# Patient Record
Sex: MALE | Race: WHITE | Employment: OTHER | URBAN - METROPOLITAN AREA
[De-identification: names, ages, dates, MRNs, and addresses within clinical notes are randomized per-mention and may not be internally consistent; named-entity substitution may affect disease eponyms.]

---

## 2017-03-02 PROBLEM — R09.89 DECREASED PULSES IN FEET: Status: ACTIVE | Noted: 2017-03-02

## 2017-03-02 PROBLEM — M79.89 LEG SWELLING: Status: ACTIVE | Noted: 2017-03-02

## 2017-03-02 PROBLEM — I87.2 CHRONIC VENOUS INSUFFICIENCY: Status: ACTIVE | Noted: 2017-03-02

## 2019-02-14 ENCOUNTER — OFFICE VISIT (OUTPATIENT)
Dept: CARDIOLOGY CLINIC | Age: 69
End: 2019-02-14
Payer: MEDICARE

## 2019-02-14 VITALS
HEIGHT: 67 IN | BODY MASS INDEX: 38.77 KG/M2 | SYSTOLIC BLOOD PRESSURE: 128 MMHG | HEART RATE: 64 BPM | RESPIRATION RATE: 16 BRPM | WEIGHT: 247 LBS | DIASTOLIC BLOOD PRESSURE: 74 MMHG

## 2019-02-14 DIAGNOSIS — I25.10 ATHEROSCLEROSIS OF NATIVE CORONARY ARTERY OF NATIVE HEART WITHOUT ANGINA PECTORIS: ICD-10-CM

## 2019-02-14 DIAGNOSIS — E78.00 PURE HYPERCHOLESTEROLEMIA: ICD-10-CM

## 2019-02-14 PROCEDURE — 99213 OFFICE O/P EST LOW 20 MIN: CPT | Performed by: INTERNAL MEDICINE

## 2019-02-14 PROCEDURE — 93000 ELECTROCARDIOGRAM COMPLETE: CPT | Performed by: INTERNAL MEDICINE

## 2019-02-14 RX ORDER — EMPAGLIFLOZIN 25 MG/1
25 TABLET, FILM COATED ORAL DAILY
COMMUNITY
Start: 2019-02-13 | End: 2020-02-11 | Stop reason: SDUPTHER

## 2019-04-16 ENCOUNTER — HOSPITAL ENCOUNTER (OUTPATIENT)
Age: 69
Discharge: HOME | End: 2019-04-18
Payer: MEDICARE

## 2019-04-16 PROBLEM — E66.01 CLASS 3 SEVERE OBESITY DUE TO EXCESS CALORIES WITH SERIOUS COMORBIDITY AND BODY MASS INDEX (BMI) OF 40.0 TO 44.9 IN ADULT (HCC): Status: ACTIVE | Noted: 2019-04-16

## 2019-04-16 LAB
ALBUMIN SERPL-MCNC: 4.6 G/DL (ref 3.5–5.2)
ALP BLD-CCNC: 55 U/L (ref 40–129)
ALT SERPL-CCNC: 19 U/L (ref 0–40)
ANION GAP SERPL CALCULATED.3IONS-SCNC: 13 MMOL/L (ref 7–16)
AST SERPL-CCNC: 22 U/L (ref 0–39)
BILIRUB SERPL-MCNC: 0.6 MG/DL (ref 0–1.2)
BUN BLDV-MCNC: 29 MG/DL (ref 8–23)
CALCIUM SERPL-MCNC: 9.6 MG/DL (ref 8.6–10.2)
CHLORIDE BLD-SCNC: 100 MMOL/L (ref 98–107)
CHOLESTEROL, TOTAL: 220 MG/DL (ref 0–199)
CO2: 22 MMOL/L (ref 22–29)
CREAT SERPL-MCNC: 1 MG/DL (ref 0.7–1.2)
GFR AFRICAN AMERICAN: >60
GFR NON-AFRICAN AMERICAN: >60 ML/MIN/1.73
GLUCOSE BLD-MCNC: 211 MG/DL (ref 74–99)
HDLC SERPL-MCNC: 47 MG/DL
LDL CHOLESTEROL CALCULATED: 136 MG/DL (ref 0–99)
POTASSIUM SERPL-SCNC: 5.4 MMOL/L (ref 3.5–5)
SODIUM BLD-SCNC: 135 MMOL/L (ref 132–146)
TOTAL PROTEIN: 7.3 G/DL (ref 6.4–8.3)
TRIGL SERPL-MCNC: 184 MG/DL (ref 0–149)
VLDLC SERPL CALC-MCNC: 37 MG/DL

## 2019-04-16 PROCEDURE — 83036 HEMOGLOBIN GLYCOSYLATED A1C: CPT

## 2019-04-16 PROCEDURE — 80061 LIPID PANEL: CPT

## 2019-04-16 PROCEDURE — 80053 COMPREHEN METABOLIC PANEL: CPT

## 2019-04-17 LAB — HBA1C MFR BLD: 8 % (ref 4–5.6)

## 2019-07-24 ENCOUNTER — HOSPITAL ENCOUNTER (OUTPATIENT)
Age: 69
Discharge: HOME | End: 2019-07-26
Payer: MEDICARE

## 2019-07-24 DIAGNOSIS — E11.9 TYPE 2 DIABETES MELLITUS WITHOUT COMPLICATION, WITHOUT LONG-TERM CURRENT USE OF INSULIN (HCC): Chronic | ICD-10-CM

## 2019-07-24 DIAGNOSIS — R53.83 FATIGUE, UNSPECIFIED TYPE: ICD-10-CM

## 2019-07-24 PROBLEM — E66.09 OBESITY DUE TO EXCESS CALORIES WITH SERIOUS COMORBIDITY: Status: ACTIVE | Noted: 2019-04-16

## 2019-07-24 LAB
ALBUMIN SERPL-MCNC: 4.6 G/DL (ref 3.5–5.2)
ALP BLD-CCNC: 57 U/L (ref 40–129)
ALT SERPL-CCNC: 16 U/L (ref 0–40)
ANION GAP SERPL CALCULATED.3IONS-SCNC: 14 MMOL/L (ref 7–16)
AST SERPL-CCNC: 17 U/L (ref 0–39)
BASOPHILS ABSOLUTE: 0.04 E9/L (ref 0–0.2)
BASOPHILS RELATIVE PERCENT: 0.8 % (ref 0–2)
BILIRUB SERPL-MCNC: 0.5 MG/DL (ref 0–1.2)
BUN BLDV-MCNC: 26 MG/DL (ref 8–23)
CALCIUM SERPL-MCNC: 9.1 MG/DL (ref 8.6–10.2)
CHLORIDE BLD-SCNC: 102 MMOL/L (ref 98–107)
CHOLESTEROL, TOTAL: 201 MG/DL (ref 0–199)
CO2: 20 MMOL/L (ref 22–29)
CREAT SERPL-MCNC: 1 MG/DL (ref 0.7–1.2)
EOSINOPHILS ABSOLUTE: 0.15 E9/L (ref 0.05–0.5)
EOSINOPHILS RELATIVE PERCENT: 2.9 % (ref 0–6)
GFR AFRICAN AMERICAN: >60
GFR NON-AFRICAN AMERICAN: >60 ML/MIN/1.73
GLUCOSE BLD-MCNC: 201 MG/DL (ref 74–99)
HBA1C MFR BLD: 7.8 % (ref 4–5.6)
HCT VFR BLD CALC: 46 % (ref 37–54)
HDLC SERPL-MCNC: 38 MG/DL
HEMOGLOBIN: 15.5 G/DL (ref 12.5–16.5)
IMMATURE GRANULOCYTES #: 0.01 E9/L
IMMATURE GRANULOCYTES %: 0.2 % (ref 0–5)
LDL CHOLESTEROL CALCULATED: 124 MG/DL (ref 0–99)
LYMPHOCYTES ABSOLUTE: 1.4 E9/L (ref 1.5–4)
LYMPHOCYTES RELATIVE PERCENT: 27.3 % (ref 20–42)
MCH RBC QN AUTO: 32.6 PG (ref 26–35)
MCHC RBC AUTO-ENTMCNC: 33.7 % (ref 32–34.5)
MCV RBC AUTO: 96.8 FL (ref 80–99.9)
MONOCYTES ABSOLUTE: 0.37 E9/L (ref 0.1–0.95)
MONOCYTES RELATIVE PERCENT: 7.2 % (ref 2–12)
NEUTROPHILS ABSOLUTE: 3.15 E9/L (ref 1.8–7.3)
NEUTROPHILS RELATIVE PERCENT: 61.6 % (ref 43–80)
PDW BLD-RTO: 12.4 FL (ref 11.5–15)
PLATELET # BLD: 151 E9/L (ref 130–450)
PMV BLD AUTO: 10.3 FL (ref 7–12)
POTASSIUM SERPL-SCNC: 5 MMOL/L (ref 3.5–5)
RBC # BLD: 4.75 E12/L (ref 3.8–5.8)
SODIUM BLD-SCNC: 136 MMOL/L (ref 132–146)
TOTAL PROTEIN: 7.2 G/DL (ref 6.4–8.3)
TRIGL SERPL-MCNC: 196 MG/DL (ref 0–149)
TSH SERPL DL<=0.05 MIU/L-ACNC: 1.38 UIU/ML (ref 0.27–4.2)
VITAMIN B-12: 422 PG/ML (ref 211–946)
VLDLC SERPL CALC-MCNC: 39 MG/DL
WBC # BLD: 5.1 E9/L (ref 4.5–11.5)

## 2019-07-24 PROCEDURE — 80061 LIPID PANEL: CPT

## 2019-07-24 PROCEDURE — 84443 ASSAY THYROID STIM HORMONE: CPT

## 2019-07-24 PROCEDURE — 85025 COMPLETE CBC W/AUTO DIFF WBC: CPT

## 2019-07-24 PROCEDURE — 82607 VITAMIN B-12: CPT

## 2019-07-24 PROCEDURE — 80053 COMPREHEN METABOLIC PANEL: CPT

## 2019-07-24 PROCEDURE — 83036 HEMOGLOBIN GLYCOSYLATED A1C: CPT

## 2019-09-19 ENCOUNTER — HOSPITAL ENCOUNTER (OUTPATIENT)
Dept: AUDIOLOGY | Age: 69
Discharge: HOME | End: 2019-09-19
Payer: MEDICARE

## 2019-09-19 PROCEDURE — 92557 COMPREHENSIVE HEARING TEST: CPT | Performed by: AUDIOLOGIST

## 2019-09-19 PROCEDURE — 92567 TYMPANOMETRY: CPT | Performed by: AUDIOLOGIST

## 2019-09-19 NOTE — PROGRESS NOTES
AUDIOMETRIC EVALUATION    REASON FOR REFERRAL:  This patient was referred for audiometric testing by Nicholas Graham MD due to difficulty hearing, especially his grandchildren. He can hear voices but has difficulty understanding conversation. He reports bilateral tinnitus and history of occupational noise exposure. RESULTS:  Pure tone audiometric testing using earphones was carried out. Results revealed air conduction thresholds averaging 15 dBHL through 1500 Hz, steeply sloping to about 60 dBHL for the higher frequencies. Speech reception thresholds were obtained at 25-15 dBHL . Speech discrimination testing was performed at 60 dBHL. Obtained were scores of 90% in the right ear and 85% in the left ear. Masked Bone Conduction Testing revealed no significant air bone gaps. Tympanometry was administered and revealed normal peak pressure and compliance bilaterally. IMPRESSION:  Todays results revealed normal hearing through 1500 Hz with a steeply sloping moderately high frequency sensorineural hearing loss bilaterally. Speech testing was in agreement with the pure tones, bilaterally. Speech discrimination was excellent bilaterally. Impedance testing revealed essentially normal middle ear function bilaterally. An ENT consult is suggested if you feel it is clinically warranted. A re-evaluation is recommended annually or if a change in hearing is noted. Amplification was discussed. His insurance is through [email or web address]. The above results were reviewed with the patient. If I can be of further assistance or provide additional information, please do not hesitate to contact this office.       Thank you for the referral.        Juliocesar Suarez M.A., [address] [facility] [id number]  Electronically signed by Shana Narayanan on 9/19/2019 at 2:38 PM

## 2019-11-04 ENCOUNTER — HOSPITAL ENCOUNTER (OUTPATIENT)
Age: 69
Discharge: HOME | End: 2019-11-06
Payer: MEDICARE

## 2019-11-04 DIAGNOSIS — E11.9 TYPE 2 DIABETES MELLITUS WITHOUT COMPLICATION, WITHOUT LONG-TERM CURRENT USE OF INSULIN (HCC): ICD-10-CM

## 2019-11-04 DIAGNOSIS — Z12.5 SCREENING FOR MALIGNANT NEOPLASM OF PROSTATE: ICD-10-CM

## 2019-11-04 LAB
ALBUMIN SERPL-MCNC: 4.5 G/DL (ref 3.5–5.2)
ALP BLD-CCNC: 63 U/L (ref 40–129)
ALT SERPL-CCNC: 15 U/L (ref 0–40)
ANION GAP SERPL CALCULATED.3IONS-SCNC: 16 MMOL/L (ref 7–16)
AST SERPL-CCNC: 15 U/L (ref 0–39)
BILIRUB SERPL-MCNC: 0.6 MG/DL (ref 0–1.2)
BUN BLDV-MCNC: 19 MG/DL (ref 8–23)
CALCIUM SERPL-MCNC: 9.6 MG/DL (ref 8.6–10.2)
CHLORIDE BLD-SCNC: 102 MMOL/L (ref 98–107)
CHOLESTEROL, TOTAL: 205 MG/DL (ref 0–199)
CO2: 23 MMOL/L (ref 22–29)
CREAT SERPL-MCNC: 1 MG/DL (ref 0.7–1.2)
GFR AFRICAN AMERICAN: >60
GFR NON-AFRICAN AMERICAN: >60 ML/MIN/1.73
GLUCOSE BLD-MCNC: 254 MG/DL (ref 74–99)
HBA1C MFR BLD: 7.9 % (ref 4–5.6)
HDLC SERPL-MCNC: 43 MG/DL
LDL CHOLESTEROL CALCULATED: 129 MG/DL (ref 0–99)
POTASSIUM SERPL-SCNC: 5.3 MMOL/L (ref 3.5–5)
PROSTATE SPECIFIC ANTIGEN: 0.43 NG/ML (ref 0–4)
SODIUM BLD-SCNC: 141 MMOL/L (ref 132–146)
TOTAL PROTEIN: 7.2 G/DL (ref 6.4–8.3)
TRIGL SERPL-MCNC: 163 MG/DL (ref 0–149)
VLDLC SERPL CALC-MCNC: 33 MG/DL

## 2019-11-04 PROCEDURE — 83036 HEMOGLOBIN GLYCOSYLATED A1C: CPT

## 2019-11-04 PROCEDURE — 80053 COMPREHEN METABOLIC PANEL: CPT

## 2019-11-04 PROCEDURE — 80061 LIPID PANEL: CPT

## 2019-11-04 PROCEDURE — G0103 PSA SCREENING: HCPCS

## 2020-02-11 ENCOUNTER — HOSPITAL ENCOUNTER (OUTPATIENT)
Age: 70
Discharge: HOME | End: 2020-02-13
Payer: MEDICARE

## 2020-02-11 LAB
ALBUMIN SERPL-MCNC: 4.5 G/DL (ref 3.5–5.2)
ALP BLD-CCNC: 52 U/L (ref 40–129)
ALT SERPL-CCNC: 18 U/L (ref 0–40)
ANION GAP SERPL CALCULATED.3IONS-SCNC: 13 MMOL/L (ref 7–16)
AST SERPL-CCNC: 19 U/L (ref 0–39)
BILIRUB SERPL-MCNC: 0.5 MG/DL (ref 0–1.2)
BUN BLDV-MCNC: 18 MG/DL (ref 8–23)
CALCIUM SERPL-MCNC: 9.5 MG/DL (ref 8.6–10.2)
CHLORIDE BLD-SCNC: 106 MMOL/L (ref 98–107)
CHOLESTEROL, TOTAL: 201 MG/DL (ref 0–199)
CO2: 21 MMOL/L (ref 22–29)
CREAT SERPL-MCNC: 1 MG/DL (ref 0.7–1.2)
GFR AFRICAN AMERICAN: >60
GFR NON-AFRICAN AMERICAN: >60 ML/MIN/1.73
GLUCOSE BLD-MCNC: 170 MG/DL (ref 74–99)
HBA1C MFR BLD: 7.6 % (ref 4–5.6)
HDLC SERPL-MCNC: 41 MG/DL
LDL CHOLESTEROL CALCULATED: 139 MG/DL (ref 0–99)
POTASSIUM SERPL-SCNC: 4.6 MMOL/L (ref 3.5–5)
SODIUM BLD-SCNC: 140 MMOL/L (ref 132–146)
TOTAL PROTEIN: 7.1 G/DL (ref 6.4–8.3)
TRIGL SERPL-MCNC: 104 MG/DL (ref 0–149)
VLDLC SERPL CALC-MCNC: 21 MG/DL

## 2020-02-11 PROCEDURE — 80053 COMPREHEN METABOLIC PANEL: CPT

## 2020-02-11 PROCEDURE — 83036 HEMOGLOBIN GLYCOSYLATED A1C: CPT

## 2020-02-11 PROCEDURE — 80061 LIPID PANEL: CPT

## 2020-03-03 ENCOUNTER — OFFICE VISIT (OUTPATIENT)
Dept: CARDIOLOGY CLINIC | Age: 70
End: 2020-03-03
Payer: MEDICARE

## 2020-03-03 VITALS
WEIGHT: 237 LBS | HEART RATE: 60 BPM | RESPIRATION RATE: 16 BRPM | BODY MASS INDEX: 37.2 KG/M2 | HEIGHT: 67 IN | SYSTOLIC BLOOD PRESSURE: 136 MMHG | DIASTOLIC BLOOD PRESSURE: 78 MMHG

## 2020-03-03 PROCEDURE — 99213 OFFICE O/P EST LOW 20 MIN: CPT | Performed by: INTERNAL MEDICINE

## 2020-03-03 PROCEDURE — 93000 ELECTROCARDIOGRAM COMPLETE: CPT | Performed by: INTERNAL MEDICINE

## 2020-03-03 NOTE — PROGRESS NOTES
Subjective:      Patient ID: Jo Bermudez is a 68 y.o. male. Chief Complaint   Patient presents with    Coronary Artery Disease       Patient Active Problem List    Diagnosis Date Noted    [facility] 10/27/2011     Priority: High     Class: Chronic    HTN (hypertension) 10/27/2011     Priority: High     Class: Chronic    Coronary atherosclerosis of native coronary artery 10/27/2011     Priority: High     Class: Chronic     Overview Note:       A. Lateral infarction 10/08 due to circumflex occlusion. PCI not performed due to completed infarction. B. Adenosine Myoview 12/14/09 ([facility] Neither fixed nor reversible perfusion defect. LVEF 50%.     C. ETT-N 2/13/2018: [facility] 6, normal nuke, EF 51%      Class 2 severe obesity due to excess calories with serious comorbidity and body mass index (BMI) of 38.0 to 38.9 in adult [facility]) 04/16/2019    Hyperlipidemia 06/07/2012     Overview Note:     Intolerant to multiple statin agents         Current Outpatient Medications   Medication Sig Dispense Refill    enalapril (VASOTEC) 2.5 MG tablet TAKE ONE TABLET BY MOUTH DAILY 90 tablet 2    JARDIANCE 25 MG tablet TAKE 25 MG BY MOUTH DAILY 90 tablet 3    glipiZIDE (GLUCOTROL) 5 MG tablet Take 1 tablet by mouth 4 times daily 360 tablet 3    atenolol (TENORMIN) 25 MG tablet TAKE 1 TABLET BY MOUTH EVERY DAY 90 tablet 3    metFORMIN (GLUCOPHAGE) 500 MG tablet TAKE 2 TABLETS BY MOUTH 2 TIMES DAILY (WITH MEALS) 360 tablet 2    sildenafil (REVATIO) 20 MG tablet Take 1 tablet by mouth daily as needed (ED) 30 tablet 3    ONE TOUCH ULTRA TEST strip TEST BLOOD SUGAR TWICE DAILY AS NEEDED 100 strip 5    tamsulosin (FLOMAX) 0.4 MG capsule TAKE ONE CAPSULE BY MOUTH EVERY DAY 90 capsule 3    TURMERIC CURCUMIN PO Take 1 capsule by mouth daily      b complex vitamins capsule Take 1 capsule by mouth daily      vitamin B-12 (CYANOCOBALAMIN) 1000 MCG tablet Take 1,000 mcg by mouth daily      vitamin D (CHOLECALCIFEROL) 1000 UNIT TABS tablet Take 400 Units by mouth daily       aspirin 325 MG tablet Take 325 mg by mouth daily.  Elastic Bandages & Supports (JOBST KNEE HIGH COMPRESSION SM) MISC Knee high with 20- 30 mmhg of compression (Patient not taking: Reported on 3/3/2020) 1 each 10     No current facility-administered medications for this visit.          Allergies   Allergen Reactions    Crestor [Rosuvastatin Calcium]      Muscles aches     Lipitor [Atorvastatin]      Muscle aches       Vitals:    03/03/20 0657   BP: 136/78   Pulse: 60   Resp: 16   Weight: 237 lb (107.5 kg)   Height: 5' 6.5\" (1.689 m)       Social History     Socioeconomic History    Marital status:      Spouse name: Not on file    Number of children: 2    Years of education: Not on file    Highest education level: Not on file   Occupational History    Occupation: self-employed     Comment: [employer] shop   Social Needs    Financial resource strain: Not on file    Food insecurity:     Worry: Not on file     Inability: Not on file    Transportation needs:     Medical: Not on file     Non-medical: Not on file   Tobacco Use    Smoking status: Never Smoker    Smokeless tobacco: Never Used   Substance and Sexual Activity    Alcohol use: Yes     Frequency: Monthly or less     Drinks per session: 1 or 2     Binge frequency: Never     Comment: couple times per month    Drug use: No    Sexual activity: Not on file   Lifestyle    Physical activity:     Days per week: Not on file     Minutes per session: Not on file    Stress: Not on file   Relationships    Social connections:     Talks on phone: Not on file     Gets together: Not on file     Attends [employer] service: Not on file     Active member of club or organization: Not on file     Attends meetings of clubs or organizations: Not on file     Relationship status: Not on file    Intimate partner violence:     Fear of current or ex partner: Not on file     Emotionally abused: Not on file     Physically abused: Not on file     Forced sexual activity: Not on file   Other Topics Concern    Not on file   Social History Narrative    Not on file       Family History   Problem Relation Age of Onset    COPD Mother     COPD Father     Heart Attack Father 79    Down Syndrome Sister     Crohn's Disease Brother     No Known Problems Sister     No Known Problems Sister     No Known Problems Brother     No Known Problems Brother        SUBJECTIVE: Zofia Schneider presents to the office today for re-evaluation of  3 chronic cardiac diagnoses. He complains of NO cardiac issues  and denies chest pain, dyspnea, lower extremity edema, orthopnea, palpitations, paroxysmal nocturnal dyspnea and syncope. He is now walking and using his phone to track steps - has lost 10 lbs. Review of Systems   Other than stated in HPI, negative 10-point system review. Objective:   Physical Exam   Constitutional: He is oriented to person, place, and time. He is cooperative. Obese     HENT:   Head: Normocephalic and atraumatic. Eyes: Conjunctivae are normal. Pupils are equal, round,   Neck: No JVD present. Carotid bruit is not present. Cardiovascular: Regular rhythm, S1 normal, S2 normal and intact distal pulses. PMI is not displaced. Exam reveals no gallop. No murmur heard. Pulmonary/Chest: Effort normal and breath sounds normal. No respiratory distress. Abdominal: Soft. Normal appearance and bowel sounds are normal. He exhibits no abdominal bruit and no pulsatile midline mass. There is no hepatomegaly. There is no tenderness. Musculoskeletal: Normal range of motion. He exhibits no edema. Neurological: He is alert and oriented to person, place, and time. Gait normal.   Skin: Skin is warm and dry. No bruising, no ecchymosis and no rash noted. He is not diaphoretic. No cyanosis. Psychiatric: He has a normal mood and affect.  His behavior is normal. Thought content normal. EKG: normal EKG, normal sinus rhythm. ASSESSMENT & PLAN:    Patient Active Problem List   Diagnoses    DM (diabetes mellitus), type 2    HTN (hypertension): at target    Coronary atherosclerosis of native coronary artery: s/p lateral MI 2008 due to circumflex occlusion treated conservatively due to completed, uncomplicated infarction.   Stress 2018            Hyperlipidemia: intolerant to multiple statin agents       OV one year

## 2020-06-10 ENCOUNTER — HOSPITAL ENCOUNTER (OUTPATIENT)
Age: 70
Discharge: HOME | End: 2020-06-12
Payer: MEDICARE

## 2020-06-10 LAB
ALBUMIN SERPL-MCNC: 4.6 G/DL (ref 3.5–5.2)
ALP BLD-CCNC: 62 U/L (ref 40–129)
ALT SERPL-CCNC: 15 U/L (ref 0–40)
ANION GAP SERPL CALCULATED.3IONS-SCNC: 19 MMOL/L (ref 7–16)
AST SERPL-CCNC: 19 U/L (ref 0–39)
BILIRUB SERPL-MCNC: 0.5 MG/DL (ref 0–1.2)
BUN BLDV-MCNC: 27 MG/DL (ref 8–23)
CALCIUM SERPL-MCNC: 10.1 MG/DL (ref 8.6–10.2)
CHLORIDE BLD-SCNC: 104 MMOL/L (ref 98–107)
CO2: 20 MMOL/L (ref 22–29)
CREAT SERPL-MCNC: 1.1 MG/DL (ref 0.7–1.2)
GFR AFRICAN AMERICAN: >60
GFR NON-AFRICAN AMERICAN: >60 ML/MIN/1.73
GLUCOSE BLD-MCNC: 190 MG/DL (ref 74–99)
HBA1C MFR BLD: 7.3 % (ref 4–5.6)
POTASSIUM SERPL-SCNC: 5.3 MMOL/L (ref 3.5–5)
SODIUM BLD-SCNC: 143 MMOL/L (ref 132–146)
TOTAL PROTEIN: 7.7 G/DL (ref 6.4–8.3)

## 2020-06-10 PROCEDURE — 83036 HEMOGLOBIN GLYCOSYLATED A1C: CPT

## 2020-06-10 PROCEDURE — 80061 LIPID PANEL: CPT

## 2020-06-10 PROCEDURE — 80053 COMPREHEN METABOLIC PANEL: CPT

## 2020-06-11 LAB
CHOLESTEROL, TOTAL: 201 MG/DL (ref 0–199)
HDLC SERPL-MCNC: 43 MG/DL
LDL CHOLESTEROL CALCULATED: 135 MG/DL (ref 0–99)
TRIGL SERPL-MCNC: 113 MG/DL (ref 0–149)
VLDLC SERPL CALC-MCNC: 23 MG/DL

## 2020-09-23 ENCOUNTER — HOSPITAL ENCOUNTER (OUTPATIENT)
Age: 70
Discharge: HOME | End: 2020-09-25
Payer: MEDICARE

## 2020-09-23 LAB
ALBUMIN SERPL-MCNC: 4.4 G/DL (ref 3.5–5.2)
ALP BLD-CCNC: 63 U/L (ref 40–129)
ALT SERPL-CCNC: 19 U/L (ref 0–40)
ANION GAP SERPL CALCULATED.3IONS-SCNC: 15 MMOL/L (ref 7–16)
AST SERPL-CCNC: 18 U/L (ref 0–39)
BILIRUB SERPL-MCNC: 0.6 MG/DL (ref 0–1.2)
BUN BLDV-MCNC: 20 MG/DL (ref 8–23)
CALCIUM SERPL-MCNC: 9.6 MG/DL (ref 8.6–10.2)
CHLORIDE BLD-SCNC: 103 MMOL/L (ref 98–107)
CHOLESTEROL, TOTAL: 203 MG/DL (ref 0–199)
CO2: 22 MMOL/L (ref 22–29)
CREAT SERPL-MCNC: 1.1 MG/DL (ref 0.7–1.2)
GFR AFRICAN AMERICAN: >60
GFR NON-AFRICAN AMERICAN: >60 ML/MIN/1.73
GLUCOSE BLD-MCNC: 192 MG/DL (ref 74–99)
HBA1C MFR BLD: 7.3 % (ref 4–5.6)
HDLC SERPL-MCNC: 45 MG/DL
LDL CHOLESTEROL CALCULATED: 135 MG/DL (ref 0–99)
POTASSIUM SERPL-SCNC: 4.9 MMOL/L (ref 3.5–5)
SODIUM BLD-SCNC: 140 MMOL/L (ref 132–146)
TOTAL PROTEIN: 7.1 G/DL (ref 6.4–8.3)
TRIGL SERPL-MCNC: 113 MG/DL (ref 0–149)
VITAMIN B-12: 653 PG/ML (ref 211–946)
VLDLC SERPL CALC-MCNC: 23 MG/DL

## 2020-09-23 PROCEDURE — 86592 SYPHILIS TEST NON-TREP QUAL: CPT

## 2020-09-23 PROCEDURE — 80053 COMPREHEN METABOLIC PANEL: CPT

## 2020-09-23 PROCEDURE — 82607 VITAMIN B-12: CPT

## 2020-09-23 PROCEDURE — 83036 HEMOGLOBIN GLYCOSYLATED A1C: CPT

## 2020-09-23 PROCEDURE — 80061 LIPID PANEL: CPT

## 2020-09-24 LAB — RPR: NORMAL

## 2020-10-06 ENCOUNTER — HOSPITAL ENCOUNTER (OUTPATIENT)
Dept: CT IMAGING | Age: 70
Discharge: HOME | End: 2020-10-08
Payer: MEDICARE

## 2020-10-06 PROCEDURE — 70450 CT HEAD/BRAIN W/O DYE: CPT

## 2021-01-05 ENCOUNTER — OFFICE VISIT (OUTPATIENT)
Dept: CARDIOLOGY CLINIC | Age: 71
End: 2021-01-05
Payer: MEDICARE

## 2021-01-05 VITALS
WEIGHT: 228.6 LBS | RESPIRATION RATE: 18 BRPM | BODY MASS INDEX: 36.74 KG/M2 | SYSTOLIC BLOOD PRESSURE: 120 MMHG | DIASTOLIC BLOOD PRESSURE: 72 MMHG | HEIGHT: 66 IN | HEART RATE: 70 BPM

## 2021-01-05 DIAGNOSIS — I48.91 ATRIAL FIBRILLATION, UNSPECIFIED TYPE (HCC): ICD-10-CM

## 2021-01-05 DIAGNOSIS — I25.10 ATHEROSCLEROSIS OF NATIVE CORONARY ARTERY OF NATIVE HEART WITHOUT ANGINA PECTORIS: Primary | ICD-10-CM

## 2021-01-05 PROCEDURE — 93000 ELECTROCARDIOGRAM COMPLETE: CPT | Performed by: INTERNAL MEDICINE

## 2021-01-05 PROCEDURE — 99215 OFFICE O/P EST HI 40 MIN: CPT | Performed by: INTERNAL MEDICINE

## 2021-01-05 NOTE — PROGRESS NOTES
Subjective:      Patient ID: Ty Mallory is a 65 y.o. male. Chief Complaint   Patient presents with    Coronary Artery Disease    Atrial Fibrillation       Patient Active Problem List    Diagnosis Date Noted    DM (diabetes mellitus), type 2 ([facility] 10/27/2011     Priority: High     Class: Chronic    HTN (hypertension) 10/27/2011     Priority: High     Class: Chronic    Coronary atherosclerosis of native coronary artery 10/27/2011     Priority: High     Class: Chronic     Overview Note:       A. Lateral infarction 10/08 due to circumflex occlusion. PCI not performed due to completed infarction. B. Adenosine Myoview 12/14/09 ([facility] Neither fixed nor reversible perfusion defect. LVEF 50%. C. ETT-N 2/13/2018: [facility] 6, normal nuke, EF 51%      Atrial fibrillation ([facility] 01/05/2021     Overview Note:     A. CHADS-VASC = 4      Class 2 severe obesity due to excess calories with serious comorbidity and body mass index (BMI) of 36.0 to 36.9 in adult [facility]) 04/16/2019    Hyperlipidemia 06/07/2012     Overview Note:     Intolerant to multiple statin agents         Current Outpatient Medications   Medication Sig Dispense Refill    nitroGLYCERIN (NITROSTAT) 0.4 MG SL tablet Place 1 tablet under the tongue every 5 minutes as needed for Chest pain up to max of 3 total doses.  If no relief after 1 dose, call 911. 25 tablet 3    zinc gluconate 50 MG tablet Take 50 mg by mouth daily      atenolol (TENORMIN) 25 MG tablet TAKE 1 TABLET BY MOUTH EVERY DAY 90 tablet 1    enalapril (VASOTEC) 2.5 MG tablet TAKE ONE TABLET BY MOUTH DAILY 90 tablet 0    metFORMIN (GLUCOPHAGE) 500 MG tablet TAKE 2 TABLETS BY MOUTH 2 TIMES DAILY (WITH MEALS) 360 tablet 2    tamsulosin (FLOMAX) 0.4 MG capsule TAKE ONE CAPSULE BY MOUTH EVERY DAY 90 capsule 3    glipiZIDE (GLUCOTROL) 5 MG tablet Take 1 tablet by mouth 4 times daily 360 tablet 3  sildenafil (REVATIO) 20 MG tablet Take 1 tablet by mouth daily as needed (ED) 30 tablet 3    TURMERIC CURCUMIN PO Take 1 capsule by mouth daily      b complex vitamins capsule Take 1 capsule by mouth daily      Elastic Bandages & Supports (JOBST KNEE HIGH COMPRESSION SM) MISC Knee high with 20- 30 mmhg of compression 1 each 10    vitamin D (CHOLECALCIFEROL) 1000 UNIT TABS tablet Take 400 Units by mouth daily       aspirin 325 MG tablet Take 325 mg by mouth daily.  rivaroxaban (XARELTO) 20 MG TABS tablet Take 1 tablet by mouth Daily with supper for 1 day Lot: [id number]  Exp: 9/22 42 tablet 0    vitamin B-12 (CYANOCOBALAMIN) 1000 MCG tablet Take 1,000 mcg by mouth daily       No current facility-administered medications for this visit.          Allergies   Allergen Reactions    Crestor [Rosuvastatin Calcium]      Muscles aches     Lipitor [Atorvastatin]      Muscle aches       Vitals:    01/05/21 1327   BP: 120/72   Pulse: 70   Resp: 18   Weight: 228 lb 9.6 oz (103.7 kg)   Height: 5' 6\" (1.676 m)       Social History     Socioeconomic History    Marital status:      Spouse name: Not on file    Number of children: 2    Years of education: Not on file    Highest education level: Not on file   Occupational History    Occupation: self-employed     Comment: [employer]   Social Needs    Financial resource strain: Not on file    Food insecurity     Worry: Not on file     Inability: Not on file   [employer] needs     Medical: Not on file     Non-medical: Not on file   Tobacco Use    Smoking status: Never Smoker    Smokeless tobacco: Never Used   Substance and Sexual Activity    Alcohol use: Yes     Frequency: Monthly or less     Drinks per session: 1 or 2     Binge frequency: Never     Comment: couple times per month    Drug use: No    Sexual activity: Not on file   Lifestyle    Physical activity     Days per week: Not on file     Minutes per session: Not on file  Stress: Not on file   Relationships    Social connections     Talks on phone: Not on file     Gets together: Not on file     Attends Holiness service: Not on file     Active member of club or organization: Not on file     Attends meetings of clubs or organizations: Not on file     Relationship status: Not on file    Intimate partner violence     Fear of current or ex partner: Not on file     Emotionally abused: Not on file     Physically abused: Not on file     Forced sexual activity: Not on file   Other Topics Concern    Not on file   Social History Narrative    Not on file       Family History   Problem Relation Age of Onset    COPD Mother     COPD Father     Heart Attack Father 79    Down Syndrome Sister     Crohn's Disease Brother     No Known Problems Sister     No Known Problems Sister     No Known Problems Brother     No Known Problems Brother        SUBJECTIVE: Dick Pickard presents to the office today for re-evaluation of  3 chronic cardiac diagnoses. He complains of new sob with exertion and vague chest discomfort. No palpitations, neuro symptoms,  and denies lower extremity edema, orthopnea, palpitations, paroxysmal nocturnal dyspnea and syncope. Review of Systems   Other than stated in HPI, negative 10-point system review. Objective:   Physical Exam   Constitutional: He is oriented to person, place, and time. He is cooperative. Obese     HENT:   Head: Normocephalic and atraumatic. Eyes: Conjunctivae are normal. Pupils are equal, round,   Neck: No JVD present. Carotid bruit is not present. Cardiovascular: irregular rhythm, S1 normal, S2 normal and intact distal pulses. PMI is not displaced. Exam reveals no gallop. No murmur heard. Pulmonary/Chest: Effort normal and breath sounds normal. No respiratory distress. Abdominal: Soft. Normal appearance and bowel sounds are normal. He exhibits no abdominal bruit and no pulsatile midline mass. There is no hepatomegaly. There is no tenderness. Musculoskeletal: Normal range of motion. He exhibits trace bilateral edema. Neurological: He is alert and oriented to person, place, and time. Gait normal.   Skin: Skin is warm and dry. No bruising, no ecchymosis and no rash noted. He is not diaphoretic. No cyanosis. Psychiatric: He has a normal mood and affect. His behavior is normal. Thought content normal.     EKG: normal EKG, normal sinus rhythm. ASSESSMENT & PLAN:    Patient Active Problem List   Diagnoses    DM (diabetes mellitus), type 2    HTN (hypertension): at target    Coronary atherosclerosis of native coronary artery: s/p lateral MI 2008 due to circumflex occlusion treated conservatively due to completed, uncomplicated infarction. Stress 2018             * Hyperlipidemia: intolerant to multiple statin agents- consider Repatha  Atrial fibrillation with elevated CHADS-VASC. Patient with symptomatic (CALI)  new onset afib with no signs of chf. . long discussion with patient about afib symptoms, chads-vasc system, risks and benefits of anticoagulations, cardioversion etc.  Gave him Xarelto 20 mg samples, one qd,and to drop ASA to 81 mg. Will see back in a month and if still in afib will arrange OP DCCV.   May need ischemic w/u after that       OV one month

## 2021-02-04 ENCOUNTER — OFFICE VISIT (OUTPATIENT)
Dept: CARDIOLOGY CLINIC | Age: 71
End: 2021-02-04
Payer: MEDICARE

## 2021-02-04 VITALS
HEIGHT: 66 IN | HEART RATE: 64 BPM | BODY MASS INDEX: 37.67 KG/M2 | SYSTOLIC BLOOD PRESSURE: 135 MMHG | RESPIRATION RATE: 16 BRPM | DIASTOLIC BLOOD PRESSURE: 93 MMHG | WEIGHT: 234.4 LBS

## 2021-02-04 DIAGNOSIS — I10 ESSENTIAL HYPERTENSION: ICD-10-CM

## 2021-02-04 DIAGNOSIS — I25.10 CORONARY ARTERY DISEASE INVOLVING NATIVE CORONARY ARTERY OF NATIVE HEART WITHOUT ANGINA PECTORIS: ICD-10-CM

## 2021-02-04 DIAGNOSIS — E78.5 HYPERLIPIDEMIA, UNSPECIFIED HYPERLIPIDEMIA TYPE: ICD-10-CM

## 2021-02-04 DIAGNOSIS — I25.118 ATHEROSCLEROSIS OF NATIVE CORONARY ARTERY OF NATIVE HEART WITH STABLE ANGINA PECTORIS (HCC): Primary | ICD-10-CM

## 2021-02-04 PROCEDURE — 93000 ELECTROCARDIOGRAM COMPLETE: CPT | Performed by: INTERNAL MEDICINE

## 2021-02-04 PROCEDURE — 99215 OFFICE O/P EST HI 40 MIN: CPT | Performed by: INTERNAL MEDICINE

## 2021-02-04 RX ORDER — ASPIRIN 81 MG/1
81 TABLET ORAL DAILY
COMMUNITY
End: 2021-03-03 | Stop reason: ALTCHOICE

## 2021-02-04 NOTE — PROGRESS NOTES
Subjective:      Patient ID: Radha Enrique is a 65 y.o. male. Chief Complaint   Patient presents with    Atrial Fibrillation    Coronary Artery Disease    Hypertension       Patient Active Problem List    Diagnosis Date Noted    DM (diabetes mellitus), type 2 ([facility] 10/27/2011     Priority: High     Class: Chronic    HTN (hypertension) 10/27/2011     Priority: High     Class: Chronic    Coronary atherosclerosis of native coronary artery 10/27/2011     Priority: High     Class: Chronic     Overview Note:       A. Lateral infarction 10/08 due to circumflex occlusion. PCI not performed due to completed infarction. B. Adenosine Myoview 12/14/09 ([facility] Neither fixed nor reversible perfusion defect. LVEF 50%. C. ETT-N 2/13/2018: [facility] 6, normal nuke, EF 51%      [facility]     Overview Note:     A. CHADS-VASC = 4      Class 2 severe obesity due to excess calories with serious comorbidity and body mass index (BMI) of 36.0 to 36.9 in adult [facility]) 04/16/2019    Hyperlipidemia 06/07/2012     Overview Note:     Intolerant to multiple statin agents         Current Outpatient Medications   Medication Sig Dispense Refill    aspirin 81 MG EC tablet Take 81 mg by mouth daily      enalapril (VASOTEC) 2.5 MG tablet TAKE ONE TABLET BY MOUTH DAILY 90 tablet 1    nitroGLYCERIN (NITROSTAT) 0.4 MG SL tablet Place 1 tablet under the tongue every 5 minutes as needed for Chest pain up to max of 3 total doses.  If no relief after 1 dose, call 911. 25 tablet 3    zinc gluconate 50 MG tablet Take 50 mg by mouth daily      atenolol (TENORMIN) 25 MG tablet TAKE 1 TABLET BY MOUTH EVERY DAY 90 tablet 1    metFORMIN (GLUCOPHAGE) 500 MG tablet TAKE 2 TABLETS BY MOUTH 2 TIMES DAILY (WITH MEALS) 360 tablet 2    tamsulosin (FLOMAX) 0.4 MG capsule TAKE ONE CAPSULE BY MOUTH EVERY DAY 90 capsule 3    glipiZIDE (GLUCOTROL) 5 MG tablet Take 1 tablet by mouth 4 times daily 360 tablet 3  sildenafil (REVATIO) 20 MG tablet Take 1 tablet by mouth daily as needed (ED) 30 tablet 3    TURMERIC CURCUMIN PO Take 1 capsule by mouth daily      b complex vitamins capsule Take 1 capsule by mouth daily      vitamin B-12 (CYANOCOBALAMIN) 1000 MCG tablet Take 1,000 mcg by mouth daily      vitamin D (CHOLECALCIFEROL) 1000 UNIT TABS tablet Take 400 Units by mouth daily       rivaroxaban (XARELTO) 20 MG TABS tablet Take 1 tablet by mouth Daily with supper for 1 day Lot: [id number]  Exp: 9/22 42 tablet 0    Elastic Bandages & Supports (JOBST KNEE HIGH COMPRESSION SM) MISC Knee high with 20- 30 mmhg of compression 1 each 10     No current facility-administered medications for this visit.          Allergies   Allergen Reactions    Crestor [Rosuvastatin Calcium]      Muscles aches     Lipitor [Atorvastatin]      Muscle aches       Vitals:    02/04/21 0715   BP: (!) 135/93   Pulse: 64   Resp: 16   Weight: 234 lb 6.4 oz (106.3 kg)   Height: 5' 6\" (1.676 m)       Social History     Socioeconomic History    Marital status:      Spouse name: Not on file    Number of children: 2    Years of education: Not on file    Highest education level: Not on file   Occupational History    Occupation: self-employed     Comment: [employer]   Social Needs    Financial resource strain: Not on file    Food insecurity     Worry: Not on file     Inability: Not on file   [employer] needs     Medical: Not on file     Non-medical: Not on file   Tobacco Use    Smoking status: Never Smoker    Smokeless tobacco: Never Used   Substance and Sexual Activity    Alcohol use: Yes     Frequency: Monthly or less     Drinks per session: 1 or 2     Binge frequency: Never     Comment: couple times per month    Drug use: No    Sexual activity: Not on file   Lifestyle    Physical activity     Days per week: Not on file     Minutes per session: Not on file    Stress: Not on file   Relationships    Social connections Talks on phone: Not on file     Gets together: Not on file     Attends Church service: Not on file     Active member of club or organization: Not on file     Attends meetings of clubs or organizations: Not on file     Relationship status: Not on file    Intimate partner violence     Fear of current or ex partner: Not on file     Emotionally abused: Not on file     Physically abused: Not on file     Forced sexual activity: Not on file   Other Topics Concern    Not on file   Social History Narrative    Not on file       Family History   Problem Relation Age of Onset    COPD Mother     COPD Father     Heart Attack Father 79    Down Syndrome Sister     Crohn's Disease Brother     No Known Problems Sister     No Known Problems Sister     No Known Problems Brother     No Known Problems Brother        SUBJECTIVE: Jose Maria Morton presents to the office today for re-evaluation of   chronic cardiac diagnoses. He complains of new sob (mild) with exertion and vague chest discomfort. No palpitations, neuro symptoms,  and denies lower extremity edema, orthopnea, palpitations, paroxysmal nocturnal dyspnea and syncope. Compliant with NOAC with no missed doses. No bleeds. No SHANNAN diagnosis. Review of Systems   Other than stated in HPI, negative 10-point system review. Objective:   Physical Exam   Constitutional: He is oriented to person, place, and time. He is cooperative. Obese     HENT:   Head: Normocephalic and atraumatic. Eyes: Conjunctivae are normal. Pupils are equal, round,   Neck: No JVD present. Carotid bruit is not present. Cardiovascular: irregular rhythm, S1 normal, S2 normal and intact distal pulses. PMI is not displaced. Exam reveals no gallop. No murmur heard. Pulmonary/Chest: Effort normal and breath sounds normal. No respiratory distress. Abdominal: Soft. Normal appearance and bowel sounds are normal. He exhibits no abdominal bruit and no pulsatile midline mass. There is no hepatomegaly. There is no tenderness. Musculoskeletal: Normal range of motion. He exhibits trace bilateral edema. Neurological: He is alert and oriented to person, place, and time. Gait normal.   Skin: Skin is warm and dry. No bruising, no ecchymosis and no rash noted. He is not diaphoretic. No cyanosis. Psychiatric: He has a normal mood and affect. His behavior is normal. Thought content normal.     EKG: atrial fibrillation, rate 64 bm , axis +46, otherwise normal      ASSESSMENT & PLAN:    Patient Active Problem List   Diagnoses    DM (diabetes mellitus), type 2    HTN (hypertension):     Coronary atherosclerosis of native coronary artery: s/p lateral MI 2008 due to circumflex occlusion treated conservatively due to completed, uncomplicated infarction.   * Hyperlipidemia: intolerant to multiple statin agents- consider Repatha  Atrial fibrillation with elevated CHADS-VASC. Patient with mildly symptomatic (CALI)  new onset afib with no signs of chf. . long discussion with patient about afib symptoms, chads-vasc system, risks and benefits of anticoagulations, cardioversion etc.  Gave him Xarelto 20 mg samples, one qd Will arrange OP DCCV.

## 2021-02-04 NOTE — PROGRESS NOTES
Pt given samples of Xarelto 20 mg 28 day supply per Dr. Yesenia Mckeon.     Electronically signed by Julieta Be MA on 2/4/2021 at 7:37 AM

## 2021-02-10 ENCOUNTER — HOSPITAL ENCOUNTER (OUTPATIENT)
Age: 71
Discharge: HOME | End: 2021-02-12
Payer: MEDICARE

## 2021-02-10 DIAGNOSIS — Z01.818 PREOP TESTING: ICD-10-CM

## 2021-02-10 PROCEDURE — U0003 INFECTIOUS AGENT DETECTION BY NUCLEIC ACID (DNA OR RNA); SEVERE ACUTE RESPIRATORY SYNDROME CORONAVIRUS 2 (SARS-COV-2) (CORONAVIRUS DISEASE [COVID-19]), AMPLIFIED PROBE TECHNIQUE, MAKING USE OF HIGH THROUGHPUT TECHNOLOGIES AS DESCRIBED BY CMS-2020-01-R: HCPCS

## 2021-02-11 LAB
SARS-COV-2: NOT DETECTED
SOURCE: NORMAL

## 2021-02-15 ENCOUNTER — HOSPITAL ENCOUNTER (OUTPATIENT)
Dept: INPATIENT UNIT | Age: 71
Setting detail: OUTPATIENT SURGERY
Discharge: HOME | End: 2021-02-15
Payer: MEDICARE

## 2021-02-15 ENCOUNTER — ANESTHESIA EVENT (OUTPATIENT)
Dept: INPATIENT UNIT | Age: 71
End: 2021-02-15

## 2021-02-15 ENCOUNTER — ANESTHESIA (OUTPATIENT)
Dept: INPATIENT UNIT | Age: 71
End: 2021-02-15

## 2021-02-15 VITALS
TEMPERATURE: 96 F | HEART RATE: 64 BPM | BODY MASS INDEX: 36.1 KG/M2 | HEIGHT: 67 IN | SYSTOLIC BLOOD PRESSURE: 144 MMHG | DIASTOLIC BLOOD PRESSURE: 75 MMHG | WEIGHT: 230 LBS | RESPIRATION RATE: 18 BRPM | OXYGEN SATURATION: 96 %

## 2021-02-15 VITALS
OXYGEN SATURATION: 99 % | SYSTOLIC BLOOD PRESSURE: 179 MMHG | RESPIRATION RATE: 20 BRPM | DIASTOLIC BLOOD PRESSURE: 82 MMHG

## 2021-02-15 LAB
EKG ATRIAL RATE: 394 BPM
EKG Q-T INTERVAL: 404 MS
EKG QRS DURATION: 78 MS
EKG QTC CALCULATION (BAZETT): 432 MS
EKG R AXIS: 15 DEGREES
EKG T AXIS: 12 DEGREES
EKG VENTRICULAR RATE: 69 BPM
METER GLUCOSE: 239 MG/DL (ref 74–99)

## 2021-02-15 PROCEDURE — 7100000011 HC PHASE II RECOVERY - ADDTL 15 MIN

## 2021-02-15 PROCEDURE — 92960 CARDIOVERSION ELECTRIC EXT: CPT

## 2021-02-15 PROCEDURE — 82962 GLUCOSE BLOOD TEST: CPT

## 2021-02-15 PROCEDURE — 92960 CARDIOVERSION ELECTRIC EXT: CPT | Performed by: INTERNAL MEDICINE

## 2021-02-15 PROCEDURE — 93005 ELECTROCARDIOGRAM TRACING: CPT

## 2021-02-15 PROCEDURE — 2580000003 HC RX 258: Performed by: NURSE ANESTHETIST, CERTIFIED REGISTERED

## 2021-02-15 PROCEDURE — 7100000010 HC PHASE II RECOVERY - FIRST 15 MIN

## 2021-02-15 PROCEDURE — 6360000002 HC RX W HCPCS: Performed by: NURSE ANESTHETIST, CERTIFIED REGISTERED

## 2021-02-15 PROCEDURE — 6370000000 HC RX 637 (ALT 250 FOR IP): Performed by: ANESTHESIOLOGY

## 2021-02-15 PROCEDURE — 3700000000 HC ANESTHESIA ATTENDED CARE

## 2021-02-15 RX ORDER — SODIUM CHLORIDE 9 MG/ML
INJECTION, SOLUTION INTRAVENOUS CONTINUOUS
Status: CANCELLED | OUTPATIENT
Start: 2021-02-15

## 2021-02-15 RX ORDER — PROPOFOL 10 MG/ML
INJECTION, EMULSION INTRAVENOUS PRN
Status: DISCONTINUED | OUTPATIENT
Start: 2021-02-15 | End: 2021-02-15 | Stop reason: SDUPTHER

## 2021-02-15 RX ORDER — SODIUM CHLORIDE 0.9 % (FLUSH) 0.9 %
10 SYRINGE (ML) INJECTION PRN
Status: CANCELLED | OUTPATIENT
Start: 2021-02-15

## 2021-02-15 RX ORDER — ATENOLOL 25 MG/1
25 TABLET ORAL ONCE
Status: COMPLETED | OUTPATIENT
Start: 2021-02-15 | End: 2021-02-15

## 2021-02-15 RX ORDER — SODIUM CHLORIDE 9 MG/ML
INJECTION, SOLUTION INTRAVENOUS CONTINUOUS PRN
Status: DISCONTINUED | OUTPATIENT
Start: 2021-02-15 | End: 2021-02-15 | Stop reason: SDUPTHER

## 2021-02-15 RX ORDER — SODIUM CHLORIDE 0.9 % (FLUSH) 0.9 %
10 SYRINGE (ML) INJECTION EVERY 12 HOURS SCHEDULED
Status: CANCELLED | OUTPATIENT
Start: 2021-02-15

## 2021-02-15 RX ADMIN — SODIUM CHLORIDE: 9 INJECTION, SOLUTION INTRAVENOUS at 09:58

## 2021-02-15 RX ADMIN — PROPOFOL 100 MG: 10 INJECTION, EMULSION INTRAVENOUS at 10:01

## 2021-02-15 RX ADMIN — ATENOLOL 25 MG: 25 TABLET ORAL at 08:58

## 2021-02-15 ASSESSMENT — LIFESTYLE VARIABLES: SMOKING_STATUS: 0

## 2021-02-15 ASSESSMENT — PAIN - FUNCTIONAL ASSESSMENT: PAIN_FUNCTIONAL_ASSESSMENT: 0-10

## 2021-02-15 NOTE — ANESTHESIA PRE PROCEDURE
Department of Anesthesiology  Preprocedure Note       Name:  Iesha Conway   Age:  65 y.o.  :  1950                                          MRN:  [id number]         Date:  2/15/2021      Surgeon: * No surgeons listed *    Procedure: Cardioversion    Medications prior to admission:   Prior to Admission medications    Medication Sig Start Date End Date Taking? Authorizing Provider   rivaroxaban (XARELTO) 20 MG TABS tablet Take 1 tablet by mouth daily (with breakfast) Lot: [id number], EXP: 2022 one bottle  Lot: [id number], EXP: 10/2022 three bottles 21  Yes Dipesh Hill MD   enalapril (VASOTEC) 2.5 MG tablet TAKE ONE TABLET BY MOUTH DAILY 1/15/21  Yes Marta Lin MD   atenolol (TENORMIN) 25 MG tablet TAKE 1 TABLET BY MOUTH EVERY DAY 10/6/20  Yes Marta Lin MD   metFORMIN (GLUCOPHAGE) 500 MG tablet TAKE 2 TABLETS BY MOUTH 2 TIMES DAILY (WITH MEALS) 20  Yes Marta Lin MD   glipiZIDE (GLUCOTROL) 5 MG tablet Take 1 tablet by mouth 4 times daily 19  Yes Marta Lin MD   aspirin 81 MG EC tablet Take 81 mg by mouth daily    Historical Provider, MD   nitroGLYCERIN (NITROSTAT) 0.4 MG SL tablet Place 1 tablet under the tongue every 5 minutes as needed for Chest pain up to max of 3 total doses.  If no relief after 1 dose, call 911. 21   Marta Lin MD   zinc gluconate 50 MG tablet Take 50 mg by mouth daily    Historical Provider, MD   tamsulosin (FLOMAX) 0.4 MG capsule TAKE ONE CAPSULE BY MOUTH EVERY DAY 20   Marta Lin MD   sildenafil (REVATIO) 20 MG tablet Take 1 tablet by mouth daily as needed (ED) 19   Marta Lin MD   TURMERIC CURCUMIN PO Take 1 capsule by mouth daily    Historical Provider, MD   b complex vitamins capsule Take 1 capsule by mouth daily    Historical Provider, MD   Elastic Bandages & Supports (JOBST KNEE HIGH COMPRESSION SM) MISC Knee high with 20- 30 mmhg of compression 3/2/17   Kandi Musa MD [Atorvastatin]      Muscle aches       Problem List:    Patient Active Problem List   Diagnosis Code    DM (diabetes mellitus), type 2 ([address] E11.9    HTN (hypertension) I10    Coronary atherosclerosis of native coronary artery I25.10    Hyperlipidemia E78.5    Class 2 severe obesity due to excess calories with serious comorbidity and body mass index (BMI) of 36.0 to 36.9 in adult ([address] E66.01, Z68.36    Atrial fibrillation ([address] I48.91       Past Medical History:        Diagnosis Date    A-fib ([address]     CAD (coronary artery disease) 10/27/2011    Chronic venous insufficiency 3/2/2017    COVID-19 01/2021    Decreased pulses in feet     Diabetes mellitus ([address]     DM (diabetes mellitus), type 2 ([address] 10/27/2011    ED (erectile dysfunction)     Gout 10/31/2011    Heart attack ([address]     HTN (hypertension) 10/27/2011    Hyperlipidemia 6/7/2012    Hypertension     Lateral myocardial infarction ([address] 10/08    due to circumflex occlusion    Leg swelling 3/2/2017       Past Surgical History:        Procedure Laterality Date    CARDIOVASCULAR STRESS TEST  12/14/09    neither fixed nor reversible perfusion defect; LVEF 50%    CATARACT REMOVAL Left     CHOLECYSTECTOMY  1999    HERNIA REPAIR  @ age 13   Fatuma Sit KNEE SURGERY      left-ligament torn    OTHER SURGICAL HISTORY  10/31/2011    cystoscopy retrograde;ESWL;stent on left    WISDOM TOOTH EXTRACTION         Social History:    Social History     Tobacco Use    Smoking status: Former Smoker    Smokeless tobacco: Never Used   Substance Use Topics    Alcohol use: Yes     Frequency: Monthly or less     Drinks per session: 1 or 2     Binge frequency: Never     Comment: couple times per month                                Counseling given: Not Answered      Vital Signs (Current):   Vitals:    02/15/21 0815   BP: (!) 148/76   Pulse: 72   Resp: 18   Temp: 95.9 °F (35.5 °C)   TempSrc: Temporal   SpO2: 98%   Weight: 230 lb (104.3 kg)   Height: 5' 6.5\" (1.689 m)                                              BP Readings from Last 3 Encounters:   02/15/21 (!) 148/76   02/04/21 (!) 135/93   01/05/21 120/72       NPO Status: Time of last liquid consumption: 2100                        Time of last solid consumption: 2100                        Date of last liquid consumption: 02/14/21                        Date of last solid food consumption: 02/14/21    BMI:   Wt Readings from Last 3 Encounters:   02/15/21 230 lb (104.3 kg)   02/10/21 230 lb (104.3 kg)   02/04/21 234 lb 6.4 oz (106.3 kg)     Body mass index is 36.57 kg/m². CBC:   Lab Results   Component Value Date    WBC 5.1 07/24/2019    RBC 4.75 07/24/2019    HGB 15.5 07/24/2019    HCT 46.0 07/24/2019    MCV 96.8 07/24/2019    RDW 12.4 07/24/2019     07/24/2019       CMP:   Lab Results   Component Value Date     01/04/2021    K 5.1 01/04/2021     01/04/2021    CO2 22 01/04/2021    BUN 20 01/04/2021    CREATININE 1.1 01/04/2021    GFRAA >60 01/04/2021    LABGLOM >60 01/04/2021    GLUCOSE 197 01/04/2021    GLUCOSE 149 11/01/2011    PROT 6.5 01/04/2021    CALCIUM 9.5 01/04/2021    BILITOT 0.7 01/04/2021    ALKPHOS 55 01/04/2021    AST 16 01/04/2021    ALT 13 01/04/2021       POC Tests: No results for input(s): POCGLU, POCNA, POCK, POCCL, POCBUN, POCHEMO, POCHCT in the last 72 hours.     Coags: No results found for: PROTIME, INR, APTT    HCG (If Applicable): No results found for: PREGTESTUR, PREGSERUM, HCG, HCGQUANT     ABGs: No results found for: PHART, PO2ART, VNO4GBJ, AUW5UMI, BEART, X4BTZBGG     Type & Screen (If Applicable):  No results found for: LABABO, LABRH    Drug/Infectious Status (If Applicable):  No results found for: HIV, HEPCAB    COVID-19 Screening (If Applicable):   Lab Results   Component Value Date    COVID19 Not Detected 02/10/2021         Anesthesia Evaluation  Patient summary reviewed no history of anesthetic complications:   Airway: Mallampati: III  TM distance: >3 FB   Neck ROM: full  Mouth opening: > = 3 FB Dental:      Comment: Poor dentition--chipped. Several molars are missing. Pulmonary: breath sounds clear to auscultation      (-) not a current smoker                           Cardiovascular:    (+) hypertension:, past MI:, CAD:, dysrhythmias: atrial fibrillation,       ECG reviewed  Rhythm: regular  Rate: normal    Stress test reviewed       Beta Blocker:  Dose within 24 Hrs         Neuro/Psych:   Negative Neuro/Psych ROS              GI/Hepatic/Renal: Neg GI/Hepatic/Renal ROS            Endo/Other:    (+) DiabetesType II DM, , blood dyscrasia: anticoagulation therapy:., .                 Abdominal:           Vascular: negative vascular ROS. Anesthesia Plan      MAC     ASA 3     (Pt agrees to [facility] ATHENS and IV sedation.)  Induction: intravenous. Anesthetic plan and risks discussed with patient. Plan discussed with CRNA.     Attending anesthesiologist reviewed and agrees with Pre Eval content            Tima Doherty MD   2/15/2021

## 2021-02-15 NOTE — ANESTHESIA POSTPROCEDURE EVALUATION
Department of Anesthesiology  Postprocedure Note    Patient: Mariana Hatfield  MRN: [id number]  YOB: 1950  Date of evaluation: 2/15/2021  Time:  6:58 PM     Procedure Summary     Date: 02/15/21 Room / Location: [facility] Stage I    Anesthesia Start: 0958 Anesthesia Stop: 1010    Procedure: CARDIOVERSION Diagnosis: Afib ([facility]    Scheduled Providers:  Responsible Provider: Diane Jones MD    Anesthesia Type: MAC ASA Status: 3          Anesthesia Type: MAC    Tejas Phase I: Tejas Score: 10    Tejas Phase II: Tejas Score: 10    Last vitals: Reviewed and per EMR flowsheets.        Anesthesia Post Evaluation    Patient location during evaluation: PACU  Level of consciousness: awake  Airway patency: patent  Nausea & Vomiting: no nausea and no vomiting  Complications: no  Cardiovascular status: hemodynamically stable  Respiratory status: acceptable

## 2021-02-16 ENCOUNTER — TELEPHONE (OUTPATIENT)
Dept: CARDIOLOGY CLINIC | Age: 71
End: 2021-02-16

## 2021-02-16 NOTE — TELEPHONE ENCOUNTER
Patient notified and instructed that the DCCV was not successful and he would like him to be started on Multaq 400 mg bid and to be seen in follow-up on Monday. Patient stated understanding and agrees to proceed.

## 2021-02-19 ENCOUNTER — TELEPHONE (OUTPATIENT)
Dept: CARDIOLOGY CLINIC | Age: 71
End: 2021-02-19

## 2021-02-19 NOTE — TELEPHONE ENCOUNTER
[phone]   From: shoaib   RE: Aide Castaneda   1950   ptn feet and hands are swollen up ptn is in bad condition, ptn is requesting a call back     Spoke with patient and he stated after reading the information about the Multaq he has many questions about this medication he would like answered before he started taking this. He would like to speak with you in person regarding this at his appt on Monday. He is concerned because his feet are swelling and his hands a little bit. He is having no shortness of breath or chest discomfort.   Please advise

## 2021-02-22 ENCOUNTER — OFFICE VISIT (OUTPATIENT)
Dept: CARDIOLOGY CLINIC | Age: 71
End: 2021-02-22
Payer: MEDICARE

## 2021-02-22 VITALS
HEIGHT: 66 IN | SYSTOLIC BLOOD PRESSURE: 128 MMHG | HEART RATE: 65 BPM | RESPIRATION RATE: 14 BRPM | WEIGHT: 235 LBS | BODY MASS INDEX: 37.77 KG/M2 | DIASTOLIC BLOOD PRESSURE: 67 MMHG

## 2021-02-22 DIAGNOSIS — I10 ESSENTIAL HYPERTENSION: Chronic | ICD-10-CM

## 2021-02-22 DIAGNOSIS — I48.91 ATRIAL FIBRILLATION, UNSPECIFIED TYPE (HCC): ICD-10-CM

## 2021-02-22 DIAGNOSIS — I25.118 ATHEROSCLEROSIS OF NATIVE CORONARY ARTERY OF NATIVE HEART WITH STABLE ANGINA PECTORIS (HCC): Primary | ICD-10-CM

## 2021-02-22 PROCEDURE — 99215 OFFICE O/P EST HI 40 MIN: CPT | Performed by: INTERNAL MEDICINE

## 2021-02-22 PROCEDURE — 93000 ELECTROCARDIOGRAM COMPLETE: CPT | Performed by: INTERNAL MEDICINE

## 2021-02-22 RX ORDER — FUROSEMIDE 20 MG/1
20 TABLET ORAL DAILY
Qty: 60 TABLET | Refills: 3 | Status: SHIPPED
Start: 2021-02-22 | End: 2021-04-02 | Stop reason: DRUGHIGH

## 2021-02-22 NOTE — PROGRESS NOTES
Subjective:      Patient ID: Eder Bermudez is a 65 y.o. male. Chief Complaint   Patient presents with    Atrial Fibrillation    Coronary Artery Disease    Hypertension       Patient Active Problem List    Diagnosis Date Noted    DM (diabetes mellitus), type 2 ([facility] 10/27/2011     Priority: High     Class: Chronic    HTN (hypertension) 10/27/2011     Priority: High     Class: Chronic    Coronary atherosclerosis of native coronary artery 10/27/2011     Priority: High     Class: Chronic     Overview Note:       A. Lateral infarction 10/08 due to circumflex occlusion. PCI not performed due to completed infarction. B. Adenosine Myoview 12/14/09 ([facility] Neither fixed nor reversible perfusion defect. LVEF 50%. C. ETT-N 2/13/2018: DUKE 6, normal nuke, EF 51%      Atrial fibrillation ([facility] [address] 01/05/2021     Overview Note:     A. CHADS-VASC = 4  B. Unsuccessful DCCV attempt 2/15/2021      Class 2 severe obesity due to excess calories with serious comorbidity and body mass index (BMI) of 36.0 to 36.9 in adult [facility]) 04/16/2019    Hyperlipidemia 06/07/2012     Overview Note:     Intolerant to multiple statin agents         Current Outpatient Medications   Medication Sig Dispense Refill    furosemide (LASIX) 20 MG tablet Take 1 tablet by mouth daily 60 tablet 3    glipiZIDE (GLUCOTROL) 5 MG tablet Take 1 tablet by mouth 4 times daily 360 tablet 3    aspirin 81 MG EC tablet Take 81 mg by mouth daily      rivaroxaban (XARELTO) 20 MG TABS tablet Take 1 tablet by mouth daily (with breakfast) Lot: [id number], EXP: 09/2022 one bottle  Lot: [id number], EXP: 10/2022 three bottles 28 tablet 0    enalapril (VASOTEC) 2.5 MG tablet TAKE ONE TABLET BY MOUTH DAILY 90 tablet 1    nitroGLYCERIN (NITROSTAT) 0.4 MG SL tablet Place 1 tablet under the tongue every 5 minutes as needed for Chest pain up to max of 3 total doses.  If no relief after 1 dose, call 911. 25 tablet 3  zinc gluconate 50 MG tablet Take 50 mg by mouth daily      atenolol (TENORMIN) 25 MG tablet TAKE 1 TABLET BY MOUTH EVERY DAY 90 tablet 1    metFORMIN (GLUCOPHAGE) 500 MG tablet TAKE 2 TABLETS BY MOUTH 2 TIMES DAILY (WITH MEALS) 360 tablet 2    tamsulosin (FLOMAX) 0.4 MG capsule TAKE ONE CAPSULE BY MOUTH EVERY DAY 90 capsule 3    sildenafil (REVATIO) 20 MG tablet Take 1 tablet by mouth daily as needed (ED) 30 tablet 3    TURMERIC CURCUMIN PO Take 1 capsule by mouth daily      b complex vitamins capsule Take 1 capsule by mouth daily      vitamin B-12 (CYANOCOBALAMIN) 1000 MCG tablet Take 1,000 mcg by mouth daily      vitamin D (CHOLECALCIFEROL) 1000 UNIT TABS tablet Take 400 Units by mouth daily       dronedarone hcl (MULTAQ) 400 MG TABS Take 1 tablet by mouth 2 times daily (with meals) (Patient not taking: Reported on 2/22/2021) 60 tablet 5    Elastic Bandages & Supports (JOBST KNEE HIGH COMPRESSION SM) MISC Knee high with 20- 30 mmhg of compression (Patient not taking: Reported on 2/22/2021) 1 each 10     No current facility-administered medications for this visit.          Allergies   Allergen Reactions    Crestor [Rosuvastatin Calcium]      Muscles aches     Lipitor [Atorvastatin]      Muscle aches       Vitals:    02/22/21 1335   BP: 128/67   Pulse: 65   Resp: 14   Weight: 235 lb (106.6 kg)   Height: 5' 6\" (1.676 m)       Social History     Socioeconomic History    Marital status:      Spouse name: Not on file    Number of children: 2    Years of education: Not on file    Highest education level: Not on file   Occupational History    Occupation: self-employed     Comment: print shop   Social Needs    Financial resource strain: Not on file    Food insecurity     Worry: Not on file     Inability: Not on file   [employer] needs     Medical: Not on file     Non-medical: Not on file   Tobacco Use    Smoking status: Former Smoker    Smokeless tobacco: Never Used Obese     HENT:   Head: Normocephalic and atraumatic. Eyes: Conjunctivae are normal. Pupils are equal, round,   Neck: No JVD present. Carotid bruit is not present. Cardiovascular: irregular rhythm, S1 normal, S2 normal and intact distal pulses. PMI is not displaced. Exam reveals no gallop. No murmur heard. Pulmonary/Chest: Effort normal and breath sounds normal. No respiratory distress. Abdominal: Soft. Normal appearance and bowel sounds are normal. He exhibits no abdominal bruit and no pulsatile midline mass. There is no hepatomegaly. There is no tenderness. Musculoskeletal: Normal range of motion. He exhibits 1+ bilateral edema. Neurological: He is alert and oriented to person, place, and time. Gait normal.   Skin: Skin is warm and dry. No bruising, no ecchymosis and no rash noted. He is not diaphoretic. No cyanosis. Psychiatric: He has a normal mood and affect. His behavior is normal. Thought content normal.     EKG: atrial fibrillation, rate 65 bpm, axis +52      ASSESSMENT & PLAN:    Patient Active Problem List   Diagnoses    DM (diabetes mellitus), type 2    HTN (hypertension):     Coronary atherosclerosis of native coronary artery: s/p lateral MI 2008 due to circumflex occlusion treated conservatively due to completed, uncomplicated infarction.   * Hyperlipidemia: intolerant to multiple statin agents- consider Repatha  Atrial fibrillation with elevated CHADS-VASC. Patient with mildly symptomatic (CALI)  new onset afib with no signs of chf. . long discussion with patient about afib symptoms, chads-vasc system, risks and benefits of anticoagulations, cardioversion etc.  We discussed rate vs rhythm control strategy and will try Multaq given his hx of structural heart disease, and safety profile.   LFTs are normal and he does NOT have Class III-IV chf Will start multaq 400 mg bid, stop ASA and atenolol, and start furosemide 20 mg to mobilize edema.  We will see him back in a week and if still in AF will arrange DCCV

## 2021-03-01 ENCOUNTER — OFFICE VISIT (OUTPATIENT)
Dept: CARDIOLOGY CLINIC | Age: 71
End: 2021-03-01
Payer: MEDICARE

## 2021-03-01 VITALS
HEIGHT: 66 IN | HEART RATE: 73 BPM | BODY MASS INDEX: 36.22 KG/M2 | SYSTOLIC BLOOD PRESSURE: 111 MMHG | OXYGEN SATURATION: 98 % | RESPIRATION RATE: 16 BRPM | DIASTOLIC BLOOD PRESSURE: 62 MMHG | WEIGHT: 225.4 LBS

## 2021-03-01 DIAGNOSIS — I10 ESSENTIAL HYPERTENSION: Chronic | ICD-10-CM

## 2021-03-01 DIAGNOSIS — I48.91 ATRIAL FIBRILLATION, UNSPECIFIED TYPE (HCC): Primary | ICD-10-CM

## 2021-03-01 DIAGNOSIS — I25.118 ATHEROSCLEROSIS OF NATIVE CORONARY ARTERY OF NATIVE HEART WITH STABLE ANGINA PECTORIS (HCC): ICD-10-CM

## 2021-03-01 PROCEDURE — 99215 OFFICE O/P EST HI 40 MIN: CPT | Performed by: INTERNAL MEDICINE

## 2021-03-01 PROCEDURE — 93000 ELECTROCARDIOGRAM COMPLETE: CPT | Performed by: INTERNAL MEDICINE

## 2021-03-01 NOTE — PROGRESS NOTES
Subjective:      Patient ID: Ty Mallory is a 65 y.o. male. Chief Complaint   Patient presents with    Atrial Fibrillation    Coronary Artery Disease    Hypertension       Patient Active Problem List    Diagnosis Date Noted    DM (diabetes mellitus), type 2 ([facility] [address] 10/27/2011     Priority: High     Class: Chronic    HTN (hypertension) 10/27/2011     Priority: High     Class: Chronic    Coronary atherosclerosis of native coronary artery 10/27/2011     Priority: High     Class: Chronic     Overview Note:       A. Lateral infarction 10/08 due to circumflex occlusion. PCI not performed due to completed infarction. B. Adenosine Myoview 12/14/09 ([facility] Neither fixed nor reversible perfusion defect. LVEF 50%. C. ETT-N 2/13/2018: [facility] 6, normal nuke, EF 51%      Atrial fibrillation ([facility] 01/05/2021     Overview Note:     A. CHADS-VASC = 4  B. Unsuccessful DCCV attempt 2/15/2021      Class 2 severe obesity due to excess calories with serious comorbidity and body mass index (BMI) of 36.0 to 36.9 in adult [facility]) 04/16/2019    Hyperlipidemia 06/07/2012     Overview Note:     Intolerant to multiple statin agents         Current Outpatient Medications   Medication Sig Dispense Refill    furosemide (LASIX) 20 MG tablet Take 1 tablet by mouth daily 60 tablet 3    glipiZIDE (GLUCOTROL) 5 MG tablet Take 1 tablet by mouth 4 times daily 360 tablet 3    dronedarone hcl (MULTAQ) 400 MG TABS Take 1 tablet by mouth 2 times daily (with meals) 60 tablet 5    rivaroxaban (XARELTO) 20 MG TABS tablet Take 1 tablet by mouth daily (with breakfast) Lot: [id number], EXP: 09/2022 one bottle  Lot: [id number], EXP: 10/2022 three bottles 28 tablet 0    enalapril (VASOTEC) 2.5 MG tablet TAKE ONE TABLET BY MOUTH DAILY 90 tablet 1    nitroGLYCERIN (NITROSTAT) 0.4 MG SL tablet Place 1 tablet under the tongue every 5 minutes as needed for Chest pain up to max of 3 total doses.  If no relief after 1 dose, call 911. 25 tablet 3    zinc gluconate 50 MG tablet Take 50 mg by mouth daily      metFORMIN (GLUCOPHAGE) 500 MG tablet TAKE 2 TABLETS BY MOUTH 2 TIMES DAILY (WITH MEALS) 360 tablet 2    tamsulosin (FLOMAX) 0.4 MG capsule TAKE ONE CAPSULE BY MOUTH EVERY DAY 90 capsule 3    sildenafil (REVATIO) 20 MG tablet Take 1 tablet by mouth daily as needed (ED) 30 tablet 3    TURMERIC CURCUMIN PO Take 1 capsule by mouth daily      b complex vitamins capsule Take 1 capsule by mouth daily      vitamin B-12 (CYANOCOBALAMIN) 1000 MCG tablet Take 1,000 mcg by mouth daily      vitamin D (CHOLECALCIFEROL) 1000 UNIT TABS tablet Take 400 Units by mouth daily       aspirin 81 MG EC tablet Take 81 mg by mouth daily      atenolol (TENORMIN) 25 MG tablet TAKE 1 TABLET BY MOUTH EVERY DAY (Patient not taking: Reported on 3/1/2021) 90 tablet 1    Elastic Bandages & Supports (JOBST KNEE HIGH COMPRESSION SM) MISC Knee high with 20- 30 mmhg of compression (Patient not taking: Reported on 2/22/2021) 1 each 10     No current facility-administered medications for this visit. Allergies   Allergen Reactions    Crestor [Rosuvastatin Calcium]      Muscles aches     Lipitor [Atorvastatin]      Muscle aches       Vitals:    03/01/21 1338   BP: 111/62   Pulse: 73   Resp: 16   SpO2: 98%   Weight: 225 lb 6.4 oz (102.2 kg)   Height: 5' 6\" (1.676 m)       Social History     Socioeconomic History    Marital status:      Spouse name: Not on file    Number of children: 2    Years of education: Not on file    Highest education level: Not on file   Occupational History    Occupation: self-employed     Comment: print shop   Social Needs    Financial resource strain: Not on file    Food insecurity     Worry: Not on file     Inability: Not on file   [employer] needs     Medical: Not on file     Non-medical: Not on file   Tobacco Use    Smoking status: Former Smoker    Smokeless tobacco: Never Used   Substance and Sexual Activity    Alcohol use:  Yes Frequency: Monthly or less     Drinks per session: 1 or 2     Binge frequency: Never     Comment: couple times per month    Drug use: No    Sexual activity: Not on file   Lifestyle    Physical activity     Days per week: Not on file     Minutes per session: Not on file    Stress: Not on file   Relationships    Social connections     Talks on phone: Not on file     Gets together: Not on file     Attends Buddhism service: Not on file     Active member of club or organization: Not on file     Attends meetings of clubs or organizations: Not on file     Relationship status: Not on file    Intimate partner violence     Fear of current or ex partner: Not on file     Emotionally abused: Not on file     Physically abused: Not on file     Forced sexual activity: Not on file   Other Topics Concern    Not on file   Social History Narrative    Not on file       Family History   Problem Relation Age of Onset    COPD Mother     COPD Father     Heart Attack Father 79    Down Syndrome Sister     Crohn's Disease Brother     No Known Problems Sister     No Known Problems Sister     No Known Problems Brother     No Known Problems Brother        SUBJECTIVE: Rj Crews presents to the office today for re-evaluation of chronic cardiac diagnoses. Since our last visit has started Multaq and furosemide. He complained of NO new compliants, and his pedal edema is less ( down 10 lbs) . No palpitations, neuro symptoms,  and denies orthopnea, palpitations, paroxysmal nocturnal dyspnea and syncope. Compliant with NOAC with no missed doses. No bleeds. No SHANNAN diagnosis. Review of Systems   Other than stated in HPI, negative 10-point system review. Objective:   Physical Exam   Constitutional: He is oriented to person, place, and time. He is cooperative. Obese     HENT:   Head: Normocephalic and atraumatic. Eyes: Conjunctivae are normal. Pupils are equal, round,   Neck: No JVD present.  Carotid bruit

## 2021-03-08 ENCOUNTER — HOSPITAL ENCOUNTER (OUTPATIENT)
Age: 71
Discharge: HOME | End: 2021-03-10
Payer: MEDICARE

## 2021-03-08 DIAGNOSIS — Z01.818 PREOP TESTING: ICD-10-CM

## 2021-03-08 LAB — SARS-COV-2, PCR: NOT DETECTED

## 2021-03-08 PROCEDURE — U0003 INFECTIOUS AGENT DETECTION BY NUCLEIC ACID (DNA OR RNA); SEVERE ACUTE RESPIRATORY SYNDROME CORONAVIRUS 2 (SARS-COV-2) (CORONAVIRUS DISEASE [COVID-19]), AMPLIFIED PROBE TECHNIQUE, MAKING USE OF HIGH THROUGHPUT TECHNOLOGIES AS DESCRIBED BY CMS-2020-01-R: HCPCS

## 2021-03-12 ENCOUNTER — ANESTHESIA EVENT (OUTPATIENT)
Dept: INPATIENT UNIT | Age: 71
End: 2021-03-12

## 2021-03-12 ENCOUNTER — ANESTHESIA (OUTPATIENT)
Dept: INPATIENT UNIT | Age: 71
End: 2021-03-12

## 2021-03-12 ENCOUNTER — TELEPHONE (OUTPATIENT)
Dept: CARDIOLOGY CLINIC | Age: 71
End: 2021-03-12

## 2021-03-12 ENCOUNTER — HOSPITAL ENCOUNTER (OUTPATIENT)
Dept: INPATIENT UNIT | Age: 71
Setting detail: OUTPATIENT SURGERY
Discharge: HOME | End: 2021-03-12
Payer: MEDICARE

## 2021-03-12 VITALS
SYSTOLIC BLOOD PRESSURE: 115 MMHG | RESPIRATION RATE: 18 BRPM | DIASTOLIC BLOOD PRESSURE: 65 MMHG | TEMPERATURE: 97.3 F | WEIGHT: 230 LBS | OXYGEN SATURATION: 98 % | HEIGHT: 67 IN | BODY MASS INDEX: 36.1 KG/M2 | HEART RATE: 66 BPM

## 2021-03-12 VITALS
SYSTOLIC BLOOD PRESSURE: 109 MMHG | DIASTOLIC BLOOD PRESSURE: 53 MMHG | RESPIRATION RATE: 69 BRPM | OXYGEN SATURATION: 95 %

## 2021-03-12 LAB — METER GLUCOSE: 143 MG/DL (ref 74–99)

## 2021-03-12 PROCEDURE — 7100000011 HC PHASE II RECOVERY - ADDTL 15 MIN

## 2021-03-12 PROCEDURE — 92960 CARDIOVERSION ELECTRIC EXT: CPT | Performed by: INTERNAL MEDICINE

## 2021-03-12 PROCEDURE — 93325 DOPPLER ECHO COLOR FLOW MAPG: CPT

## 2021-03-12 PROCEDURE — 93312 ECHO TRANSESOPHAGEAL: CPT

## 2021-03-12 PROCEDURE — 92960 CARDIOVERSION ELECTRIC EXT: CPT

## 2021-03-12 PROCEDURE — 3700000000 HC ANESTHESIA ATTENDED CARE

## 2021-03-12 PROCEDURE — 3700000001 HC ADD 15 MINUTES (ANESTHESIA)

## 2021-03-12 PROCEDURE — 93320 DOPPLER ECHO COMPLETE: CPT

## 2021-03-12 PROCEDURE — 2580000003 HC RX 258: Performed by: INTERNAL MEDICINE

## 2021-03-12 PROCEDURE — 6360000002 HC RX W HCPCS: Performed by: NURSE ANESTHETIST, CERTIFIED REGISTERED

## 2021-03-12 PROCEDURE — 7100000010 HC PHASE II RECOVERY - FIRST 15 MIN

## 2021-03-12 PROCEDURE — 93005 ELECTROCARDIOGRAM TRACING: CPT | Performed by: INTERNAL MEDICINE

## 2021-03-12 PROCEDURE — 82962 GLUCOSE BLOOD TEST: CPT

## 2021-03-12 RX ORDER — SODIUM CHLORIDE 9 MG/ML
INJECTION, SOLUTION INTRAVENOUS CONTINUOUS
Status: DISCONTINUED | OUTPATIENT
Start: 2021-03-12 | End: 2021-03-13 | Stop reason: HOSPADM

## 2021-03-12 RX ORDER — PROPOFOL 10 MG/ML
INJECTION, EMULSION INTRAVENOUS PRN
Status: DISCONTINUED | OUTPATIENT
Start: 2021-03-12 | End: 2021-03-12 | Stop reason: SDUPTHER

## 2021-03-12 RX ADMIN — SODIUM CHLORIDE: 9 INJECTION, SOLUTION INTRAVENOUS at 13:57

## 2021-03-12 RX ADMIN — PROPOFOL 230 MG: 10 INJECTION, EMULSION INTRAVENOUS at 14:53

## 2021-03-12 ASSESSMENT — LIFESTYLE VARIABLES: SMOKING_STATUS: 0

## 2021-03-12 ASSESSMENT — PAIN - FUNCTIONAL ASSESSMENT: PAIN_FUNCTIONAL_ASSESSMENT: 0-10

## 2021-03-12 NOTE — ANESTHESIA PRE PROCEDURE
Department of Anesthesiology  Preprocedure Note       Name:  Ace Cole   Age:  65 y.o.  :  1950                                          MRN:  [id number]         Date:  3/12/2021      Surgeon: * No surgeons listed *    Procedure: Cardioversion    Medications prior to admission:   Prior to Admission medications    Medication Sig Start Date End Date Taking? Authorizing Provider   furosemide (LASIX) 20 MG tablet Take 1 tablet by mouth daily  Patient taking differently: Take 20 mg by mouth every other day  21   Michelle Burleson MD   glipiZIDE (GLUCOTROL) 5 MG tablet Take 1 tablet by mouth 4 times daily 21   Yovani Mcpherson MD   dronedarone hcl (MULTAQ) 400 MG TABS Take 1 tablet by mouth 2 times daily (with meals) 21   Michelle Burleson MD   rivaroxaban (XARELTO) 20 MG TABS tablet Take 1 tablet by mouth daily (with breakfast) Lot: [id number], EXP: 2022 one bottle  Lot: [id number], EXP: 10/2022 three bottles 21   Michelle Burleson MD   enalapril (VASOTEC) 2.5 MG tablet TAKE ONE TABLET BY MOUTH DAILY  Patient taking differently: every evening TAKE ONE TABLET BY MOUTH DAILY 1/15/21   Yovani Mcpherson MD   nitroGLYCERIN (NITROSTAT) 0.4 MG SL tablet Place 1 tablet under the tongue every 5 minutes as needed for Chest pain up to max of 3 total doses.  If no relief after 1 dose, call 911. 21   Yovani Mcpherson MD   zinc gluconate 50 MG tablet Take 50 mg by mouth daily    Historical Provider, MD   metFORMIN (GLUCOPHAGE) 500 MG tablet TAKE 2 TABLETS BY MOUTH 2 TIMES DAILY (WITH MEALS) 20   Yovani Mcpherson MD   tamsulosin (FLOMAX) 0.4 MG capsule TAKE ONE CAPSULE BY MOUTH EVERY DAY 20   Yovani Mcpherson MD   sildenafil (REVATIO) 20 MG tablet Take 1 tablet by mouth daily as needed (ED) 19   Yovani Mcpherson MD   TURMERIC CURCUMIN PO Take 1 capsule by mouth daily    Historical Provider, MD   b complex vitamins capsule Take 1 capsule by mouth daily    Historical (CYANOCOBALAMIN) 1000 MCG tablet Take 1,000 mcg by mouth daily      vitamin D (CHOLECALCIFEROL) 1000 UNIT TABS tablet Take 400 Units by mouth daily        No current facility-administered medications for this visit. Facility-Administered Medications Ordered in Other Visits   Medication Dose Route Frequency Provider Last Rate Last Admin    0.9 % sodium chloride infusion   Intravenous Continuous Franci Noel MD           Allergies:     Allergies   Allergen Reactions    Adhesive Tape Dermatitis    Crestor [Rosuvastatin Calcium]      Muscles aches     Lipitor [Atorvastatin]      Muscle aches       Problem List:    Patient Active Problem List   Diagnosis Code    DM (diabetes mellitus), type 2 ([address] E11.9    HTN (hypertension) I10    Coronary atherosclerosis of native coronary artery I25.10    Hyperlipidemia E78.5    Class 2 severe obesity due to excess calories with serious comorbidity and body mass index (BMI) of 36.0 to 36.9 in adult ([facility]) E66.01, Z68.36    Atrial fibrillation ([facility]) I48.91       Past Medical History:        Diagnosis Date    A-fib [facility])     for cardioversion 3-12-21     CAD (coronary artery disease) 10/27/2011    Dr. Wero Silva Chronic venous insufficiency 3/2/2017    COVID-19 01/2021    resolved as of 3-3-21     Decreased pulses in feet     Diabetes mellitus ([address]     DM (diabetes mellitus), type 2 ([address] 10/27/2011    ED (erectile dysfunction)     Gout 10/31/2011    Heart attack ([address]     HTN (hypertension) 10/27/2011    Hyperlipidemia 6/7/2012    Hypertension     Lateral myocardial infarction ([address] 10/08    due to circumflex occlusion    Leg swelling 3/2/2017       Past Surgical History:        Procedure Laterality Date    CARDIAC CATHETERIZATION      CARDIOVASCULAR STRESS TEST  12/14/09    neither fixed nor reversible perfusion defect; LVEF 50%    CATARACT REMOVAL Left     CHOLECYSTECTOMY  1999    HERNIA REPAIR  @ age 14    KNEE SURGERY      left-ligament torn  OTHER SURGICAL HISTORY  10/31/2011    cystoscopy retrograde;ESWL;stent on left    TESTICLE SURGERY      as child     WISDOM TOOTH EXTRACTION         Social History:    Social History     Tobacco Use    Smoking status: Former Smoker    Smokeless tobacco: Never Used   Substance Use Topics    Alcohol use: Yes     Frequency: Monthly or less     Drinks per session: 1 or 2     Binge frequency: Never     Comment: couple times per month                                Counseling given: Not Answered      Vital Signs (Current): There were no vitals filed for this visit. BP Readings from Last 3 Encounters:   03/12/21 126/68   03/01/21 111/62   02/22/21 128/67       NPO Status:                                                                                 BMI:   Wt Readings from Last 3 Encounters:   03/12/21 230 lb (104.3 kg)   03/03/21 230 lb (104.3 kg)   03/01/21 225 lb 6.4 oz (102.2 kg)     There is no height or weight on file to calculate BMI.    CBC:   Lab Results   Component Value Date    WBC 5.1 07/24/2019    RBC 4.75 07/24/2019    HGB 15.5 07/24/2019    HCT 46.0 07/24/2019    MCV 96.8 07/24/2019    RDW 12.4 07/24/2019     07/24/2019       CMP:   Lab Results   Component Value Date     01/04/2021    K 5.1 01/04/2021     01/04/2021    CO2 22 01/04/2021    BUN 20 01/04/2021    CREATININE 1.1 01/04/2021    GFRAA >60 01/04/2021    LABGLOM >60 01/04/2021    GLUCOSE 197 01/04/2021    GLUCOSE 149 11/01/2011    PROT 6.5 01/04/2021    CALCIUM 9.5 01/04/2021    BILITOT 0.7 01/04/2021    ALKPHOS 55 01/04/2021    AST 16 01/04/2021    ALT 13 01/04/2021       POC Tests: No results for input(s): POCGLU, POCNA, POCK, POCCL, POCBUN, POCHEMO, POCHCT in the last 72 hours.     Coags: No results found for: PROTIME, INR, APTT    HCG (If Applicable): No results found for: PREGTESTUR, PREGSERUM, HCG, HCGQUANT     ABGs: No results found for: PHART, PO2ART, OOK1PUR, WCC4FQC, BEART, E6KUIGEO     Type & Screen (If Applicable):  No results found for: LABABO, LABRH    Drug/Infectious Status (If Applicable):  No results found for: HIV, HEPCAB    COVID-19 Screening (If Applicable):   Lab Results   Component Value Date    COVID19 Not Detected 03/08/2021         Anesthesia Evaluation  Patient summary reviewed and Nursing notes reviewed no history of anesthetic complications:   Airway: Mallampati: III  TM distance: >3 FB   Neck ROM: full  Mouth opening: > = 3 FB Dental:      Comment: Poor dentition--chipped. Several molars are missing. Pulmonary: breath sounds clear to auscultation      (-) not a current smoker                          ROS comment: COVID dEC 2020   Cardiovascular:  Exercise tolerance: good (>4 METS),   (+) hypertension:, past MI:, CAD:, dysrhythmias: atrial fibrillation,       ECG reviewed  Rhythm: regular  Rate: normal    Stress test reviewed       Beta Blocker:  Dose within 24 Hrs         Neuro/Psych:   Negative Neuro/Psych ROS              GI/Hepatic/Renal:            ROS comment: BPH. Endo/Other:    (+) DiabetesType II DM, , blood dyscrasia: anticoagulation therapy:., .                 Abdominal:           Vascular: negative vascular ROS. Anesthesia Plan      MAC     ASA 3     (Pt agrees to [facility] ATHENS and IV sedation.)  Induction: intravenous. Anesthetic plan and risks discussed with patient and spouse. Plan discussed with CRNA.     Attending anesthesiologist reviewed and agrees with Nena Cassidy MD   3/12/2021

## 2021-03-12 NOTE — TELEPHONE ENCOUNTER
----- Message from Arlin Christy MD sent at 3/12/2021  3:39 PM EST -----  OV one month but as it looks like he will be staying on multaq the dose of [place] needs to be reduced to 15 mg qd      ----- Message -----  From: Laura Amezquita  Sent: 3/12/2021   3:26 PM EST  To: Arlin Christy MD    Please advise on follow-up   ----- Message -----  From: Merlyn Darling MD  Sent: 3/12/2021   3:16 PM EST  To: Laura Amezquita    Successful cardioversion, please schedule follow up with Teodoro Mcgregor

## 2021-03-13 LAB
EKG ATRIAL RATE: 83 BPM
EKG Q-T INTERVAL: 406 MS
EKG QRS DURATION: 90 MS
EKG QTC CALCULATION (BAZETT): 425 MS
EKG R AXIS: 27 DEGREES
EKG T AXIS: 46 DEGREES
EKG VENTRICULAR RATE: 66 BPM

## 2021-03-13 PROCEDURE — 93010 ELECTROCARDIOGRAM REPORT: CPT | Performed by: INTERNAL MEDICINE

## 2021-03-15 LAB
EKG ATRIAL RATE: 71 BPM
EKG P AXIS: 41 DEGREES
EKG P-R INTERVAL: 240 MS
EKG Q-T INTERVAL: 412 MS
EKG QRS DURATION: 94 MS
EKG QTC CALCULATION (BAZETT): 447 MS
EKG R AXIS: 3 DEGREES
EKG T AXIS: 33 DEGREES
EKG VENTRICULAR RATE: 71 BPM

## 2021-03-17 NOTE — ANESTHESIA POSTPROCEDURE EVALUATION
Department of Anesthesiology  Postprocedure Note    Patient: Diana Blunt  MRN: [id number]  YOB: 1950  Date of evaluation: 3/17/2021  Time:  8:51 AM     Procedure Summary     Date: 03/12/21 Room / Location: [facility] Stage I    Anesthesia Start: 9460 Anesthesia Stop: 1222    Procedure: CARDIOVERSION Diagnosis: Paroxysmal atrial fibrillation    Scheduled Providers:  Responsible Provider: Alirio Patel MD    Anesthesia Type: MAC ASA Status: 3          Anesthesia Type: MAC    Tejas Phase I: Tejas Score: 10    Tejas Phase II: Tejas Score: 10    Last vitals: Reviewed and per EMR flowsheets.        Anesthesia Post Evaluation    Patient location during evaluation: PACU  Patient participation: complete - patient participated  Level of consciousness: awake and alert  Airway patency: patent  Nausea & Vomiting: no vomiting and no nausea  Complications: no  Cardiovascular status: blood pressure returned to baseline  Respiratory status: acceptable  Hydration status: euvolemic

## 2021-04-02 DIAGNOSIS — E11.9 TYPE 2 DIABETES MELLITUS WITHOUT COMPLICATION, WITHOUT LONG-TERM CURRENT USE OF INSULIN (HCC): Chronic | ICD-10-CM

## 2021-04-02 LAB
ALBUMIN SERPL-MCNC: 4 G/DL (ref 3.5–5.2)
ALP BLD-CCNC: 58 U/L (ref 40–129)
ALT SERPL-CCNC: 19 U/L (ref 0–40)
ANION GAP SERPL CALCULATED.3IONS-SCNC: 12 MMOL/L (ref 7–16)
AST SERPL-CCNC: 22 U/L (ref 0–39)
BILIRUB SERPL-MCNC: 0.4 MG/DL (ref 0–1.2)
BUN BLDV-MCNC: 23 MG/DL (ref 8–23)
CALCIUM SERPL-MCNC: 9.3 MG/DL (ref 8.6–10.2)
CHLORIDE BLD-SCNC: 104 MMOL/L (ref 98–107)
CHOLESTEROL, TOTAL: 144 MG/DL (ref 0–199)
CO2: 23 MMOL/L (ref 22–29)
CREAT SERPL-MCNC: 1.2 MG/DL (ref 0.7–1.2)
GFR AFRICAN AMERICAN: >60
GFR NON-AFRICAN AMERICAN: 59 ML/MIN/1.73
GLUCOSE BLD-MCNC: 186 MG/DL (ref 74–99)
HBA1C MFR BLD: 7.2 % (ref 4–5.6)
HDLC SERPL-MCNC: 44 MG/DL
LDL CHOLESTEROL CALCULATED: 89 MG/DL (ref 0–99)
POTASSIUM SERPL-SCNC: 4.9 MMOL/L (ref 3.5–5)
SODIUM BLD-SCNC: 139 MMOL/L (ref 132–146)
TOTAL PROTEIN: 6.9 G/DL (ref 6.4–8.3)
TRIGL SERPL-MCNC: 53 MG/DL (ref 0–149)
VLDLC SERPL CALC-MCNC: 11 MG/DL

## 2021-04-08 ENCOUNTER — OFFICE VISIT (OUTPATIENT)
Dept: CARDIOLOGY CLINIC | Age: 71
End: 2021-04-08
Payer: MEDICARE

## 2021-04-08 VITALS
BODY MASS INDEX: 37.28 KG/M2 | DIASTOLIC BLOOD PRESSURE: 70 MMHG | SYSTOLIC BLOOD PRESSURE: 141 MMHG | WEIGHT: 232 LBS | HEART RATE: 70 BPM | HEIGHT: 66 IN | RESPIRATION RATE: 14 BRPM

## 2021-04-08 DIAGNOSIS — I48.0 PAROXYSMAL ATRIAL FIBRILLATION (HCC): ICD-10-CM

## 2021-04-08 PROCEDURE — 93000 ELECTROCARDIOGRAM COMPLETE: CPT | Performed by: INTERNAL MEDICINE

## 2021-04-08 PROCEDURE — 99214 OFFICE O/P EST MOD 30 MIN: CPT | Performed by: INTERNAL MEDICINE

## 2021-04-08 NOTE — PROGRESS NOTES
Subjective:      Patient ID: Maddie Robertson is a 65 y.o. male. Chief Complaint   Patient presents with    Atrial Fibrillation    Coronary Artery Disease    Hypertension       Patient Active Problem List    Diagnosis Date Noted    DM (diabetes mellitus), type 2 ([facility] 10/27/2011     Priority: High     Class: Chronic    HTN (hypertension) 10/27/2011     Priority: High     Class: Chronic    Coronary atherosclerosis of native coronary artery 10/27/2011     Priority: High     Class: Chronic     Overview Note:       A. Lateral infarction 10/08 due to circumflex occlusion. PCI not performed due to completed infarction. B. Adenosine Myoview 12/14/09 ([facility] Neither fixed nor reversible perfusion defect. LVEF 50%. C. ETT-N 2/13/2018: DUKE 6, normal nuke, EF 51%      Atrial fibrillation ([facility] 01/05/2021     Overview Note:     A. CHADS-VASC = 4  B. Unsuccessful DCCV attempt 2/15/2021  C. CRYSTAL guided DCCV 3/12/2021 on Multaq      Class 2 severe obesity due to excess calories with serious comorbidity and body mass index (BMI) of 36.0 to 36.9 in adult [facility]) 04/16/2019    Hyperlipidemia 06/07/2012     Overview Note:     Intolerant to multiple statin agents         Current Outpatient Medications   Medication Sig Dispense Refill    furosemide (LASIX) 20 MG tablet Take 20 mg by mouth every other day      enalapril (VASOTEC) 2.5 MG tablet Take 2.5 mg by mouth nightly      rivaroxaban (XARELTO) 20 MG TABS tablet Take 1 tablet by mouth Daily with supper Lot: [id number], EXP: 09/2022 one bottle  Lot: [id number], EXP: 10/2022 three bottles 30 tablet 2    glipiZIDE (GLUCOTROL) 5 MG tablet Take 1 tablet by mouth 4 times daily 360 tablet 3    dronedarone hcl (MULTAQ) 400 MG TABS Take 1 tablet by mouth 2 times daily (with meals) 60 tablet 5    nitroGLYCERIN (NITROSTAT) 0.4 MG SL tablet Place 1 tablet under the tongue every 5 minutes as needed for Chest pain up to max of 3 total doses.  If no relief after 1 dose, call Physical activity     Days per week: Not on file     Minutes per session: Not on file    Stress: Not on file   Relationships    Social connections     Talks on phone: Not on file     Gets together: Not on file     Attends Denominational service: Not on file     Active member of club or organization: Not on file     Attends meetings of clubs or organizations: Not on file     Relationship status: Not on file    Intimate partner violence     Fear of current or ex partner: Not on file     Emotionally abused: Not on file     Physically abused: Not on file     Forced sexual activity: Not on file   Other Topics Concern    Not on file   Social History Narrative    Not on file       Family History   Problem Relation Age of Onset    COPD Mother     COPD Father     Heart Attack Father 79    Down Syndrome Sister     Crohn's Disease Brother     No Known Problems Sister     No Known Problems Sister     No Known Problems Brother     No Known Problems Brother        SUBJECTIVE: Javier Enciso presents to the office today for re-evaluation of chronic cardiac diagnoses. Since our last visit has had DCCV on  Multaq. It was successful and he felt an improved sense of energy for one week time, then back to his baseline. He complained of NO new compliants, and has been taking his lasix prn . No palpitations, neuro symptoms,  and denies orthopnea, palpitations, paroxysmal nocturnal dyspnea and syncope. Compliant with NOAC with no missed doses. No bleeds. No SHANNAN diagnosis. Review of Systems   Other than stated in HPI, negative 10-point system review. Objective:   Physical Exam   Constitutional: He is oriented to person, place, and time. He is cooperative. Obese     HENT:   Head: Normocephalic and atraumatic. Eyes: Conjunctivae are normal. Pupils are equal, round,   Neck: No JVD present. Carotid bruit is not present. Cardiovascular: irregular rhythm, S1 normal, S2 normal and intact distal pulses.   PMI

## 2021-04-09 ENCOUNTER — HOSPITAL ENCOUNTER (OUTPATIENT)
Dept: MRI IMAGING | Age: 71
Discharge: HOME | End: 2021-04-11
Payer: MEDICARE

## 2021-04-09 ENCOUNTER — HOSPITAL ENCOUNTER (OUTPATIENT)
Dept: ULTRASOUND IMAGING | Age: 71
Discharge: HOME | End: 2021-04-11
Payer: MEDICARE

## 2021-04-09 DIAGNOSIS — I48.0 PAROXYSMAL ATRIAL FIBRILLATION (HCC): Primary | ICD-10-CM

## 2021-04-09 DIAGNOSIS — R20.0 RIGHT SIDED NUMBNESS: ICD-10-CM

## 2021-04-09 PROCEDURE — 93880 EXTRACRANIAL BILAT STUDY: CPT

## 2021-04-09 PROCEDURE — 70551 MRI BRAIN STEM W/O DYE: CPT

## 2021-04-12 ENCOUNTER — TELEPHONE (OUTPATIENT)
Dept: VASCULAR SURGERY | Age: 71
End: 2021-04-12

## 2021-05-03 ENCOUNTER — OFFICE VISIT (OUTPATIENT)
Dept: NON INVASIVE DIAGNOSTICS | Age: 71
End: 2021-05-03
Payer: MEDICARE

## 2021-05-03 VITALS
BODY MASS INDEX: 37.39 KG/M2 | RESPIRATION RATE: 22 BRPM | HEART RATE: 77 BPM | DIASTOLIC BLOOD PRESSURE: 70 MMHG | WEIGHT: 238.2 LBS | SYSTOLIC BLOOD PRESSURE: 114 MMHG | OXYGEN SATURATION: 99 % | HEIGHT: 67 IN

## 2021-05-03 DIAGNOSIS — G47.9 SLEEP DISORDER: ICD-10-CM

## 2021-05-03 DIAGNOSIS — I48.0 PAROXYSMAL ATRIAL FIBRILLATION (HCC): Primary | ICD-10-CM

## 2021-05-03 DIAGNOSIS — R07.9 CHEST PAIN OF UNCERTAIN ETIOLOGY: ICD-10-CM

## 2021-05-03 PROCEDURE — 99204 OFFICE O/P NEW MOD 45 MIN: CPT | Performed by: STUDENT IN AN ORGANIZED HEALTH CARE EDUCATION/TRAINING PROGRAM

## 2021-05-03 PROCEDURE — 93000 ELECTROCARDIOGRAM COMPLETE: CPT | Performed by: STUDENT IN AN ORGANIZED HEALTH CARE EDUCATION/TRAINING PROGRAM

## 2021-05-03 NOTE — PATIENT INSTRUCTIONS
No medication changes at this time. Follow-up with Dr Sara vinson in 3 months. Obtain sleep study. Obtain stress test.  Avoid binge drinking. Consider antiarrhythmic medication and ablation options discussed at today's appointment. Contact Dr Sara Flynn office when you have made a decision.

## 2021-05-03 NOTE — PROGRESS NOTES
Lateral myocardial infarction ([facility] 10/08    due to circumflex occlusion    Leg swelling 3/2/2017     Past Surgical History:   Procedure Laterality Date    CARDIAC CATHETERIZATION      CARDIOVASCULAR STRESS TEST  12/14/09    neither fixed nor reversible perfusion defect; LVEF 50%    CATARACT REMOVAL Left     CHOLECYSTECTOMY  1999    HERNIA REPAIR  @ age 13   Tera Reaper KNEE SURGERY      left-ligament torn    OTHER SURGICAL HISTORY  10/31/2011    cystoscopy retrograde;ESWL;stent on left    TESTICLE SURGERY      as child     WISDOM TOOTH EXTRACTION       Family History   Problem Relation Age of Onset    COPD Mother     COPD Father     Heart Attack Father 79    Down Syndrome Sister     Crohn's Disease Brother     Atrial Fibrillation Sister     No Known Problems Sister     No Known Problems Brother     No Known Problems Brother      There is no family history of sudden cardiac arrest    Social History     Tobacco Use    Smoking status: Never Smoker    Smokeless tobacco: Never Used   Substance Use Topics    Alcohol use: Yes     Frequency: Monthly or less     Drinks per session: 1 or 2     Binge frequency: Never       Current Outpatient Medications   Medication Sig Dispense Refill    furosemide (LASIX) 20 MG tablet Take 20 mg by mouth every other day      enalapril (VASOTEC) 2.5 MG tablet Take 2.5 mg by mouth nightly      rivaroxaban (XARELTO) 20 MG TABS tablet Take 1 tablet by mouth Daily with supper Lot: [id number], EXP: 09/2022 one bottle  Lot: [id number], EXP: 10/2022 three bottles 30 tablet 2    glipiZIDE (GLUCOTROL) 5 MG tablet Take 1 tablet by mouth 4 times daily 360 tablet 3    nitroGLYCERIN (NITROSTAT) 0.4 MG SL tablet Place 1 tablet under the tongue every 5 minutes as needed for Chest pain up to max of 3 total doses.  If no relief after 1 dose, call 911. 25 tablet 3    zinc gluconate 50 MG tablet Take 50 mg by mouth daily      metFORMIN (GLUCOPHAGE) 500 MG tablet TAKE 2 TABLETS BY MOUTH 2 TIMES DAILY Weight: 238 lb 3.2 oz (108 kg)   Height: 5' 6.5\" (1.689 m)   Constitutional: Well-developed, no acute distress, well groomed, obese  Eyes: conjunctivae normal, no xanthelasma   Ears, Nose, Throat: mask  Neck: supple, no JVD, no bruits, no thyromegaly   CV: IRIR, regular rate,  no murmurs, rubs, or gallops. PMI is nondisplaced, Peripheral pulses normal  Lungs: clear to auscultation bilaterally, normal respiratory effort without used of accessory muscles, no wheezes  Abdomen: soft, non-tender, bowel sounds present, no masses or hepatomegaly   Extremities: no digital clubbing, 1+ b/l LE edema   Skin: warm, no rashes   Neuro/Psych: A&O x 3, normal mood and affect    Cardiac testing today:  · ECG (5/3/21): atrial fibrillation at 77 bpm, low precordial voltage. Assessment/Plan:  1. Nonvalvular persistent atrial fibrillation sp DCCV x 2 (2/15/21-failed; 3/12/21)  -CHADSVASC = 4 (age, CAD, HTN, DM). Continue Xarelto 20 mg daily. -Modifiable risk factors reviewed with patient. --Obesity: patient reports ~30 lb weight loss in the past year. Recommend diet and exercise to reduce weight further. --SHANNAN: patient reports chronically poor sleep duration and quality. Recommend sleep study. --EtOH: patient reports binge EtOH drinking (former alcoholic, now drinks ~5 drinks once a month). Recommend avoidance of EtOH. -Symptomatic despite rate control. Recommend rhythm control. I reviewed options of AA and ablation, including indications, material risks, and benefits of each. He desires to consider options and ask for input from Dr Vaibhav Degroot of which option to pursue. I will message Dr Vaibhav Degroot.     2. Exertional Chest Pain  -Recommend pharmacologic nuclear stress test.    I spent a total of 50 minutes reviewing previous notes, test results, and face to face with the patient discussing the diagnosis and importance of compliance with the treatment plan as well as documenting on the day of the visit.     Time of the day of service includes:  · Preparing to see the patient (eg. Review of the medical record, such as tests). · Obtaining and/or reviewing separately obtained history. · Ordering prescription medications, tests, and/or procedures. · Communicating results to the patient/family/caregiver. · Counseling/educating the patient/family/caregiver. · Documenting clinical information in the patients electronic record. · Coordination of care for the patient. · Performing a medical appropriate exam and/or evaluation. Thank you for allowing me to participate in your patient's care. Please call me if there are any questions. Abena Marcus D.O.   Cardiac Electrophysiology  [facility]    CC: Dr Austin Medel, Dr Juan Chandler

## 2021-05-05 ENCOUNTER — TELEPHONE (OUTPATIENT)
Dept: VASCULAR SURGERY | Age: 71
End: 2021-05-05

## 2021-05-06 ENCOUNTER — TELEPHONE (OUTPATIENT)
Dept: NON INVASIVE DIAGNOSTICS | Age: 71
End: 2021-05-06

## 2021-05-06 ENCOUNTER — OFFICE VISIT (OUTPATIENT)
Dept: VASCULAR SURGERY | Age: 71
End: 2021-05-06
Payer: MEDICARE

## 2021-05-06 VITALS — HEIGHT: 67 IN | BODY MASS INDEX: 36.1 KG/M2 | WEIGHT: 230 LBS

## 2021-05-06 DIAGNOSIS — I65.21 CAROTID STENOSIS, RIGHT: ICD-10-CM

## 2021-05-06 PROCEDURE — 99204 OFFICE O/P NEW MOD 45 MIN: CPT | Performed by: SURGERY

## 2021-05-06 NOTE — TELEPHONE ENCOUNTER
I attempted to contact patient to discuss rhythm control options. No answer at home number, so I left VM to contact office with a time I should I attempt to contact him. He was not available at his work number. Recommend stress test, then tikosyn admit. In the future, we can consider ablation after he addresses his modifiable risk factors (avoidance of binge drinking, sleep study, weight loss).

## 2021-05-06 NOTE — PROGRESS NOTES
Chief Complaint:   Chief Complaint   Patient presents with    Consultation     new ptAle SWANN         HPI: Patient came to the office for evaluation of abnormal carotid ultrasound that revealed 50 to 70% right carotid stenosis    Patient does not recall exactly why the testing was done, tells me partly because he is old, partly because he is getting some early dementia and wanted of them checked out, did also undergo MRI of the brain, no major abnormal findings      Patient denies any focal lateralizing neurological symptoms like loss of speech, vision or loss of function of extremity    Patient can walk a few blocks , and denies any symptoms of rest pain    Allergies   Allergen Reactions    Adhesive Tape Dermatitis    Crestor [Rosuvastatin Calcium]      Muscles aches     Lipitor [Atorvastatin]      Muscle aches       Current Outpatient Medications   Medication Sig Dispense Refill    furosemide (LASIX) 20 MG tablet Take 20 mg by mouth every other day      enalapril (VASOTEC) 2.5 MG tablet Take 2.5 mg by mouth nightly      rivaroxaban (XARELTO) 20 MG TABS tablet Take 1 tablet by mouth Daily with supper Lot: [id number], EXP: 09/2022 one bottle  Lot: [id number], EXP: 10/2022 three bottles 30 tablet 2    glipiZIDE (GLUCOTROL) 5 MG tablet Take 1 tablet by mouth 4 times daily 360 tablet 3    nitroGLYCERIN (NITROSTAT) 0.4 MG SL tablet Place 1 tablet under the tongue every 5 minutes as needed for Chest pain up to max of 3 total doses.  If no relief after 1 dose, call 911. 25 tablet 3    zinc gluconate 50 MG tablet Take 50 mg by mouth daily      metFORMIN (GLUCOPHAGE) 500 MG tablet TAKE 2 TABLETS BY MOUTH 2 TIMES DAILY (WITH MEALS) 360 tablet 2    tamsulosin (FLOMAX) 0.4 MG capsule TAKE ONE CAPSULE BY MOUTH EVERY DAY 90 capsule 3    sildenafil (REVATIO) 20 MG tablet Take 1 tablet by mouth daily as needed (ED) 30 tablet 3    TURMERIC CURCUMIN PO Take 1 capsule by mouth daily      b complex vitamins capsule Take 1 capsule by mouth daily      Elastic Bandages & Supports (JOBST KNEE HIGH COMPRESSION SM) MISC Knee high with 20- 30 mmhg of compression 1 each 10    vitamin B-12 (CYANOCOBALAMIN) 1000 MCG tablet Take 1,000 mcg by mouth daily      vitamin D (CHOLECALCIFEROL) 1000 UNIT TABS tablet Take 400 Units by mouth daily        No current facility-administered medications for this visit.         Past Medical History:   Diagnosis Date    A-fib [facility])     for cardioversion 3-12-21     CAD (coronary artery disease) 10/27/2011    Dr. Emelina Marina Carotid stenosis, right 5/6/2021    Chronic venous insufficiency 3/2/2017    COVID-19 01/2021    resolved as of 3-3-21     Decreased pulses in feet     Diabetes mellitus ([address]     DM (diabetes mellitus), type 2 ([address] 10/27/2011    ED (erectile dysfunction)     Gout 10/31/2011    Heart attack ([address]     HTN (hypertension) 10/27/2011    Hyperlipidemia 6/7/2012    Hypertension     Lateral myocardial infarction ([address] 10/08    due to circumflex occlusion    Leg swelling 3/2/2017       Past Surgical History:   Procedure Laterality Date    CARDIAC CATHETERIZATION      CARDIOVASCULAR STRESS TEST  12/14/09    neither fixed nor reversible perfusion defect; LVEF 50%    CATARACT REMOVAL Left     CHOLECYSTECTOMY  1999    HERNIA REPAIR  @ age 13   [facility] KNEE SURGERY      left-ligament torn    OTHER SURGICAL HISTORY  10/31/2011    cystoscopy retrograde;ESWL;stent on left    TESTICLE SURGERY      as child     WISDOM TOOTH EXTRACTION         Family History   Problem Relation Age of Onset    COPD Mother     COPD Father     Heart Attack Father 79    Down Syndrome Sister     Crohn's Disease Brother     Atrial Fibrillation Sister     No Known Problems Sister     No Known Problems Brother     No Known Problems Brother        Social History     Socioeconomic History    Marital status:      Spouse name: Not on file    Number of children: 2    Years of education: Not on file    Highest education level: Not on file   Occupational History    Occupation: self-employed     Comment: print shop   Social Needs    Financial resource strain: Not on file    Food insecurity     Worry: Not on file     Inability: Not on file   [employer] needs     Medical: Not on file     Non-medical: Not on file   Tobacco Use    Smoking status: Never Smoker    Smokeless tobacco: Never Used   Substance and Sexual Activity    Alcohol use: Yes     Frequency: Monthly or less     Drinks per session: 1 or 2     Binge frequency: Never    Drug use: Never    Sexual activity: Not on file   Lifestyle    Physical activity     Days per week: Not on file     Minutes per session: Not on file    Stress: Not on file   Relationships    Social connections     Talks on phone: Not on file     Gets together: Not on file     Attends Taoist service: Not on file     Active member of club or organization: Not on file     Attends meetings of clubs or organizations: Not on file     Relationship status: Not on file    Intimate partner violence     Fear of current or ex partner: Not on file     Emotionally abused: Not on file     Physically abused: Not on file     Forced sexual activity: Not on file   Other Topics Concern    Not on file   Social History Narrative    Not on file       Review of Systems:  Skin:  No abnormal pigmentation or rash  Eyes:  No blurring, diplopia or vision loss  Ears/Nose/Throat:  No hearing loss or vertigo  Respiratory:  No cough, pleuritic chest pain, dyspnea, or wheezing. Cardiovascular: No angina, palpitations . Coronary artery disease, cardiac including atrial fibrillation, at one time was recommended ablation, patient not sure, hyperlipidemia, hypertension  Gastrointestinal:  No nausea or vomiting; no abdominal pain or rectal bleeding  Musculoskeletal:  No arthritis or weakness.   Neurologic:  No paralysis, paresis, paresthesia, seizures or headaches  Hematologic/Lymphatic/Immunologic:  No anemia,

## 2021-05-25 ENCOUNTER — TELEPHONE (OUTPATIENT)
Dept: CARDIOLOGY | Age: 71
End: 2021-05-25

## 2021-05-27 ENCOUNTER — HOSPITAL ENCOUNTER (OUTPATIENT)
Dept: CARDIOLOGY | Age: 71
Discharge: HOME | End: 2021-05-27
Payer: MEDICARE

## 2021-05-27 VITALS
DIASTOLIC BLOOD PRESSURE: 80 MMHG | RESPIRATION RATE: 16 BRPM | BODY MASS INDEX: 36.1 KG/M2 | SYSTOLIC BLOOD PRESSURE: 126 MMHG | HEART RATE: 68 BPM | HEIGHT: 67 IN | WEIGHT: 230 LBS

## 2021-05-27 DIAGNOSIS — R07.9 CHEST PAIN OF UNCERTAIN ETIOLOGY: Primary | ICD-10-CM

## 2021-05-27 DIAGNOSIS — R07.9 CHEST PAIN OF UNCERTAIN ETIOLOGY: ICD-10-CM

## 2021-05-27 PROCEDURE — 3430000000 HC RX DIAGNOSTIC RADIOPHARMACEUTICAL: Performed by: INTERNAL MEDICINE

## 2021-05-27 PROCEDURE — A9500 TC99M SESTAMIBI: HCPCS | Performed by: INTERNAL MEDICINE

## 2021-05-27 PROCEDURE — 2580000003 HC RX 258: Performed by: INTERNAL MEDICINE

## 2021-05-27 PROCEDURE — 93017 CV STRESS TEST TRACING ONLY: CPT

## 2021-05-27 PROCEDURE — 78452 HT MUSCLE IMAGE SPECT MULT: CPT

## 2021-05-27 PROCEDURE — 6360000002 HC RX W HCPCS: Performed by: STUDENT IN AN ORGANIZED HEALTH CARE EDUCATION/TRAINING PROGRAM

## 2021-05-27 RX ORDER — SODIUM CHLORIDE 0.9 % (FLUSH) 0.9 %
10 SYRINGE (ML) INJECTION PRN
Status: DISCONTINUED | OUTPATIENT
Start: 2021-05-27 | End: 2021-05-28 | Stop reason: HOSPADM

## 2021-05-27 RX ADMIN — Medication 10.2 MILLICURIE: at 07:19

## 2021-05-27 RX ADMIN — SODIUM CHLORIDE, PRESERVATIVE FREE 10 ML: 5 INJECTION INTRAVENOUS at 08:41

## 2021-05-27 RX ADMIN — SODIUM CHLORIDE, PRESERVATIVE FREE 10 ML: 5 INJECTION INTRAVENOUS at 07:20

## 2021-05-27 RX ADMIN — Medication 32.8 MILLICURIE: at 08:40

## 2021-05-27 RX ADMIN — SODIUM CHLORIDE, PRESERVATIVE FREE 10 ML: 5 INJECTION INTRAVENOUS at 08:40

## 2021-05-27 RX ADMIN — REGADENOSON 0.4 MG: 0.08 INJECTION, SOLUTION INTRAVENOUS at 08:40

## 2021-05-27 NOTE — PROCEDURES
[address]  [phone]                Pharmacologic Stress Nuclear Gated SPECT Study    Name: [facility] Account Number: [de-identified]    :  1950          Sex: male         Date of Study:  2021    Height: 5' 6.5\" (168.9 cm)         Weight: 230 lb (104.3 kg)     Ordering Provider: Librado Ariza DO          PCP: Richard Bronson MD      Cardiologist: Pascale Campos MD             Interpreting Physician: Samra Chow MD  _________________________________________________________________________________    Indication:   Evaluation of extent and severity of coronary artery disease    Clinical History:   Patient has prior history of coronary artery disease. Resting ECG:    ID int  sec, QRS int 0.08 sec, QT int  sec; HR 70 bpm  Atrial fibrillation with controlled ventricular response. Old anteroseptal MI. Procedure:   Pharmacologic stress testing was performed with regadenoson 0.4 mg for 15 seconds. Additionally, low-level exercise was performed along with the infusion. The heart rate was 70 at baseline and danyelle to 108 beats during the infusion. This corresponds to 73% of maximum predicted heart rate. The blood pressure at baseline was 126/80 and blood pressure at the end of infusion was 128/70. Blood pressure response was normal during the stress procedure. The patient experienced mild shortness of breath during the infusion. ECG during the infusion did not change. IMAGING: Myocardial perfusion imaging was performed at rest 30-35 minutes following the intravenous injection of 10.2 mCi of (Tc-Sestamibi) followed by 10 ml of Normal Saline. As per infusion protocol, the patient was injected intravenously with 32.8 mCi of (Tc-Sestamibi) followed by 10 ml of Normal Saline. Gated post-stress tomographic imaging was performed 20-25 minutes after stress. FINDINGS: The overall quality of the study was good. Left ventricular cavity size was noted to be normal.    Rotational analog analysis demonstrated no patient motion or abnormal extracardiac radioactivity. The gated SPECT stress imaging in the short, vertical long, and horizontal long axis demonstrated a moderate size mild intensity apical defect. This also involves the mid and distal adjacent anterior wall and distal anterior septum. There is a moderately large mild intensity defect of the basilar and mid inferior wall and the basilar and mid inferior septum. The resting images show significant reversibility of the inferior and inferoseptal segments. The apical defect is unchanged. Resolution of the distal anterior defect and distal anterior septal defect. Gated SPECT left ventricular ejection fraction was calculated to be 64%, with normal myocardial thickening and wall motion. Impression:    ECG during the infusion did not change. The myocardial perfusion imaging was abnormal.  The fixed apical defect had no associated wall motion reality it was likely artifact. The inferior and inferoseptal defect show significant reversibility consistent with ischemia and likely some element of attenuation artifact. There is significant reversibility of the distal anterior and distal anteroseptal defect suggesting ischemic potential..  This suggests multivessel ischemic potential  Overall left ventricular systolic function was normal without regional wall motion abnormalities. High risk nuclear perfusion pharmacologic stress test.    Thank you for sending your patient to this [employer].      Electronically signed by Cristhian Bhardwaj MD on 5/27/21 at 5:07 PM EDT

## 2021-05-28 ENCOUNTER — TELEPHONE (OUTPATIENT)
Dept: CARDIOLOGY CLINIC | Age: 71
End: 2021-05-28

## 2021-05-28 NOTE — TELEPHONE ENCOUNTER
----- Message from Ron Brown MD sent at 5/28/2021  3:18 PM EDT -----  Please let patient know that his stress test was reviewed. There does appear to be some change from his prior study. Advise him if he does has any significant chest pain to seek attention in the hospital emergency room. Dr. Kayleen Brown will further evaluate the study when he returns and get back to him.  ----- Message -----  From: Radha Keyes  Sent: 5/28/2021   3:06 PM EDT  To: Ron Brown MD      ----- Message -----  From: Radha Keyes  Sent: 5/28/2021   9:59 AM EDT  To: Chelsea Hull MD    Please review and advise   ----- Message -----  From: Julian Pierre  Sent: 5/28/2021   8:46 AM EDT  To:  Vickie Amezquita    Abnormal stress test Guy pt - ordered by Yissel Cornelius  ----- Message -----  From: Daniel Cadena  Sent: 5/28/2021   8:08 AM EDT  To: Julian Pierre

## 2021-05-28 NOTE — TELEPHONE ENCOUNTER
Patients wife notified and instructed on stress test results and recommendations per Dr. Ni Silvestre. We will get back to patient on Tuesday after review by Dr. Alvarez Young. She stated understanding.

## 2021-06-01 NOTE — TELEPHONE ENCOUNTER
Patent notified and instructed on stress test results and need for heart cath. He stated understanding.

## 2021-06-02 DIAGNOSIS — Z01.810 PREOPERATIVE CARDIOVASCULAR EXAMINATION: ICD-10-CM

## 2021-06-02 DIAGNOSIS — R94.39 ABNORMAL STRESS TEST: Primary | ICD-10-CM

## 2021-06-02 DIAGNOSIS — I25.118 ATHEROSCLEROSIS OF NATIVE CORONARY ARTERY OF NATIVE HEART WITH STABLE ANGINA PECTORIS (HCC): ICD-10-CM

## 2021-06-14 ENCOUNTER — HOSPITAL ENCOUNTER (OUTPATIENT)
Age: 71
Discharge: HOME | End: 2021-06-14
Payer: MEDICARE

## 2021-06-14 DIAGNOSIS — I25.118 ATHEROSCLEROSIS OF NATIVE CORONARY ARTERY OF NATIVE HEART WITH STABLE ANGINA PECTORIS (HCC): ICD-10-CM

## 2021-06-14 DIAGNOSIS — Z01.810 PREOPERATIVE CARDIOVASCULAR EXAMINATION: ICD-10-CM

## 2021-06-14 DIAGNOSIS — R94.39 ABNORMAL STRESS TEST: ICD-10-CM

## 2021-06-14 LAB
ALBUMIN SERPL-MCNC: 4.2 G/DL (ref 3.5–5.2)
ALP BLD-CCNC: 59 U/L (ref 40–129)
ALT SERPL-CCNC: 15 U/L (ref 0–40)
ANION GAP SERPL CALCULATED.3IONS-SCNC: 8 MMOL/L (ref 7–16)
AST SERPL-CCNC: 15 U/L (ref 0–39)
BILIRUB SERPL-MCNC: 0.5 MG/DL (ref 0–1.2)
BUN BLDV-MCNC: 18 MG/DL (ref 6–23)
CALCIUM SERPL-MCNC: 9 MG/DL (ref 8.6–10.2)
CHLORIDE BLD-SCNC: 107 MMOL/L (ref 98–107)
CO2: 24 MMOL/L (ref 22–29)
CREAT SERPL-MCNC: 1 MG/DL (ref 0.7–1.2)
GFR AFRICAN AMERICAN: >60
GFR NON-AFRICAN AMERICAN: >60 ML/MIN/1.73
GLUCOSE BLD-MCNC: 195 MG/DL (ref 74–99)
HCT VFR BLD CALC: 42 % (ref 37–54)
HEMOGLOBIN: 14.5 G/DL (ref 12.5–16.5)
INR BLD: 1.3
MCH RBC QN AUTO: 33.5 PG (ref 26–35)
MCHC RBC AUTO-ENTMCNC: 34.5 % (ref 32–34.5)
MCV RBC AUTO: 97 FL (ref 80–99.9)
PDW BLD-RTO: 12.8 FL (ref 11.5–15)
PLATELET # BLD: 133 E9/L (ref 130–450)
PMV BLD AUTO: 9.5 FL (ref 7–12)
POTASSIUM SERPL-SCNC: 4.7 MMOL/L (ref 3.5–5)
PROTHROMBIN TIME: 13.6 SEC (ref 9.3–12.4)
RBC # BLD: 4.33 E12/L (ref 3.8–5.8)
SODIUM BLD-SCNC: 139 MMOL/L (ref 132–146)
TOTAL PROTEIN: 6.6 G/DL (ref 6.4–8.3)
WBC # BLD: 4.3 E9/L (ref 4.5–11.5)

## 2021-06-14 PROCEDURE — 80053 COMPREHEN METABOLIC PANEL: CPT

## 2021-06-14 PROCEDURE — 36415 COLL VENOUS BLD VENIPUNCTURE: CPT

## 2021-06-14 PROCEDURE — 85027 COMPLETE CBC AUTOMATED: CPT

## 2021-06-14 PROCEDURE — 85610 PROTHROMBIN TIME: CPT

## 2021-06-15 ENCOUNTER — TELEPHONE (OUTPATIENT)
Dept: CARDIAC CATH/INVASIVE PROCEDURES | Age: 71
End: 2021-06-15

## 2021-06-15 NOTE — TELEPHONE ENCOUNTER
Reminded wife of patient's scheduled procedure on 6/16/21. Instructions given and COVID questionnaire completed.

## 2021-06-16 ENCOUNTER — HOSPITAL ENCOUNTER (OUTPATIENT)
Dept: CARDIAC CATH/INVASIVE PROCEDURES | Age: 71
Discharge: HOME | End: 2021-06-16
Payer: MEDICARE

## 2021-06-16 VITALS
RESPIRATION RATE: 14 BRPM | HEART RATE: 69 BPM | OXYGEN SATURATION: 96 % | BODY MASS INDEX: 36.96 KG/M2 | TEMPERATURE: 98.5 F | WEIGHT: 230 LBS | DIASTOLIC BLOOD PRESSURE: 96 MMHG | HEIGHT: 66 IN | SYSTOLIC BLOOD PRESSURE: 138 MMHG

## 2021-06-16 DIAGNOSIS — I25.118 ATHEROSCLEROSIS OF NATIVE CORONARY ARTERY OF NATIVE HEART WITH STABLE ANGINA PECTORIS (HCC): Primary | ICD-10-CM

## 2021-06-16 LAB
ABO/RH: NORMAL
ANTIBODY SCREEN: NORMAL

## 2021-06-16 PROCEDURE — 86901 BLOOD TYPING SEROLOGIC RH(D): CPT

## 2021-06-16 PROCEDURE — C1769 GUIDE WIRE: HCPCS

## 2021-06-16 PROCEDURE — 2500000003 HC RX 250 WO HCPCS

## 2021-06-16 PROCEDURE — 6360000002 HC RX W HCPCS

## 2021-06-16 PROCEDURE — 93458 L HRT ARTERY/VENTRICLE ANGIO: CPT | Performed by: INTERNAL MEDICINE

## 2021-06-16 PROCEDURE — C1894 INTRO/SHEATH, NON-LASER: HCPCS

## 2021-06-16 PROCEDURE — 86900 BLOOD TYPING SEROLOGIC ABO: CPT

## 2021-06-16 PROCEDURE — 93458 L HRT ARTERY/VENTRICLE ANGIO: CPT

## 2021-06-16 PROCEDURE — 86850 RBC ANTIBODY SCREEN: CPT

## 2021-06-16 PROCEDURE — 36415 COLL VENOUS BLD VENIPUNCTURE: CPT

## 2021-06-16 PROCEDURE — C1887 CATHETER, GUIDING: HCPCS

## 2021-06-16 PROCEDURE — 2709999900 HC NON-CHARGEABLE SUPPLY

## 2021-06-16 RX ORDER — SODIUM CHLORIDE 0.9 % (FLUSH) 0.9 %
5-40 SYRINGE (ML) INJECTION PRN
Status: DISCONTINUED | OUTPATIENT
Start: 2021-06-16 | End: 2021-06-17 | Stop reason: HOSPADM

## 2021-06-16 RX ORDER — SODIUM CHLORIDE 0.9 % (FLUSH) 0.9 %
5-40 SYRINGE (ML) INJECTION EVERY 12 HOURS SCHEDULED
Status: DISCONTINUED | OUTPATIENT
Start: 2021-06-16 | End: 2021-06-17 | Stop reason: HOSPADM

## 2021-06-16 RX ORDER — SODIUM CHLORIDE 9 MG/ML
25 INJECTION, SOLUTION INTRAVENOUS PRN
Status: DISCONTINUED | OUTPATIENT
Start: 2021-06-16 | End: 2021-06-17 | Stop reason: HOSPADM

## 2021-06-16 RX ORDER — ACETAMINOPHEN 325 MG/1
650 TABLET ORAL EVERY 4 HOURS PRN
Status: DISCONTINUED | OUTPATIENT
Start: 2021-06-16 | End: 2021-06-17 | Stop reason: HOSPADM

## 2021-06-16 RX ORDER — SODIUM CHLORIDE 9 MG/ML
INJECTION, SOLUTION INTRAVENOUS CONTINUOUS
Status: ACTIVE | OUTPATIENT
Start: 2021-06-16 | End: 2021-06-16

## 2021-06-16 NOTE — PROCEDURES
[address]                  [address] [facility]  [facility]                            CARDIAC CATHETERIZATION    PATIENT NAME: Myriam Chery                    :        1950  MED REC NO:   [id number]                            ROOM:  ACCOUNT NO:   [de-identified]                           ADMIT DATE: 2021  PROVIDER:     Florecita Young MD    DATE OF PROCEDURE:  2021    REFERRING PROVIDER:  Dr. Sean Vera. INDICATION:  High-risk Lexiscan with evidence of inferior and anterior  reversible ischemia. DESCRIPTION OF PROCEDURE:  After obtaining a signed consent from the  patient, he was brought to the cardiac cath lab in his usual fasting  state. Under sterile condition and under local anesthetic and using  conscious sedation, a 6-Welsh Terumo Slender sheath was inserted into  the right radial artery. He received 5000 units of intravenous heparin. He also received 200 mcg of intraarterial nitroglycerin via the right  radial sheath. Then, a 5-Welsh TIG diagnostic catheter was advanced to  the ascending aorta without difficulty. The right coronary artery was  engaged and a coronary angiogram was done. Then, the left main coronary  artery was engaged and a coronary angiogram was done. This catheter was  exchanged over a guidewire to a 5-Welsh pigtail, which was advanced  into the left ventricle without difficulty. The left ventricular  end-diastolic pressure was measured. Left ventriculogram was done. There was no significant gradient across the aortic valve. At the end  of the procedure, the pigtail was pulled out. The Terumo Slender sheath  was pulled out and a Vasc Band was applied over the right radial artery  with good hemostasis. The patient tolerated the procedure very well and  no complications were noted. No significant blood loss occurred during  the procedure. FINDINGS:  HEMODYNAMICS:  Aortic pressure 140/87 mmHg.   Left ventricular end-diastolic pressure 20 mmHg. CORONARY ANATOMY:  LEFT MAIN:  It is a large artery with no significant angiographic  stenosis. LAD:  It is a large artery wrapping around the apex and giving rise to 2  small diagonal branches and several fair-size septal branches. The LAD  was heavily calcified in its proximal segment. There was 80% discrete  proximal LAD stenosis and 60% discrete mid stenosis and 70% apical LAD  stenosis. LEFT CIRCUMFLEX:  It is a large artery giving rise to a large  bifurcating obtuse marginal branch and there is chronic total occlusion  of the mid circumflex. The distal circumflex artery is receiving  collaterals from the RV marginal branch of the small nondominant RCA. There was 70%-80% tubular proximal to mid circumflex stenosis and  80%-90% discrete proximal stenosis prior to the bifurcation of the  marginal branch. RIGHT CORONARY ARTERY:  It is A small nondominant artery giving rise to  a small conus branch, 2 fair-size RV marginal branches. The first  obtuse marginal branch gives collaterals to the distal left circumflex  artery. The second RV marginal branch has 80%-90% proximal stenosis. LEFT VENTRICULOGRAM:  Revealed mid to basal inferior wall akinesis with  an ejection fraction of 45%-50%. No mitral regurgitation was noted. IMPRESSION:  1. Severe triple-vessel CAD. 2.  Mid to basal inferior wall akinesis with an ejection fraction of  45%-50%. 3.  Elevated LVEDP at 20 mmHg. RECOMMENDATIONS:  1.  Start coated baby aspirin daily. 2.  May resume Xarelto tonight. 3.  Hold metformin for the next 48 hours and reassess kidney function  ____prior to resuming metformin. 4.  We will refer for CT Surgery consultation for possible CABG. PROCEDURE START TIME:  09:38 a.m. PROCEDURE END TIME:  09:53 a.m. FLUOROSCOPY TIME:  1.8 minutes. CONTRAST VOLUME:  54 mL of Isovue.     CONSCIOUS SEDATION:  1 mg of intravenous Versed and 50 mcg of  intravenous fentanyl.         Donna Dexter MD    D: 06/16/2021 10:15:11       T: 06/16/2021 10:21:23     [id number]  Job#: [id number]     Doc#: [id number]    CC:

## 2021-06-19 ENCOUNTER — HOSPITAL ENCOUNTER (OUTPATIENT)
Age: 71
Discharge: HOME | End: 2021-06-19
Payer: MEDICARE

## 2021-06-19 LAB
ANION GAP SERPL CALCULATED.3IONS-SCNC: 9 MMOL/L (ref 7–16)
BUN BLDV-MCNC: 19 MG/DL (ref 6–23)
CALCIUM SERPL-MCNC: 9.2 MG/DL (ref 8.6–10.2)
CHLORIDE BLD-SCNC: 106 MMOL/L (ref 98–107)
CO2: 24 MMOL/L (ref 22–29)
CREAT SERPL-MCNC: 1.2 MG/DL (ref 0.7–1.2)
GFR AFRICAN AMERICAN: >60
GFR NON-AFRICAN AMERICAN: 59 ML/MIN/1.73
GLUCOSE BLD-MCNC: 209 MG/DL (ref 74–99)
POTASSIUM SERPL-SCNC: 4.6 MMOL/L (ref 3.5–5)
SODIUM BLD-SCNC: 139 MMOL/L (ref 132–146)

## 2021-06-19 PROCEDURE — 80048 BASIC METABOLIC PNL TOTAL CA: CPT

## 2021-06-19 PROCEDURE — 36415 COLL VENOUS BLD VENIPUNCTURE: CPT

## 2021-06-21 ENCOUNTER — TELEPHONE (OUTPATIENT)
Dept: CARDIOTHORACIC SURGERY | Age: 71
End: 2021-06-21

## 2021-06-22 ENCOUNTER — INITIAL CONSULT (OUTPATIENT)
Dept: CARDIOTHORACIC SURGERY | Age: 71
End: 2021-06-22
Payer: MEDICARE

## 2021-06-22 ENCOUNTER — PREP FOR PROCEDURE (OUTPATIENT)
Dept: CARDIOTHORACIC SURGERY | Age: 71
End: 2021-06-22

## 2021-06-22 VITALS
SYSTOLIC BLOOD PRESSURE: 148 MMHG | DIASTOLIC BLOOD PRESSURE: 73 MMHG | BODY MASS INDEX: 36.96 KG/M2 | HEIGHT: 66 IN | WEIGHT: 230 LBS | HEART RATE: 62 BPM

## 2021-06-22 DIAGNOSIS — Z01.818 PRE-OP EVALUATION: Primary | ICD-10-CM

## 2021-06-22 DIAGNOSIS — I73.9 PVD (PERIPHERAL VASCULAR DISEASE) (HCC): ICD-10-CM

## 2021-06-22 DIAGNOSIS — I25.10 CAD IN NATIVE ARTERY: Primary | ICD-10-CM

## 2021-06-22 DIAGNOSIS — E11.69 TYPE 2 DIABETES MELLITUS WITH OTHER SPECIFIED COMPLICATION, UNSPECIFIED WHETHER LONG TERM INSULIN USE (HCC): ICD-10-CM

## 2021-06-22 PROCEDURE — 99204 OFFICE O/P NEW MOD 45 MIN: CPT | Performed by: THORACIC SURGERY (CARDIOTHORACIC VASCULAR SURGERY)

## 2021-06-22 RX ORDER — SODIUM CHLORIDE 0.9 % (FLUSH) 0.9 %
10 SYRINGE (ML) INJECTION EVERY 12 HOURS SCHEDULED
Status: CANCELLED | OUTPATIENT
Start: 2021-06-22

## 2021-06-22 RX ORDER — SODIUM CHLORIDE 9 MG/ML
INJECTION, SOLUTION INTRAVENOUS CONTINUOUS
Status: CANCELLED | OUTPATIENT
Start: 2021-06-22

## 2021-06-22 RX ORDER — SODIUM CHLORIDE 9 MG/ML
25 INJECTION, SOLUTION INTRAVENOUS PRN
Status: CANCELLED | OUTPATIENT
Start: 2021-06-22

## 2021-06-22 RX ORDER — CHLORHEXIDINE GLUCONATE ORAL RINSE 1.2 MG/ML
15 SOLUTION DENTAL ONCE
Status: CANCELLED | OUTPATIENT
Start: 2021-06-22 | End: 2021-06-22

## 2021-06-22 RX ORDER — SODIUM CHLORIDE 0.9 % (FLUSH) 0.9 %
10 SYRINGE (ML) INJECTION PRN
Status: CANCELLED | OUTPATIENT
Start: 2021-06-22

## 2021-06-22 RX ORDER — CHLORHEXIDINE GLUCONATE 4 G/100ML
SOLUTION TOPICAL ONCE
Status: CANCELLED | OUTPATIENT
Start: 2021-06-22 | End: 2021-06-22

## 2021-06-22 ASSESSMENT — ENCOUNTER SYMPTOMS
SHORTNESS OF BREATH: 0
COUGH: 0

## 2021-06-22 NOTE — H&P
Lashawn Benito is a 66 y.o. male who recently  presented to office upon referral from cardiology for continued evaluation and recommendations regarding his CAD. Patient hx is significant for CAD ( MI in 2008) SHANNAN, and afib s/p DCCV x 2 (2/15/21-failed; 3/12/21)  Patient reported over last few months exertional CP. Stress test was ordered which was positive and patient was referred for cardiac cath done on 6/16 revealing severe triple vessel CAD  Patient denies any CP at present time or CP at rest.  No SOB.     Past Medical History:   Diagnosis Date    A-fib [facility])     for cardioversion 3-12-21     CAD (coronary artery disease) 10/27/2011    Dr. Lashon Ceron Carotid stenosis, right 5/6/2021    Chronic venous insufficiency 3/2/2017    COVID-19 01/2021    resolved as of 3-3-21     Decreased pulses in feet     Diabetes mellitus ([address]     DM (diabetes mellitus), type 2 ([address] 10/27/2011    ED (erectile dysfunction)     Gout 10/31/2011    Heart attack ([address]     HTN (hypertension) 10/27/2011    Hyperlipidemia 6/7/2012    Hypertension     Lateral myocardial infarction ([address] 10/08    due to circumflex occlusion    Leg swelling 3/2/2017       Past Surgical History:   Procedure Laterality Date    CARDIAC CATHETERIZATION      CARDIAC CATHETERIZATION  06/16/2021    DR. [facility]DELARIA [facility] EF 45% CTS CONSULT    CARDIOVASCULAR STRESS TEST  12/14/2009    neither fixed nor reversible perfusion defect; LVEF 50%    CATARACT REMOVAL Left     CHOLECYSTECTOMY  1999    HERNIA REPAIR  @ age 13   [employer] KNEE SURGERY      left-ligament torn    OTHER SURGICAL HISTORY  10/31/2011    cystoscopy retrograde;ESWL;stent on left    TESTICLE SURGERY      as child     WISDOM TOOTH EXTRACTION         Social History     Tobacco Use    Smoking status: Never Smoker    Smokeless tobacco: Never Used   Vaping Use    Vaping Use: Never used   Substance Use Topics    Alcohol use: Yes     Comment: occasional    Drug use: Never       No current facility-administered medications for this encounter. Current Outpatient Medications:     aspirin 81 MG EC tablet, Take 81 mg by mouth daily, Disp: , Rfl:     tamsulosin (FLOMAX) 0.4 MG capsule, TAKE ONE CAPSULE BY MOUTH EVERY DAY, Disp: 90 capsule, Rfl: 3    furosemide (LASIX) 20 MG tablet, Take 20 mg by mouth every other day Patient takes when needed, Disp: , Rfl:     enalapril (VASOTEC) 2.5 MG tablet, Take 2.5 mg by mouth nightly, Disp: , Rfl:     rivaroxaban (XARELTO) 20 MG TABS tablet, Take 1 tablet by mouth Daily with supper Lot: [id number], EXP: 09/2022 one bottle Lot: [id number], EXP: 10/2022 three bottles, Disp: 30 tablet, Rfl: 2    glipiZIDE (GLUCOTROL) 5 MG tablet, Take 1 tablet by mouth 4 times daily, Disp: 360 tablet, Rfl: 3    nitroGLYCERIN (NITROSTAT) 0.4 MG SL tablet, Place 1 tablet under the tongue every 5 minutes as needed for Chest pain up to max of 3 total doses.  If no relief after 1 dose, call 911., Disp: 25 tablet, Rfl: 3    zinc gluconate 50 MG tablet, Take 50 mg by mouth daily Not taking, Disp: , Rfl:     metFORMIN (GLUCOPHAGE) 500 MG tablet, TAKE 2 TABLETS BY MOUTH 2 TIMES DAILY (WITH MEALS), Disp: 360 tablet, Rfl: 2    sildenafil (REVATIO) 20 MG tablet, Take 1 tablet by mouth daily as needed (ED), Disp: 30 tablet, Rfl: 3    TURMERIC CURCUMIN PO, Take 1 capsule by mouth daily Has not taken recently, Disp: , Rfl:     b complex vitamins capsule, Take 1 capsule by mouth daily, Disp: , Rfl:     Elastic Bandages & Supports (JOBST KNEE HIGH COMPRESSION SM) MISC, Knee high with 20- 30 mmhg of compression, Disp: 1 each, Rfl: 10    vitamin B-12 (CYANOCOBALAMIN) 1000 MCG tablet, Take 1,000 mcg by mouth daily Has not taken recently, Disp: , Rfl:     vitamin D (CHOLECALCIFEROL) 1000 UNIT TABS tablet, Take 400 Units by mouth daily , Disp: , Rfl:     Allergies   Allergen Reactions    Adhesive Tape Dermatitis    Crestor [Rosuvastatin Calcium]      Muscles aches     Lipitor [Atorvastatin] regurgitation.      Aortic Valve   Structurally normal aortic valve.      Pulmonic Valve   The pulmonic valve was not well visualized.      Pericardial Effusion   No pericardial effusion.      Aorta   Aortic root dimension within normal limits.   Mild plaque noted in the descending aorta.      Conclusions      Summary   Normal left ventricle size and systolic function.   Mildly dilated left atrium.   Physiologic and/or trace mitral regurgitation is present.   Mild tricuspid regurgitation.   Mild plaque noted in the descending aorta.           CORONARY ANATOMY:  LEFT MAIN:  It is a large artery with no significant angiographic  stenosis.     LAD:  It is a large artery wrapping around the apex and giving rise to 2  small diagonal branches and several fair-size septal branches.  The LAD  was heavily calcified in its proximal segment.  There was 80% discrete  proximal LAD stenosis and 60% discrete mid stenosis and 70% apical LAD  stenosis.     LEFT CIRCUMFLEX:  It is a large artery giving rise to a large  bifurcating obtuse marginal branch and there is chronic total occlusion  of the mid circumflex.  The distal circumflex artery is receiving  collaterals from the RV marginal branch of the small nondominant RCA. There was 70%-80% tubular proximal to mid circumflex stenosis and  80%-90% discrete proximal stenosis prior to the bifurcation of the  marginal branch.     RIGHT CORONARY ARTERY:  It is A small nondominant artery giving rise to  a small conus branch, 2 fair-size RV marginal branches.  The first  obtuse marginal branch gives collaterals to the distal left circumflex  artery.  The second RV marginal branch has 80%-90% proximal stenosis.     LEFT VENTRICULOGRAM:  Revealed mid to basal inferior wall akinesis with  an ejection fraction of 45%-50%.  No mitral regurgitation was noted.     IMPRESSION:  1.  Severe triple-vessel CAD. 2.  Mid to basal inferior wall akinesis with an ejection fraction of  45%-50%.   3.

## 2021-06-22 NOTE — PROGRESS NOTES
and systolic function. Right Ventricle   Normal right ventricular size and function. Left Atrium   Mildly dilated left atrium. There is no left atrial appendage thrombus. Agitated saline injected for shunt evaluation. No evidence of patent foramen ovale. Right Atrium   Normal right atrium size. Mitral Valve   Structurally normal mitral valve. Physiologic and/or trace mitral regurgitation is present. Tricuspid Valve   The tricuspid valve appears structurally normal.   Mild tricuspid regurgitation. Aortic Valve   Structurally normal aortic valve. Pulmonic Valve   The pulmonic valve was not well visualized. Pericardial Effusion   No pericardial effusion. Aorta   Aortic root dimension within normal limits. Mild plaque noted in the descending aorta. Conclusions      Summary   Normal left ventricle size and systolic function. Mildly dilated left atrium. Physiologic and/or trace mitral regurgitation is present. Mild tricuspid regurgitation. Mild plaque noted in the descending aorta. CORONARY ANATOMY:  LEFT MAIN:  It is a large artery with no significant angiographic  stenosis.     LAD:  It is a large artery wrapping around the apex and giving rise to 2  small diagonal branches and several fair-size septal branches. The LAD  was heavily calcified in its proximal segment. There was 80% discrete  proximal LAD stenosis and 60% discrete mid stenosis and 70% apical LAD  stenosis.     LEFT CIRCUMFLEX:  It is a large artery giving rise to a large  bifurcating obtuse marginal branch and there is chronic total occlusion  of the mid circumflex. The distal circumflex artery is receiving  collaterals from the RV marginal branch of the small nondominant RCA.    There was 70%-80% tubular proximal to mid circumflex stenosis and  80%-90% discrete proximal stenosis prior to the bifurcation of the  marginal branch.     RIGHT CORONARY ARTERY:  It is A small nondominant artery giving rise to  a small conus branch, 2 fair-size RV marginal branches. The first  obtuse marginal branch gives collaterals to the distal left circumflex  artery. The second RV marginal branch has 80%-90% proximal stenosis.     LEFT VENTRICULOGRAM:  Revealed mid to basal inferior wall akinesis with  an ejection fraction of 45%-50%. No mitral regurgitation was noted.     IMPRESSION:  1. Severe triple-vessel CAD. 2.  Mid to basal inferior wall akinesis with an ejection fraction of  45%-50%. 3.  Elevated LVEDP at 20 mmHg.     RECOMMENDATIONS:  1.  Start coated baby aspirin daily. 2.  May resume Xarelto tonight. 3.  Hold metformin for the next 48 hours and reassess kidney function  ____prior to resuming metformin. 4.  We will refer for CT Surgery consultation for possible CABG.     Assessment:      67 yo male with severe MVCAD, afib s/p unsuccessful DCCV x2       Plan:      Surgery was recommended  R/B/A were discussed at length and he is agreeable to proceed  CABG (LAD, OM +/- distal cx/LPDA)/MAZE/LAAL on 7/9   Hold Effie Du prior to surgery

## 2021-06-24 ENCOUNTER — TELEPHONE (OUTPATIENT)
Dept: CARDIOTHORACIC SURGERY | Age: 71
End: 2021-06-24

## 2021-06-24 NOTE — TELEPHONE ENCOUNTER
Pt has had COVID vaccine.  Informed to bring card with him to PAT  [email or web address] 7/7 7:30AM  arrive 7:15  Last dose Xarelto 7/3

## 2021-07-02 ENCOUNTER — OFFICE VISIT (OUTPATIENT)
Dept: CARDIOLOGY CLINIC | Age: 71
End: 2021-07-02
Payer: MEDICARE

## 2021-07-02 VITALS
WEIGHT: 235.6 LBS | HEART RATE: 74 BPM | DIASTOLIC BLOOD PRESSURE: 89 MMHG | RESPIRATION RATE: 18 BRPM | SYSTOLIC BLOOD PRESSURE: 118 MMHG | HEIGHT: 67 IN | BODY MASS INDEX: 36.98 KG/M2

## 2021-07-02 DIAGNOSIS — I25.10 CORONARY ARTERY DISEASE INVOLVING NATIVE CORONARY ARTERY OF NATIVE HEART WITHOUT ANGINA PECTORIS: Primary | ICD-10-CM

## 2021-07-02 DIAGNOSIS — E78.5 HYPERLIPIDEMIA, UNSPECIFIED HYPERLIPIDEMIA TYPE: ICD-10-CM

## 2021-07-02 DIAGNOSIS — I48.91 ATRIAL FIBRILLATION, UNSPECIFIED TYPE (HCC): ICD-10-CM

## 2021-07-02 PROCEDURE — 99214 OFFICE O/P EST MOD 30 MIN: CPT | Performed by: INTERNAL MEDICINE

## 2021-07-02 PROCEDURE — 93000 ELECTROCARDIOGRAM COMPLETE: CPT | Performed by: INTERNAL MEDICINE

## 2021-07-02 RX ORDER — PRAVASTATIN SODIUM 20 MG
20 TABLET ORAL DAILY
Qty: 90 TABLET | Refills: 5 | Status: SHIPPED
Start: 2021-07-02 | End: 2022-06-15 | Stop reason: SDUPTHER

## 2021-07-02 NOTE — PROGRESS NOTES
360 tablet 3    nitroGLYCERIN (NITROSTAT) 0.4 MG SL tablet Place 1 tablet under the tongue every 5 minutes as needed for Chest pain up to max of 3 total doses. If no relief after 1 dose, call 911. 25 tablet 3    zinc gluconate 50 MG tablet Take 50 mg by mouth daily Not taking      metFORMIN (GLUCOPHAGE) 500 MG tablet TAKE 2 TABLETS BY MOUTH 2 TIMES DAILY (WITH MEALS) 360 tablet 2    sildenafil (REVATIO) 20 MG tablet Take 1 tablet by mouth daily as needed (ED) 30 tablet 3    TURMERIC CURCUMIN PO Take 1 capsule by mouth daily Has not taken recently      b complex vitamins capsule Take 1 capsule by mouth daily      vitamin B-12 (CYANOCOBALAMIN) 1000 MCG tablet Take 1,000 mcg by mouth daily Has not taken recently      vitamin D (CHOLECALCIFEROL) 1000 UNIT TABS tablet Take 400 Units by mouth daily       Elastic Bandages & Supports (JOBST KNEE HIGH COMPRESSION SM) MISC Knee high with 20- 30 mmhg of compression 1 each 10     No current facility-administered medications for this visit.         Allergies   Allergen Reactions    Adhesive Tape Dermatitis    Crestor [Rosuvastatin Calcium]      Muscles aches     Lipitor [Atorvastatin]      Muscle aches       Vitals:    07/02/21 0726   BP: 118/89   Pulse: 74   Resp: 18   Weight: 235 lb 9.6 oz (106.9 kg)   Height: 5' 6.5\" (1.689 m)       Social History     Socioeconomic History    Marital status:      Spouse name: Not on file    Number of children: 2    Years of education: Not on file    Highest education level: Not on file   Occupational History    Occupation: self-employed     Comment: print shop   Tobacco Use    Smoking status: Never Smoker    Smokeless tobacco: Never Used   Vaping Use    Vaping Use: Never used   Substance and Sexual Activity    Alcohol use: Yes     Comment: occasional    Drug use: Never    Sexual activity: Not on file   Other Topics Concern    Not on file   Social History Narrative    Not on file     Social Determinants of Health Financial Resource Strain:     Difficulty of Paying Living Expenses:    Food Insecurity:     Worried About Running Out of Food in the Last Year:     [address] in the Last Year:    Transportation Needs:     Lack of Transportation (Medical):  Lack of Transportation (Non-Medical):    Physical Activity:     Days of Exercise per Week:     Minutes of Exercise per Session:    Stress:     Feeling of Stress :    Social Connections:     Frequency of Communication with Friends and Family:     Frequency of Social Gatherings with Friends and Family:     Attends [employer] Services:     Active Member of Clubs or Organizations:     Attends Club or Organization Meetings:     Marital Status:    Intimate Partner Violence:     Fear of Current or Ex-Partner:     Emotionally Abused:     Physically Abused:     Sexually Abused:        Family History   Problem Relation Age of Onset    COPD Mother     COPD Father     Heart Attack Father 79    Down Syndrome Sister     Crohn's Disease Brother     Atrial Fibrillation Sister     No Known Problems Sister     No Known Problems Brother     No Known Problems Brother        SUBJECTIVE: Lola Shah presents to the office today for re-evaluation of chronic cardiac diagnoses. Since our last visit has had high risk stress, cardiac cath (I reviewed actual films), met with EP - dr Ayse Atkinson, and has had OHS set up with dr Kacy Veliz for next wee,. He complains of NO new compliants, and has been taking his lasix prn No palpitations, neuro symptoms,  and denies orthopnea, palpitations, paroxysmal nocturnal dyspnea and syncope. Compliant with NOAC with no missed doses. No bleeds. No SHANNAN diagnosis. Review of Systems   Other than stated in HPI, negative 10-point system review. Objective:   Physical Exam   Constitutional: He is oriented to person, place, and time. He is cooperative. Obese     HENT:   Head: Normocephalic and atraumatic.    Eyes: Conjunctivae are normal. Pupils are equal, round,   Neck: No JVD present. Carotid bruit is not present. Cardiovascular: irregular rhythm, S1 normal, S2 normal and intact distal pulses. PMI is not displaced. Exam reveals no gallop. No murmur heard. Pulmonary/Chest: Effort normal and breath sounds normal. No respiratory distress. Abdominal: Soft. Normal appearance and bowel sounds are normal. He exhibits no abdominal bruit and no pulsatile midline mass. There is no hepatomegaly. There is no tenderness. Musculoskeletal: Normal range of motion. He exhibits 1+ bilateral edema. Neurological: He is alert and oriented to person, place, and time. Gait normal.   Skin: Skin is warm and dry. No bruising, no ecchymosis and no rash noted. He is not diaphoretic. No cyanosis. Psychiatric: He has a normal mood and affect. His behavior is normal. Thought content normal.     EKG: atrial fibrillation, rate 74 bpm, axis +35, otherwise normal.       ASSESSMENT & PLAN:    Patient Active Problem List   Diagnoses    DM (diabetes mellitus), type 2    HTN (hypertension):     Coronary atherosclerosis of native coronary artery: s/p lateral MI 2008 due to circumflex occlusion treated conservatively due to completed, uncomplicated infarction. Cath now for high risk stress - multivessel disease, surgery recommended                 * Hyperlipidemia: intolerant to multiple statin agents- (crestor and atorvastatin) will try pravastatin 20 mg. If cannot tolerate will need Repatha    Atrial fibrillation with elevated CHADS-VASC. We opted initially for rhythm control given his claim of malaise in AF. Multaq given his hx of ischemic heart disease, and safety profile. DCCV initially successful, but now back in AF.   EP involved now for possible AAD, but we will have [facility] and [facility] at time of OHS and will re-evaluate

## 2021-07-06 ENCOUNTER — HOSPITAL ENCOUNTER (OUTPATIENT)
Age: 71
Discharge: HOME | End: 2021-07-06
Payer: MEDICARE

## 2021-07-06 DIAGNOSIS — E11.9 TYPE 2 DIABETES MELLITUS WITHOUT COMPLICATION, WITHOUT LONG-TERM CURRENT USE OF INSULIN (HCC): Chronic | ICD-10-CM

## 2021-07-06 LAB
ALBUMIN SERPL-MCNC: 4.2 G/DL (ref 3.5–5.2)
ALP BLD-CCNC: 60 U/L (ref 40–129)
ALT SERPL-CCNC: 16 U/L (ref 0–40)
ANION GAP SERPL CALCULATED.3IONS-SCNC: 9 MMOL/L (ref 7–16)
AST SERPL-CCNC: 19 U/L (ref 0–39)
BILIRUB SERPL-MCNC: 0.9 MG/DL (ref 0–1.2)
BUN BLDV-MCNC: 22 MG/DL (ref 6–23)
CALCIUM SERPL-MCNC: 9.1 MG/DL (ref 8.6–10.2)
CHLORIDE BLD-SCNC: 106 MMOL/L (ref 98–107)
CHOLESTEROL, TOTAL: 152 MG/DL (ref 0–199)
CO2: 24 MMOL/L (ref 22–29)
CREAT SERPL-MCNC: 1.1 MG/DL (ref 0.7–1.2)
GFR AFRICAN AMERICAN: >60
GFR NON-AFRICAN AMERICAN: >60 ML/MIN/1.73
GLUCOSE BLD-MCNC: 195 MG/DL (ref 74–99)
HBA1C MFR BLD: 7.2 % (ref 4–5.6)
HDLC SERPL-MCNC: 46 MG/DL
LDL CHOLESTEROL CALCULATED: 91 MG/DL (ref 0–99)
POTASSIUM SERPL-SCNC: 4.9 MMOL/L (ref 3.5–5)
SODIUM BLD-SCNC: 139 MMOL/L (ref 132–146)
TOTAL PROTEIN: 6.9 G/DL (ref 6.4–8.3)
TRIGL SERPL-MCNC: 75 MG/DL (ref 0–149)
VLDLC SERPL CALC-MCNC: 15 MG/DL

## 2021-07-06 PROCEDURE — 80053 COMPREHEN METABOLIC PANEL: CPT

## 2021-07-06 PROCEDURE — 36415 COLL VENOUS BLD VENIPUNCTURE: CPT

## 2021-07-06 PROCEDURE — 80061 LIPID PANEL: CPT

## 2021-07-06 PROCEDURE — 83036 HEMOGLOBIN GLYCOSYLATED A1C: CPT

## 2021-07-06 NOTE — PROGRESS NOTES
parking garage or the handicap lot. [] To reach the [place] lobby from [address], upon entering the hospital, take elevator B to the 3rd floor. EDUCATION INSTRUCTIONS:        [x] Pre-admission Testing educational folder given  [x] Incentive Spirometry,coughing & deep breathing exercises reviewed. [x]Medication information sheet(s)   [x]Fluoroscopy-Xray used in surgery reviewed with patient. Educational pamphlet placed in chart. [x]Pain: Post-op pain is normal and to be expected. You will be asked to rate your pain from 0-10(a zero is not acceptable-education is needed). Your post-op pain goal is:  [x] Ask your nurse for your pain medication. MEDICATION INSTRUCTIONS:   [x]Bring a complete list of your medications, please write the last time you took the medicine, give this list to the nurse. [x] Take the following medications the morning of surgery with 1-2 ounces of water:   [x] Stop herbal supplements and vitamins 5 days before your surgery. [x] DO NOT take any diabetic medicine the morning of surgery. Follow instructions for insulin the day before surgery. [x] If you are diabetic and your blood sugar is low or you feel symptomatic, you may drink 1-2 ounces of apple juice or take a glucose tablet. The morning of your procedure, you may call the pre-op area if you have concerns about your blood sugar [phone]. [x] Follow physician instructions regarding any blood thinners you may be taking. WHAT TO EXPECT:  [x] The day of surgery you will be greeted and checked in by the [facility] [employer] [facility].  In addition, you will be registered in the [facility] by a Patient Access Representative. Please bring your photo ID and insurance card. A nurse will greet you in accordance to the time you are needed in the pre-op area to prepare you for surgery.  Please do not be discouraged if you are not greeted in the order you arrive as there are many variables that are involved in patient preparation. Your patience is greatly appreciated as you wait for your nurse. Please bring in items such as: books, magazines, newspapers, electronics, or any other items  to occupy your time in the waiting area. [x]  Delays may occur with surgery and staff will make a sincere effort to keep you informed of delays. If any delays occur with your procedure, we apologize ahead of time for your inconvenience as we recognize the value of your time.

## 2021-07-07 ENCOUNTER — HOSPITAL ENCOUNTER (OUTPATIENT)
Dept: PULMONOLOGY | Age: 71
Discharge: HOME | End: 2021-07-07
Payer: MEDICARE

## 2021-07-07 ENCOUNTER — HOSPITAL ENCOUNTER (OUTPATIENT)
Dept: CT IMAGING | Age: 71
Discharge: HOME | End: 2021-07-09
Payer: MEDICARE

## 2021-07-07 ENCOUNTER — HOSPITAL ENCOUNTER (OUTPATIENT)
Dept: GENERAL RADIOLOGY | Age: 71
Discharge: HOME | End: 2021-07-09
Payer: MEDICARE

## 2021-07-07 ENCOUNTER — HOSPITAL ENCOUNTER (OUTPATIENT)
Dept: INTERVENTIONAL RADIOLOGY/VASCULAR | Age: 71
Discharge: HOME | End: 2021-07-09
Payer: MEDICARE

## 2021-07-07 ENCOUNTER — HOSPITAL ENCOUNTER (OUTPATIENT)
Dept: PREADMISSION TESTING | Age: 71
Setting detail: SURGERY ADMIT
Discharge: HOME | DRG: 228 | End: 2021-07-07
Payer: MEDICARE

## 2021-07-07 VITALS
TEMPERATURE: 96.5 F | DIASTOLIC BLOOD PRESSURE: 57 MMHG | OXYGEN SATURATION: 97 % | RESPIRATION RATE: 20 BRPM | HEART RATE: 77 BPM | WEIGHT: 236 LBS | SYSTOLIC BLOOD PRESSURE: 127 MMHG | BODY MASS INDEX: 37.93 KG/M2 | HEIGHT: 66 IN

## 2021-07-07 DIAGNOSIS — Z01.818 PRE-OP EVALUATION: ICD-10-CM

## 2021-07-07 DIAGNOSIS — E11.69 TYPE 2 DIABETES MELLITUS WITH OTHER SPECIFIED COMPLICATION, UNSPECIFIED WHETHER LONG TERM INSULIN USE (HCC): ICD-10-CM

## 2021-07-07 DIAGNOSIS — I73.9 PVD (PERIPHERAL VASCULAR DISEASE) (HCC): ICD-10-CM

## 2021-07-07 LAB
ABO/RH: NORMAL
ALBUMIN SERPL-MCNC: 4.3 G/DL (ref 3.5–5.2)
ALP BLD-CCNC: 55 U/L (ref 40–129)
ALT SERPL-CCNC: 15 U/L (ref 0–40)
ANION GAP SERPL CALCULATED.3IONS-SCNC: 8 MMOL/L (ref 7–16)
ANTIBODY SCREEN: NORMAL
APTT: 27.6 SEC (ref 24.5–35.1)
AST SERPL-CCNC: 17 U/L (ref 0–39)
BILIRUB SERPL-MCNC: 0.8 MG/DL (ref 0–1.2)
BILIRUBIN URINE: NEGATIVE
BLOOD, URINE: NEGATIVE
BUN BLDV-MCNC: 19 MG/DL (ref 6–23)
CALCIUM SERPL-MCNC: 9.1 MG/DL (ref 8.6–10.2)
CHLORIDE BLD-SCNC: 107 MMOL/L (ref 98–107)
CLARITY: CLEAR
CO2: 23 MMOL/L (ref 22–29)
COLOR: YELLOW
CREAT SERPL-MCNC: 1 MG/DL (ref 0.7–1.2)
GFR AFRICAN AMERICAN: >60
GFR NON-AFRICAN AMERICAN: >60 ML/MIN/1.73
GLUCOSE BLD-MCNC: 210 MG/DL (ref 74–99)
GLUCOSE URINE: NEGATIVE MG/DL
HBA1C MFR BLD: 7.2 % (ref 4–5.6)
HCT VFR BLD CALC: 43.1 % (ref 37–54)
HEMOGLOBIN: 14.6 G/DL (ref 12.5–16.5)
INR BLD: 1.1
KETONES, URINE: NEGATIVE MG/DL
LEUKOCYTE ESTERASE, URINE: NEGATIVE
MCH RBC QN AUTO: 32.4 PG (ref 26–35)
MCHC RBC AUTO-ENTMCNC: 33.9 % (ref 32–34.5)
MCV RBC AUTO: 95.6 FL (ref 80–99.9)
NITRITE, URINE: NEGATIVE
PDW BLD-RTO: 12.4 FL (ref 11.5–15)
PH UA: 5.5 (ref 5–9)
PLATELET # BLD: 136 E9/L (ref 130–450)
PMV BLD AUTO: 9.8 FL (ref 7–12)
POTASSIUM SERPL-SCNC: 4.5 MMOL/L (ref 3.5–5)
PROTEIN UA: NEGATIVE MG/DL
PROTHROMBIN TIME: 12.1 SEC (ref 9.3–12.4)
RBC # BLD: 4.51 E12/L (ref 3.8–5.8)
SODIUM BLD-SCNC: 138 MMOL/L (ref 132–146)
SPECIFIC GRAVITY UA: 1.02 (ref 1–1.03)
TOTAL PROTEIN: 6.5 G/DL (ref 6.4–8.3)
UROBILINOGEN, URINE: 0.2 E.U./DL
WBC # BLD: 4.8 E9/L (ref 4.5–11.5)

## 2021-07-07 PROCEDURE — 71046 X-RAY EXAM CHEST 2 VIEWS: CPT

## 2021-07-07 PROCEDURE — 93922 UPR/L XTREMITY ART 2 LEVELS: CPT

## 2021-07-07 PROCEDURE — 85730 THROMBOPLASTIN TIME PARTIAL: CPT

## 2021-07-07 PROCEDURE — 83036 HEMOGLOBIN GLYCOSYLATED A1C: CPT

## 2021-07-07 PROCEDURE — 94375 RESPIRATORY FLOW VOLUME LOOP: CPT

## 2021-07-07 PROCEDURE — 80053 COMPREHEN METABOLIC PANEL: CPT

## 2021-07-07 PROCEDURE — 86900 BLOOD TYPING SEROLOGIC ABO: CPT

## 2021-07-07 PROCEDURE — 87088 URINE BACTERIA CULTURE: CPT

## 2021-07-07 PROCEDURE — 86923 COMPATIBILITY TEST ELECTRIC: CPT

## 2021-07-07 PROCEDURE — 94729 DIFFUSING CAPACITY: CPT | Performed by: INTERNAL MEDICINE

## 2021-07-07 PROCEDURE — 94726 PLETHYSMOGRAPHY LUNG VOLUMES: CPT | Performed by: INTERNAL MEDICINE

## 2021-07-07 PROCEDURE — 94729 DIFFUSING CAPACITY: CPT

## 2021-07-07 PROCEDURE — 94010 BREATHING CAPACITY TEST: CPT | Performed by: INTERNAL MEDICINE

## 2021-07-07 PROCEDURE — 36415 COLL VENOUS BLD VENIPUNCTURE: CPT

## 2021-07-07 PROCEDURE — 71250 CT THORAX DX C-: CPT

## 2021-07-07 PROCEDURE — 85027 COMPLETE CBC AUTOMATED: CPT

## 2021-07-07 PROCEDURE — 85610 PROTHROMBIN TIME: CPT

## 2021-07-07 PROCEDURE — 86850 RBC ANTIBODY SCREEN: CPT

## 2021-07-07 PROCEDURE — 87081 CULTURE SCREEN ONLY: CPT

## 2021-07-07 PROCEDURE — 86901 BLOOD TYPING SEROLOGIC RH(D): CPT

## 2021-07-07 PROCEDURE — 81003 URINALYSIS AUTO W/O SCOPE: CPT

## 2021-07-08 ENCOUNTER — ANESTHESIA EVENT (OUTPATIENT)
Dept: OPERATING ROOM | Age: 71
DRG: 228 | End: 2021-07-08
Payer: MEDICARE

## 2021-07-08 LAB — MRSA CULTURE ONLY: NORMAL

## 2021-07-08 NOTE — PROGRESS NOTES
Pre-operative testing review:   --carotids with right 50-69% and left 0-49%  --gracia with good flow bilaterally  --ct chest reviewed  --CRYSTAL on 3/12/21 with no significant LV dysfunction or valvular abnormalities  --pft with FEV1 91 percent of predicted and DLCO 90 percent of predicted  --hgA1c 7.2

## 2021-07-09 ENCOUNTER — HOSPITAL ENCOUNTER (INPATIENT)
Age: 71
LOS: 6 days | Discharge: NURSING, REHAB OR LONG-TERM CARE | DRG: 228 | End: 2021-07-15
Attending: THORACIC SURGERY (CARDIOTHORACIC VASCULAR SURGERY) | Admitting: THORACIC SURGERY (CARDIOTHORACIC VASCULAR SURGERY)
Payer: MEDICARE

## 2021-07-09 ENCOUNTER — APPOINTMENT (OUTPATIENT)
Dept: GENERAL RADIOLOGY | Age: 71
DRG: 228 | End: 2021-07-09
Attending: THORACIC SURGERY (CARDIOTHORACIC VASCULAR SURGERY)
Payer: MEDICARE

## 2021-07-09 ENCOUNTER — ANESTHESIA (OUTPATIENT)
Dept: OPERATING ROOM | Age: 71
DRG: 228 | End: 2021-07-09
Payer: MEDICARE

## 2021-07-09 VITALS — OXYGEN SATURATION: 88 % | TEMPERATURE: 97 F

## 2021-07-09 DIAGNOSIS — G89.18 ACUTE POST-OPERATIVE PAIN: Primary | ICD-10-CM

## 2021-07-09 DIAGNOSIS — Z95.1 S/P CABG (CORONARY ARTERY BYPASS GRAFT): ICD-10-CM

## 2021-07-09 PROBLEM — I25.10 CAD IN NATIVE ARTERY: Status: ACTIVE | Noted: 2021-07-09

## 2021-07-09 LAB
AADO2: 116 MMHG
AADO2: 173.2 MMHG
ACTIVATED CLOTTING TIME: 106 SECONDS (ref 99–130)
ACTIVATED CLOTTING TIME: 125 SECONDS (ref 99–130)
ACTIVATED CLOTTING TIME: 427 SECONDS (ref 99–130)
ACTIVATED CLOTTING TIME: 466 SECONDS (ref 99–130)
ACTIVATED CLOTTING TIME: 617 SECONDS (ref 99–130)
ACTIVATED CLOTTING TIME: 844 SECONDS (ref 99–130)
ANION GAP SERPL CALCULATED.3IONS-SCNC: 11 MMOL/L (ref 7–16)
APTT: 25.2 SEC (ref 24.5–35.1)
B.E.: -1.7 MMOL/L (ref -3–0)
B.E.: -2.3 MMOL/L (ref -3–0)
B.E.: -3.9 MMOL/L (ref -3–3)
B.E.: -4.5 MMOL/L (ref -3–3)
B.E.: -5.7 MMOL/L (ref -3–3)
BLOOD BANK DISPENSE STATUS: NORMAL
BLOOD BANK PRODUCT CODE: NORMAL
BPU ID: NORMAL
BUN BLDV-MCNC: 14 MG/DL (ref 6–23)
CALCIUM SERPL-MCNC: 8.4 MG/DL (ref 8.6–10.2)
CARDIOPULMONARY BYPASS: YES
CARDIOPULMONARY BYPASS: YES
CHLORIDE BLD-SCNC: 107 MMOL/L (ref 98–107)
CO2: 20 MMOL/L (ref 22–29)
COHB: 0.3 % (ref 0–1.5)
COHB: 0.4 % (ref 0–1.5)
COHB: 0.4 % (ref 0–1.5)
CREAT SERPL-MCNC: 1 MG/DL (ref 0.7–1.2)
CRITICAL: ABNORMAL
DATE ANALYZED: ABNORMAL
DATE OF COLLECTION: ABNORMAL
DESCRIPTION BLOOD BANK: NORMAL
DEVICE: ABNORMAL
DEVICE: ABNORMAL
FIO2 ARTERIAL: 70
FIO2 ARTERIAL: 70
FIO2: 50 %
FIO2: 50 %
GFR AFRICAN AMERICAN: >60
GFR NON-AFRICAN AMERICAN: >60 ML/MIN/1.73
GLUCOSE BLD-MCNC: 134 MG/DL (ref 74–99)
HCO3 ARTERIAL: 23.4 MMOL/L (ref 22–26)
HCO3 ARTERIAL: 23.5 MMOL/L (ref 22–26)
HCO3: 17.8 MMOL/L (ref 22–26)
HCO3: 20.2 MMOL/L (ref 22–26)
HCO3: 20.6 MMOL/L (ref 22–26)
HCT (EST): 28 % (ref 37–54)
HCT (EST): 29 % (ref 37–54)
HCT VFR BLD CALC: 33.7 % (ref 37–54)
HEMOGLOBIN: 11.3 G/DL (ref 12.5–16.5)
HGB, (EST): 9.4 G/DL (ref 12.5–15.5)
HGB, (EST): 9.8 G/DL (ref 12.5–15.5)
HHB: 1.5 % (ref 0–5)
HHB: 2.3 % (ref 0–5)
HHB: 3 % (ref 0–5)
INR BLD: 1.3
LAB: ABNORMAL
MAGNESIUM: 2.5 MG/DL (ref 1.6–2.6)
MCH RBC QN AUTO: 32.6 PG (ref 26–35)
MCHC RBC AUTO-ENTMCNC: 33.5 % (ref 32–34.5)
MCV RBC AUTO: 97.1 FL (ref 80–99.9)
METER GLUCOSE: 115 MG/DL (ref 74–99)
METER GLUCOSE: 140 MG/DL (ref 74–99)
METER GLUCOSE: 173 MG/DL (ref 74–99)
METER GLUCOSE: 180 MG/DL (ref 74–99)
METER GLUCOSE: 184 MG/DL (ref 74–99)
METER GLUCOSE: 219 MG/DL (ref 74–99)
METER GLUCOSE: 272 MG/DL (ref 74–99)
METER GLUCOSE: 283 MG/DL (ref 74–99)
METHB: 0 % (ref 0–1.5)
METHB: 0.4 % (ref 0–1.5)
METHB: 0.5 % (ref 0–1.5)
MODE: ABNORMAL
MODE: ABNORMAL
MODE: AC
O2 CONTENT: 16.4 ML/DL
O2 SATURATION: 100 % (ref 92–98.5)
O2 SATURATION: 97 % (ref 92–98.5)
O2 SATURATION: 97.7 % (ref 92–98.5)
O2 SATURATION: 98.5 % (ref 92–98.5)
O2 SATURATION: 99.8 % (ref 92–98.5)
O2HB: 96.6 % (ref 94–97)
O2HB: 96.9 % (ref 94–97)
O2HB: 97.7 % (ref 94–97)
OPERATOR ID: 5100
OPERATOR ID: ABNORMAL
OPERATOR ID: ABNORMAL
PATIENT TEMP: 37 C
PCO2 ARTERIAL: 40.8 MMHG (ref 35–45)
PCO2 ARTERIAL: 43.2 MMHG (ref 35–45)
PCO2: 29.1 MMHG (ref 35–45)
PCO2: 33.9 MMHG (ref 35–45)
PCO2: 38.3 MMHG (ref 35–45)
PDW BLD-RTO: 12.1 FL (ref 11.5–15)
PEEP/CPAP: 5 CMH2O
PEEP/CPAP: 6 CMH2O
PFO2: 2.55 MMHG/%
PFO2: 3.8 MMHG/%
PH BLOOD GAS: 7.34 (ref 7.35–7.45)
PH BLOOD GAS: 7.35 (ref 7.35–7.45)
PH BLOOD GAS: 7.37 (ref 7.35–7.45)
PH BLOOD GAS: 7.39 (ref 7.35–7.45)
PH BLOOD GAS: 7.4 (ref 7.35–7.45)
PLATELET # BLD: 86 E9/L (ref 130–450)
PLATELET CONFIRMATION: NORMAL
PMV BLD AUTO: 9.4 FL (ref 7–12)
PO2 ARTERIAL: 262.5 MMHG (ref 60–80)
PO2 ARTERIAL: 411 MMHG (ref 60–80)
PO2: 127.7 MMHG (ref 75–100)
PO2: 189.9 MMHG (ref 75–100)
PO2: 97.9 MMHG (ref 75–100)
POTASSIUM SERPL-SCNC: 4.05 MMOL/L (ref 3.5–5)
POTASSIUM SERPL-SCNC: 4.2 MMOL/L (ref 3.5–5)
POTASSIUM SERPL-SCNC: 4.48 MMOL/L (ref 3.5–5)
POTASSIUM SERPL-SCNC: 5.5 MMOL/L (ref 3.5–5.5)
POTASSIUM SERPL-SCNC: 5.9 MMOL/L (ref 3.5–5.5)
POTASSIUM SERPL-SCNC: 6.1 MMOL/L (ref 3.5–5)
PROTHROMBIN TIME: 14.4 SEC (ref 9.3–12.4)
PS: 10 CMH20
RBC # BLD: 3.47 E12/L (ref 3.8–5.8)
RI(T): 0.61
RI(T): 136 %
RR MECHANICAL: 10 B/MIN
SODIUM BLD-SCNC: 138 MMOL/L (ref 132–146)
SOURCE, BLOOD GAS: ABNORMAL
THB: 11.9 G/DL (ref 11.5–16.5)
THB: 12.5 G/DL (ref 11.5–16.5)
THB: 12.6 G/DL (ref 11.5–16.5)
TIME ANALYZED: 1222
TIME ANALYZED: 1353
TIME ANALYZED: 1518
URINE CULTURE, ROUTINE: NORMAL
VT MECHANICAL: 550 ML
WBC # BLD: 7.7 E9/L (ref 4.5–11.5)

## 2021-07-09 PROCEDURE — 2700000000 HC OXYGEN THERAPY PER DAY

## 2021-07-09 PROCEDURE — 33572 OPEN CORONARY ENDARTERECTOMY: CPT | Performed by: THORACIC SURGERY (CARDIOTHORACIC VASCULAR SURGERY)

## 2021-07-09 PROCEDURE — 85610 PROTHROMBIN TIME: CPT

## 2021-07-09 PROCEDURE — 02C00ZZ EXTIRPATION OF MATTER FROM CORONARY ARTERY, ONE ARTERY, OPEN APPROACH: ICD-10-PCS | Performed by: THORACIC SURGERY (CARDIOTHORACIC VASCULAR SURGERY)

## 2021-07-09 PROCEDURE — 71045 X-RAY EXAM CHEST 1 VIEW: CPT

## 2021-07-09 PROCEDURE — 3600000018 HC SURGERY OHS ADDTL 15MIN: Performed by: THORACIC SURGERY (CARDIOTHORACIC VASCULAR SURGERY)

## 2021-07-09 PROCEDURE — 05HM33Z INSERTION OF INFUSION DEVICE INTO RIGHT INTERNAL JUGULAR VEIN, PERCUTANEOUS APPROACH: ICD-10-PCS | Performed by: ANESTHESIOLOGY

## 2021-07-09 PROCEDURE — 6360000002 HC RX W HCPCS

## 2021-07-09 PROCEDURE — 82962 GLUCOSE BLOOD TEST: CPT

## 2021-07-09 PROCEDURE — 7100000000 HC PACU RECOVERY - FIRST 15 MIN

## 2021-07-09 PROCEDURE — 33257 ABLATE ATRIA LMTD ADD-ON: CPT | Performed by: THORACIC SURGERY (CARDIOTHORACIC VASCULAR SURGERY)

## 2021-07-09 PROCEDURE — 3600000008 HC SURGERY OHS BASE: Performed by: THORACIC SURGERY (CARDIOTHORACIC VASCULAR SURGERY)

## 2021-07-09 PROCEDURE — 02L70CK OCCLUSION OF LEFT ATRIAL APPENDAGE WITH EXTRALUMINAL DEVICE, OPEN APPROACH: ICD-10-PCS | Performed by: THORACIC SURGERY (CARDIOTHORACIC VASCULAR SURGERY)

## 2021-07-09 PROCEDURE — 84132 ASSAY OF SERUM POTASSIUM: CPT

## 2021-07-09 PROCEDURE — 37799 UNLISTED PX VASCULAR SURGERY: CPT

## 2021-07-09 PROCEDURE — 88305 TISSUE EXAM BY PATHOLOGIST: CPT

## 2021-07-09 PROCEDURE — 99223 1ST HOSP IP/OBS HIGH 75: CPT | Performed by: INTERNAL MEDICINE

## 2021-07-09 PROCEDURE — 33533 CABG ARTERIAL SINGLE: CPT | Performed by: THORACIC SURGERY (CARDIOTHORACIC VASCULAR SURGERY)

## 2021-07-09 PROCEDURE — 02580ZZ DESTRUCTION OF CONDUCTION MECHANISM, OPEN APPROACH: ICD-10-PCS | Performed by: THORACIC SURGERY (CARDIOTHORACIC VASCULAR SURGERY)

## 2021-07-09 PROCEDURE — 99232 SBSQ HOSP IP/OBS MODERATE 35: CPT | Performed by: NURSE PRACTITIONER

## 2021-07-09 PROCEDURE — 82805 BLOOD GASES W/O2 SATURATION: CPT

## 2021-07-09 PROCEDURE — 6360000002 HC RX W HCPCS: Performed by: PHYSICIAN ASSISTANT

## 2021-07-09 PROCEDURE — 6370000000 HC RX 637 (ALT 250 FOR IP): Performed by: PHYSICIAN ASSISTANT

## 2021-07-09 PROCEDURE — C1729 CATH, DRAINAGE: HCPCS | Performed by: THORACIC SURGERY (CARDIOTHORACIC VASCULAR SURGERY)

## 2021-07-09 PROCEDURE — 85027 COMPLETE CBC AUTOMATED: CPT

## 2021-07-09 PROCEDURE — 2580000003 HC RX 258

## 2021-07-09 PROCEDURE — 6360000002 HC RX W HCPCS: Performed by: THORACIC SURGERY (CARDIOTHORACIC VASCULAR SURGERY)

## 2021-07-09 PROCEDURE — 06BQ4ZZ EXCISION OF LEFT SAPHENOUS VEIN, PERCUTANEOUS ENDOSCOPIC APPROACH: ICD-10-PCS | Performed by: THORACIC SURGERY (CARDIOTHORACIC VASCULAR SURGERY)

## 2021-07-09 PROCEDURE — 2580000003 HC RX 258: Performed by: PHYSICIAN ASSISTANT

## 2021-07-09 PROCEDURE — C9113 INJ PANTOPRAZOLE SODIUM, VIA: HCPCS | Performed by: PHYSICIAN ASSISTANT

## 2021-07-09 PROCEDURE — 2709999900 HC NON-CHARGEABLE SUPPLY: Performed by: THORACIC SURGERY (CARDIOTHORACIC VASCULAR SURGERY)

## 2021-07-09 PROCEDURE — 33508 ENDOSCOPIC VEIN HARVEST: CPT | Performed by: THORACIC SURGERY (CARDIOTHORACIC VASCULAR SURGERY)

## 2021-07-09 PROCEDURE — APPSS60 APP SPLIT SHARED TIME 46-60 MINUTES: Performed by: PHYSICIAN ASSISTANT

## 2021-07-09 PROCEDURE — 2500000003 HC RX 250 WO HCPCS: Performed by: THORACIC SURGERY (CARDIOTHORACIC VASCULAR SURGERY)

## 2021-07-09 PROCEDURE — 2580000003 HC RX 258: Performed by: THORACIC SURGERY (CARDIOTHORACIC VASCULAR SURGERY)

## 2021-07-09 PROCEDURE — 83735 ASSAY OF MAGNESIUM: CPT

## 2021-07-09 PROCEDURE — 94640 AIRWAY INHALATION TREATMENT: CPT

## 2021-07-09 PROCEDURE — 85347 COAGULATION TIME ACTIVATED: CPT

## 2021-07-09 PROCEDURE — 33517 CABG ARTERY-VEIN SINGLE: CPT | Performed by: THORACIC SURGERY (CARDIOTHORACIC VASCULAR SURGERY)

## 2021-07-09 PROCEDURE — 94002 VENT MGMT INPAT INIT DAY: CPT

## 2021-07-09 PROCEDURE — C1889 IMPLANT/INSERT DEVICE, NOC: HCPCS | Performed by: THORACIC SURGERY (CARDIOTHORACIC VASCULAR SURGERY)

## 2021-07-09 PROCEDURE — 6370000000 HC RX 637 (ALT 250 FOR IP)

## 2021-07-09 PROCEDURE — P9045 ALBUMIN (HUMAN), 5%, 250 ML: HCPCS

## 2021-07-09 PROCEDURE — 85730 THROMBOPLASTIN TIME PARTIAL: CPT

## 2021-07-09 PROCEDURE — P9041 ALBUMIN (HUMAN),5%, 50ML: HCPCS

## 2021-07-09 PROCEDURE — 88304 TISSUE EXAM BY PATHOLOGIST: CPT

## 2021-07-09 PROCEDURE — 82803 BLOOD GASES ANY COMBINATION: CPT

## 2021-07-09 PROCEDURE — 7100000001 HC PACU RECOVERY - ADDTL 15 MIN

## 2021-07-09 PROCEDURE — 2000000000 HC ICU R&B

## 2021-07-09 PROCEDURE — 2500000003 HC RX 250 WO HCPCS: Performed by: PHYSICIAN ASSISTANT

## 2021-07-09 PROCEDURE — 80048 BASIC METABOLIC PNL TOTAL CA: CPT

## 2021-07-09 PROCEDURE — 2500000003 HC RX 250 WO HCPCS

## 2021-07-09 PROCEDURE — 021009W BYPASS CORONARY ARTERY, ONE ARTERY FROM AORTA WITH AUTOLOGOUS VENOUS TISSUE, OPEN APPROACH: ICD-10-PCS | Performed by: THORACIC SURGERY (CARDIOTHORACIC VASCULAR SURGERY)

## 2021-07-09 PROCEDURE — 3700000000 HC ANESTHESIA ATTENDED CARE: Performed by: THORACIC SURGERY (CARDIOTHORACIC VASCULAR SURGERY)

## 2021-07-09 PROCEDURE — C1713 ANCHOR/SCREW BN/BN,TIS/BN: HCPCS | Performed by: THORACIC SURGERY (CARDIOTHORACIC VASCULAR SURGERY)

## 2021-07-09 PROCEDURE — 6360000002 HC RX W HCPCS: Performed by: NURSE PRACTITIONER

## 2021-07-09 PROCEDURE — P9045 ALBUMIN (HUMAN), 5%, 250 ML: HCPCS | Performed by: NURSE PRACTITIONER

## 2021-07-09 PROCEDURE — 6370000000 HC RX 637 (ALT 250 FOR IP): Performed by: THORACIC SURGERY (CARDIOTHORACIC VASCULAR SURGERY)

## 2021-07-09 PROCEDURE — 2720000010 HC SURG SUPPLY STERILE: Performed by: THORACIC SURGERY (CARDIOTHORACIC VASCULAR SURGERY)

## 2021-07-09 PROCEDURE — 5A1221Z PERFORMANCE OF CARDIAC OUTPUT, CONTINUOUS: ICD-10-PCS | Performed by: THORACIC SURGERY (CARDIOTHORACIC VASCULAR SURGERY)

## 2021-07-09 PROCEDURE — 36415 COLL VENOUS BLD VENIPUNCTURE: CPT

## 2021-07-09 PROCEDURE — B24BZZ4 ULTRASONOGRAPHY OF HEART WITH AORTA, TRANSESOPHAGEAL: ICD-10-PCS | Performed by: THORACIC SURGERY (CARDIOTHORACIC VASCULAR SURGERY)

## 2021-07-09 PROCEDURE — 3700000001 HC ADD 15 MINUTES (ANESTHESIA): Performed by: THORACIC SURGERY (CARDIOTHORACIC VASCULAR SURGERY)

## 2021-07-09 PROCEDURE — 02100Z9 BYPASS CORONARY ARTERY, ONE ARTERY FROM LEFT INTERNAL MAMMARY, OPEN APPROACH: ICD-10-PCS | Performed by: THORACIC SURGERY (CARDIOTHORACIC VASCULAR SURGERY)

## 2021-07-09 DEVICE — DEVICE OCCL CLP L40MM PLUNG GRP FLX SHFT FOR GILLINOV: Type: IMPLANTABLE DEVICE | Site: HEART | Status: FUNCTIONAL

## 2021-07-09 DEVICE — SCREW BNE L11MM DIA2.3MM THOR STRNL TI LOK DRL FREE LEV 1: Type: IMPLANTABLE DEVICE | Site: STERNUM | Status: FUNCTIONAL

## 2021-07-09 DEVICE — PLATE BONE 1.8MM THICKNESS STRNL LCK 6 H CP TI: Type: IMPLANTABLE DEVICE | Site: STERNUM | Status: FUNCTIONAL

## 2021-07-09 DEVICE — PLATE LCK STRNL 8-H LAD T 1.8MM: Type: IMPLANTABLE DEVICE | Site: STERNUM | Status: FUNCTIONAL

## 2021-07-09 RX ORDER — ASPIRIN 300 MG/1
300 SUPPOSITORY RECTAL ONCE
Status: COMPLETED | OUTPATIENT
Start: 2021-07-09 | End: 2021-07-09

## 2021-07-09 RX ORDER — PROPOFOL 10 MG/ML
INJECTION, EMULSION INTRAVENOUS PRN
Status: DISCONTINUED | OUTPATIENT
Start: 2021-07-09 | End: 2021-07-09 | Stop reason: SDUPTHER

## 2021-07-09 RX ORDER — OXYCODONE HYDROCHLORIDE 5 MG/1
5 TABLET ORAL EVERY 4 HOURS PRN
Status: DISCONTINUED | OUTPATIENT
Start: 2021-07-09 | End: 2021-07-10

## 2021-07-09 RX ORDER — GLYCOPYRROLATE 1 MG/5 ML
SYRINGE (ML) INTRAVENOUS PRN
Status: DISCONTINUED | OUTPATIENT
Start: 2021-07-09 | End: 2021-07-09 | Stop reason: SDUPTHER

## 2021-07-09 RX ORDER — FENTANYL CITRATE 50 UG/ML
50 INJECTION, SOLUTION INTRAMUSCULAR; INTRAVENOUS
Status: DISCONTINUED | OUTPATIENT
Start: 2021-07-09 | End: 2021-07-10

## 2021-07-09 RX ORDER — SODIUM CHLORIDE 9 MG/ML
25 INJECTION, SOLUTION INTRAVENOUS PRN
Status: DISCONTINUED | OUTPATIENT
Start: 2021-07-09 | End: 2021-07-09 | Stop reason: HOSPADM

## 2021-07-09 RX ORDER — ONDANSETRON 2 MG/ML
INJECTION INTRAMUSCULAR; INTRAVENOUS PRN
Status: DISCONTINUED | OUTPATIENT
Start: 2021-07-09 | End: 2021-07-09 | Stop reason: SDUPTHER

## 2021-07-09 RX ORDER — SODIUM CHLORIDE 9 MG/ML
INJECTION, SOLUTION INTRAVENOUS CONTINUOUS
Status: DISCONTINUED | OUTPATIENT
Start: 2021-07-09 | End: 2021-07-09

## 2021-07-09 RX ORDER — SODIUM CHLORIDE 0.9 % (FLUSH) 0.9 %
10 SYRINGE (ML) INJECTION PRN
Status: DISCONTINUED | OUTPATIENT
Start: 2021-07-09 | End: 2021-07-09 | Stop reason: HOSPADM

## 2021-07-09 RX ORDER — SODIUM CHLORIDE 0.9 % (FLUSH) 0.9 %
10 SYRINGE (ML) INJECTION PRN
Status: DISCONTINUED | OUTPATIENT
Start: 2021-07-09 | End: 2021-07-15 | Stop reason: HOSPADM

## 2021-07-09 RX ORDER — IPRATROPIUM BROMIDE AND ALBUTEROL SULFATE 2.5; .5 MG/3ML; MG/3ML
1 SOLUTION RESPIRATORY (INHALATION)
Status: DISCONTINUED | OUTPATIENT
Start: 2021-07-09 | End: 2021-07-15 | Stop reason: HOSPADM

## 2021-07-09 RX ORDER — VECURONIUM BROMIDE 1 MG/ML
INJECTION, POWDER, LYOPHILIZED, FOR SOLUTION INTRAVENOUS PRN
Status: DISCONTINUED | OUTPATIENT
Start: 2021-07-09 | End: 2021-07-09 | Stop reason: SDUPTHER

## 2021-07-09 RX ORDER — CEFAZOLIN SODIUM 1 G/3ML
INJECTION, POWDER, FOR SOLUTION INTRAMUSCULAR; INTRAVENOUS PRN
Status: DISCONTINUED | OUTPATIENT
Start: 2021-07-09 | End: 2021-07-09 | Stop reason: SDUPTHER

## 2021-07-09 RX ORDER — PROPOFOL 10 MG/ML
10 INJECTION, EMULSION INTRAVENOUS CONTINUOUS PRN
Status: DISCONTINUED | OUTPATIENT
Start: 2021-07-09 | End: 2021-07-10

## 2021-07-09 RX ORDER — SODIUM CHLORIDE 9 MG/ML
30 INJECTION, SOLUTION INTRAVENOUS CONTINUOUS
Status: DISCONTINUED | OUTPATIENT
Start: 2021-07-09 | End: 2021-07-10

## 2021-07-09 RX ORDER — LIDOCAINE HYDROCHLORIDE 20 MG/ML
INJECTION, SOLUTION INTRAVENOUS PRN
Status: DISCONTINUED | OUTPATIENT
Start: 2021-07-09 | End: 2021-07-09 | Stop reason: SDUPTHER

## 2021-07-09 RX ORDER — SODIUM CHLORIDE 9 MG/ML
INJECTION, SOLUTION INTRAVENOUS CONTINUOUS PRN
Status: DISCONTINUED | OUTPATIENT
Start: 2021-07-09 | End: 2021-07-09 | Stop reason: SDUPTHER

## 2021-07-09 RX ORDER — ACETAMINOPHEN 650 MG/1
650 SUPPOSITORY RECTAL EVERY 4 HOURS PRN
Status: DISCONTINUED | OUTPATIENT
Start: 2021-07-09 | End: 2021-07-10

## 2021-07-09 RX ORDER — 0.9 % SODIUM CHLORIDE 0.9 %
250 INTRAVENOUS SOLUTION INTRAVENOUS CONTINUOUS PRN
Status: DISCONTINUED | OUTPATIENT
Start: 2021-07-09 | End: 2021-07-10

## 2021-07-09 RX ORDER — MAGNESIUM SULFATE IN WATER 40 MG/ML
2000 INJECTION, SOLUTION INTRAVENOUS PRN
Status: DISCONTINUED | OUTPATIENT
Start: 2021-07-09 | End: 2021-07-10

## 2021-07-09 RX ORDER — AMINOCAPROIC ACID 250 MG/ML
INJECTION, SOLUTION INTRAVENOUS PRN
Status: DISCONTINUED | OUTPATIENT
Start: 2021-07-09 | End: 2021-07-09 | Stop reason: SDUPTHER

## 2021-07-09 RX ORDER — PANTOPRAZOLE SODIUM 40 MG/1
40 TABLET, DELAYED RELEASE ORAL DAILY
Status: DISCONTINUED | OUTPATIENT
Start: 2021-07-10 | End: 2021-07-15 | Stop reason: HOSPADM

## 2021-07-09 RX ORDER — CHLORHEXIDINE GLUCONATE 0.12 MG/ML
15 RINSE ORAL 2 TIMES DAILY
Status: DISCONTINUED | OUTPATIENT
Start: 2021-07-09 | End: 2021-07-09

## 2021-07-09 RX ORDER — SENNA AND DOCUSATE SODIUM 50; 8.6 MG/1; MG/1
1 TABLET, FILM COATED ORAL 2 TIMES DAILY
Status: DISCONTINUED | OUTPATIENT
Start: 2021-07-09 | End: 2021-07-10

## 2021-07-09 RX ORDER — FENTANYL CITRATE 50 UG/ML
25 INJECTION, SOLUTION INTRAMUSCULAR; INTRAVENOUS
Status: DISCONTINUED | OUTPATIENT
Start: 2021-07-09 | End: 2021-07-10

## 2021-07-09 RX ORDER — ALBUMIN, HUMAN INJ 5% 5 %
SOLUTION INTRAVENOUS PRN
Status: DISCONTINUED | OUTPATIENT
Start: 2021-07-09 | End: 2021-07-09 | Stop reason: SDUPTHER

## 2021-07-09 RX ORDER — POTASSIUM CHLORIDE 29.8 MG/ML
20 INJECTION INTRAVENOUS PRN
Status: DISCONTINUED | OUTPATIENT
Start: 2021-07-09 | End: 2021-07-10

## 2021-07-09 RX ORDER — MEPERIDINE HYDROCHLORIDE 50 MG/ML
25 INJECTION INTRAMUSCULAR; INTRAVENOUS; SUBCUTANEOUS
Status: ACTIVE | OUTPATIENT
Start: 2021-07-09 | End: 2021-07-09

## 2021-07-09 RX ORDER — ALBUMIN, HUMAN INJ 5% 5 %
25 SOLUTION INTRAVENOUS ONCE
Status: COMPLETED | OUTPATIENT
Start: 2021-07-09 | End: 2021-07-09

## 2021-07-09 RX ORDER — TAMSULOSIN HYDROCHLORIDE 0.4 MG/1
0.4 CAPSULE ORAL DAILY
Status: DISCONTINUED | OUTPATIENT
Start: 2021-07-10 | End: 2021-07-15 | Stop reason: HOSPADM

## 2021-07-09 RX ORDER — ALBUMIN, HUMAN INJ 5% 5 %
SOLUTION INTRAVENOUS
Status: COMPLETED
Start: 2021-07-09 | End: 2021-07-09

## 2021-07-09 RX ORDER — DEXTROSE MONOHYDRATE 50 MG/ML
100 INJECTION, SOLUTION INTRAVENOUS PRN
Status: DISCONTINUED | OUTPATIENT
Start: 2021-07-09 | End: 2021-07-10

## 2021-07-09 RX ORDER — PHENYLEPHRINE HYDROCHLORIDE 10 MG/ML
INJECTION INTRAVENOUS PRN
Status: DISCONTINUED | OUTPATIENT
Start: 2021-07-09 | End: 2021-07-09 | Stop reason: SDUPTHER

## 2021-07-09 RX ORDER — PROTAMINE SULFATE 10 MG/ML
INJECTION, SOLUTION INTRAVENOUS PRN
Status: DISCONTINUED | OUTPATIENT
Start: 2021-07-09 | End: 2021-07-09 | Stop reason: SDUPTHER

## 2021-07-09 RX ORDER — SODIUM CHLORIDE 9 MG/ML
25 INJECTION, SOLUTION INTRAVENOUS PRN
Status: DISCONTINUED | OUTPATIENT
Start: 2021-07-09 | End: 2021-07-10

## 2021-07-09 RX ORDER — SENNA PLUS 8.6 MG/1
1 TABLET ORAL NIGHTLY
Status: DISCONTINUED | OUTPATIENT
Start: 2021-07-10 | End: 2021-07-10

## 2021-07-09 RX ORDER — FIBRINOGEN HUMAN AND THROMBIN HUMAN 1 ML
KIT TOPICAL PRN
Status: DISCONTINUED | OUTPATIENT
Start: 2021-07-09 | End: 2021-07-09 | Stop reason: HOSPADM

## 2021-07-09 RX ORDER — ALBUMIN, HUMAN INJ 5% 5 %
25 SOLUTION INTRAVENOUS PRN
Status: DISCONTINUED | OUTPATIENT
Start: 2021-07-09 | End: 2021-07-10

## 2021-07-09 RX ORDER — OXYCODONE HYDROCHLORIDE 10 MG/1
10 TABLET ORAL EVERY 4 HOURS PRN
Status: DISCONTINUED | OUTPATIENT
Start: 2021-07-09 | End: 2021-07-10

## 2021-07-09 RX ORDER — MIDAZOLAM HYDROCHLORIDE 1 MG/ML
INJECTION INTRAMUSCULAR; INTRAVENOUS PRN
Status: DISCONTINUED | OUTPATIENT
Start: 2021-07-09 | End: 2021-07-09 | Stop reason: SDUPTHER

## 2021-07-09 RX ORDER — SODIUM CHLORIDE 9 MG/ML
10 INJECTION INTRAVENOUS DAILY
Status: COMPLETED | OUTPATIENT
Start: 2021-07-09 | End: 2021-07-09

## 2021-07-09 RX ORDER — ONDANSETRON 2 MG/ML
4 INJECTION INTRAMUSCULAR; INTRAVENOUS EVERY 8 HOURS PRN
Status: DISCONTINUED | OUTPATIENT
Start: 2021-07-09 | End: 2021-07-10

## 2021-07-09 RX ORDER — PANTOPRAZOLE SODIUM 40 MG/10ML
40 INJECTION, POWDER, LYOPHILIZED, FOR SOLUTION INTRAVENOUS DAILY
Status: COMPLETED | OUTPATIENT
Start: 2021-07-09 | End: 2021-07-09

## 2021-07-09 RX ORDER — HEPARIN SODIUM 10000 [USP'U]/ML
INJECTION, SOLUTION INTRAVENOUS; SUBCUTANEOUS PRN
Status: DISCONTINUED | OUTPATIENT
Start: 2021-07-09 | End: 2021-07-09 | Stop reason: SDUPTHER

## 2021-07-09 RX ORDER — EPINEPHRINE 1 MG/ML
INJECTION, SOLUTION, CONCENTRATE INTRAVENOUS PRN
Status: DISCONTINUED | OUTPATIENT
Start: 2021-07-09 | End: 2021-07-09 | Stop reason: SDUPTHER

## 2021-07-09 RX ORDER — DEXTROSE MONOHYDRATE 25 G/50ML
12.5 INJECTION, SOLUTION INTRAVENOUS PRN
Status: DISCONTINUED | OUTPATIENT
Start: 2021-07-09 | End: 2021-07-10

## 2021-07-09 RX ORDER — CHLORHEXIDINE GLUCONATE 4 G/100ML
SOLUTION TOPICAL ONCE
Status: COMPLETED | OUTPATIENT
Start: 2021-07-09 | End: 2021-07-09

## 2021-07-09 RX ORDER — INSULIN GLARGINE 100 [IU]/ML
0.15 INJECTION, SOLUTION SUBCUTANEOUS NIGHTLY
Status: DISCONTINUED | OUTPATIENT
Start: 2021-07-10 | End: 2021-07-15 | Stop reason: HOSPADM

## 2021-07-09 RX ORDER — SODIUM CHLORIDE, SODIUM LACTATE, POTASSIUM CHLORIDE, CALCIUM CHLORIDE 600; 310; 30; 20 MG/100ML; MG/100ML; MG/100ML; MG/100ML
INJECTION, SOLUTION INTRAVENOUS CONTINUOUS PRN
Status: DISCONTINUED | OUTPATIENT
Start: 2021-07-09 | End: 2021-07-09 | Stop reason: SDUPTHER

## 2021-07-09 RX ORDER — PROPOFOL 10 MG/ML
INJECTION, EMULSION INTRAVENOUS
Status: COMPLETED
Start: 2021-07-09 | End: 2021-07-09

## 2021-07-09 RX ORDER — ASPIRIN 81 MG/1
81 TABLET ORAL DAILY
Status: DISCONTINUED | OUTPATIENT
Start: 2021-07-10 | End: 2021-07-10

## 2021-07-09 RX ORDER — CHLORHEXIDINE GLUCONATE 0.12 MG/ML
15 RINSE ORAL ONCE
Status: COMPLETED | OUTPATIENT
Start: 2021-07-09 | End: 2021-07-09

## 2021-07-09 RX ORDER — FENTANYL CITRATE 0.05 MG/ML
INJECTION, SOLUTION INTRAMUSCULAR; INTRAVENOUS PRN
Status: DISCONTINUED | OUTPATIENT
Start: 2021-07-09 | End: 2021-07-09 | Stop reason: SDUPTHER

## 2021-07-09 RX ORDER — ACETAMINOPHEN 325 MG/1
650 TABLET ORAL EVERY 4 HOURS PRN
Status: DISCONTINUED | OUTPATIENT
Start: 2021-07-09 | End: 2021-07-10

## 2021-07-09 RX ORDER — CALCIUM CHLORIDE 100 MG/ML
INJECTION INTRAVENOUS; INTRAVENTRICULAR PRN
Status: DISCONTINUED | OUTPATIENT
Start: 2021-07-09 | End: 2021-07-09 | Stop reason: SDUPTHER

## 2021-07-09 RX ORDER — PRAVASTATIN SODIUM 20 MG
20 TABLET ORAL NIGHTLY
Status: DISCONTINUED | OUTPATIENT
Start: 2021-07-09 | End: 2021-07-15 | Stop reason: HOSPADM

## 2021-07-09 RX ORDER — SODIUM CHLORIDE 0.9 % (FLUSH) 0.9 %
10 SYRINGE (ML) INJECTION EVERY 12 HOURS SCHEDULED
Status: DISCONTINUED | OUTPATIENT
Start: 2021-07-09 | End: 2021-07-09 | Stop reason: HOSPADM

## 2021-07-09 RX ORDER — NICOTINE POLACRILEX 4 MG
15 LOZENGE BUCCAL PRN
Status: DISCONTINUED | OUTPATIENT
Start: 2021-07-09 | End: 2021-07-10

## 2021-07-09 RX ORDER — SODIUM CHLORIDE 0.9 % (FLUSH) 0.9 %
10 SYRINGE (ML) INJECTION EVERY 12 HOURS SCHEDULED
Status: DISCONTINUED | OUTPATIENT
Start: 2021-07-09 | End: 2021-07-15 | Stop reason: HOSPADM

## 2021-07-09 RX ADMIN — SODIUM CHLORIDE, PRESERVATIVE FREE 10 ML: 5 INJECTION INTRAVENOUS at 12:03

## 2021-07-09 RX ADMIN — ALBUMIN, HUMAN INJ 5% 25 G: 5 SOLUTION at 11:45

## 2021-07-09 RX ADMIN — CALCIUM CHLORIDE 1 G: 100 INJECTION, SOLUTION INTRAVENOUS at 10:17

## 2021-07-09 RX ADMIN — FENTANYL CITRATE 50 MCG: 50 INJECTION, SOLUTION INTRAMUSCULAR; INTRAVENOUS at 11:46

## 2021-07-09 RX ADMIN — AMINOCAPROIC ACID 1 G/HR: 250 INJECTION, SOLUTION INTRAVENOUS at 07:16

## 2021-07-09 RX ADMIN — Medication 2 MCG/MIN: at 16:15

## 2021-07-09 RX ADMIN — LIDOCAINE HYDROCHLORIDE 100 MG: 20 INJECTION, SOLUTION INTRAVENOUS at 07:00

## 2021-07-09 RX ADMIN — CHLORHEXIDINE GLUCONATE: 213 SOLUTION TOPICAL at 05:44

## 2021-07-09 RX ADMIN — FENTANYL CITRATE 250 MCG: 50 INJECTION, SOLUTION INTRAMUSCULAR; INTRAVENOUS at 07:51

## 2021-07-09 RX ADMIN — INSULIN HUMAN 10 UNITS: 100 INJECTION, SOLUTION PARENTERAL at 09:52

## 2021-07-09 RX ADMIN — IPRATROPIUM BROMIDE AND ALBUTEROL SULFATE 1 AMPULE: 2.5; .5 SOLUTION RESPIRATORY (INHALATION) at 16:35

## 2021-07-09 RX ADMIN — SODIUM CHLORIDE, PRESERVATIVE FREE 10 ML: 5 INJECTION INTRAVENOUS at 15:56

## 2021-07-09 RX ADMIN — ALBUMIN (HUMAN) 500 ML: 12.5 INJECTION, SOLUTION INTRAVENOUS at 10:20

## 2021-07-09 RX ADMIN — FENTANYL CITRATE 100 MCG: 50 INJECTION, SOLUTION INTRAMUSCULAR; INTRAVENOUS at 07:00

## 2021-07-09 RX ADMIN — 0.12% CHLORHEXIDINE GLUCONATE 15 ML: 1.2 RINSE ORAL at 05:50

## 2021-07-09 RX ADMIN — PROPOFOL 10 MCG/KG/MIN: 10 INJECTION, EMULSION INTRAVENOUS at 11:50

## 2021-07-09 RX ADMIN — CEFAZOLIN 2000 MG: 1 INJECTION, POWDER, FOR SOLUTION INTRAMUSCULAR; INTRAVENOUS at 10:15

## 2021-07-09 RX ADMIN — PROPOFOL 120 MG: 10 INJECTION, EMULSION INTRAVENOUS at 07:00

## 2021-07-09 RX ADMIN — PROPOFOL 50 MG: 10 INJECTION, EMULSION INTRAVENOUS at 07:30

## 2021-07-09 RX ADMIN — MUPIROCIN: 20 OINTMENT TOPICAL at 20:21

## 2021-07-09 RX ADMIN — VECURONIUM BROMIDE 4 MG: 10 INJECTION, POWDER, LYOPHILIZED, FOR SOLUTION INTRAVENOUS at 09:06

## 2021-07-09 RX ADMIN — ALBUMIN (HUMAN) 25 G: 12.5 INJECTION, SOLUTION INTRAVENOUS at 15:07

## 2021-07-09 RX ADMIN — FENTANYL CITRATE 50 MCG: 50 INJECTION, SOLUTION INTRAMUSCULAR; INTRAVENOUS at 16:30

## 2021-07-09 RX ADMIN — Medication 0.2 MG: at 10:49

## 2021-07-09 RX ADMIN — SODIUM CHLORIDE, POTASSIUM CHLORIDE, SODIUM LACTATE AND CALCIUM CHLORIDE: 600; 310; 30; 20 INJECTION, SOLUTION INTRAVENOUS at 06:48

## 2021-07-09 RX ADMIN — ONDANSETRON HYDROCHLORIDE 4 MG: 2 INJECTION, SOLUTION INTRAMUSCULAR; INTRAVENOUS at 10:36

## 2021-07-09 RX ADMIN — PANTOPRAZOLE SODIUM 40 MG: 40 INJECTION, POWDER, FOR SOLUTION INTRAVENOUS at 12:34

## 2021-07-09 RX ADMIN — INSULIN LISPRO 3 UNITS: 100 INJECTION, SOLUTION INTRAVENOUS; SUBCUTANEOUS at 15:43

## 2021-07-09 RX ADMIN — ASPIRIN 300 MG: 300 SUPPOSITORY RECTAL at 12:35

## 2021-07-09 RX ADMIN — MIDAZOLAM 4 MG: 1 INJECTION INTRAMUSCULAR; INTRAVENOUS at 06:55

## 2021-07-09 RX ADMIN — MUPIROCIN: 20 OINTMENT TOPICAL at 12:30

## 2021-07-09 RX ADMIN — EPINEPHRINE 10 MCG: 1 INJECTION, SOLUTION INTRAMUSCULAR; SUBCUTANEOUS at 10:09

## 2021-07-09 RX ADMIN — AMINOCAPROIC ACID 500 MG: 250 INJECTION, SOLUTION INTRAVENOUS at 07:00

## 2021-07-09 RX ADMIN — IPRATROPIUM BROMIDE AND ALBUTEROL SULFATE 1 AMPULE: 2.5; .5 SOLUTION RESPIRATORY (INHALATION) at 20:24

## 2021-07-09 RX ADMIN — IPRATROPIUM BROMIDE AND ALBUTEROL SULFATE 1 AMPULE: 2.5; .5 SOLUTION RESPIRATORY (INHALATION) at 12:20

## 2021-07-09 RX ADMIN — 0.12% CHLORHEXIDINE GLUCONATE 15 ML: 1.2 RINSE ORAL at 12:34

## 2021-07-09 RX ADMIN — SODIUM CHLORIDE: 9 INJECTION, SOLUTION INTRAVENOUS at 06:33

## 2021-07-09 RX ADMIN — HEPARIN SODIUM 40000 UNITS: 10000 INJECTION INTRAVENOUS; SUBCUTANEOUS at 08:01

## 2021-07-09 RX ADMIN — FENTANYL CITRATE 150 MCG: 50 INJECTION, SOLUTION INTRAMUSCULAR; INTRAVENOUS at 07:33

## 2021-07-09 RX ADMIN — INSULIN HUMAN 7 UNITS: 100 INJECTION, SOLUTION PARENTERAL at 09:04

## 2021-07-09 RX ADMIN — SODIUM CHLORIDE 5 UNITS/HR: 9 INJECTION, SOLUTION INTRAVENOUS at 08:34

## 2021-07-09 RX ADMIN — SODIUM CHLORIDE 30 ML/HR: 9 INJECTION, SOLUTION INTRAVENOUS at 11:58

## 2021-07-09 RX ADMIN — PHENYLEPHRINE HYDROCHLORIDE 900 MCG: 10 INJECTION INTRAVENOUS at 10:52

## 2021-07-09 RX ADMIN — FENTANYL CITRATE 250 MCG: 50 INJECTION, SOLUTION INTRAMUSCULAR; INTRAVENOUS at 07:36

## 2021-07-09 RX ADMIN — FENTANYL CITRATE 25 MCG: 50 INJECTION, SOLUTION INTRAMUSCULAR; INTRAVENOUS at 20:21

## 2021-07-09 RX ADMIN — SODIUM CHLORIDE: 9 INJECTION, SOLUTION INTRAVENOUS at 05:57

## 2021-07-09 RX ADMIN — CEFAZOLIN 2000 MG: 1 INJECTION, POWDER, FOR SOLUTION INTRAMUSCULAR; INTRAVENOUS at 07:00

## 2021-07-09 RX ADMIN — MIDAZOLAM 4 MG: 1 INJECTION INTRAMUSCULAR; INTRAVENOUS at 06:37

## 2021-07-09 RX ADMIN — PROTAMINE SULFATE 350 MG: 10 INJECTION, SOLUTION INTRAVENOUS at 10:11

## 2021-07-09 RX ADMIN — CALCIUM GLUCONATE 1000 MG: 98 INJECTION, SOLUTION INTRAVENOUS at 13:51

## 2021-07-09 RX ADMIN — SODIUM CHLORIDE, PRESERVATIVE FREE 10 ML: 5 INJECTION INTRAVENOUS at 12:35

## 2021-07-09 RX ADMIN — INSULIN LISPRO 3 UNITS: 100 INJECTION, SOLUTION INTRAVENOUS; SUBCUTANEOUS at 11:54

## 2021-07-09 RX ADMIN — SODIUM CHLORIDE, POTASSIUM CHLORIDE, SODIUM LACTATE AND CALCIUM CHLORIDE: 600; 310; 30; 20 INJECTION, SOLUTION INTRAVENOUS at 10:40

## 2021-07-09 RX ADMIN — FENTANYL CITRATE 250 MCG: 50 INJECTION, SOLUTION INTRAMUSCULAR; INTRAVENOUS at 07:38

## 2021-07-09 RX ADMIN — VECURONIUM BROMIDE 20 MG: 10 INJECTION, POWDER, LYOPHILIZED, FOR SOLUTION INTRAVENOUS at 07:00

## 2021-07-09 RX ADMIN — Medication 2000 MG: at 17:37

## 2021-07-09 RX ADMIN — SODIUM CHLORIDE, PRESERVATIVE FREE 10 ML: 5 INJECTION INTRAVENOUS at 20:21

## 2021-07-09 RX ADMIN — ALBUMIN (HUMAN) 25 G: 12.5 INJECTION, SOLUTION INTRAVENOUS at 11:45

## 2021-07-09 ASSESSMENT — PULMONARY FUNCTION TESTS
PIF_VALUE: 1
PIF_VALUE: 1
PIF_VALUE: 18
PIF_VALUE: 22
PIF_VALUE: 1
PIF_VALUE: 22
PIF_VALUE: 18
PIF_VALUE: 23
PIF_VALUE: 1
PIF_VALUE: 20
PIF_VALUE: 1
PIF_VALUE: 22
PIF_VALUE: 21
PIF_VALUE: 23
PIF_VALUE: 23
PIF_VALUE: 21
PIF_VALUE: 1
PIF_VALUE: 21
PIF_VALUE: 1
PIF_VALUE: 18
PIF_VALUE: 1
PIF_VALUE: 1
PIF_VALUE: 17
PIF_VALUE: 18
PIF_VALUE: 22
PIF_VALUE: 21
PIF_VALUE: 22
PIF_VALUE: 18
PIF_VALUE: 1
PIF_VALUE: 22
PIF_VALUE: 21
PIF_VALUE: 0
PIF_VALUE: 22
PIF_VALUE: 21
PIF_VALUE: 0
PIF_VALUE: 1
PIF_VALUE: 18
PIF_VALUE: 18
PIF_VALUE: 21
PIF_VALUE: 1
PIF_VALUE: 17
PIF_VALUE: 21
PIF_VALUE: 23
PIF_VALUE: 23
PIF_VALUE: 18
PIF_VALUE: 21
PIF_VALUE: 18
PIF_VALUE: 22
PIF_VALUE: 21
PIF_VALUE: 16
PIF_VALUE: 1
PIF_VALUE: 23
PIF_VALUE: 22
PIF_VALUE: 18
PIF_VALUE: 1
PIF_VALUE: 0
PIF_VALUE: 18
PIF_VALUE: 1
PIF_VALUE: 1
PIF_VALUE: 2
PIF_VALUE: 21
PIF_VALUE: 22
PIF_VALUE: 21
PIF_VALUE: 2
PIF_VALUE: 1
PIF_VALUE: 7
PIF_VALUE: 1
PIF_VALUE: 17
PIF_VALUE: 0
PIF_VALUE: 21
PIF_VALUE: 1
PIF_VALUE: 18
PIF_VALUE: 21
PIF_VALUE: 18
PIF_VALUE: 21
PIF_VALUE: 22
PIF_VALUE: 1
PIF_VALUE: 23
PIF_VALUE: 1
PIF_VALUE: 21
PIF_VALUE: 20
PIF_VALUE: 18
PIF_VALUE: 21
PIF_VALUE: 1
PIF_VALUE: 1
PIF_VALUE: 22
PIF_VALUE: 21
PIF_VALUE: 0
PIF_VALUE: 21
PIF_VALUE: 22
PIF_VALUE: 1
PIF_VALUE: 19
PIF_VALUE: 19
PIF_VALUE: 22
PIF_VALUE: 1
PIF_VALUE: 21
PIF_VALUE: 1
PIF_VALUE: 18
PIF_VALUE: 23
PIF_VALUE: 20
PIF_VALUE: 22
PIF_VALUE: 1
PIF_VALUE: 18
PIF_VALUE: 21
PIF_VALUE: 0
PIF_VALUE: 15
PIF_VALUE: 17
PIF_VALUE: 23
PIF_VALUE: 20
PIF_VALUE: 18
PIF_VALUE: 22
PIF_VALUE: 0
PIF_VALUE: 1
PIF_VALUE: 20
PIF_VALUE: 1
PIF_VALUE: 1
PIF_VALUE: 22
PIF_VALUE: 20
PIF_VALUE: 18
PIF_VALUE: 1
PIF_VALUE: 1
PIF_VALUE: 22
PIF_VALUE: 22
PIF_VALUE: 19
PIF_VALUE: 23
PIF_VALUE: 22
PIF_VALUE: 20
PIF_VALUE: 21
PIF_VALUE: 22
PIF_VALUE: 15
PIF_VALUE: 2
PIF_VALUE: 21
PIF_VALUE: 20
PIF_VALUE: 20
PIF_VALUE: 22
PIF_VALUE: 1
PIF_VALUE: 22
PIF_VALUE: 0
PIF_VALUE: 17
PIF_VALUE: 6
PIF_VALUE: 18
PIF_VALUE: 20
PIF_VALUE: 22
PIF_VALUE: 22
PIF_VALUE: 1
PIF_VALUE: 17
PIF_VALUE: 3
PIF_VALUE: 1
PIF_VALUE: 1
PIF_VALUE: 22
PIF_VALUE: 0
PIF_VALUE: 21
PIF_VALUE: 18
PIF_VALUE: 21
PIF_VALUE: 20
PIF_VALUE: 18
PIF_VALUE: 22
PIF_VALUE: 21
PIF_VALUE: 0
PIF_VALUE: 1
PIF_VALUE: 21
PIF_VALUE: 1
PIF_VALUE: 20
PIF_VALUE: 22
PIF_VALUE: 8
PIF_VALUE: 22
PIF_VALUE: 1
PIF_VALUE: 21
PIF_VALUE: 22
PIF_VALUE: 20
PIF_VALUE: 22
PIF_VALUE: 21
PIF_VALUE: 22
PIF_VALUE: 21
PIF_VALUE: 1
PIF_VALUE: 20
PIF_VALUE: 19
PIF_VALUE: 17
PIF_VALUE: 1
PIF_VALUE: 0
PIF_VALUE: 1
PIF_VALUE: 2
PIF_VALUE: 21
PIF_VALUE: 21
PIF_VALUE: 1
PIF_VALUE: 21
PIF_VALUE: 0
PIF_VALUE: 23
PIF_VALUE: 23
PIF_VALUE: 1
PIF_VALUE: 1
PIF_VALUE: 19
PIF_VALUE: 20
PIF_VALUE: 20
PIF_VALUE: 0
PIF_VALUE: 17
PIF_VALUE: 18
PIF_VALUE: 20
PIF_VALUE: 21
PIF_VALUE: 1
PIF_VALUE: 17
PIF_VALUE: 21
PIF_VALUE: 26
PIF_VALUE: 1
PIF_VALUE: 21
PIF_VALUE: 21
PIF_VALUE: 1
PIF_VALUE: 18
PIF_VALUE: 18
PIF_VALUE: 2
PIF_VALUE: 23
PIF_VALUE: 17
PIF_VALUE: 22
PIF_VALUE: 17
PIF_VALUE: 1
PIF_VALUE: 0
PIF_VALUE: 23
PIF_VALUE: 19
PIF_VALUE: 18
PIF_VALUE: 23
PIF_VALUE: 21
PIF_VALUE: 1
PIF_VALUE: 22
PIF_VALUE: 22
PIF_VALUE: 23
PIF_VALUE: 22
PIF_VALUE: 22
PIF_VALUE: 17
PIF_VALUE: 1
PIF_VALUE: 37
PIF_VALUE: 0
PIF_VALUE: 15
PIF_VALUE: 20
PIF_VALUE: 20
PIF_VALUE: 1
PIF_VALUE: 21
PIF_VALUE: 1
PIF_VALUE: 0
PIF_VALUE: 21
PIF_VALUE: 21
PIF_VALUE: 20
PIF_VALUE: 22
PIF_VALUE: 21
PIF_VALUE: 18
PIF_VALUE: 15
PIF_VALUE: 18
PIF_VALUE: 23
PIF_VALUE: 18
PIF_VALUE: 3
PIF_VALUE: 1
PIF_VALUE: 24
PIF_VALUE: 15
PIF_VALUE: 0
PIF_VALUE: 22
PIF_VALUE: 21
PIF_VALUE: 1
PIF_VALUE: 17
PIF_VALUE: 22
PIF_VALUE: 18
PIF_VALUE: 18
PIF_VALUE: 1
PIF_VALUE: 1
PIF_VALUE: 21
PIF_VALUE: 20
PIF_VALUE: 19
PIF_VALUE: 1
PIF_VALUE: 0
PIF_VALUE: 1
PIF_VALUE: 21
PIF_VALUE: 22
PIF_VALUE: 18
PIF_VALUE: 1
PIF_VALUE: 1
PIF_VALUE: 0
PIF_VALUE: 23
PIF_VALUE: 21
PIF_VALUE: 22
PIF_VALUE: 20
PIF_VALUE: 20
PIF_VALUE: 37
PIF_VALUE: 20
PIF_VALUE: 18
PIF_VALUE: 20
PIF_VALUE: 21
PIF_VALUE: 20
PIF_VALUE: 22
PIF_VALUE: 1
PIF_VALUE: 21
PIF_VALUE: 22
PIF_VALUE: 20
PIF_VALUE: 22

## 2021-07-09 ASSESSMENT — PAIN - FUNCTIONAL ASSESSMENT
PAIN_FUNCTIONAL_ASSESSMENT: 0-10
PAIN_FUNCTIONAL_ASSESSMENT: PREVENTS OR INTERFERES SOME ACTIVE ACTIVITIES AND ADLS
PAIN_FUNCTIONAL_ASSESSMENT: PREVENTS OR INTERFERES SOME ACTIVE ACTIVITIES AND ADLS

## 2021-07-09 ASSESSMENT — PAIN DESCRIPTION - ORIENTATION
ORIENTATION: MID
ORIENTATION: MID

## 2021-07-09 ASSESSMENT — PAIN DESCRIPTION - ONSET
ONSET: ON-GOING
ONSET: GRADUAL

## 2021-07-09 ASSESSMENT — PAIN DESCRIPTION - PAIN TYPE
TYPE: SURGICAL PAIN
TYPE: ACUTE PAIN;SURGICAL PAIN
TYPE: SURGICAL PAIN

## 2021-07-09 ASSESSMENT — PAIN DESCRIPTION - DESCRIPTORS
DESCRIPTORS: ACHING;DISCOMFORT
DESCRIPTORS: ACHING;BURNING;DISCOMFORT
DESCRIPTORS: ACHING;BURNING;DISCOMFORT

## 2021-07-09 ASSESSMENT — PAIN DESCRIPTION - PROGRESSION
CLINICAL_PROGRESSION: GRADUALLY WORSENING
CLINICAL_PROGRESSION: GRADUALLY WORSENING

## 2021-07-09 ASSESSMENT — PAIN SCALES - GENERAL
PAINLEVEL_OUTOF10: 7
PAINLEVEL_OUTOF10: 7
PAINLEVEL_OUTOF10: 0
PAINLEVEL_OUTOF10: 4
PAINLEVEL_OUTOF10: 0
PAINLEVEL_OUTOF10: 4
PAINLEVEL_OUTOF10: 3

## 2021-07-09 ASSESSMENT — PAIN DESCRIPTION - FREQUENCY
FREQUENCY: INTERMITTENT
FREQUENCY: CONTINUOUS

## 2021-07-09 ASSESSMENT — LIFESTYLE VARIABLES: SMOKING_STATUS: 0

## 2021-07-09 ASSESSMENT — PAIN DESCRIPTION - LOCATION
LOCATION: CHEST
LOCATION: CHEST;INCISION;STERNUM
LOCATION: CHEST

## 2021-07-09 NOTE — PROCEDURES
[address]                  [address] [facility],  [facility]                               PULMONARY FUNCTION    PATIENT NAME: Jeff Moralse                    :        1950  MED REC NO:   [id number]                            ROOM:  ACCOUNT NO:   [de-identified]                           ADMIT DATE: 2021  PROVIDER:     Joseluis Walsh DO    DATE OF PROCEDURE:  2021    INDICATION:  A 75-year-old male, 66 inches, 236 pounds, preoperative  assessment. FINDINGS:  Spirometry and diffusion capacity are normal with a forced  vital capacity of 3.26 liters, 91% of predicted, FEV1 of 2.48 liters,  91% of predicted with an FEV1/FVC ratio at 76%. Midflow rates are  normal at 95% of predicted with a maximum voluntary ventilation at 91  liters per minute, 83% of predicted. Static lung volumes with slow vital capacity of 3.40 liters, 95% of  predicted. Inspiratory capacity 2.99 liters, 101% of predicted. Expiratory reserve volume 0.41 liters, 65% of predicted with a diffusion  capacity of 24.50 mL/minute per mmHg which is 90% of predicted. IMPRESSION:  Normal spirometry and diffusion capacity.           Ric Soriano DO    D: 2021 15:20:21       T: 2021 15:28:27     [email or web address]  Job#: [id number]     Doc#: [id number]

## 2021-07-09 NOTE — PROGRESS NOTES
Patient was extubated to 3 liters/min via nasal cannula. Breath Sounds post extubation were clear throughout all lung fields anterior and posteriorly. Stridor was not present post extubation. SPO2 was 98%. Patient suctioned orally and endotracheally prior to extubation. Post extubation patient is able voice his name with good phonation.       Performed by  Carly Del Rio RCP

## 2021-07-09 NOTE — CONSULTS
Inpatient Cardiology Consultation      Reason for Consult:  Post op, know to [facility] cardiology    Consulting Physician: Oliver Mccoy DO    Requesting Physician:  Noemi Jeronimo DO    Date of Consultation: 7/9/2021    HISTORY OF PRESENT ILLNESS OF Jayme Soriano located in  room 3813/3813-A:     Jayme Soriano is a 66 y.o. male  known to Dr. Yan Story. Shahram Robles MD    Patient has h/o CAD, MI 2008, A. fib, HTN, HLD, DM, PAD, carotid disease, gout, chronic venous insufficiency, Covid-19 1/21. He underwent a stress test on 5/27/2021 showing a normal left ventricular systolic function and reversible perfusion defect in inferior and inferior lateral segments. He has had a heart cath on 6/16/2021 followed by CABG x2 (LIMA-LAD, SVG-OM), right carotid endarterectomy, MAZE, LYNSEY today (7/9/2021) by Dr. Gisella Shin MD and Noemi Jeronimo DO. During the exam: Sedated, recently extubated. Please note: past medical records were reviewed per electronic medical record (EMR) - see detailed reports under Past Medical/ Surgical History. Past Medical History:    Past Medical History:   Diagnosis Date    A-fib [facility])     for cardioversion 3-12-21     CAD (coronary artery disease) 10/27/2011    Dr. Robin Russo Carotid stenosis, right 5/6/2021    Chronic venous insufficiency 3/2/2017    COVID-19 01/2021    resolved as of 3-3-21     Decreased pulses in feet     Diabetes mellitus ([address]     DM (diabetes mellitus), type 2 ([address] 10/27/2011    ED (erectile dysfunction)     Gout 10/31/2011    Heart attack ([address]     HTN (hypertension) 10/27/2011    Hyperlipidemia 6/7/2012    Hypertension     Lateral myocardial infarction ([address] 10/08    due to circumflex occlusion    Leg swelling 3/2/2017       Past Surgical History:    Past Surgical History:   Procedure Laterality Date   R Stephanie 11  06/16/2021    DR. CANDELARIA [facility] EF 45% CTS CONSULT    CARDIOVASCULAR STRESS TEST 12/14/2009    neither fixed nor reversible perfusion defect; LVEF 50%    CATARACT REMOVAL Left     CHOLECYSTECTOMY  1999    HERNIA REPAIR  @ age 13    KNEE SURGERY      left-ligament torn    OTHER SURGICAL HISTORY  10/31/2011    cystoscopy retrograde;ESWL;stent on left    TESTICLE SURGERY      as child     WISDOM TOOTH EXTRACTION         Medications Prior to admit:  Prior to Admission medications    Medication Sig Start Date End Date Taking? Authorizing Provider   pravastatin (PRAVACHOL) 20 MG tablet Take 1 tablet by mouth daily 7/2/21  Yes González Colon MD   aspirin 81 MG EC tablet Take 81 mg by mouth daily   Yes Historical Provider, MD   glipiZIDE (GLUCOTROL) 5 MG tablet Take 1 tablet by mouth 4 times daily 2/17/21  Yes Angelina Britt MD   metFORMIN (GLUCOPHAGE) 500 MG tablet TAKE 2 TABLETS BY MOUTH 2 TIMES DAILY (WITH MEALS) 7/27/20  Yes Angelina Britt MD   blood glucose test strips (ONETOUCH ULTRA) strip TEST BLOOD SUGAR TWICE DAILY AS NEEDED 7/6/21   LINNETTE Brooks - CNP   tamsulosin (FLOMAX) 0.4 MG capsule TAKE ONE CAPSULE BY MOUTH EVERY DAY 6/10/21   Angelina Britt MD   furosemide (LASIX) 20 MG tablet Take 20 mg by mouth every other day Patient takes when needed    Historical Provider, MD   enalapril (VASOTEC) 2.5 MG tablet Take 2.5 mg by mouth nightly    Historical Provider, MD   rivaroxaban (XARELTO) 20 MG TABS tablet Take 1 tablet by mouth Daily with supper Lot: [id number], EXP: 09/2022 one bottle  Lot: [id number], EXP: 10/2022 three bottles 3/12/21   Leandro Saini MD   nitroGLYCERIN (NITROSTAT) 0.4 MG SL tablet Place 1 tablet under the tongue every 5 minutes as needed for Chest pain up to max of 3 total doses. If no relief after 1 dose, call 911.   Patient not taking: Reported on 7/6/2021 1/4/21   Angelina Britt MD   zinc gluconate 50 MG tablet Take 50 mg by mouth daily Not taking    Historical Provider, MD   TURMERIC CURCUMIN PO Take 1 capsule by mouth daily Has not taken recently    Historical Provider, MD   b complex vitamins capsule Take 1 capsule by mouth daily    Historical Provider, MD   Elastic Bandages & Supports (JOBST KNEE HIGH COMPRESSION SM) MISC Knee high with 20- 30 mmhg of compression 3/2/17   Adilson Salinas MD   vitamin B-12 (CYANOCOBALAMIN) 1000 MCG tablet Take 1,000 mcg by mouth daily Has not taken recently    Historical Provider, MD   vitamin D (CHOLECALCIFEROL) 1000 UNIT TABS tablet Take 400 Units by mouth daily     Historical Provider, MD       Current Medications:    Current Facility-Administered Medications: 0.9 % sodium chloride infusion, 30 mL/hr, Intravenous, Continuous  sodium chloride flush 0.9 % injection 10 mL, 10 mL, Intravenous, 2 times per day  sodium chloride flush 0.9 % injection 10 mL, 10 mL, Intravenous, PRN  0.9 % sodium chloride infusion, 25 mL, Intravenous, PRN  ondansetron (ZOFRAN) injection 4 mg, 4 mg, Intravenous, Q8H PRN  [START ON 7/10/2021] aspirin EC tablet 81 mg, 81 mg, Oral, Daily  acetaminophen (TYLENOL) tablet 650 mg, 650 mg, Oral, Q4H PRN  acetaminophen (TYLENOL) suppository 650 mg, 650 mg, Rectal, Q4H PRN  oxyCODONE (ROXICODONE) immediate release tablet 5 mg, 5 mg, Oral, Q4H PRN **OR** oxyCODONE HCl (OXY-IR) immediate release tablet 10 mg, 10 mg, Oral, Q4H PRN  fentaNYL (SUBLIMAZE) injection 25 mcg, 25 mcg, Intravenous, Q1H PRN **OR** fentaNYL (SUBLIMAZE) injection 50 mcg, 50 mcg, Intravenous, Q1H PRN  meperidine (DEMEROL) injection 25 mg, 25 mg, Intravenous, Once PRN  chlorhexidine (PERIDEX) 0.12 % solution 15 mL, 15 mL, Mouth/Throat, BID  [START ON 7/10/2021] magnesium oxide (MAG-OX) tablet 400 mg, 400 mg, Oral, Daily  mupirocin (BACTROBAN) 2 % ointment, , Nasal, BID  propofol injection, 10 mcg/kg/min, Intravenous, Continuous PRN  sennosides-docusate sodium (SENOKOT-S) 8.6-50 MG tablet 1 tablet, 1 tablet, Oral, BID  magnesium hydroxide (MILK OF MAGNESIA) 400 MG/5ML suspension 30 mL, 30 mL, Oral, Daily PRN  [START ON 7/10/2021] pantoprazole (PROTONIX) tablet 40 mg, 40 mg, Oral, Daily  ceFAZolin (ANCEF) 2000 mg in sterile water 20 mL IV syringe, 2,000 mg, Intravenous, Q8H  potassium chloride 20 mEq/50 mL IVPB (Central Line), 20 mEq, Intravenous, PRN  magnesium sulfate 2000 mg in 50 mL IVPB premix, 2,000 mg, Intravenous, PRN  ipratropium-albuterol (DUONEB) nebulizer solution 1 ampule, 1 ampule, Inhalation, Q4H WA  albumin human 5 % IV solution 25 g, 25 g, Intravenous, PRN  0.9 % sodium chloride bolus, 250 mL, Intravenous, Continuous PRN  norepinephrine (LEVOPHED) 16 mg in dextrose 5% 250 mL infusion, 2 mcg/min, Intravenous, Continuous PRN  clevidipine (CLEVIPREX) infusion, 2 mg/hr, Intravenous, Continuous PRN  insulin regular (HUMULIN R;NOVOLIN R) 100 Units in sodium chloride 0.9 % 100 mL infusion, 1 Units/hr, Intravenous, Continuous  [START ON 7/10/2021] insulin glargine (LANTUS) injection vial 16 Units, 0.15 Units/kg, Subcutaneous, Nightly  glucose (GLUTOSE) 40 % oral gel 15 g, 15 g, Oral, PRN  dextrose 50 % IV solution, 12.5 g, Intravenous, PRN  glucagon (rDNA) injection 1 mg, 1 mg, Intramuscular, PRN  dextrose 5 % solution, 100 mL/hr, Intravenous, PRN  calcium gluconate 1,000 mg in dextrose 5 % 100 mL IVPB, 1,000 mg, Intravenous, PRN  insulin lispro (HUMALOG) injection vial 0-18 Units, 0-18 Units, Subcutaneous, Q4H  [START ON 7/10/2021] tamsulosin (FLOMAX) capsule 0.4 mg, 0.4 mg, Oral, Daily  [START ON 7/10/2021] senna (SENOKOT) tablet 8.6 mg, 1 tablet, Oral, Nightly  pravastatin (PRAVACHOL) tablet 20 mg, 20 mg, Oral, Nightly  albumin human 5 % IV solution 25 g, 25 g, Intravenous, Once    Allergies:  Adhesive tape, Crestor [rosuvastatin calcium], and Lipitor [atorvastatin]    Social History:    Social History     Socioeconomic History    Marital status:      Spouse name: Not on file    Number of children: 2    Years of education: Not on file    Highest education level: Not on file   Occupational History    Occupation: self-employed     Comment: print shop   Tobacco Use    Smoking status: Never Smoker    Smokeless tobacco: Never Used   Vaping Use    Vaping Use: Never used   Substance and Sexual Activity    Alcohol use: Yes     Comment: occasional    Drug use: Never    Sexual activity: Not on file   Other Topics Concern    Not on file   Social History Narrative    Not on file     Social Determinants of Health     Financial Resource Strain:     Difficulty of Paying Living Expenses:    Food Insecurity:     Worried About Running Out of Food in the Last Year:     [address] in the Last Year:    Transportation Needs:     Lack of Transportation (Medical):  Lack of Transportation (Non-Medical):    Physical Activity:     Days of Exercise per Week:     Minutes of Exercise per Session:    Stress:     Feeling of Stress :    Social Connections:     Frequency of Communication with Friends and Family:     Frequency of Social Gatherings with Friends and Family:     Attends Voodoo Services:     Active Member of Clubs or Organizations:     Attends Club or Organization Meetings:     Marital Status:    Intimate Partner Violence:     Fear of Current or Ex-Partner:     Emotionally Abused:     Physically Abused:     Sexually Abused:        Family History:   Family History   Problem Relation Age of Onset    COPD Mother     COPD Father     Heart Attack Father 79    Down Syndrome Sister     Crohn's Disease Brother     Atrial Fibrillation Sister     No Known Problems Sister     No Known Problems Brother     No Known Problems Brother          REVIEW OF SYSTEMS:     Not obtained since patient is sedated, recently extubated    PHYSICAL EXAM:   BP (!) 94/47   Pulse 63   Temp 96.6 °F (35.9 °C) (Bladder)   Resp 16   Ht 5' 6\" (1.676 m)   Wt 236 lb (107 kg)   SpO2 98%   BMI 38.09 kg/m²   CONST: Obese.  Awake, alert, cooperative, no apparent distress  HEENT:   Head- Normocephalic, atraumatic   Eyes- Conjunctivae pink, anicteric  Throat- Oral mucosa pink and moist  Neck-  No stridor, trachea midline, right carotid dressing  CHEST: Chest symmetrical and apparently non-tender to palpation. No accessory muscle use or intercostal retractions. Chest tube present  RESPIRATORY:  Lung sounds - clear breathing sound bilaterally  CARDIOVASCULAR:     No carotid bruit in the left carotid area, right carotid area dressing present. Heart Inspection- shows no noted pulsations  Precordial pacing wires present. Mediastinal drainage present. Heart Ausculation- Regular rate and rhythm, no murmur. No s3, s4 or rub   PV: No right lower extremity edema. Right leg bandaged with Kerlix. Pedal pulses not felt on palpation. No toes cyanosis bilaterally. ABDOMEN: Soft, apparently non-tender to light palpation. Bowel sounds present. MS: Moves all extremities. Recent soft restraints. : Dueñas in. No visible signs of blood in urine. SKIN: Warm and moist.  NEURO / PSYCH: Sedated. DATA:    Monitor: Sinus rhythm, singular premature narrow complex QRS  Diagnostic:      XR CHEST (2 VW)    Result Date: 7/7/2021  EXAMINATION: TWO XRAY VIEWS OF THE CHEST 7/7/2021 9:00 am COMPARISON: 13 December 2009 HISTORY: ORDERING SYSTEM PROVIDED HISTORY: Pre-op evaluation TECHNOLOGIST PROVIDED HISTORY: What reading provider will be dictating this exam?->CRC FINDINGS: The lungs are without acute focal process. There is no effusion or pneumothorax. The cardiomediastinal silhouette is without acute process. The osseous structures are without acute process. No acute process. CT chest without contrast    Result Date: 7/8/2021  EXAMINATION: CT OF THE CHEST WITHOUT CONTRAST 7/7/2021 10:52 am TECHNIQUE: CT of the chest was performed without the administration of intravenous contrast. Multiplanar reformatted images are provided for review.  Dose modulation, iterative reconstruction, and/or weight based adjustment of the mA/kV was utilized to reduce the radiation dose to as low as reasonably achievable. COMPARISON: Chest radiograph dated 07/07/2021 at 0956 hours. No prior CT. HISTORY: ORDERING SYSTEM PROVIDED HISTORY: Pre-op evaluation TECHNOLOGIST PROVIDED HISTORY: What reading provider will be dictating this exam?->CRC FINDINGS: Exam is limited without intravenous contrast.  Scattered vascular calcifications. This includes coronary artery calcifications. Heart size is normal.  No pleural or pericardial effusion. Small hiatal hernia. Normal-size lymph nodes without adenopathy by size criteria. Visualized portions of the upper abdomen demonstrate no acute abnormality. Partial visualization of a 3 mm nonobstructing left renal stone. No pneumothorax. Tiny intra fissural lymph node involving the minor fissure. There are a few scattered areas of minimal scarring and or atelectasis, most notable in the lower lung zones. No localized consolidation or dominant mass. Degenerative changes are scattered in the spine. Chronic appearing findings. No significant acute cardiopulmonary process identified. Vascular ankle / brachial indices extremity complete    Result Date: 7/7/2021  Normal ankle arm index with good arterial flow to both feet based upon the pulse volume recordings over the metatarsals          Labs:   CARDIAC ENZYMES:  No results for input(s): CKTOTAL, CKMB, CKMBINDEX, TROPHS in the last 72 hours. H/O TROPONIN levels    No results found for: TROPHS, TROPONINI  No results for input(s): PROBNP in the last 72 hours. No results found for: PROBNP  BMP:   Recent Labs     07/07/21  0900 07/09/21  0900 07/09/21  1143 07/09/21  1222     --  138  --    K 4.5   < > 4.2 4.05   CO2 23  --  20*  --    BUN 19  --  14  --    CREATININE 1.0  --  1.0  --    LABGLOM >60  --  >60  --    CALCIUM 9.1  --  8.4*  --     < > = values in this interval not displayed.      Lab Results   Component Value Date    CREATININE 1.0 07/09/2021    CREATININE 1.0 07/07/2021    CREATININE 1.1 07/06/2021    CREATININE 1.2 06/19/2021    CREATININE 1.0 06/14/2021    CREATININE 1.2 04/02/2021    CREATININE 1.1 01/04/2021    CREATININE 1.1 09/23/2020    CREATININE 1.1 06/10/2020    CREATININE 1.0 02/11/2020    CREATININE 1.0 11/04/2019    CREATININE 1.0 07/24/2019    CREATININE 1.0 04/16/2019    CREATININE 1.10 01/09/2019    CREATININE 1.07 09/12/2018    CREATININE 0.99 06/05/2018    CREATININE 0.97 01/23/2018    CREATININE 1.1 11/01/2011    CREATININE 1.6 10/28/2011    CREATININE 1.6 10/27/2011    CREATININE 1.6 10/26/2011    CREATININE 1.0 10/24/2011     CBC:   Recent Labs     07/07/21  0900 07/09/21  1143   WBC 4.8 7.7   HGB 14.6 11.3*   HCT 43.1 33.7*    86*     Mag:   Recent Labs     07/09/21  1143   MG 2.5     Phos: No results for input(s): PHOS in the last 72 hours. TSH: No results for input(s): TSH in the last 72 hours. HgA1c:   Lab Results   Component Value Date    LABA1C 7.2 (H) 07/07/2021     No results found for: EAG  BNP: No results for input(s): BNP in the last 72 hours. PT/INR:   Recent Labs     07/07/21  0900 07/09/21  1143   PROTIME 12.1 14.4*   INR 1.1 1.3     APTT:  Recent Labs     07/07/21  0900 07/09/21  1143   APTT 27.6 25.2       FASTING LIPID PANEL:  Lab Results   Component Value Date    CHOL 152 07/06/2021    HDL 46 07/06/2021    LDLCALC 91 07/06/2021    TRIG 75 07/06/2021     LIVER PROFILE:  Recent Labs     07/07/21  0900   AST 17   ALT 15   LABALBU 4.3       COVID-19 Labs:  Lab Results   Component Value Date    COVID19 Not Detected 03/08/2021     No results for input(s): COVID19 in the last 72 hours.           ASSESSMENT/PLAN:    CAD,  s/p CABG x2 (LIMA-LAD, SVG-OM) on 7/9/21  - POD#0     Pre op - normal ventricular function     ASA -per primary service    Afib, now  sinus rhythm, s/p MAZE and LAAL on 7/9/21  - POD#0,       Xarelto per primary service    Mild TR  HTN, post op hypotension, not on pressors, IV fluids  HLD, on pravastatin  DM, PAD,   Carotid disease, s/p right carotid enterectomy  Gout, in remission  Chronic venous insufficiency,   BPH, on Flomax  Postop pain  Postop anemia  H/o Covid-19 1/21. Assessment and plan discussed with Dr. Shanika Dueñas DO    Assessment and Plan to follow as per Dr. Shanika Dueñas DO    Electronically signed by RAMAN Carpenter on 7/9/2021 at 3:04 PM     I independently performed an evaluation on the patient. I have reviewed the above documentation completed by the advance practitioner. Please see my additional contributions to the HPI, physical exam, assessment and medical decision making. Shanika Dueñas D.O.   Cardiologist  Cardiology, [facility]

## 2021-07-09 NOTE — PROGRESS NOTES
CVICU Admission Note    Name: Car Meredith  MRN: [id number]    CC: Postoperative Critical Care Management     Indication for Surgery/Procedure: CAD, Afib   LVEF:  55%    RVF:  Normal     Important/Relevant PMH/PSH: Afib failed DCCV, CAD (MI in 2008), HTN, DMII, HLD, right ICA stenosis 50-69%, Gout     Procedure/Surgeries: 7/9/2021 CABG x2 (LIMA-LAD, SVG-OM), Coronary endarterectomy (OM), MAZE, LAAL  KLS plating     Pacing wires: Ventricular       Physical Exam:    BP (!) 94/47   Pulse 67   Temp 96.6 °F (35.9 °C) (Bladder)   Resp 10   Ht 5' 6\" (1.676 m)   Wt 236 lb (107 kg)   SpO2 100%   BMI 38.09 kg/m²     Recent Labs     07/07/21  0900   WBC 4.8   RBC 4.51   HGB 14.6   HCT 43.1   MCV 95.6   MCH 32.4   MCHC 33.9   RDW 12.4      MPV 9.8     Recent Labs     07/07/21  0900 07/09/21  0900 07/09/21  0953     --   --    K 4.5   < > 5.5     --   --    CO2 23  --   --    BUN 19  --   --    CREATININE 1.0  --   --    GLUCOSE 210*  --   --    CALCIUM 9.1  --   --     < > = values in this interval not displayed. Post operative CXR:          Atelectasis, No pneumothorax noted, Mildly increased vascular markings bilateral, No significant pleural effusion. ETT/lines/drains appear to be in proper position. Final Radiology report pending. General Appearance: Arrived to ICU in stable condition, intubated on ventilator   Eyes: PERRL  Pulmonary: Diminished bibasilar. No wheezes, no accessory muscle use noted   Ventilator: Mode: AC/VC, 50 FiO2, 6 PEEP, 550 Vt   Cardiovascular: RRR, no heaves or thrills palpated   Telemetry: SR  Abdomen: Soft, OG to LIWS   Extremities: Palpable pulses all extremities, no edema   Neurologic/Psych: Sedated   Skin: Warm and dry   Incision: MSI with janeen dressing, LLE SVG sites with ace wrap on      Assessment/Plan: Day of Surgery     1. CAD S/p CABGx2  - ASA, Pravastatin    - Ancef  - Monitor chest tube output and hemodynamics closely    2.  Atrial fibrillation s/p

## 2021-07-09 NOTE — PLAN OF CARE
Problem: Gas Exchange - Impaired:  Goal: Levels of oxygenation will improve  Description: Levels of oxygenation will improve  Outcome: Met This Shift  Goal: Ability to maintain adequate ventilation will improve  Description: Ability to maintain adequate ventilation will improve  Outcome: Met This Shift     Problem: Pain:  Goal: Pain level will decrease  Description: Pain level will decrease  Outcome: Met This Shift  Goal: Control of acute pain  Description: Control of acute pain  Outcome: Met This Shift     Problem: Tissue Perfusion - Cardiopulmonary, Altered:  Goal: Absence of angina  Description: Absence of angina  Outcome: Met This Shift  Goal: Hemodynamic stability will improve  Description: Hemodynamic stability will improve  Outcome: Met This Shift  Goal: Will show no evidence of cardiac arrhythmias  Description: Will show no evidence of cardiac arrhythmias  Outcome: Met This Shift     Problem: Falls - Risk of:  Goal: Will remain free from falls  Description: Will remain free from falls  Outcome: Met This Shift  Goal: Absence of physical injury  Description: Absence of physical injury  Outcome: Met This Shift

## 2021-07-09 NOTE — ANESTHESIA PROCEDURE NOTES
Arterial Line:    An arterial line was placed using surface landmarks, in the OR for the following indication(s): continuous blood pressure monitoring and blood sampling needed. A 20 gauge (size), 5 cm (length), Arrow (type) catheter was placed, Seldinger technique not used, into the right brachial artery, secured by tape. Anesthesia type: Local    Events:  patient tolerated procedure well with no complications.   Anesthesiologist: Mallika Jones DO  Resident/CRNA: LINNETTE Preston - BUNNY  Other anesthesia staff: Cindy Jordan RN  Performed: Resident/CRNA   Preanesthetic Checklist  Completed: patient identified, IV checked, site marked, risks and benefits discussed, surgical consent, monitors and equipment checked, pre-op evaluation, timeout performed, anesthesia consent given, oxygen available and patient being monitored

## 2021-07-09 NOTE — ANESTHESIA PROCEDURE NOTES
Central Venous Line:    A central venous line was placed using ultrasound guidance, in the OR for the following indication(s): central venous access. Sterility preparation included the following: hand hygiene performed prior to procedure, maximum sterile barriers used and sterile technique used to drape from head to toe. The patient was placed in Trendelenburg position. The right internal jugular vein was prepped. The site was prepped with Chloraprep. A 8.5 Fr (size), introducer double lumen was placed. During the procedure, the following specific steps were taken: target vein identified, needle advanced into vein and blood aspirated and guidewire advanced into vein. Intravenous verification was obtained by ultrasound and venous blood return. Post insertion care included: all ports aspirated, all ports flushed easily, guidewire removed intact, Biopatch applied, line sutured in place and dressing applied. During the procedure the patient experienced: patient tolerated procedure well with no complications.       Anesthesia type: local..No  Staffing  Performed: Anesthesiologist   Anesthesiologist: Baron Julito DO  Preanesthetic Checklist  Completed: patient identified, IV checked, site marked, risks and benefits discussed, surgical consent, monitors and equipment checked, pre-op evaluation, timeout performed, anesthesia consent given, oxygen available and patient being monitored

## 2021-07-09 NOTE — ANESTHESIA PRE PROCEDURE
Department of Anesthesiology  Preprocedure Note       Name:  Nick Conde   Age:  66 y.o.  :  1950                                          MRN:  [id number]         Date:  2021      Surgeon: Jhony Ivey):  Margie Flores DO    Procedure: Procedure(s):  CABG CORONARY ARTERY BYPASS, MAZE, LEFT ATRIAL APPENDAGE OCCLUSION    Medications prior to admission:   Prior to Admission medications    Medication Sig Start Date End Date Taking? Authorizing Provider   pravastatin (PRAVACHOL) 20 MG tablet Take 1 tablet by mouth daily 21  Yes David Judd MD   aspirin 81 MG EC tablet Take 81 mg by mouth daily   Yes Historical Provider, MD   glipiZIDE (GLUCOTROL) 5 MG tablet Take 1 tablet by mouth 4 times daily 21  Yes Chiara Gomez MD   metFORMIN (GLUCOPHAGE) 500 MG tablet TAKE 2 TABLETS BY MOUTH 2 TIMES DAILY (WITH MEALS) 20  Yes Chiara Gomez MD   blood glucose test strips (ONETOUCH ULTRA) strip TEST BLOOD SUGAR TWICE DAILY AS NEEDED 21   LINNETTE Horn - CNP   tamsulosin (FLOMAX) 0.4 MG capsule TAKE ONE CAPSULE BY MOUTH EVERY DAY 6/10/21   Chiara Gomez MD   furosemide (LASIX) 20 MG tablet Take 20 mg by mouth every other day Patient takes when needed    Historical Provider, MD   enalapril (VASOTEC) 2.5 MG tablet Take 2.5 mg by mouth nightly    Historical Provider, MD   rivaroxaban (XARELTO) 20 MG TABS tablet Take 1 tablet by mouth Daily with supper Lot: [id number], EXP: 2022 one bottle  Lot: [id number], EXP: 10/2022 three bottles 3/12/21   Felxi Fernandez MD   nitroGLYCERIN (NITROSTAT) 0.4 MG SL tablet Place 1 tablet under the tongue every 5 minutes as needed for Chest pain up to max of 3 total doses. If no relief after 1 dose, call 911.   Patient not taking: Reported on 2021   Chiara Gomez MD   zinc gluconate 50 MG tablet Take 50 mg by mouth daily Not taking    Historical Provider, MD   TURMERIC CURCUMIN PO Take 1 capsule by mouth daily Has not taken recently    Historical Provider, MD   b complex vitamins capsule Take 1 capsule by mouth daily    Historical Provider, MD   Elastic Bandages & Supports (JOBST KNEE HIGH COMPRESSION SM) MISC Knee high with 20- 30 mmhg of compression 3/2/17   Sandra Chu MD   vitamin B-12 (CYANOCOBALAMIN) 1000 MCG tablet Take 1,000 mcg by mouth daily Has not taken recently    Historical Provider, MD   vitamin D (CHOLECALCIFEROL) 1000 UNIT TABS tablet Take 400 Units by mouth daily     Historical Provider, MD       Current medications:    Current Facility-Administered Medications   Medication Dose Route Frequency Provider Last Rate Last Admin    0.9 % sodium chloride infusion   Intravenous Continuous Maximiano Factor,  mL/hr at 07/09/21 0557 New Bag at 07/09/21 0557    0.9 % sodium chloride infusion  25 mL Intravenous PRN Maximiano Factor, DO        ceFAZolin (ANCEF) 2000 mg in sterile water 20 mL IV syringe  2,000 mg Intravenous See Admin Instructions Maximiano Factor, DO        sodium chloride flush 0.9 % injection 10 mL  10 mL Intravenous 2 times per day Maximiano Factor, DO        sodium chloride flush 0.9 % injection 10 mL  10 mL Intravenous PRN Maximiano Factor, DO           Allergies:     Allergies   Allergen Reactions    Adhesive Tape Dermatitis    Crestor [Rosuvastatin Calcium]      Muscles aches     Lipitor [Atorvastatin]      Muscle aches       Problem List:    Patient Active Problem List   Diagnosis Code    Type 2 diabetes mellitus with hyperglycemia, without long-term current use of insulin ([facility]) E11.65    HTN (hypertension) I10    Coronary atherosclerosis of native coronary artery I25.10    Hyperlipidemia E78.5    Class 2 severe obesity due to excess calories with serious comorbidity and body mass index (BMI) of 38.0 to 38.9 in adult ([facility]) E66.01, Z68.38    Atrial fibrillation ([facility]) I48.91    Carotid stenosis, right I65.21    CAD in native artery I25.10       Past Medical History:        Diagnosis Date    A-fib [facility])     for cardioversion 3-12-21     CAD (coronary artery disease) 10/27/2011    Dr. Shaila Fowler Carotid stenosis, right 5/6/2021    Chronic venous insufficiency 3/2/2017    COVID-19 01/2021    resolved as of 3-3-21     Decreased pulses in feet     Diabetes mellitus ([address]     DM (diabetes mellitus), type 2 ([address] 10/27/2011    ED (erectile dysfunction)     Gout 10/31/2011    Heart attack ([address]     HTN (hypertension) 10/27/2011    Hyperlipidemia 6/7/2012    Hypertension     Lateral myocardial infarction ([facility] 10/08    due to circumflex occlusion    Leg swelling 3/2/2017       Past Surgical History:        Procedure Laterality Date    CARDIAC CATHETERIZATION      CARDIAC CATHETERIZATION  06/16/2021    DR. [facility] EF 45% CTS CONSULT    CARDIOVASCULAR STRESS TEST  12/14/2009    neither fixed nor reversible perfusion defect; LVEF 50%    CATARACT REMOVAL Left     CHOLECYSTECTOMY  1999    HERNIA REPAIR  @ age 13   [place] KNEE [facility]      left-ligament torn    OTHER SURGICAL HISTORY  10/31/2011    cystoscopy retrograde;ES[facility];stent on left    TESTICLE SURGERY      as child     WISDOM TOOTH EXTRACTION         Social History:    Social History     Tobacco Use    Smoking status: Never Smoker    Smokeless tobacco: Never Used   Substance Use Topics    Alcohol use: Yes     Comment: occasional                                Counseling given: Not Answered      Vital Signs (Current):   Vitals:    07/09/21 0528   BP: 135/71   Pulse: 73   Resp: 16   Temp: 36.1 °C (96.9 °F)   TempSrc: Temporal   SpO2: 98%   Weight: 236 lb (107 kg)   Height: 5' 6\" (1.676 m)                                              BP Readings from Last 3 Encounters:   07/09/21 135/71   07/07/21 (S) (!) 127/57   07/06/21 122/80       NPO Status: Time of last liquid consumption: 2245                        Time of last solid consumption: 2245                        Date of last liquid consumption: 07/08/21                        Date of last solid food consumption: 07/08/21    BMI:   Wt Readings from Last 3 Encounters:   07/09/21 236 lb (107 kg)   07/07/21 236 lb (107 kg)   07/06/21 236 lb (107 kg)     Body mass index is 38.09 kg/m². CBC:   Lab Results   Component Value Date    WBC 4.8 07/07/2021    RBC 4.51 07/07/2021    HGB 14.6 07/07/2021    HCT 43.1 07/07/2021    MCV 95.6 07/07/2021    RDW 12.4 07/07/2021     07/07/2021       CMP:   Lab Results   Component Value Date     07/07/2021    K 4.5 07/07/2021     07/07/2021    CO2 23 07/07/2021    BUN 19 07/07/2021    CREATININE 1.0 07/07/2021    GFRAA >60 07/07/2021    LABGLOM >60 07/07/2021    GLUCOSE 210 07/07/2021    GLUCOSE 149 11/01/2011    PROT 6.5 07/07/2021    CALCIUM 9.1 07/07/2021    BILITOT 0.8 07/07/2021    ALKPHOS 55 07/07/2021    AST 17 07/07/2021    ALT 15 07/07/2021       POC Tests: No results for input(s): POCGLU, POCNA, POCK, POCCL, POCBUN, POCHEMO, POCHCT in the last 72 hours. Coags:   Lab Results   Component Value Date    PROTIME 12.1 07/07/2021    INR 1.1 07/07/2021    APTT 27.6 07/07/2021       HCG (If Applicable): No results found for: PREGTESTUR, PREGSERUM, HCG, HCGQUANT     ABGs: No results found for: PHART, PO2ART, JLT9LSW, AJB2SMS, BEART, S4JWJFRU     Type & Screen (If Applicable):  No results found for: LABABO, LABRH    Drug/Infectious Status (If Applicable):  No results found for: HIV, HEPCAB    COVID-19 Screening (If Applicable):   Lab Results   Component Value Date    COVID19 Not Detected 03/08/2021       CXR 07/07/2021  Narrative   EXAMINATION:   TWO XRAY VIEWS OF THE CHEST       7/7/2021 9:00 am       COMPARISON:   13 December 2009       HISTORY:   ORDERING SYSTEM PROVIDED HISTORY: Pre-op evaluation   TECHNOLOGIST PROVIDED HISTORY:   What reading provider will be dictating this exam?->CRC       FINDINGS:   The lungs are without acute focal process.  There is no effusion or   pneumothorax. The cardiomediastinal silhouette is without acute process. The   osseous structures are without acute process.           Impression   No acute process. 12-LEAD 07/02/2021  Atrial fibrillation  with PVC or aberrantly conducted beat  rate 74  axis +35  otherwise normal    ECHO  03/12/201  Type of Study  CRYSTAL procedure: Transesophageal Echo (CRYSTAL). 03/12/2021 02:38 PM  Height: 66 inches Weight: 230 pounds BSA: 2.12 m²  HR: 68 bpm BP: 136/78 mmHg  CRYSTAL Performed By: the attending and the sonographer  Type of Anesthesia: General anesthesia  Findings  Left Ventricle  Normal left ventricle size and systolic function. Right Ventricle  Normal right ventricular size and function. Left Atrium  Mildly dilated left atrium. There is no left atrial appendage thrombus. Agitated saline injected for shunt evaluation. No evidence of patent foramen ovale. Right Atrium  Normal right atrium size. Mitral Valve  Structurally normal mitral valve. Physiologic and/or trace mitral regurgitation is present. Tricuspid Valve  The tricuspid valve appears structurally normal.  Mild tricuspid regurgitation. Aortic Valve  Structurally normal aortic valve. Pulmonic Valve  The pulmonic valve was not well visualized. Pericardial Effusion  No pericardial effusion. Aorta  Aortic root dimension within normal limits. Mild plaque noted in the descending aorta. Conclusions  Summary  Normal left ventricle size and systolic function. Mildly dilated left atrium. Physiologic and/or trace mitral regurgitation is present. Mild tricuspid regurgitation. Mild plaque noted in the descending aorta. Signature  Electronically signed by Shawna Quinn MD(Interpreting physician) on 03/12/2021 04:14 PM       CARDIAC CATH  06/16/2021  FINDINGS:  HEMODYNAMICS:  Aortic pressure 140/87 mmHg.   Left ventricular end-diastolic pressure 20 mmHg.     CORONARY ANATOMY:  LEFT MAIN:  It is a large artery with no significant angiographic  stenosis.     LAD:  It is a large artery wrapping around the apex and giving rise to 2  small diagonal branches and several fair-size septal branches. The LAD  was heavily calcified in its proximal segment. There was 80% discrete  proximal LAD stenosis and 60% discrete mid stenosis and 70% apical LAD  stenosis.     LEFT CIRCUMFLEX:  It is a large artery giving rise to a large  bifurcating obtuse marginal branch and there is chronic total occlusion  of the mid circumflex. The distal circumflex artery is receiving  collaterals from the RV marginal branch of the small nondominant RCA. There was 70%-80% tubular proximal to mid circumflex stenosis and  80%-90% discrete proximal stenosis prior to the bifurcation of the  marginal branch.     RIGHT CORONARY ARTERY:  It is A small nondominant artery giving rise to  a small conus branch, 2 fair-size RV marginal branches. The first  obtuse marginal branch gives collaterals to the distal left circumflex  artery. The second RV marginal branch has 80%-90% proximal stenosis.     LEFT VENTRICULOGRAM:  Revealed mid to basal inferior wall akinesis with  an ejection fraction of 45%-50%. No mitral regurgitation was noted.     IMPRESSION:  1. Severe triple-vessel CAD. 2.  Mid to basal inferior wall akinesis with an ejection fraction of  45%-50%. 3.  Elevated LVEDP at 20 mmHg.     RECOMMENDATIONS:  1.  Start coated baby aspirin daily. 2.  May resume Xarelto tonight. 3.  Hold metformin for the next 48 hours and reassess kidney function  ____prior to resuming metformin. 4.  We will refer for CT Surgery consultation for possible CABG.       Anesthesia Evaluation  Patient summary reviewed and Nursing notes reviewed no history of anesthetic complications:   Airway: Mallampati: III  TM distance: <3 FB   Neck ROM: full  Mouth opening: < 3 FB Dental:          Pulmonary:normal exam  breath sounds clear to auscultation      (-) not a current smoker                           Cardiovascular:    (+) hypertension:, past MI:, CAD:, dysrhythmias: atrial fibrillation, hyperlipidemia        Rhythm: irregular  Rate: normal                    Neuro/Psych:   Negative Neuro/Psych ROS              GI/Hepatic/Renal: Neg GI/Hepatic/Renal ROS  (+) renal disease (resolved): kidney stones,           Endo/Other:    (+) DiabetesType II DM, well controlled, , .                 Abdominal:       Abdomen: soft. Vascular:   + PVD, aortic or cerebral (carotid stenosis - RIGHT), . Other Findings:             Anesthesia Plan      general     ASA 3     (#18 RIGHT hand)  Induction: intravenous. arterial line, BIS, central line, CVP and CRYSTAL  MIPS: Postoperative opioids intended, Prophylactic antiemetics administered and Postoperative trial extubation. Anesthetic plan and risks discussed with patient, spouse and child/children. Use of blood products discussed with patient, spouse and child/children whom consented to blood products. Plan discussed with attending.                   Angus Wood RN, [facility]   7/9/2021

## 2021-07-09 NOTE — ANESTHESIA PROCEDURE NOTES
Procedure Performed: CRYSTAL     Start Time:        End Time:      Preanesthesia Checklist:  Patient identified, IV assessed, risks and benefits discussed, monitors and equipment assessed, procedure being performed at surgeon's request and anesthesia consent obtained. General Procedure Information  Diagnostic Indications for Echo:  assessment of surgical repair, hemodynamic monitoring and assessment of valve function  Physician Requesting Echo: Perfecto Lucas DO  Location performed:  OR  Intubated  Bite block placed  Heart visualized  Probe Insertion:  Easy  Probe Type:  3D and mulitplane  Modalities:  3D, color flow mapping, pulse wave Doppler and continuous wave Doppler    Echocardiographic and Doppler Measurements    Ventricles    Right Ventricle:  Cavity size normal.  Hypertrophy not present. Thrombus not present. Global function normal.    Left Ventricle:  Cavity size normal.  Hypertrophy not present. Thrombus not present. Global Function normal.  Ejection Fraction 55%. Ventricular Regional Function:  1- Basal Anteroseptal:  normal  2- Basal Anterior:  normal  3- Basal Anterolateral:  normal  4- Basal Inferolateral:  normal  5- Basal Inferior:  normal  6- Basal Inferoseptal:  normal  7- Mid Anteroseptal:  normal  8- Mid Anterior:  normal  9- Mid Anterolateral:  normal  10- Mid Inferolateral:  normal  11- Mid Inferior:  normal  12- Mid Inferoseptal:  normal  13- Apical Anterior:  normal  14- Apical Lateral:  normal  15- Apical Inferior:  normal  16- Apical Septal:  normal  17- [place]:  normal      Valves    Aortic Valve: Annulus normal.  Stenosis not present. Regurgitation mild. Leaflets normal.  Leaflet motions normal.      Mitral Valve: Annulus normal.  Stenosis not present. Regurgitation none. Leaflets normal.  Leaflet motions normal.      Tricuspid Valve: Annulus normal.  Stenosis not present. Regurgitation none. Leaflets normal.  Leaflet motions normal.    Pulmonic Valve:   Annulus normal. Stenosis not present. Regurgitation mild. Other Valve Findings:       Lambl's excresence present on AV. Aorta    Ascending Aorta:  Size normal.  Dissection not present. Aortic Arch:  Size normal.  Dissection not present. Descending Aorta:  Size normal.  Dissection not present. Other Aortic Findings:       [place] grade 1 atheroma throughout the aorta    Atria    Right Atrium:  Size normal.  Spontaneous echo contrast not present. Thrombus not present. Tumor not present. Device not present. Left Atrium:  Size normal.  Spontaneous echo contrast not present. Thrombus not present. Tumor not present. Device not present. Left atrial appendage normal.      Septa    Atrial Septum:  Intra-atrial septal morphology normal.      Ventricular Septum:  Intra-ventricular septum morphology normal.          Other Findings  Pericardium:  normal  Pleural Effusion:  none  Pulmonary Arteries:  normal  Pulmonary Venous Flow:  normal    Anesthesia Information  Performed Personally  Anesthesiologist:  Tiffanie Boucher DO      Echocardiogram Comments:       S/p CABG x 2 MAZE and PORSCHE clip. Normal RVEF and LVEF. No other changes from pre-CPB.   All findings d/w surgeon

## 2021-07-10 ENCOUNTER — APPOINTMENT (OUTPATIENT)
Dept: GENERAL RADIOLOGY | Age: 71
DRG: 228 | End: 2021-07-10
Attending: THORACIC SURGERY (CARDIOTHORACIC VASCULAR SURGERY)
Payer: MEDICARE

## 2021-07-10 LAB
ANION GAP SERPL CALCULATED.3IONS-SCNC: 12 MMOL/L (ref 7–16)
BUN BLDV-MCNC: 24 MG/DL (ref 6–23)
CALCIUM SERPL-MCNC: 8.6 MG/DL (ref 8.6–10.2)
CHLORIDE BLD-SCNC: 103 MMOL/L (ref 98–107)
CO2: 18 MMOL/L (ref 22–29)
CREAT SERPL-MCNC: 1.6 MG/DL (ref 0.7–1.2)
GFR AFRICAN AMERICAN: 51
GFR NON-AFRICAN AMERICAN: 43 ML/MIN/1.73
GLUCOSE BLD-MCNC: 221 MG/DL (ref 74–99)
HCT VFR BLD CALC: 37.4 % (ref 37–54)
HEMOGLOBIN: 12.9 G/DL (ref 12.5–16.5)
MAGNESIUM: 2.1 MG/DL (ref 1.6–2.6)
MAGNESIUM: 2.1 MG/DL (ref 1.6–2.6)
MCH RBC QN AUTO: 32.9 PG (ref 26–35)
MCHC RBC AUTO-ENTMCNC: 34.5 % (ref 32–34.5)
MCV RBC AUTO: 95.4 FL (ref 80–99.9)
METER GLUCOSE: 123 MG/DL (ref 74–99)
METER GLUCOSE: 128 MG/DL (ref 74–99)
METER GLUCOSE: 145 MG/DL (ref 74–99)
METER GLUCOSE: 203 MG/DL (ref 74–99)
METER GLUCOSE: 213 MG/DL (ref 74–99)
METER GLUCOSE: 213 MG/DL (ref 74–99)
METER GLUCOSE: 222 MG/DL (ref 74–99)
METER GLUCOSE: 231 MG/DL (ref 74–99)
METER GLUCOSE: 244 MG/DL (ref 74–99)
PDW BLD-RTO: 12.7 FL (ref 11.5–15)
PLATELET # BLD: 112 E9/L (ref 130–450)
PMV BLD AUTO: 10.2 FL (ref 7–12)
POTASSIUM SERPL-SCNC: 5 MMOL/L (ref 3.5–5)
POTASSIUM SERPL-SCNC: 5.1 MMOL/L (ref 3.5–5)
POTASSIUM SERPL-SCNC: 5.6 MMOL/L (ref 3.5–5)
POTASSIUM SERPL-SCNC: 6.6 MMOL/L (ref 3.5–5)
RBC # BLD: 3.92 E12/L (ref 3.8–5.8)
SODIUM BLD-SCNC: 133 MMOL/L (ref 132–146)
WBC # BLD: 10.1 E9/L (ref 4.5–11.5)

## 2021-07-10 PROCEDURE — 6360000002 HC RX W HCPCS: Performed by: THORACIC SURGERY (CARDIOTHORACIC VASCULAR SURGERY)

## 2021-07-10 PROCEDURE — 2580000003 HC RX 258: Performed by: PHYSICIAN ASSISTANT

## 2021-07-10 PROCEDURE — 99232 SBSQ HOSP IP/OBS MODERATE 35: CPT | Performed by: INTERNAL MEDICINE

## 2021-07-10 PROCEDURE — 2500000003 HC RX 250 WO HCPCS: Performed by: THORACIC SURGERY (CARDIOTHORACIC VASCULAR SURGERY)

## 2021-07-10 PROCEDURE — 6370000000 HC RX 637 (ALT 250 FOR IP): Performed by: THORACIC SURGERY (CARDIOTHORACIC VASCULAR SURGERY)

## 2021-07-10 PROCEDURE — 6360000002 HC RX W HCPCS: Performed by: PHYSICIAN ASSISTANT

## 2021-07-10 PROCEDURE — 82962 GLUCOSE BLOOD TEST: CPT

## 2021-07-10 PROCEDURE — P9047 ALBUMIN (HUMAN), 25%, 50ML: HCPCS | Performed by: THORACIC SURGERY (CARDIOTHORACIC VASCULAR SURGERY)

## 2021-07-10 PROCEDURE — 93798 PHYS/QHP OP CAR RHAB W/ECG: CPT

## 2021-07-10 PROCEDURE — 6370000000 HC RX 637 (ALT 250 FOR IP): Performed by: PHYSICIAN ASSISTANT

## 2021-07-10 PROCEDURE — 2580000003 HC RX 258: Performed by: THORACIC SURGERY (CARDIOTHORACIC VASCULAR SURGERY)

## 2021-07-10 PROCEDURE — 2500000003 HC RX 250 WO HCPCS: Performed by: PHYSICIAN ASSISTANT

## 2021-07-10 PROCEDURE — 83735 ASSAY OF MAGNESIUM: CPT

## 2021-07-10 PROCEDURE — 36415 COLL VENOUS BLD VENIPUNCTURE: CPT

## 2021-07-10 PROCEDURE — 2140000000 HC CCU INTERMEDIATE R&B

## 2021-07-10 PROCEDURE — 94640 AIRWAY INHALATION TREATMENT: CPT

## 2021-07-10 PROCEDURE — 84132 ASSAY OF SERUM POTASSIUM: CPT

## 2021-07-10 PROCEDURE — 2700000000 HC OXYGEN THERAPY PER DAY

## 2021-07-10 PROCEDURE — 71045 X-RAY EXAM CHEST 1 VIEW: CPT

## 2021-07-10 PROCEDURE — 80048 BASIC METABOLIC PNL TOTAL CA: CPT

## 2021-07-10 PROCEDURE — 85027 COMPLETE CBC AUTOMATED: CPT

## 2021-07-10 RX ORDER — BISACODYL 10 MG
10 SUPPOSITORY, RECTAL RECTAL DAILY
Status: DISCONTINUED | OUTPATIENT
Start: 2021-07-10 | End: 2021-07-15

## 2021-07-10 RX ORDER — ASCORBIC ACID 500 MG
500 TABLET ORAL 2 TIMES DAILY
Status: DISCONTINUED | OUTPATIENT
Start: 2021-07-10 | End: 2021-07-15 | Stop reason: HOSPADM

## 2021-07-10 RX ORDER — POTASSIUM CHLORIDE 20 MEQ/1
20 TABLET, EXTENDED RELEASE ORAL PRN
Status: DISCONTINUED | OUTPATIENT
Start: 2021-07-10 | End: 2021-07-12

## 2021-07-10 RX ORDER — ALBUMIN (HUMAN) 12.5 G/50ML
25 SOLUTION INTRAVENOUS ONCE
Status: COMPLETED | OUTPATIENT
Start: 2021-07-10 | End: 2021-07-10

## 2021-07-10 RX ORDER — DIPHENHYDRAMINE HCL 25 MG
25 TABLET ORAL EVERY 8 HOURS PRN
Status: DISCONTINUED | OUTPATIENT
Start: 2021-07-10 | End: 2021-07-15 | Stop reason: HOSPADM

## 2021-07-10 RX ORDER — FOLIC ACID 1 MG/1
1 TABLET ORAL DAILY
Status: DISCONTINUED | OUTPATIENT
Start: 2021-07-10 | End: 2021-07-15 | Stop reason: HOSPADM

## 2021-07-10 RX ORDER — FERROUS SULFATE 325(65) MG
325 TABLET ORAL 2 TIMES DAILY WITH MEALS
Status: DISCONTINUED | OUTPATIENT
Start: 2021-07-10 | End: 2021-07-15 | Stop reason: HOSPADM

## 2021-07-10 RX ORDER — SENNA AND DOCUSATE SODIUM 50; 8.6 MG/1; MG/1
1 TABLET, FILM COATED ORAL 2 TIMES DAILY
Status: DISCONTINUED | OUTPATIENT
Start: 2021-07-10 | End: 2021-07-15 | Stop reason: HOSPADM

## 2021-07-10 RX ORDER — ACETAMINOPHEN 325 MG/1
650 TABLET ORAL EVERY 4 HOURS PRN
Status: DISCONTINUED | OUTPATIENT
Start: 2021-07-10 | End: 2021-07-15 | Stop reason: HOSPADM

## 2021-07-10 RX ORDER — OXYCODONE HYDROCHLORIDE AND ACETAMINOPHEN 5; 325 MG/1; MG/1
2 TABLET ORAL EVERY 4 HOURS PRN
Status: DISCONTINUED | OUTPATIENT
Start: 2021-07-10 | End: 2021-07-15 | Stop reason: HOSPADM

## 2021-07-10 RX ORDER — ONDANSETRON 2 MG/ML
4 INJECTION INTRAMUSCULAR; INTRAVENOUS EVERY 8 HOURS PRN
Status: DISCONTINUED | OUTPATIENT
Start: 2021-07-10 | End: 2021-07-15 | Stop reason: HOSPADM

## 2021-07-10 RX ORDER — ASPIRIN 81 MG/1
81 TABLET ORAL DAILY
Status: DISCONTINUED | OUTPATIENT
Start: 2021-07-10 | End: 2021-07-15 | Stop reason: HOSPADM

## 2021-07-10 RX ORDER — OXYCODONE HYDROCHLORIDE AND ACETAMINOPHEN 5; 325 MG/1; MG/1
1 TABLET ORAL EVERY 4 HOURS PRN
Status: DISCONTINUED | OUTPATIENT
Start: 2021-07-10 | End: 2021-07-15 | Stop reason: HOSPADM

## 2021-07-10 RX ADMIN — SODIUM CHLORIDE, PRESERVATIVE FREE 10 ML: 5 INJECTION INTRAVENOUS at 20:51

## 2021-07-10 RX ADMIN — Medication 2000 MG: at 02:42

## 2021-07-10 RX ADMIN — PANTOPRAZOLE SODIUM 40 MG: 40 TABLET, DELAYED RELEASE ORAL at 08:27

## 2021-07-10 RX ADMIN — PRAVASTATIN SODIUM 20 MG: 20 TABLET ORAL at 20:51

## 2021-07-10 RX ADMIN — Medication 2000 MG: at 19:00

## 2021-07-10 RX ADMIN — SODIUM CHLORIDE 4.86 UNITS/HR: 9 INJECTION, SOLUTION INTRAVENOUS at 02:40

## 2021-07-10 RX ADMIN — ACETAMINOPHEN 650 MG: 325 TABLET ORAL at 02:32

## 2021-07-10 RX ADMIN — MAGNESIUM HYDROXIDE 30 ML: 2400 SUSPENSION ORAL at 11:27

## 2021-07-10 RX ADMIN — INSULIN GLARGINE 16 UNITS: 100 INJECTION, SOLUTION SUBCUTANEOUS at 20:51

## 2021-07-10 RX ADMIN — OXYCODONE AND ACETAMINOPHEN 1 TABLET: 5; 325 TABLET ORAL at 15:33

## 2021-07-10 RX ADMIN — IPRATROPIUM BROMIDE AND ALBUTEROL SULFATE 1 AMPULE: 2.5; .5 SOLUTION RESPIRATORY (INHALATION) at 07:48

## 2021-07-10 RX ADMIN — ACETAMINOPHEN 650 MG: 325 TABLET ORAL at 06:35

## 2021-07-10 RX ADMIN — INSULIN LISPRO 3 UNITS: 100 INJECTION, SOLUTION INTRAVENOUS; SUBCUTANEOUS at 00:50

## 2021-07-10 RX ADMIN — OXYCODONE AND ACETAMINOPHEN 2 TABLET: 5; 325 TABLET ORAL at 11:27

## 2021-07-10 RX ADMIN — INSULIN LISPRO 1 UNITS: 100 INJECTION, SOLUTION INTRAVENOUS; SUBCUTANEOUS at 20:51

## 2021-07-10 RX ADMIN — SODIUM CHLORIDE, PRESERVATIVE FREE 10 ML: 5 INJECTION INTRAVENOUS at 08:27

## 2021-07-10 RX ADMIN — OXYCODONE AND ACETAMINOPHEN 2 TABLET: 5; 325 TABLET ORAL at 23:49

## 2021-07-10 RX ADMIN — INSULIN LISPRO 2 UNITS: 100 INJECTION, SOLUTION INTRAVENOUS; SUBCUTANEOUS at 11:27

## 2021-07-10 RX ADMIN — DOCUSATE SODIUM 50 MG AND SENNOSIDES 8.6 MG 1 TABLET: 8.6; 5 TABLET, FILM COATED ORAL at 20:51

## 2021-07-10 RX ADMIN — TAMSULOSIN HYDROCHLORIDE 0.4 MG: 0.4 CAPSULE ORAL at 08:27

## 2021-07-10 RX ADMIN — OXYCODONE HYDROCHLORIDE 10 MG: 10 TABLET ORAL at 06:35

## 2021-07-10 RX ADMIN — MUPIROCIN: 20 OINTMENT TOPICAL at 08:28

## 2021-07-10 RX ADMIN — INSULIN LISPRO 2 UNITS: 100 INJECTION, SOLUTION INTRAVENOUS; SUBCUTANEOUS at 17:20

## 2021-07-10 RX ADMIN — IPRATROPIUM BROMIDE AND ALBUTEROL SULFATE 1 AMPULE: 2.5; .5 SOLUTION RESPIRATORY (INHALATION) at 15:42

## 2021-07-10 RX ADMIN — FENTANYL CITRATE 25 MCG: 50 INJECTION, SOLUTION INTRAMUSCULAR; INTRAVENOUS at 05:31

## 2021-07-10 RX ADMIN — DOCUSATE SODIUM 50 MG AND SENNOSIDES 8.6 MG 1 TABLET: 8.6; 5 TABLET, FILM COATED ORAL at 08:27

## 2021-07-10 RX ADMIN — OXYCODONE 5 MG: 5 TABLET ORAL at 02:32

## 2021-07-10 RX ADMIN — ALBUMIN (HUMAN) 25 G: 0.25 INJECTION, SOLUTION INTRAVENOUS at 08:27

## 2021-07-10 RX ADMIN — FERROUS SULFATE TAB 325 MG (65 MG ELEMENTAL FE) 325 MG: 325 (65 FE) TAB at 17:19

## 2021-07-10 RX ADMIN — FOLIC ACID 1 MG: 1 TABLET ORAL at 11:27

## 2021-07-10 RX ADMIN — ASPIRIN 81 MG: 81 TABLET, COATED ORAL at 08:27

## 2021-07-10 RX ADMIN — OXYCODONE HYDROCHLORIDE AND ACETAMINOPHEN 500 MG: 500 TABLET ORAL at 11:27

## 2021-07-10 RX ADMIN — OXYCODONE HYDROCHLORIDE AND ACETAMINOPHEN 500 MG: 500 TABLET ORAL at 20:51

## 2021-07-10 RX ADMIN — Medication 2000 MG: at 10:14

## 2021-07-10 RX ADMIN — BISACODYL 5 MG: 5 TABLET, COATED ORAL at 11:27

## 2021-07-10 ASSESSMENT — PAIN SCALES - GENERAL
PAINLEVEL_OUTOF10: 6
PAINLEVEL_OUTOF10: 10
PAINLEVEL_OUTOF10: 2
PAINLEVEL_OUTOF10: 0
PAINLEVEL_OUTOF10: 7
PAINLEVEL_OUTOF10: 0
PAINLEVEL_OUTOF10: 0
PAINLEVEL_OUTOF10: 4
PAINLEVEL_OUTOF10: 0
PAINLEVEL_OUTOF10: 0
PAINLEVEL_OUTOF10: 7
PAINLEVEL_OUTOF10: 6
PAINLEVEL_OUTOF10: 4
PAINLEVEL_OUTOF10: 0
PAINLEVEL_OUTOF10: 0
PAINLEVEL_OUTOF10: 2
PAINLEVEL_OUTOF10: 6
PAINLEVEL_OUTOF10: 8
PAINLEVEL_OUTOF10: 0

## 2021-07-10 ASSESSMENT — PAIN DESCRIPTION - FREQUENCY
FREQUENCY: INTERMITTENT
FREQUENCY: CONTINUOUS
FREQUENCY: INTERMITTENT

## 2021-07-10 ASSESSMENT — PAIN - FUNCTIONAL ASSESSMENT
PAIN_FUNCTIONAL_ASSESSMENT: PREVENTS OR INTERFERES SOME ACTIVE ACTIVITIES AND ADLS

## 2021-07-10 ASSESSMENT — PAIN DESCRIPTION - ORIENTATION
ORIENTATION: MID

## 2021-07-10 ASSESSMENT — PAIN DESCRIPTION - LOCATION
LOCATION: CHEST

## 2021-07-10 ASSESSMENT — PAIN DESCRIPTION - DESCRIPTORS
DESCRIPTORS: ACHING;CONSTANT;DISCOMFORT
DESCRIPTORS: ACHING;SORE;DISCOMFORT
DESCRIPTORS: ACHING;CONSTANT;DISCOMFORT
DESCRIPTORS: ACHING;SORE;DISCOMFORT
DESCRIPTORS: ACHING;CONSTANT;DISCOMFORT

## 2021-07-10 ASSESSMENT — PAIN DESCRIPTION - PROGRESSION
CLINICAL_PROGRESSION: GRADUALLY WORSENING

## 2021-07-10 ASSESSMENT — PAIN DESCRIPTION - ONSET
ONSET: AWAKENED FROM SLEEP
ONSET: AWAKENED FROM SLEEP
ONSET: ON-GOING

## 2021-07-10 ASSESSMENT — PAIN DESCRIPTION - PAIN TYPE
TYPE: SURGICAL PAIN

## 2021-07-10 NOTE — PATIENT CARE CONFERENCE
P Quality Flow/Interdisciplinary Rounds Progress Note        Quality Flow Rounds held on July 10, 2021    Disciplines Attending:  Bedside Nurse, ,  and Nursing Unit 510 CAROLYNE Gray was admitted on 7/9/2021  5:11 AM    Anticipated Discharge Date:       Disposition:    Davion Score:  Davion Scale Score: 17    Readmission Risk              Risk of Unplanned Readmission:  12           Discussed patient goal for the day, patient clinical progression, and barriers to discharge.   The following Goal(s) of the Day/Commitment(s) have been identified:  Labs - Report Results and increase activity      Rahat Rizvi RN  July 10, 2021

## 2021-07-10 NOTE — PLAN OF CARE
Problem: Discharge Planning:  Goal: Discharged to appropriate level of care  Description: Discharged to appropriate level of care  Outcome: Ongoing     Problem: Gas Exchange - Impaired:  Goal: Levels of oxygenation will improve  Description: Levels of oxygenation will improve  7/10/2021 0158 by Mary Gan RN  Outcome: Met This Shift  7/9/2021 1904 by Gisella Doyle RN  Outcome: Met This Shift  Goal: Ability to maintain adequate ventilation will improve  Description: Ability to maintain adequate ventilation will improve  7/10/2021 0158 by Mary Gan RN  Outcome: Met This Shift  7/9/2021 1904 by Gisella Doyle RN  Outcome: Met This Shift     Problem: Pain:  Goal: Pain level will decrease  Description: Pain level will decrease  7/10/2021 0158 by Mary Gan RN  Outcome: Met This Shift  7/9/2021 1904 by Gisella Doyle RN  Outcome: Met This Shift  Goal: Control of acute pain  Description: Control of acute pain  7/10/2021 0158 by Mary Gan RN  Outcome: Met This Shift  7/9/2021 1904 by Gisella Doyle RN  Outcome: Met This Shift  Goal: Control of chronic pain  Description: Control of chronic pain  Outcome: Met This Shift     Problem: Tissue Perfusion - Cardiopulmonary, Altered:  Goal: Absence of angina  Description: Absence of angina  7/10/2021 0158 by Mary Gan RN  Outcome: Met This Shift  7/9/2021 1904 by Gisella Doyle RN  Outcome: Met This Shift  Goal: Hemodynamic stability will improve  Description: Hemodynamic stability will improve  7/10/2021 0158 by Mary Gan RN  Outcome: Met This Shift  7/9/2021 1904 by Gisella Doyle RN  Outcome: Met This Shift  Goal: Will show no evidence of cardiac arrhythmias  Description: Will show no evidence of cardiac arrhythmias  7/10/2021 0158 by Mary Gan RN  Outcome: Met This Shift  7/9/2021 1904 by Gisella Doyle RN  Outcome: Met This Shift     Problem: Falls - Risk of:  Goal: Will remain free from falls  Description: Will remain free from falls  7/10/2021 0963 by Pinky Owens RN  Outcome: Met This Shift  7/9/2021 1904 by Mack Nunez RN  Outcome: Met This Shift  Goal: Absence of physical injury  Description: Absence of physical injury  7/10/2021 0158 by Pinky Owens RN  Outcome: Met This Shift  7/9/2021 1904 by Mack Nunez RN  Outcome: Met This Shift

## 2021-07-10 NOTE — PLAN OF CARE
Problem: Discharge Planning:  Goal: Discharged to appropriate level of care  Description: Discharged to appropriate level of care  7/10/2021 0759 by Rosa Gottlieb RN  Outcome: Ongoing  7/10/2021 0158 by David Taylor RN  Outcome: Ongoing     Problem: Gas Exchange - Impaired:  Goal: Levels of oxygenation will improve  Description: Levels of oxygenation will improve  7/10/2021 0759 by Rosa Gottlieb RN  Outcome: Met This Shift  7/10/2021 0158 by David Taylor RN  Outcome: Met This Shift  7/9/2021 1904 by Wilmer Zuleta RN  Outcome: Met This Shift  Goal: Ability to maintain adequate ventilation will improve  Description: Ability to maintain adequate ventilation will improve  7/10/2021 0759 by Rosa Gottlieb RN  Outcome: Met This Shift  7/10/2021 0158 by David Taylor RN  Outcome: Met This Shift  7/9/2021 1904 by Wilmer Zuleta RN  Outcome: Met This Shift     Problem: Pain:  Goal: Pain level will decrease  Description: Pain level will decrease  7/10/2021 0759 by Rosa Gottlieb RN  Outcome: Met This Shift  7/10/2021 0158 by David Taylor RN  Outcome: Met This Shift  7/9/2021 1904 by Wilmer Zuleta RN  Outcome: Met This Shift  Goal: Control of acute pain  Description: Control of acute pain  7/10/2021 0759 by Rosa Gottlieb RN  Outcome: Met This Shift  7/10/2021 0158 by David Taylor RN  Outcome: Met This Shift  7/9/2021 1904 by Wilmer Zuleta RN  Outcome: Met This Shift  Goal: Control of chronic pain  Description: Control of chronic pain  7/10/2021 0759 by Rosa Gottlieb RN  Outcome: Met This Shift  7/10/2021 0158 by David Taylor RN  Outcome: Met This Shift     Problem: Tissue Perfusion - Cardiopulmonary, Altered:  Goal: Absence of angina  Description: Absence of angina  7/10/2021 0759 by Rosa Gottlieb RN  Outcome: Met This Shift  7/10/2021 0158 by David Taylor RN  Outcome: Met This Shift  7/9/2021 1904 by Wilmer Zuleta RN  Outcome: Met This Shift  Goal: Hemodynamic stability will improve  Description: Hemodynamic stability will improve  7/10/2021 0759 by Esperanza Bhakta RN  Outcome: Met This Shift  7/10/2021 0158 by Linda Caraballo RN  Outcome: Met This Shift  7/9/2021 1904 by Ivett Suaer RN  Outcome: Met This Shift  Goal: Will show no evidence of cardiac arrhythmias  Description: Will show no evidence of cardiac arrhythmias  7/10/2021 0759 by Esperanza Bhakta RN  Outcome: Met This Shift  7/10/2021 0158 by Linda Caraballo RN  Outcome: Met This Shift  7/9/2021 1904 by Ivett Sauer RN  Outcome: Met This Shift     Problem: Falls - Risk of:  Goal: Will remain free from falls  Description: Will remain free from falls  7/10/2021 0759 by Esperanza Bhakta RN  Outcome: Met This Shift  7/10/2021 0158 by Linda Caraballo RN  Outcome: Met This Shift  7/9/2021 1904 by Ivett Sauer RN  Outcome: Met This Shift  Goal: Absence of physical injury  Description: Absence of physical injury  7/10/2021 0759 by Esperanza Bhakta RN  Outcome: Met This Shift  7/10/2021 0158 by Linda Caraballo RN  Outcome: Met This Shift  7/9/2021 1904 by Ivett Sauer RN  Outcome: Met This Shift

## 2021-07-10 NOTE — PROGRESS NOTES
Transport here. Patient ambulated from CVICU bed to bed provided by transport. Tolerated well. On portable monitor and O2. No belongings noted. To [facility] at this time.

## 2021-07-10 NOTE — PROGRESS NOTES
INPATIENT CARDIOLOGY FOLLOW-UP    Name: Keerthi Fenton    Age: 66 y.o. Date of Admission: 7/9/2021  5:11 AM    Date of Service: 7/10/2021    Chief Complaint: Follow-up for post cardiac surgery f    Interim History:  No new overnight cardiac complaints except for chest discomfort and deep breathing. Patient was transferred out of the ICU this morning. Currently with no complaints of CP, SOB, palpitations, dizziness, or lightheadedness. SR on telemetry. Review of Systems:   Cardiac: As per HPI  General: No fever, chills  Pulmonary: As per HPI  HEENT: No visual disturbances, difficult swallowing  GI: No nausea, vomiting  Endocrine: No thyroid disease or DM  Musculoskeletal: MENDEZ x 4, no focal motor deficits  Skin: Intact, no rashes  Neuro/Psych: No headache or seizures    Problem List:  Patient Active Problem List   Diagnosis    Type 2 diabetes mellitus with hyperglycemia, without long-term current use of insulin (HCC)    HTN (hypertension)    Coronary atherosclerosis of native coronary artery    Hyperlipidemia    Class 2 severe obesity due to excess calories with serious comorbidity and body mass index (BMI) of 38.0 to 38.9 in adult [facility])    Atrial fibrillation ([facility] [address]    Carotid stenosis, right    CAD in native artery       Allergies:   Allergies   Allergen Reactions    Adhesive Tape Dermatitis    Crestor [Rosuvastatin Calcium]      Muscles aches     Lipitor [Atorvastatin]      Muscle aches       Current Medications:  Current Facility-Administered Medications   Medication Dose Route Frequency Provider Last Rate Last Admin    aspirin EC tablet 81 mg  81 mg Oral Daily Jimbo Guadarrama DO        acetaminophen (TYLENOL) tablet 650 mg  650 mg Oral Q4H PRN Earnest Beltran DO        oxyCODONE-acetaminophen (PERCOCET) 5-325 MG per tablet 1 tablet  1 tablet Oral Q4H PRN Earnest Beltran DO   1 tablet at 07/10/21 1533    Or    oxyCODONE-acetaminophen (PERCOCET) 5-325 MG per tablet 2 tablet  2 tablet Oral Q4H PRN Shelton Decree, DO   2 tablet at 07/10/21 1127    bisacodyl (DULCOLAX) EC tablet 5 mg  5 mg Oral Daily Shelton Decree, DO   5 mg at 07/10/21 1127    sennosides-docusate sodium (SENOKOT-S) 8.6-50 MG tablet 1 tablet  1 tablet Oral BID Shelton Decree, DO        magnesium hydroxide (MILK OF MAGNESIA) 400 MG/5ML suspension 30 mL  30 mL Oral Daily Shelton Decree, DO   30 mL at 07/10/21 1127    ferrous sulfate (IRON 325) tablet 325 mg  325 mg Oral BID  Shelton Decree, DO   325 mg at 07/10/21 1719    ascorbic acid (VITAMIN C) tablet 500 mg  500 mg Oral BID Shelton Decree, DO   500 mg at 64/17/83 4362    folic acid (FOLVITE) tablet 1 mg  1 mg Oral Daily Shelton Decree, DO   1 mg at 07/10/21 1127    potassium chloride (KLOR-CON M) extended release tablet 20 mEq  20 mEq Oral PRN Shelton Decree, DO        ondansetron [facility]) injection 4 mg  4 mg Intravenous Q8H PRN Theoplis Linen Jubach, DO        insulin lispro (HUMALOG) injection vial 0-6 Units  0-6 Units Subcutaneous TID  Shelton Decree, DO   2 Units at 07/10/21 1720    insulin lispro (HUMALOG) injection vial 0-3 Units  0-3 Units Subcutaneous Nightly Theoplis Linen Jubach, DO        diphenhydrAMINE (BENADRYL) tablet 25 mg  25 mg Oral Q8H PRN Theoplis Linen Jubach, DO        bisacodyl (DULCOLAX) suppository 10 mg  10 mg Rectal Daily Theoplis Linen Jubach, DO        sodium chloride flush 0.9 % injection 10 mL  10 mL Intravenous 2 times per day Shelton Decree, DO   10 mL at 07/10/21 0827    sodium chloride flush 0.9 % injection 10 mL  10 mL Intravenous PRN Shelton Decree, DO   10 mL at 07/09/21 1556    magnesium oxide (MAG-OX) tablet 400 mg  400 mg Oral Daily Shelton Decree, DO        pantoprazole (PROTONIX) tablet 40 mg  40 mg Oral Daily Shelton Decree, DO   40 mg at 07/10/21 0827    ceFAZolin (ANCEF) 2000 mg in sterile water 20 mL IV syringe  2,000 mg Intravenous Q8H Gómez Logane, DO   2,000 mg at 07/10/21 1014    ipratropium-albuterol (DUONEB) nebulizer solution 1 ampule  1 ampule Inhalation Q4H WA La Sniff, DO   1 ampule at 07/10/21 1542    insulin glargine (LANTUS) injection vial 16 Units  0.15 Units/kg Subcutaneous Nightly La Sniff, DO        tamsulosin [facility]) capsule 0.4 mg  0.4 mg Oral Daily La Sniff, DO   0.4 mg at 07/10/21 0827    pravastatin (PRAVACHOL) tablet 20 mg  20 mg Oral Nightly La Sniff, DO             Physical Exam:  /61   Pulse 68   Temp 95.3 °F (35.2 °C) (Temporal)   Resp 18   Ht 5' 6\" (1.676 m)   Wt 242 lb 4.6 oz (109.9 kg)   SpO2 93%   BMI 39.11 kg/m²   Wt Readings from Last 3 Encounters:   07/10/21 242 lb 4.6 oz (109.9 kg)   07/07/21 236 lb (107 kg)   07/06/21 236 lb (107 kg)     Appearance: Awake, alert, no acute respiratory distress  Skin: Intact, no rash  Head: Normocephalic, atraumatic  Eyes: EOMI, no conjunctival erythema  ENMT: No pharyngeal erythema, MMM, no rhinorrhea  Neck: Supple, no elevated JVP, no carotid bruits  Lungs: Clear to auscultation bilaterally. No wheezes, rales, or rhonchi. Cardiac: Regular rate and rhythm, +S1S2, no murmurs apparent, surgical dressing over the sternal area present. Still has chest tubes. Abdomen: Soft, nontender, +bowel sounds  Extremities: Moves all extremities x 4, no lower extremity edema  Neurologic: No focal motor deficits apparent, normal mood and affect  Peripheral Pulses: Intact posterior tibial pulses bilaterally    Intake/Output:    Intake/Output Summary (Last 24 hours) at 7/10/2021 1902  Last data filed at 7/10/2021 1410  Gross per 24 hour   Intake 1946.41 ml   Output 1076 ml   Net 870.41 ml     No intake/output data recorded.     Laboratory Tests:  Recent Labs     07/09/21  1143 07/09/21  1222 07/09/21  2044 07/10/21  0425 07/10/21  1202     --   --  133  --    K 4.2   < > 6.1* 5.6* 5.1*     --   --  103  --    CO2 20*  --   --  18*  --    BUN 14  --   --  24*  --    CREATININE 1.0  --   --  1.6*  --    GLUCOSE 134*  --   --  221*  --    CALCIUM 8.4*  --   --  8.6  --     < > = values in this interval not displayed. Lab Results   Component Value Date    MG 2.1 07/10/2021    MG 2.1 07/10/2021     No results for input(s): ALKPHOS, ALT, AST, PROT, BILITOT, BILIDIR, LABALBU in the last 72 hours. Recent Labs     07/09/21  1143 07/10/21  0425   WBC 7.7 10.1   RBC 3.47* 3.92   HGB 11.3* 12.9   HCT 33.7* 37.4   MCV 97.1 95.4   MCH 32.6 32.9   MCHC 33.5 34.5   RDW 12.1 12.7   PLT 86* 112*   MPV 9.4 10.2     Lab Results   Component Value Date    CKTOTAL 161 07/25/2016     Lab Results   Component Value Date    INR 1.3 07/09/2021    INR 1.1 07/07/2021    INR 1.3 06/14/2021    PROTIME 14.4 (H) 07/09/2021    PROTIME 12.1 07/07/2021    PROTIME 13.6 (H) 06/14/2021     Lab Results   Component Value Date    TSH 1.380 07/24/2019     Lab Results   Component Value Date    LABA1C 7.2 (H) 07/07/2021     No results found for: EAG  Lab Results   Component Value Date    CHOL 152 07/06/2021    CHOL 144 04/02/2021    CHOL 179 01/04/2021     Lab Results   Component Value Date    TRIG 75 07/06/2021    TRIG 53 04/02/2021    TRIG 73 01/04/2021     Lab Results   Component Value Date    HDL 46 07/06/2021    HDL 44 04/02/2021    HDL 42 01/04/2021     Lab Results   Component Value Date    LDLCALC 91 07/06/2021    LDLCALC 89 04/02/2021    LDLCALC 122 (H) 01/04/2021     Lab Results   Component Value Date    LABVLDL 15 07/06/2021    LABVLDL 11 04/02/2021    LABVLDL 15 01/04/2021     Lab Results   Component Value Date    CHOLHDLRATIO 4.0 01/09/2019    CHOLHDLRATIO 4.8 09/12/2018    CHOLHDLRATIO 4.8 06/05/2018       Cardiac Tests:      Telemetry findings reviewed: Sinus rhythm with heart rate in the 60s    Vitals and labs were reviewed: Blood pressure 118/61 heart rate 68. Labs-potassium 5.1, BUN/creatinine 14/1>> 24/1.6 CBC normal.    Chest X-ray:       CRYSTAL-3/12/2021  Summary   Normal left ventricle size and systolic function. Mildly dilated left atrium. Physiologic and/or trace mitral regurgitation is present. Mild tricuspid regurgitation. Mild plaque noted in the descending aorta. Stress test:        Cardiac catheterization-6/16/2021:   FINDINGS:  HEMODYNAMICS:  Aortic pressure 140/87 mmHg. Left ventricular end-diastolic pressure 20 mmHg.     CORONARY ANATOMY:  LEFT MAIN:  It is a large artery with no significant angiographic  stenosis.     LAD:  It is a large artery wrapping around the apex and giving rise to 2  small diagonal branches and several fair-size septal branches. The LAD  was heavily calcified in its proximal segment. There was 80% discrete  proximal LAD stenosis and 60% discrete mid stenosis and 70% apical LAD  stenosis.     LEFT CIRCUMFLEX:  It is a large artery giving rise to a large  bifurcating obtuse marginal branch and there is chronic total occlusion  of the mid circumflex. The distal circumflex artery is receiving  collaterals from the RV marginal branch of the small nondominant RCA. There was 70%-80% tubular proximal to mid circumflex stenosis and  80%-90% discrete proximal stenosis prior to the bifurcation of the  marginal branch.     RIGHT CORONARY ARTERY:  It is A small nondominant artery giving rise to  a small conus branch, 2 fair-size RV marginal branches. The first  obtuse marginal branch gives collaterals to the distal left circumflex  artery. The second RV marginal branch has 80%-90% proximal stenosis.     LEFT VENTRICULOGRAM:  Revealed mid to basal inferior wall akinesis with  an ejection fraction of 45%-50%. No mitral regurgitation was noted.     IMPRESSION:  1. Severe triple-vessel CAD. 2.  Mid to basal inferior wall akinesis with an ejection fraction of  45%-50%. 3.  Elevated LVEDP at 20 mmHg. ASSESSMENT:  · CAD status post CABG x2, LIMA to LAD, SVG to OM1, coronary endarterectomy, maze and left atrial appendage ligation.   · Atrial fibrillation s/p maze,>> sinus rhythm, Xarelto for anticoagulation  · Acute postop pain  · Hyperlipidemia on pravastatin  · Hypertension stable  · PAD, status post right carotid endarterectomy  · BPH on Flomax  · Mild postop anemia resolved  · History of COVID-19-January 2021    Plan:   · Continue aspirin, and statin  · Restart enalapril 2.5 mg p.o. daily once he is hemodynamically stable  · Anticoagulation therapy with Xarelto once his chest tubes are removed. · Continue OT PT  · Rest as per CTS. · We will see him as needed. Tisha Robert MD., Tash Pierre.   [facility]) Cardiology

## 2021-07-10 NOTE — OP NOTE
[address]                  [address] [id number],  [facility]                                OPERATIVE REPORT    PATIENT NAME: Tomy January                    :        1950  MED REC NO:   [id number]                            ROOM:       [id number]  ACCOUNT NO:   [de-identified]                           ADMIT DATE: 2021  PROVIDER:     La Solano DO    DATE OF PROCEDURE:  2021    PREOPERATIVE DIAGNOSIS:  Coronary artery disease and atrial  fibrillation. POSTOPERATIVE DIAGNOSIS:  Coronary artery disease and atrial  fibrillation. PROCEDURE PERFORMED:  1. Coronary artery bypass grafting x2 using left internal mammary  artery to left anterior descending artery and reverse saphenous vein  graft to the obtuse marginal branch of the circumflex. 2.  Open coronary endarterectomy of the obtuse marginal branch of the  circumflex. 3.  Maze procedure using pulmonary vein isolation. 4.  Left atrial appendage ligation using a 40-mm AtriCure appendage  clip. 5.  Rigid sternal fixation with the KLS plating system. SURGEON:  La Solano DO    ASSISTANT:  Jillian Jackson MD (Dr. Anyi Soto assisted with all portions of the  procedure) and [facility]). ANESTHESIA:  General.    ESTIMATED BLOOD LOSS:  Less than 50. COMPLICATIONS:  None. SPECIMENS:  Left atrial appendage and coronary endarterectomy specimen. DESCRIPTION OF PROCEDURE:  After informed and written consent had been  obtained, the patient was brought to the operating room, placed in  supine position on the operating table. Monitoring lines as well as  Dueñas catheter and transesophageal echo probe were inserted after  induction of anesthesia. Monitoring lines as well as Dueñas catheter  were inserted. The preoperative echo demonstrated normal biventricular  function, no valvular abnormalities. The patient was noted to be in  atrial fibrillation.   The surgical time-out was held, preoperative  antibiotics were administered. A standard midline sternotomy incision  was carried out. Subcutaneous tissues were dissected and the sternum  was divided with a sternal saw. Simultaneously, lower extremity  endoscopic vein harvest was carried out in the usual fashion. The left  internal mammary artery was dissected free from the undersurface of the  left chest wall with a pedicled technique. All branches were clipped  and divided. There the patient was administered systemic heparin for an  adequate ACT for bypass and the distal portion of left internal mammary  artery was clipped and divided. It was noted to be an adequate conduit  for bypass. Next, the mammary retractor was removed. Standard sternal  retractor was placed and the pericardium was opened and the ascending  aorta was palpated and noted be free of atherosclerotic disease. Suitable areas for cannulation, crossclamping and proximal anastomoses  were apparent. Next the central cannulation was commenced in the usual  fashion with the ascending aorta and right atrial cannulas followed by  insertion of antegrade and retrograde cardioplegia lines. Next, the ACT  was verified and the patient was placed on cardiopulmonary bypass. The  distal targets were inspected and there were adequate target in the LAD  and the lateral wall distributions. There was one dominant obtuse  marginal branch from the circumflex which was chosen for bypass. The  terminal circumflex/LPDA was too small for bypassing as was the entirety  of the right coronary artery system. Next, the right superior and  inferior pulmonary veins were encircled in the usual fashion and the  AtriCure RFA ablation clamp was applied. The three separate lesions  were created with the RFA ablation clamp. Next, the crossclamp was  applied. The heart was arrested with combination of antegrade and  retrograde cardioplegia.   We achieved an excellent arrest and also  administered maintenance doses via the retrograde cardioplegia cannula  every 15 minutes. Next, our attention was turned to the left-sided  pulmonary veins. These were encircled in the usual fashion and ablated  similarly to the right side with the RFA AtriCure clamp. Next the  appendage was measured to be 40 mm and a 40 mm AtriCure appendage clip  was applied as well. Next our attention was turned to the lateral wall. Once the arteriotomy was made, it was clear that there was no visible  lumen here requiring a quite long arteriotomy followed by complete  endarterectomy of this obtuse marginal branch of the circumflex. The  specimen was sent for pathology. Next, the vein graft anastomosis was  created with a running 7-0 Prolene suture. This was tested, had  excellent flow and also was hemostatic. Next, our attention was turned  to the midportion of LAD. After arteriotomy, an end-to-side LIMA to LAD  anastomosis was created here with a running 7-0 Prolene suture. This  was tested by removing the bulldog clamp. The left internal mammary  artery noted excellent runoff to the distal vessel. Next, our attention  was turned to the proximal anastomosis. A standard aortotomy was  fashioned in the ascending aorta with a 4.8 mm punch. The vein graft  was then measured to length and the proximal anastomosis was created  with running 6-0 Prolene suture. Once this was completed, the bulldog  clamp was then removed from left internal mammary artery permanently and  the crossclamp removed from the aorta. The heart began to reanimate  immediately. A temporary ventricular pacing wire was placed, however,  not used secondary to the patient being in normal sinus rhythm. Next,  once all evidence of intracardiac air was gone, the aortic root vent,  retrograde cardioplegia cannula was removed. Next the patient was  weaned from cardiopulmonary bypass without difficulty. The venous  cannula was removed.   Protamine was administered

## 2021-07-10 NOTE — PLAN OF CARE
Problem: Gas Exchange - Impaired:  Goal: Levels of oxygenation will improve  Description: Levels of oxygenation will improve  7/10/2021 0759 by Lit Servin RN  Outcome: Met This Shift  7/10/2021 0158 by Reno Mi RN  Outcome: Met This Shift  Goal: Ability to maintain adequate ventilation will improve  Description: Ability to maintain adequate ventilation will improve  7/10/2021 0759 by Lit Servin RN  Outcome: Met This Shift  7/10/2021 0158 by Reno Mi RN  Outcome: Met This Shift     Problem: Pain:  Goal: Pain level will decrease  Description: Pain level will decrease  7/10/2021 1402 by Trevor Triana RN  Outcome: Met This Shift  7/10/2021 0759 by Lit Servin RN  Outcome: Met This Shift  7/10/2021 0158 by Reno Mi RN  Outcome: Met This Shift  Goal: Control of acute pain  Description: Control of acute pain  7/10/2021 1402 by Trevor Triana RN  Outcome: Met This Shift  7/10/2021 0759 by Lit Servin RN  Outcome: Met This Shift  7/10/2021 0158 by Reno Mi RN  Outcome: Met This Shift  Goal: Control of chronic pain  Description: Control of chronic pain  7/10/2021 1402 by Trevor Triana RN  Outcome: Met This Shift  7/10/2021 0759 by Lit Servin RN  Outcome: Met This Shift  7/10/2021 0158 by Reno Mi RN  Outcome: Met This Shift     Problem: Tissue Perfusion - Cardiopulmonary, Altered:  Goal: Absence of angina  Description: Absence of angina  7/10/2021 0759 by Lit Servin RN  Outcome: Met This Shift  7/10/2021 0158 by Reno Mi RN  Outcome: Met This Shift  Goal: Hemodynamic stability will improve  Description: Hemodynamic stability will improve  7/10/2021 0759 by Lit Servin RN  Outcome: Met This Shift  7/10/2021 0158 by Reno Mi RN  Outcome: Met This Shift  Goal: Will show no evidence of cardiac arrhythmias  Description: Will show no evidence of cardiac arrhythmias  7/10/2021 0759 by Lit Servin RN  Outcome: Met This Shift  7/10/2021 0158 by Geovany Manning RN  Outcome: Met This Shift     Problem: Falls - Risk of:  Goal: Will remain free from falls  Description: Will remain free from falls  7/10/2021 0759 by Dora Winn RN  Outcome: Met This Shift  7/10/2021 0158 by Geovany Manning RN  Outcome: Met This Shift  Goal: Absence of physical injury  Description: Absence of physical injury  7/10/2021 0759 by Dora Winn RN  Outcome: Met This Shift  7/10/2021 0158 by Geovany Manning RN  Outcome: Met This Shift

## 2021-07-11 ENCOUNTER — APPOINTMENT (OUTPATIENT)
Dept: GENERAL RADIOLOGY | Age: 71
DRG: 228 | End: 2021-07-11
Attending: THORACIC SURGERY (CARDIOTHORACIC VASCULAR SURGERY)
Payer: MEDICARE

## 2021-07-11 LAB
ANION GAP SERPL CALCULATED.3IONS-SCNC: 13 MMOL/L (ref 7–16)
ANION GAP SERPL CALCULATED.3IONS-SCNC: 14 MMOL/L (ref 7–16)
BUN BLDV-MCNC: 33 MG/DL (ref 6–23)
BUN BLDV-MCNC: 39 MG/DL (ref 6–23)
CALCIUM SERPL-MCNC: 8.9 MG/DL (ref 8.6–10.2)
CALCIUM SERPL-MCNC: 8.9 MG/DL (ref 8.6–10.2)
CHLORIDE BLD-SCNC: 100 MMOL/L (ref 98–107)
CHLORIDE BLD-SCNC: 98 MMOL/L (ref 98–107)
CO2: 18 MMOL/L (ref 22–29)
CO2: 20 MMOL/L (ref 22–29)
CREAT SERPL-MCNC: 1.4 MG/DL (ref 0.7–1.2)
CREAT SERPL-MCNC: 1.4 MG/DL (ref 0.7–1.2)
EKG ATRIAL RATE: 88 BPM
EKG Q-T INTERVAL: 274 MS
EKG QRS DURATION: 92 MS
EKG QTC CALCULATION (BAZETT): 351 MS
EKG R AXIS: 22 DEGREES
EKG T AXIS: 18 DEGREES
EKG VENTRICULAR RATE: 99 BPM
GFR AFRICAN AMERICAN: >60
GFR AFRICAN AMERICAN: >60
GFR NON-AFRICAN AMERICAN: 50 ML/MIN/1.73
GFR NON-AFRICAN AMERICAN: 50 ML/MIN/1.73
GLUCOSE BLD-MCNC: 262 MG/DL (ref 74–99)
GLUCOSE BLD-MCNC: 310 MG/DL (ref 74–99)
HCT VFR BLD CALC: 37.7 % (ref 37–54)
HEMOGLOBIN: 12.8 G/DL (ref 12.5–16.5)
MAGNESIUM: 2.1 MG/DL (ref 1.6–2.6)
MCH RBC QN AUTO: 32.5 PG (ref 26–35)
MCHC RBC AUTO-ENTMCNC: 34 % (ref 32–34.5)
MCV RBC AUTO: 95.7 FL (ref 80–99.9)
METER GLUCOSE: 255 MG/DL (ref 74–99)
METER GLUCOSE: 300 MG/DL (ref 74–99)
METER GLUCOSE: 313 MG/DL (ref 74–99)
METER GLUCOSE: 313 MG/DL (ref 74–99)
PDW BLD-RTO: 12.7 FL (ref 11.5–15)
PLATELET # BLD: 93 E9/L (ref 130–450)
PLATELET CONFIRMATION: NORMAL
PMV BLD AUTO: 10.8 FL (ref 7–12)
POTASSIUM SERPL-SCNC: 5.2 MMOL/L (ref 3.5–5)
POTASSIUM SERPL-SCNC: 5.2 MMOL/L (ref 3.5–5)
RBC # BLD: 3.94 E12/L (ref 3.8–5.8)
SODIUM BLD-SCNC: 131 MMOL/L (ref 132–146)
SODIUM BLD-SCNC: 132 MMOL/L (ref 132–146)
WBC # BLD: 12.6 E9/L (ref 4.5–11.5)

## 2021-07-11 PROCEDURE — 2580000003 HC RX 258: Performed by: THORACIC SURGERY (CARDIOTHORACIC VASCULAR SURGERY)

## 2021-07-11 PROCEDURE — 71045 X-RAY EXAM CHEST 1 VIEW: CPT

## 2021-07-11 PROCEDURE — 6370000000 HC RX 637 (ALT 250 FOR IP): Performed by: THORACIC SURGERY (CARDIOTHORACIC VASCULAR SURGERY)

## 2021-07-11 PROCEDURE — 80048 BASIC METABOLIC PNL TOTAL CA: CPT

## 2021-07-11 PROCEDURE — 99291 CRITICAL CARE FIRST HOUR: CPT | Performed by: INTERNAL MEDICINE

## 2021-07-11 PROCEDURE — 6360000002 HC RX W HCPCS: Performed by: THORACIC SURGERY (CARDIOTHORACIC VASCULAR SURGERY)

## 2021-07-11 PROCEDURE — 2700000000 HC OXYGEN THERAPY PER DAY

## 2021-07-11 PROCEDURE — 2140000000 HC CCU INTERMEDIATE R&B

## 2021-07-11 PROCEDURE — 83735 ASSAY OF MAGNESIUM: CPT

## 2021-07-11 PROCEDURE — 93798 PHYS/QHP OP CAR RHAB W/ECG: CPT

## 2021-07-11 PROCEDURE — 93010 ELECTROCARDIOGRAM REPORT: CPT | Performed by: INTERNAL MEDICINE

## 2021-07-11 PROCEDURE — 82962 GLUCOSE BLOOD TEST: CPT

## 2021-07-11 PROCEDURE — 94640 AIRWAY INHALATION TREATMENT: CPT

## 2021-07-11 PROCEDURE — 85027 COMPLETE CBC AUTOMATED: CPT

## 2021-07-11 PROCEDURE — 6360000002 HC RX W HCPCS: Performed by: PHYSICIAN ASSISTANT

## 2021-07-11 PROCEDURE — 36415 COLL VENOUS BLD VENIPUNCTURE: CPT

## 2021-07-11 PROCEDURE — 93005 ELECTROCARDIOGRAM TRACING: CPT | Performed by: INTERNAL MEDICINE

## 2021-07-11 PROCEDURE — 2580000003 HC RX 258: Performed by: PHYSICIAN ASSISTANT

## 2021-07-11 RX ORDER — FUROSEMIDE 10 MG/ML
20 INJECTION INTRAMUSCULAR; INTRAVENOUS ONCE
Status: DISCONTINUED | OUTPATIENT
Start: 2021-07-11 | End: 2021-07-11

## 2021-07-11 RX ADMIN — BISACODYL 5 MG: 5 TABLET, COATED ORAL at 08:13

## 2021-07-11 RX ADMIN — DOCUSATE SODIUM 50 MG AND SENNOSIDES 8.6 MG 1 TABLET: 8.6; 5 TABLET, FILM COATED ORAL at 08:14

## 2021-07-11 RX ADMIN — INSULIN LISPRO 4 UNITS: 100 INJECTION, SOLUTION INTRAVENOUS; SUBCUTANEOUS at 11:24

## 2021-07-11 RX ADMIN — PANTOPRAZOLE SODIUM 40 MG: 40 TABLET, DELAYED RELEASE ORAL at 08:13

## 2021-07-11 RX ADMIN — INSULIN LISPRO 2 UNITS: 100 INJECTION, SOLUTION INTRAVENOUS; SUBCUTANEOUS at 20:50

## 2021-07-11 RX ADMIN — AMIODARONE HYDROCHLORIDE 1 MG/MIN: 50 INJECTION, SOLUTION INTRAVENOUS at 19:31

## 2021-07-11 RX ADMIN — PRAVASTATIN SODIUM 20 MG: 20 TABLET ORAL at 20:49

## 2021-07-11 RX ADMIN — OXYCODONE HYDROCHLORIDE AND ACETAMINOPHEN 500 MG: 500 TABLET ORAL at 20:50

## 2021-07-11 RX ADMIN — SODIUM CHLORIDE, PRESERVATIVE FREE 10 ML: 5 INJECTION INTRAVENOUS at 20:49

## 2021-07-11 RX ADMIN — OXYCODONE AND ACETAMINOPHEN 2 TABLET: 5; 325 TABLET ORAL at 11:15

## 2021-07-11 RX ADMIN — SODIUM CHLORIDE, PRESERVATIVE FREE 10 ML: 5 INJECTION INTRAVENOUS at 08:14

## 2021-07-11 RX ADMIN — SODIUM CHLORIDE, PRESERVATIVE FREE 10 ML: 5 INJECTION INTRAVENOUS at 01:57

## 2021-07-11 RX ADMIN — INSULIN GLARGINE 16 UNITS: 100 INJECTION, SOLUTION SUBCUTANEOUS at 20:50

## 2021-07-11 RX ADMIN — ASPIRIN 81 MG: 81 TABLET, COATED ORAL at 08:14

## 2021-07-11 RX ADMIN — OXYCODONE HYDROCHLORIDE AND ACETAMINOPHEN 500 MG: 500 TABLET ORAL at 08:13

## 2021-07-11 RX ADMIN — INSULIN LISPRO 3 UNITS: 100 INJECTION, SOLUTION INTRAVENOUS; SUBCUTANEOUS at 06:19

## 2021-07-11 RX ADMIN — IPRATROPIUM BROMIDE AND ALBUTEROL SULFATE 1 AMPULE: 2.5; .5 SOLUTION RESPIRATORY (INHALATION) at 09:04

## 2021-07-11 RX ADMIN — ENOXAPARIN SODIUM 30 MG: 30 INJECTION, SOLUTION INTRAVENOUS; SUBCUTANEOUS at 08:56

## 2021-07-11 RX ADMIN — MAGNESIUM HYDROXIDE 30 ML: 2400 SUSPENSION ORAL at 08:14

## 2021-07-11 RX ADMIN — FERROUS SULFATE TAB 325 MG (65 MG ELEMENTAL FE) 325 MG: 325 (65 FE) TAB at 17:07

## 2021-07-11 RX ADMIN — Medication 2000 MG: at 01:56

## 2021-07-11 RX ADMIN — DEXTROSE MONOHYDRATE 150 MG: 50 INJECTION, SOLUTION INTRAVENOUS at 08:49

## 2021-07-11 RX ADMIN — FERROUS SULFATE TAB 325 MG (65 MG ELEMENTAL FE) 325 MG: 325 (65 FE) TAB at 08:13

## 2021-07-11 RX ADMIN — INSULIN LISPRO 4 UNITS: 100 INJECTION, SOLUTION INTRAVENOUS; SUBCUTANEOUS at 17:10

## 2021-07-11 RX ADMIN — IPRATROPIUM BROMIDE AND ALBUTEROL SULFATE 1 AMPULE: 2.5; .5 SOLUTION RESPIRATORY (INHALATION) at 16:52

## 2021-07-11 RX ADMIN — AMIODARONE HYDROCHLORIDE 1 MG/MIN: 50 INJECTION, SOLUTION INTRAVENOUS at 11:15

## 2021-07-11 RX ADMIN — FOLIC ACID 1 MG: 1 TABLET ORAL at 08:13

## 2021-07-11 RX ADMIN — IPRATROPIUM BROMIDE AND ALBUTEROL SULFATE 1 AMPULE: 2.5; .5 SOLUTION RESPIRATORY (INHALATION) at 21:00

## 2021-07-11 RX ADMIN — DOCUSATE SODIUM 50 MG AND SENNOSIDES 8.6 MG 1 TABLET: 8.6; 5 TABLET, FILM COATED ORAL at 20:49

## 2021-07-11 RX ADMIN — TAMSULOSIN HYDROCHLORIDE 0.4 MG: 0.4 CAPSULE ORAL at 08:13

## 2021-07-11 ASSESSMENT — PAIN SCALES - GENERAL
PAINLEVEL_OUTOF10: 0
PAINLEVEL_OUTOF10: 7
PAINLEVEL_OUTOF10: 0

## 2021-07-11 NOTE — CONSULTS
Cardiac Electrophysiology Consultation Note    Cindy Telles  1950  Date of Service: 7/11/2021  PCP: iMke Hammer MD  Cardiologist: Dr Aicha Carreon  Electrophysiologist: Dr Pratibha Arboleda    Reason for Consultation: Amador Castaneda op Atrial Fibrillation    SUBJECTIVE: Cindy Telles is a 67 yo male who I was asked to see in Cardiac Electrophysiology consultation for post op atrial fibrillation. He has a hx of persistent atrial fibrillation sp DCCV x 2 (2/15/21-failed; 3/12/21), CAD, HTN, DM, obesity, binge EtOH drinking (former alcoholic, now drinks ~5 drinks once a month), diabetic neuropathy, ED, and gout. He was diagnosed with atrial fibrillation in 1/2021 shortly after COVID-19 diagnosis in 12/2020. His AF symptoms are fatigue. He had a failed DCCV on 2/15/21, then started on multaq (atenolol discontinued at this time) and repeat DCCV on 3/12/21, which successfully restored sinus rhythm and resolved patient's symptom of fatigue for ~3 days, then returned to AF. He saw Dr Pratibha Arboleda with above hx and  stress test recommended for chest pain and rhythm control.  Stress test ordered was positive and patient was referred for cardiac cath done on 6/16 revealing severe triple vessel CAD    He underwent Coronary artery bypass grafting x2 (left internal mammary  artery to left anterior descending artery and reverse saphenous vein  graft to the obtuse marginal branch of the circumflex), Maze procedure using pulmonary vein isolation, Left atrial appendage ligation using a 40-mm AtriCure appendage clip on 7/9/21    7/10/21 : he was in NSR recovering    7/11/21 : today, In Afib rate 100, asymptomatic except for incisional pain    Patient Active Problem List    Diagnosis Date Noted    Type 2 diabetes mellitus with hyperglycemia, without long-term current use of insulin ([facility] [address] 10/27/2011     Priority: High     Class: Chronic    HTN (hypertension) 10/27/2011     Priority: High     Class: Chronic    Coronary atherosclerosis of native coronary artery 10/27/2011     Priority: High     Class: Chronic     Overview Note:       A. Lateral infarction 10/08 due to circumflex occlusion. PCI not performed due to completed infarction. B. Adenosine Myoview 12/14/09 ([facility]). Neither fixed nor reversible perfusion defect. LVEF 50%. C. Cath 6/16/21 ([place]): severe 3 vessel disease, left dominant, EF 45-50%      CAD in native artery 07/09/2021    Carotid stenosis, right 05/06/2021    Atrial fibrillation ([address] 01/05/2021     Overview Note:     A. CHADS-VASC = 4  B. Unsuccessful DCCV attempt 2/15/2021  C. CRYSTAL guided DCCV 3/12/2021 on Multaq      Class 2 severe obesity due to excess calories with serious comorbidity and body mass index (BMI) of 38.0 to 38.9 in adult [facility]) 04/16/2019    Hyperlipidemia 06/07/2012     Overview Note:     Intolerant to multiple statin agents         Past Medical History:   Diagnosis Date    A-fib [facility])     for cardioversion 3-12-21     CAD (coronary artery disease) 10/27/2011    Dr. Baron Irvin Carotid stenosis, right 5/6/2021    Chronic venous insufficiency 3/2/2017    COVID-19 01/2021    resolved as of 3-3-21     Decreased pulses in feet     Diabetes mellitus ([address]     DM (diabetes mellitus), type 2 ([address] 10/27/2011    ED (erectile dysfunction)     Gout 10/31/2011    Heart attack ([address]     HTN (hypertension) 10/27/2011    Hyperlipidemia 6/7/2012    Hypertension     Lateral myocardial infarction ([address] 10/08    due to circumflex occlusion    Leg swelling 3/2/2017       Past Surgical History:   Procedure Laterality Date    CARDIAC CATHETERIZATION      CARDIAC CATHETERIZATION  06/16/2021    DR. CANDELARIA [facility] EF 45% CTS CONSULT    CARDIOVASCULAR STRESS TEST  12/14/2009    neither fixed nor reversible perfusion defect; LVEF 50%    CATARACT REMOVAL Left     CHOLECYSTECTOMY  1999    HERNIA REPAIR  @ age 13    KNEE SURGERY      left-ligament torn    OTHER SURGICAL HISTORY  10/31/2011    cystoscopy retrograde;ESWL;stent on left    TESTICLE SURGERY      as child     WISDOM TOOTH EXTRACTION         Family History   Problem Relation Age of Onset    COPD Mother     COPD Father     Heart Attack Father 79    Down Syndrome Sister     Crohn's Disease Brother     Atrial Fibrillation Sister     No Known Problems Sister     No Known Problems Brother     No Known Problems Brother        Social History     Tobacco Use    Smoking status: Never Smoker    Smokeless tobacco: Never Used   Substance Use Topics    Alcohol use: Yes     Comment: occasional       Current Facility-Administered Medications   Medication Dose Route Frequency Provider Last Rate Last Admin    enoxaparin (LOVENOX) injection 30 mg  30 mg Subcutaneous Daily Mina Chandler PA-C   30 mg at 07/11/21 0856    amiodarone (CORDARONE) 450 mg in dextrose 5 % 250 mL infusion  1 mg/min Intravenous Continuous Carson Barn, DO        aspirin EC tablet 81 mg  81 mg Oral Daily Jimbo Guadarrama, DO   81 mg at 07/11/21 0814    acetaminophen (TYLENOL) tablet 650 mg  650 mg Oral Q4H PRN Carson Barn, DO        oxyCODONE-acetaminophen (PERCOCET) 5-325 MG per tablet 1 tablet  1 tablet Oral Q4H PRN Carson Barn, DO   1 tablet at 07/10/21 1533    Or    oxyCODONE-acetaminophen (PERCOCET) 5-325 MG per tablet 2 tablet  2 tablet Oral Q4H PRN Carson Barn, DO   2 tablet at 07/10/21 2349    bisacodyl (DULCOLAX) EC tablet 5 mg  5 mg Oral Daily Carson Barn, DO   5 mg at 07/11/21 0813    sennosides-docusate sodium (SENOKOT-S) 8.6-50 MG tablet 1 tablet  1 tablet Oral BID Carson Barn, DO   1 tablet at 07/11/21 0814    magnesium hydroxide (MILK OF MAGNESIA) 400 MG/5ML suspension 30 mL  30 mL Oral Daily Carson Barn, DO   30 mL at 07/11/21 0814    ferrous sulfate (IRON 325) tablet 325 mg  325 mg Oral BID  Jimbo Guadarrama, DO   325 mg at 07/11/21 0813    ascorbic acid (VITAMIN C) tablet 500 mg  500 mg Oral BID Carson Barn, DO   500 mg at 07/11/21 0813    folic acid (FOLVITE) tablet 1 mg  1 mg Oral Daily Media Quill, DO   1 mg at 07/11/21 0813    potassium chloride (KLOR-CON M) extended release tablet 20 mEq  20 mEq Oral PRN Media Quill, DO        ondansetron [facility]) injection 4 mg  4 mg Intravenous Q8H PRN Media Quill, DO        insulin lispro (HUMALOG) injection vial 0-6 Units  0-6 Units Subcutaneous TID WC Media Quill, DO   3 Units at 07/11/21 0619    insulin lispro (HUMALOG) injection vial 0-3 Units  0-3 Units Subcutaneous Nightly Media Quill, DO   1 Units at 07/10/21 2051    diphenhydrAMINE (BENADRYL) tablet 25 mg  25 mg Oral Q8H PRN Media Quill, DO        bisacodyl (DULCOLAX) suppository 10 mg  10 mg Rectal Daily Media Quill, DO        sodium chloride flush 0.9 % injection 10 mL  10 mL Intravenous 2 times per day Media Quill, DO   10 mL at 07/11/21 0814    sodium chloride flush 0.9 % injection 10 mL  10 mL Intravenous PRN Media Quill, DO   10 mL at 07/11/21 0157    magnesium oxide (MAG-OX) tablet 400 mg  400 mg Oral Daily Media Quill, DO        pantoprazole (PROTONIX) tablet 40 mg  40 mg Oral Daily Media Quill, DO   40 mg at 07/11/21 0813    ipratropium-albuterol (DUONEB) nebulizer solution 1 ampule  1 ampule Inhalation Q4H ALEXANDER Guadarrama, DO   1 ampule at 07/11/21 0904    insulin glargine (LANTUS) injection vial 16 Units  0.15 Units/kg Subcutaneous Nightly Media Quill, DO   16 Units at 07/10/21 2051    tamsulosin (FLOMAX) capsule 0.4 mg  0.4 mg Oral Daily Media Quill, DO   0.4 mg at 07/11/21 0813    pravastatin (PRAVACHOL) tablet 20 mg  20 mg Oral Nightly Media Quill, DO   20 mg at 07/10/21 2051        Allergies   Allergen Reactions    Adhesive Tape Dermatitis    Crestor [Rosuvastatin Calcium]      Muscles aches     Lipitor [Atorvastatin]      Muscle aches       ROS:   Constitutional: Negative for fever, activity change and appetite change. HENT: Negative for epistaxis, difficulty swallowing.    Eyes: Negative for blurred vision or double vision. Respiratory: Negative for cough, chest tightness, shortness of breath and wheezing. Cardiovascular: Negative for chest pain, palpitations and leg swelling. Gastrointestinal: Negative for abdominal pain, heartburn, or blood in stool. Genitourinary: Negative for hematuria, burning or frequency. Musculoskeletal: Negative for myalgias, stiffness, or swelling. Skin: Negative for rash, pain, or lumps. Neurological: Negative for syncope, seizures, or headaches. Psychiatric/Behavioral: Negative for confusion and agitation. The patient is not nervous/anxious. PHYSICAL EXAM:  Vitals:    07/11/21 0715 07/11/21 0730 07/11/21 0900 07/11/21 0904   BP: 112/67      Pulse: 104  85    Resp: 16      Temp: 98.5 °F (36.9 °C)      TempSrc: Temporal      SpO2: 94% 96%  95%   Weight:       Height:         Constitutional: Oriented to person, place, and time. Well-developed and cooperative. Head: Normocephalic and atraumatic. Eyes: Conjunctivae are normal.   Neck:  no JVD present. Cardiovascular: S1 normal, S2 normal and intact distal pulses. A regular rhythm present. Chest incision dressing present. Epicardial wires connected  Pulmonary/Chest: Effort normal and breath sounds normal. No respiratory distress. Abdominal: Soft. Normal appearance and bowel sounds are normal.    Musculoskeletal: Normal range of motion present, no muscle weakness. Neurological: Alert and oriented to person, place, and time. No focal findings on my exam  Skin: Skin is warm and dry. Pos bruising, no ecchymosis   Extremity: No visible clubbing or cyanosis. Pos edema. Psychiatric: Normal mood and affect.  Thought content normal.     Pertinent Labs:  CBC:   Recent Labs     07/09/21  1143 07/10/21  0425 07/11/21  0452   WBC 7.7 10.1 12.6*   HGB 11.3* 12.9 12.8   HCT 33.7* 37.4 37.7   PLT 86* 112* 93*     BMP:  Recent Labs     07/09/21  1143 07/09/21  1222 07/10/21  0425 07/10/21  1202 07/10/21  1905 07/10/21  2122 07/11/21  0452     --  133  --   --   --  132   K 4.2   < > 5.6*   < > 6.6* 5.0 5.2*     --  103  --   --   --  100   CO2 20*  --  18*  --   --   --  18*   BUN 14  --  24*  --   --   --  33*   CREATININE 1.0  --  1.6*  --   --   --  1.4*   CALCIUM 8.4*  --  8.6  --   --   --  8.9    < > = values in this interval not displayed. ABGs:   Lab Results   Component Value Date    PO2ART 262.5 2021    EII9HCB 43.2 2021     INR:   Recent Labs     21  1143   INR 1.3     BNP: No results for input(s): BNP in the last 72 hours. TSH:   Lab Results   Component Value Date    TSH 1.380 2019      Cardiac Injury Profile: No results for input(s): CKTOTAL, CKMB, CKMBINDEX, TROPONINI in the last 72 hours. Lipid Profile:   Lab Results   Component Value Date    TRIG 75 2021    HDL 46 2021    LDLCALC 91 2021    CHOL 152 2021      Hemoglobin A1C: No components found for: HGBA1C   Xray:   XR CHEST PORTABLE   Final Result   1. Second postoperative study after median sternotomy, as above commented. 2.  Removal of the endotracheal tube and of the NG tube since the previous   study performed  at 12:12 p.m. 3.  No change in the position of the left-sided chest tube but there is   minimal left apical pneumothorax present. 4.  Development of subsegmental atelectasis in both bases. XR CHEST PORTABLE   Final Result   Postop chest         XR CHEST PORTABLE    (Results Pending)         Pertinent Cardiac Testin21 cardiac Cath  LEFT VENTRICULOGRAM:  Revealed mid to basal inferior wall akinesis with  an ejection fraction of 45%-50%. No mitral regurgitation was noted.     IMPRESSION:  1. Severe triple-vessel CAD. 2.  Mid to basal inferior wall akinesis with an ejection fraction of  45%-50%. 3.  Elevated LVEDP at 20 mmHg. 3/12/21 CRYSTAL   Normal left ventricle size and systolic function. Mildly dilated left atrium.    Physiologic and/or trace mitral regurgitation is present. Mild tricuspid regurgitation. Mild plaque noted in the descending aorta. Telemetry7/11/2021: AF rate 100    ECG 7/9/21 : AF rate 100, low voltage    I have independently reviewed all of the ECGs and rhythm strips per above. I have personally reviewed the laboratory, cardiac diagnostic and radiographic testing as outlined above. I have reviewed previous records noted in [address] Impression:    1. Atrial fibrillation  - Diagnosed with atrial fibrillation in 1/2021 shortly after COVID-19 diagnosis in 12/2020  - persistent atrial fibrillation sp DCCV x 2 (2/15/21-failed; 3/12/21~ lasted for 3 days)  - now POD # 2 from CABG, MAZE, Atrial clip  - Last NSR was 7/10 pm.   - Ok with IV amiodarone to see if we can get him back into rhythm; 150 mg IV bolus given with gtt  - Start anticoagulation   - Rate in AF stable  - Epicardial wires not reliably capturing    2. CAD  - s/p CABG 7/9/11  - Mid to basal inferior wall akinesis with an ejection fraction of 45%-50% on Cath    3. Obesity  - Lifestyle modification    4. SANJIV  - resolving        Recommendations:    1. Rate in AF stable ~ 100  2. [id number] Melissa Barone for IV Amio in the interim  3. Start anticoagulation as soon as ok with surgery    We will follow      I have spent a total of 30 CC minutes with the patient and his/her family reviewing the above stated recommendations. A total of >50% of that time involved face-to-face time providing counseling and or coordination of care with the other providers. Thank you for allowing me to participate in your patient's care. [facility] Cardiac Electrophysiology  . [facility]    NOTE: This report was transcribed using voice recognition software. Every effort was made to ensure accuracy; however, inadvertent computerized transcription errors may be present.

## 2021-07-11 NOTE — PROGRESS NOTES
Patient went into Afibrillation with a rate of , patient's creatinine 1.6, unable to give PRN Magnesium, postop bundle branch block noted unable to give oral Amiodarone at this time. Vital signs obtained and patient comfortably resting in bed, oxygen demand increased to 5L, SPO2-92%. Patient's epicardial wires connected to pacer boxer settings: VVI rate: 40, VMA 10. PICC line insertion permit printed and placed on patients soft chart.

## 2021-07-11 NOTE — PROGRESS NOTES
POD#2  Awake, alert. No complaints. Denies CP, palpitations, SOB at rest, dizziness/lightheadedness. Sitting OOB in chair     Vitals:    07/10/21 2330 07/11/21 0200 07/11/21 0415 07/11/21 0715   BP: 115/65 110/69  112/67   Pulse: 105 111  104   Resp: 20 24  16   Temp: 97.8 °F (36.6 °C) 97.8 °F (36.6 °C)  98.5 °F (36.9 °C)   TempSrc: Temporal Temporal  Temporal   SpO2: 91% 92%  94%   Weight:   244 lb 3.2 oz (110.8 kg)    Height:         O2: 4-5 L/NC      Intake/Output Summary (Last 24 hours) at 7/11/2021 0817  Last data filed at 7/11/2021 0814  Gross per 24 hour   Intake 710 ml   Output 1063 ml   Net -353 ml       Recent Labs     07/09/21  1143 07/10/21  0425 07/11/21  0452   WBC 7.7 10.1 12.6*   HGB 11.3* 12.9 12.8   HCT 33.7* 37.4 37.7   PLT 86* 112* 93*      Recent Labs     07/09/21  1143 07/10/21  0425 07/11/21  0452   BUN 14 24* 33*   CREATININE 1.0 1.6* 1.4*           Telemetry: afib       PE  Cardiac: IRRR  Lungs: decreased bases  Chest incision with intact MAEVE DSD. Sternum stable. Prior chest tube site incisions C/D/I, no erythema with intact sutures. Chest tubes x 3 and Epicardial pacing wires present and secure. Abd: Soft, nontender, +BS  Ext: Incisions C/D/I, approximated, no erythema, + edema           A/P: POD# 2    1. CAD  --Stable s/p CABG x 2   --Post op CRYSTAL reveals  Preserved EF  -- ASA/statin  --reinforce sternal precautions  --continue epicardial pacing wires  --chest tubes without significant output and without airleaks. Remove. Chest tube(s) removed without difficulty. Patient tolerated well       2. Acute blood loss anemia secondary to open heart surgery  --stable  - hgb 12.8       3. Afib   - Hx of afib  - s/p maze procedure and PORSCHE exclusion   - has seen EP in past with afib and hx of heart block? Will consult them. - Discussed with EP at bedside. Hold on BB today and start amio, will give amio bolus this am and possibly start PO later today per EP   - Resume anticoagulation tomm? 4. Prolonged postoperative respiratory insufficiency  --wean oxygen to keep SpO2 greater than or equal to 92%  --continue duonebs with ezpap  --encourage C&DB, SMI  --currently on 5L O2/NC        5. Constipation  --no BM since prior to surgery  --Continue daily MOM/oral bisacodyl until +BM and senna-s as scheduled. --Will give daily suppository until +BM starting POD 3 if no result by then   --Encouraged continued increase in oral intake and activity. 6. SANJIV  - Baseline Scr 1.0   - yesterday 1.6, today 1.4. Cont to monitor  - Hold on diuresis today     7. Hyperkalemia   - K+ 5.2  - recheck BMP later today  - no EKG changes  - given SANJIV hold off on diuresis at this time         8.  Post operative deconditioning  --Increase activity as tolerated  --PT/OT  --currently ambulating 50ft           DVT prophylaxis:  --continue bilateral knee high BLANCA hose  --continue PCDs  --continue progressive ambulation  --Add lovenox for dvt prophylaxis and continue knee high BLANCA hose/pcds/progressive ambulation      Dispo: home vs. Rehab pending progression in activity level        This patient's case and care plan was discussed with the attending surgeon

## 2021-07-12 LAB
ANION GAP SERPL CALCULATED.3IONS-SCNC: 13 MMOL/L (ref 7–16)
ANION GAP SERPL CALCULATED.3IONS-SCNC: 13 MMOL/L (ref 7–16)
BUN BLDV-MCNC: 44 MG/DL (ref 6–23)
BUN BLDV-MCNC: 49 MG/DL (ref 6–23)
CALCIUM SERPL-MCNC: 8.7 MG/DL (ref 8.6–10.2)
CALCIUM SERPL-MCNC: 8.8 MG/DL (ref 8.6–10.2)
CHLORIDE BLD-SCNC: 95 MMOL/L (ref 98–107)
CHLORIDE BLD-SCNC: 97 MMOL/L (ref 98–107)
CHLORIDE URINE RANDOM: <20 MMOL/L
CO2: 19 MMOL/L (ref 22–29)
CO2: 20 MMOL/L (ref 22–29)
CREAT SERPL-MCNC: 1.3 MG/DL (ref 0.7–1.2)
CREAT SERPL-MCNC: 1.3 MG/DL (ref 0.7–1.2)
CREATININE URINE: 223 MG/DL (ref 40–278)
CREATININE URINE: 226 MG/DL (ref 40–278)
CREATININE URINE: 232 MG/DL (ref 40–278)
EKG ATRIAL RATE: 101 BPM
EKG Q-T INTERVAL: 374 MS
EKG QRS DURATION: 88 MS
EKG QTC CALCULATION (BAZETT): 428 MS
EKG R AXIS: 35 DEGREES
EKG T AXIS: 25 DEGREES
EKG VENTRICULAR RATE: 79 BPM
GFR AFRICAN AMERICAN: >60
GFR AFRICAN AMERICAN: >60
GFR NON-AFRICAN AMERICAN: 54 ML/MIN/1.73
GFR NON-AFRICAN AMERICAN: 54 ML/MIN/1.73
GLUCOSE BLD-MCNC: 295 MG/DL (ref 74–99)
GLUCOSE BLD-MCNC: 299 MG/DL (ref 74–99)
HCT VFR BLD CALC: 37.8 % (ref 37–54)
HEMOGLOBIN: 12.6 G/DL (ref 12.5–16.5)
MCH RBC QN AUTO: 33.8 PG (ref 26–35)
MCHC RBC AUTO-ENTMCNC: 33.3 % (ref 32–34.5)
MCV RBC AUTO: 101.3 FL (ref 80–99.9)
METER GLUCOSE: 248 MG/DL (ref 74–99)
METER GLUCOSE: 312 MG/DL (ref 74–99)
METER GLUCOSE: 317 MG/DL (ref 74–99)
METER GLUCOSE: 357 MG/DL (ref 74–99)
MICROALBUMIN UR-MCNC: 225.7 MG/L
MICROALBUMIN/CREAT UR-RTO: 101.2 (ref 0–30)
PDW BLD-RTO: 13 FL (ref 11.5–15)
PH VENOUS: 7.41 (ref 7.35–7.45)
PLATELET # BLD: 96 E9/L (ref 130–450)
PLATELET CONFIRMATION: NORMAL
PMV BLD AUTO: 11.3 FL (ref 7–12)
POTASSIUM SERPL-SCNC: 5.2 MMOL/L (ref 3.5–5)
POTASSIUM SERPL-SCNC: 5.2 MMOL/L (ref 3.5–5)
POTASSIUM, UR: 49.1 MMOL/L
PRO-BNP: 8362 PG/ML (ref 0–125)
PROTEIN PROTEIN: 56 MG/DL (ref 0–12)
PROTEIN/CREAT RATIO: 0.2
PROTEIN/CREAT RATIO: 0.2 (ref 0–0.2)
RBC # BLD: 3.73 E12/L (ref 3.8–5.8)
SODIUM BLD-SCNC: 128 MMOL/L (ref 132–146)
SODIUM BLD-SCNC: 129 MMOL/L (ref 132–146)
SODIUM URINE: <20 MMOL/L
UREA NITROGEN, UR: 1119 MG/DL (ref 800–1666)
WBC # BLD: 11.5 E9/L (ref 4.5–11.5)

## 2021-07-12 PROCEDURE — 94640 AIRWAY INHALATION TREATMENT: CPT

## 2021-07-12 PROCEDURE — 99233 SBSQ HOSP IP/OBS HIGH 50: CPT | Performed by: INTERNAL MEDICINE

## 2021-07-12 PROCEDURE — 82962 GLUCOSE BLOOD TEST: CPT

## 2021-07-12 PROCEDURE — 93798 PHYS/QHP OP CAR RHAB W/ECG: CPT

## 2021-07-12 PROCEDURE — 6360000002 HC RX W HCPCS: Performed by: INTERNAL MEDICINE

## 2021-07-12 PROCEDURE — 82436 ASSAY OF URINE CHLORIDE: CPT

## 2021-07-12 PROCEDURE — 6370000000 HC RX 637 (ALT 250 FOR IP): Performed by: PHYSICIAN ASSISTANT

## 2021-07-12 PROCEDURE — 2580000003 HC RX 258: Performed by: THORACIC SURGERY (CARDIOTHORACIC VASCULAR SURGERY)

## 2021-07-12 PROCEDURE — 51798 US URINE CAPACITY MEASURE: CPT

## 2021-07-12 PROCEDURE — 93005 ELECTROCARDIOGRAM TRACING: CPT | Performed by: INTERNAL MEDICINE

## 2021-07-12 PROCEDURE — 6370000000 HC RX 637 (ALT 250 FOR IP): Performed by: THORACIC SURGERY (CARDIOTHORACIC VASCULAR SURGERY)

## 2021-07-12 PROCEDURE — 6370000000 HC RX 637 (ALT 250 FOR IP): Performed by: INTERNAL MEDICINE

## 2021-07-12 PROCEDURE — 84156 ASSAY OF PROTEIN URINE: CPT

## 2021-07-12 PROCEDURE — 84300 ASSAY OF URINE SODIUM: CPT

## 2021-07-12 PROCEDURE — 51701 INSERT BLADDER CATHETER: CPT

## 2021-07-12 PROCEDURE — 2700000000 HC OXYGEN THERAPY PER DAY

## 2021-07-12 PROCEDURE — 84133 ASSAY OF URINE POTASSIUM: CPT

## 2021-07-12 PROCEDURE — 80048 BASIC METABOLIC PNL TOTAL CA: CPT

## 2021-07-12 PROCEDURE — 82800 BLOOD PH: CPT

## 2021-07-12 PROCEDURE — 83880 ASSAY OF NATRIURETIC PEPTIDE: CPT

## 2021-07-12 PROCEDURE — 82044 UR ALBUMIN SEMIQUANTITATIVE: CPT

## 2021-07-12 PROCEDURE — 85027 COMPLETE CBC AUTOMATED: CPT

## 2021-07-12 PROCEDURE — 6360000002 HC RX W HCPCS: Performed by: THORACIC SURGERY (CARDIOTHORACIC VASCULAR SURGERY)

## 2021-07-12 PROCEDURE — 82570 ASSAY OF URINE CREATININE: CPT

## 2021-07-12 PROCEDURE — 84540 ASSAY OF URINE/UREA-N: CPT

## 2021-07-12 PROCEDURE — 2580000003 HC RX 258: Performed by: INTERNAL MEDICINE

## 2021-07-12 PROCEDURE — 6370000000 HC RX 637 (ALT 250 FOR IP): Performed by: NURSE PRACTITIONER

## 2021-07-12 PROCEDURE — 2140000000 HC CCU INTERMEDIATE R&B

## 2021-07-12 PROCEDURE — 93010 ELECTROCARDIOGRAM REPORT: CPT | Performed by: INTERNAL MEDICINE

## 2021-07-12 PROCEDURE — 36415 COLL VENOUS BLD VENIPUNCTURE: CPT

## 2021-07-12 PROCEDURE — 6360000002 HC RX W HCPCS: Performed by: PHYSICIAN ASSISTANT

## 2021-07-12 RX ORDER — FUROSEMIDE 10 MG/ML
40 INJECTION INTRAMUSCULAR; INTRAVENOUS ONCE
Status: COMPLETED | OUTPATIENT
Start: 2021-07-12 | End: 2021-07-12

## 2021-07-12 RX ORDER — AMIODARONE HYDROCHLORIDE 200 MG/1
200 TABLET ORAL DAILY
Status: DISCONTINUED | OUTPATIENT
Start: 2021-07-12 | End: 2021-07-15 | Stop reason: HOSPADM

## 2021-07-12 RX ADMIN — BISACODYL 5 MG: 5 TABLET, COATED ORAL at 08:38

## 2021-07-12 RX ADMIN — INSULIN LISPRO 5 UNITS: 100 INJECTION, SOLUTION INTRAVENOUS; SUBCUTANEOUS at 18:11

## 2021-07-12 RX ADMIN — DOCUSATE SODIUM 50 MG AND SENNOSIDES 8.6 MG 1 TABLET: 8.6; 5 TABLET, FILM COATED ORAL at 20:40

## 2021-07-12 RX ADMIN — FOLIC ACID 1 MG: 1 TABLET ORAL at 08:38

## 2021-07-12 RX ADMIN — INSULIN LISPRO 4 UNITS: 100 INJECTION, SOLUTION INTRAVENOUS; SUBCUTANEOUS at 11:28

## 2021-07-12 RX ADMIN — AMIODARONE HYDROCHLORIDE 200 MG: 200 TABLET ORAL at 15:54

## 2021-07-12 RX ADMIN — IPRATROPIUM BROMIDE AND ALBUTEROL SULFATE 1 AMPULE: 2.5; .5 SOLUTION RESPIRATORY (INHALATION) at 13:25

## 2021-07-12 RX ADMIN — OXYCODONE HYDROCHLORIDE AND ACETAMINOPHEN 500 MG: 500 TABLET ORAL at 08:39

## 2021-07-12 RX ADMIN — INSULIN LISPRO 1 UNITS: 100 INJECTION, SOLUTION INTRAVENOUS; SUBCUTANEOUS at 20:53

## 2021-07-12 RX ADMIN — IPRATROPIUM BROMIDE AND ALBUTEROL SULFATE 1 AMPULE: 2.5; .5 SOLUTION RESPIRATORY (INHALATION) at 22:05

## 2021-07-12 RX ADMIN — SODIUM CHLORIDE, PRESERVATIVE FREE 10 ML: 5 INJECTION INTRAVENOUS at 20:41

## 2021-07-12 RX ADMIN — DEXTROSE 0.5 MG/MIN: 5 SOLUTION INTRAVENOUS at 14:17

## 2021-07-12 RX ADMIN — INSULIN LISPRO 3 UNITS: 100 INJECTION, SOLUTION INTRAVENOUS; SUBCUTANEOUS at 09:03

## 2021-07-12 RX ADMIN — MAGNESIUM HYDROXIDE 30 ML: 2400 SUSPENSION ORAL at 08:38

## 2021-07-12 RX ADMIN — AMIODARONE HYDROCHLORIDE 1 MG/MIN: 50 INJECTION, SOLUTION INTRAVENOUS at 04:48

## 2021-07-12 RX ADMIN — ENOXAPARIN SODIUM 30 MG: 30 INJECTION, SOLUTION INTRAVENOUS; SUBCUTANEOUS at 08:51

## 2021-07-12 RX ADMIN — ASPIRIN 81 MG: 81 TABLET, COATED ORAL at 08:39

## 2021-07-12 RX ADMIN — OXYCODONE HYDROCHLORIDE AND ACETAMINOPHEN 500 MG: 500 TABLET ORAL at 20:40

## 2021-07-12 RX ADMIN — INSULIN GLARGINE 16 UNITS: 100 INJECTION, SOLUTION SUBCUTANEOUS at 20:52

## 2021-07-12 RX ADMIN — PANTOPRAZOLE SODIUM 40 MG: 40 TABLET, DELAYED RELEASE ORAL at 08:38

## 2021-07-12 RX ADMIN — DEXTROSE 0.5 MG/MIN: 5 SOLUTION INTRAVENOUS at 10:19

## 2021-07-12 RX ADMIN — BISACODYL 10 MG: 10 SUPPOSITORY RECTAL at 08:51

## 2021-07-12 RX ADMIN — DOCUSATE SODIUM 50 MG AND SENNOSIDES 8.6 MG 1 TABLET: 8.6; 5 TABLET, FILM COATED ORAL at 08:38

## 2021-07-12 RX ADMIN — RIVAROXABAN 20 MG: 20 TABLET, FILM COATED ORAL at 18:14

## 2021-07-12 RX ADMIN — FUROSEMIDE 40 MG: 10 INJECTION, SOLUTION INTRAMUSCULAR; INTRAVENOUS at 14:12

## 2021-07-12 RX ADMIN — TAMSULOSIN HYDROCHLORIDE 0.4 MG: 0.4 CAPSULE ORAL at 08:38

## 2021-07-12 RX ADMIN — SODIUM ZIRCONIUM CYCLOSILICATE 10 G: 10 POWDER, FOR SUSPENSION ORAL at 10:25

## 2021-07-12 RX ADMIN — PRAVASTATIN SODIUM 20 MG: 20 TABLET ORAL at 20:40

## 2021-07-12 RX ADMIN — FERROUS SULFATE TAB 325 MG (65 MG ELEMENTAL FE) 325 MG: 325 (65 FE) TAB at 08:38

## 2021-07-12 RX ADMIN — FERROUS SULFATE TAB 325 MG (65 MG ELEMENTAL FE) 325 MG: 325 (65 FE) TAB at 15:53

## 2021-07-12 RX ADMIN — Medication 400 MG: at 08:38

## 2021-07-12 ASSESSMENT — PAIN SCALES - GENERAL
PAINLEVEL_OUTOF10: 0

## 2021-07-12 NOTE — CONSULTS
Department of Internal Medicine  Nephrology Attending Consult Note      Reason for Consult: Acute kidney injury and hyponatremia  Requesting Physician:  RAMAN Titus    CHIEF COMPLAINT: Admitted for elective CABG    History Obtained From:  patient, electronic medical record    HISTORY OF PRESENT ILLNESS:  Briefly Mr. Eric Stubbs is a 75-year-old man with history of HTN, type II DM, recently diagnosed with CAD with severe triple-vessel disease by catheterization on June 16, hyperlipidemia, permanent AF s/p DCCV x2, right carotid artery stenosis, COVID-19, gout, who was admitted on July 9, 2021 for elective CABG. Post surgery his creatinine has a brisk increase from 1 to 1.6 mg/dL, although has slowly improve has not completely recover to its baseline with a most recent creatinine level 1.3 mg/dL, he has well has developed hyponatremia with a sodium level of 129, reasons for this consultation. Reported no nausea, vomiting, diarrhea. Reports having some shortness of breath. Past Medical History:        Diagnosis Date    A-fib [facility])     for cardioversion 3-12-21     CAD (coronary artery disease) 10/27/2011    Dr. Bridget Merino Carotid stenosis, right 5/6/2021    Chronic venous insufficiency 3/2/2017    COVID-19 01/2021    resolved as of 3-3-21     Decreased pulses in feet     Diabetes mellitus ([address]     DM (diabetes mellitus), type 2 ([address] 10/27/2011    ED (erectile dysfunction)     Gout 10/31/2011    Heart attack ([address]     HTN (hypertension) 10/27/2011    Hyperlipidemia 6/7/2012    Hypertension     Lateral myocardial infarction ([address] 10/08    due to circumflex occlusion    Leg swelling 3/2/2017     Past Surgical History:        Procedure Laterality Date    CARDIAC CATHETERIZATION      CARDIAC CATHETERIZATION  06/16/2021    DR. CANDELARIA [facility] EF 45% CTS CONSULT    CARDIOVASCULAR STRESS TEST  12/14/2009    neither fixed nor reversible perfusion defect; LVEF 50%    CATARACT REMOVAL Left     CHOLECYSTECTOMY [address] GRAFT Left 7/9/2021    CABG CORONARY ARTERY BYPASS, MAZE, LEFT ATRIAL APPENDAGE OCCLUSION performed by Saundra Green DO at [address]  @ age 13   [facility] [place] KNEE SURGERY      left-ligament torn    OTHER SURGICAL HISTORY  10/31/2011    cystoscopy retrograde;ESWL;stent on left    TESTICLE SURGERY      as child     WISDOM TOOTH EXTRACTION       Current Medications:    Current Facility-Administered Medications: amiodarone (CORDARONE) 450 mg in dextrose 5 % 250 mL infusion, 0.5 mg/min, Intravenous, Continuous  enoxaparin (LOVENOX) injection 30 mg, 30 mg, Subcutaneous, Daily  aspirin EC tablet 81 mg, 81 mg, Oral, Daily  acetaminophen (TYLENOL) tablet 650 mg, 650 mg, Oral, Q4H PRN  oxyCODONE-acetaminophen (PERCOCET) 5-325 MG per tablet 1 tablet, 1 tablet, Oral, Q4H PRN **OR** oxyCODONE-acetaminophen (PERCOCET) 5-325 MG per tablet 2 tablet, 2 tablet, Oral, Q4H PRN  bisacodyl (DULCOLAX) EC tablet 5 mg, 5 mg, Oral, Daily  sennosides-docusate sodium (SENOKOT-S) 8.6-50 MG tablet 1 tablet, 1 tablet, Oral, BID  magnesium hydroxide (MILK OF MAGNESIA) 400 MG/5ML suspension 30 mL, 30 mL, Oral, Daily  ferrous sulfate (IRON 325) tablet 325 mg, 325 mg, Oral, BID WC  ascorbic acid (VITAMIN C) tablet 500 mg, 500 mg, Oral, BID  folic acid (FOLVITE) tablet 1 mg, 1 mg, Oral, Daily  potassium chloride (KLOR-CON M) extended release tablet 20 mEq, 20 mEq, Oral, PRN  ondansetron (ZOFRAN) injection 4 mg, 4 mg, Intravenous, Q8H PRN  insulin lispro (HUMALOG) injection vial 0-6 Units, 0-6 Units, Subcutaneous, TID WC  insulin lispro (HUMALOG) injection vial 0-3 Units, 0-3 Units, Subcutaneous, Nightly  diphenhydrAMINE (BENADRYL) tablet 25 mg, 25 mg, Oral, Q8H PRN  bisacodyl (DULCOLAX) suppository 10 mg, 10 mg, Rectal, Daily  sodium chloride flush 0.9 % injection 10 mL, 10 mL, Intravenous, 2 times per day  sodium chloride flush 0.9 % injection 10 mL, 10 mL, Intravenous, PRN  magnesium oxide (MAG-OX) tablet 400 mg, 400 mg, Oral, Daily  pantoprazole (PROTONIX) tablet 40 mg, 40 mg, Oral, Daily  ipratropium-albuterol (DUONEB) nebulizer solution 1 ampule, 1 ampule, Inhalation, Q4H WA  insulin glargine (LANTUS) injection vial 16 Units, 0.15 Units/kg, Subcutaneous, Nightly  tamsulosin (FLOMAX) capsule 0.4 mg, 0.4 mg, Oral, Daily  pravastatin (PRAVACHOL) tablet 20 mg, 20 mg, Oral, Nightly  Allergies:  Adhesive tape, Crestor [rosuvastatin calcium], and Lipitor [atorvastatin]    Social History:    TOBACCO:   reports that he has never smoked. He has never used smokeless tobacco.  ETOH:   reports current alcohol use.     Family History:       Problem Relation Age of Onset    COPD Mother    Daisha Navarro COPD Father     Heart Attack Father 79    Down Syndrome Sister     Crohn's Disease Brother     Atrial Fibrillation Sister     No Known Problems Sister     No Known Problems Brother     No Known Problems Brother      REVIEW OF SYSTEMS:    CONSTITUTIONAL:  negative for  fevers and chills  EYES:  negative  HEENT:  negative for  hearing loss and tinnitus  RESPIRATORY:  negative for  dry cough and dyspnea  CARDIOVASCULAR:  positive for  dyspnea  GASTROINTESTINAL:  negative for nausea, vomiting and diarrhea  GENITOURINARY:  negative for hematuria  INTEGUMENT/BREAST:  negative  HEMATOLOGIC/LYMPHATIC:  negative  ALLERGIC/IMMUNOLOGIC:  positive for drug reactions  ENDOCRINE:  negative  MUSCULOSKELETAL:  negative  NEUROLOGICAL:  negative    PHYSICAL EXAM:      Vitals:    VITALS:  /63   Pulse 87   Temp 96.7 °F (35.9 °C) (Temporal)   Resp 20   Ht 5' 6\" (1.676 m)   Wt 243 lb 9.6 oz (110.5 kg)   SpO2 96% Comment: return to chair  BMI 39.32 kg/m²   24HR INTAKE/OUTPUT:    Intake/Output Summary (Last 24 hours) at 7/12/2021 0936  Last data filed at 7/12/2021 0100  Gross per 24 hour   Intake 618.4 ml   Output 775 ml   Net -156.6 ml       Constitutional: Patient is awake alert no distress  HEENT: Pupils are equal reactive  Respiratory: Decreased developed hyponatremia with a sodium level of 129, reasons for this consultation. Reported no nausea, vomiting, diarrhea. Reports having some shortness of breath. SANJIV stage I, CABG associated SANJIV, probably mild ATN. Nonoliguric, renal function slowly improving. Hyponatremia, probably hemodynamically mediated due to underlying heart failure? With some component of translocation due to hyperglycemia. Severe triple-vessel disease CAD, status post CABG x2 July 9, 2021  Mild hyperkalemia, 2/2 decreased GFR, hyperglycemia  Low bicarbonate levels, probably metabolic acidosis versus respiratory alkalosis, to obtain venous pH  HTN  Persistent AF, status post maze procedure and PORSCHE exclusion, on amiodarone drip, rivaroxaban  BPH, on tamsulosin    Plan:    Obtain proBNP  Obtain urine indices  Lasix 40 mg IV x1 after urine indices obtained  Fluid restriction to dry tray  Low potassium diet  Lokelma 10 g p.o. x1  Better glucose control  Monitor sodium levels  Monitor renal function      Thank you very much Elisabeth Camejo for allowing us to participate in the care of Mr. Rodolfo Beltran.

## 2021-07-12 NOTE — PATIENT CARE CONFERENCE
P Quality Flow/Interdisciplinary Rounds Progress Note        Quality Flow Rounds held on July 12, 2021    Disciplines Attending:  Bedside Nurse, ,  and Nursing Unit Leadership    Anila Minor was admitted on 7/9/2021  5:11 AM    Anticipated Discharge Date:  Expected Discharge Date: 07/14/21    Disposition:    Davion Score:  Davion Scale Score: 18    Readmission Risk              Risk of Unplanned Readmission:  13           Discussed patient goal for the day, patient clinical progression, and barriers to discharge.   The following Goal(s) of the Day/Commitment(s) have been identified:  patient to remain in sinus      Lynn Chinchilla RN  July 12, 2021

## 2021-07-12 NOTE — PROGRESS NOTES
POD#3 Awake, alert. No complaints. Denies CP, palpitations, SOB at rest, dizziness/lightheadedness. Vitals:    07/12/21 0730 07/12/21 0751 07/12/21 0949 07/12/21 1130   BP: 123/63   108/68   Pulse: 87   85   Resp: 20   16   Temp: 96.7 °F (35.9 °C)   96.4 °F (35.8 °C)   TempSrc: Temporal   Temporal   SpO2: 93% 96% 97% 93%   Weight:       Height:         O2: 2L/NC      Intake/Output Summary (Last 24 hours) at 7/12/2021 1147  Last data filed at 7/12/2021 0100  Gross per 24 hour   Intake 618.4 ml   Output 775 ml   Net -156.6 ml         +BM pre-op    UO: 575mL/8hr       Recent Labs     07/10/21  0425 07/11/21  0452 07/12/21  0611   WBC 10.1 12.6* 11.5   HGB 12.9 12.8 12.6   HCT 37.4 37.7 37.8   * 93* 96*      Recent Labs     07/11/21  0452 07/11/21  1516 07/12/21  0611   BUN 33* 39* 44*   CREATININE 1.4* 1.4* 1.3*         Telemetry: Afib      PE  Cardiac: IRRR  Lungs: decreased bases  Chest incision with intact MAEVE DSD. Sternum stable. Prior chest tube site incisions C/D/I, no erythema with intact sutures. Epicardial pacing wires present and secure. Abd: Soft, nontender, +BS  Ext: Incisions C/D/I, approximated, no erythema, + edema               A/P: POD#3     1. CAD  --Stable s/p CABG x 2   --Post op CRYSTAL reveals preserved EF  -- ASA/statin  --reinforce sternal precautions  --continue epicardial pacing wires  --chest tubes removed        2. Acute blood loss anemia secondary to open heart surgery  --stable  - hgb 12.6        3. Afib   - Hx of afib  - s/p maze procedure and PORSCHE exclusion   - has seen EP in past with afib and hx of heart block - EP consulted  - Amio gtt to wean per EP - No BB per EP  - Resume home Xarelto today        4. Prolonged postoperative respiratory insufficiency  --wean oxygen to keep SpO2 greater than or equal to 92%  --continue duonebs with ezpap  --encourage C&DB, SMI  --currently on 2L O2/NC       5.  Constipation  --no BM since prior to surgery  --Continue daily MOM/oral bisacodyl until +BM and senna-s as scheduled. --Will give daily suppository until +BM starting POD 3 if no result by then   --Encouraged continued increase in oral intake and activity.        6. SANJIV  - Baseline Scr 1.0   - Scr 1.3 today - improved from yesterday  - Lasix today per Nephrology       7. Hyperkalemia/ hyponatremia  - K+ 5.2  - no EKG changes  - Nephrology consulted        8. Post operative deconditioning  --Increase activity as tolerated  --PT/OT  --currently ambulating 60ft      9.  Thrombocytopenia  - Plts 96  - Monitor            DVT prophylaxis:  --continue bilateral knee high BLANCA hose  --continue PCDs  --continue progressive ambulation  --Xarelto (lovenox discontinued)        Dispo: home vs. Rehab pending progression in activity level       This patient's case and care plan was discussed with the attending surgeon

## 2021-07-12 NOTE — PROGRESS NOTES
Nutrition Education    Counseled patient and wife on heart healthy/diabetic diet. Provided educational handouts and sample meal plans. · Written educational materials provided. · Contact name and number provided. · Refer to Patient Education activity for more details.     Electronically signed by Zenaida Tejada RD, LD on 7/12/21 at 1:31 PM EDT    Contact: [phone]

## 2021-07-12 NOTE — PROGRESS NOTES
RN performed a straight cath on patient at this time. Total urine collected through straight cath was 575 mL of chi colored urine. Patient educated on urine trial. Call light within reach. Will continue to monitor closely.

## 2021-07-12 NOTE — CARE COORDINATION
SOCIAL WORK/CASEMANAGEMENT TRANSITION OF CARE [id number], [address], [place], [phone]): I met with pt , daughter who is in from [address] and wife. The couple lives in a ranch home with 2 steps from garage to enter. Pt works for self in [employer] and wife is retired. Pt has another child out of town as well. No hhc pta. Wife has a raised toiilet seat and fww if needed. Pt is not a . Pt ambulated 60' today. Went over need for 24/7 care with hhc vs laverne. Provided laverne and hhc list to look over. Sw/cm to follow. Sean Le, KHLOE  7/12/2021    The Plan for Transition of Care is related to the following treatment goals: hhc and laverne     The Patient and/or patient representative was provided with a choice of provider and agrees   with the discharge plan. [x] Yes [] No    Freedom of choice list was provided with basic dialogue that supports the patient's individualized plan of care/goals, treatment preferences and shares the quality data associated with the providers.  [x] Yes [] No

## 2021-07-12 NOTE — PROGRESS NOTES
Comprehensive Nutrition Assessment    Type and Reason for Visit:  Initial, Consult, Patient Education    Nutrition Recommendations/Plan: Recommend and start Boost Pudding supplement BID to help meet increased nutritional needs. Monitor fluid restriction and adjust supplements as needed. Once fluid restriction is discontinued, will start Jose wound healing supplement BID and Ensure High Protein BID (vanilla). Nutrition Assessment:  Patient OOB in chair eating lunch at this time stating poor appetite since surgery ; pt at nutritional risk d/t increased needs from surgical wound healing (s/p CABG x 2 on 7/9) ; noted SANJIV ; hx of DM and CAD ; pt on dry trays ; will start ONS    Malnutrition Assessment:  Malnutrition Status: At risk for malnutrition (Comment)    Context:  Acute Illness     Findings of the 6 clinical characteristics of malnutrition:  Energy Intake:  Mild decrease in energy intake (Comment) (x 3 days)  Weight Loss:  No significant weight loss     Body Fat Loss:  No significant body fat loss     Muscle Mass Loss:  No significant muscle mass loss    Fluid Accumulation:  No significant fluid accumulation     Strength:  Not Performed    Estimated Daily Nutrient Needs:  Energy (kcal):  2159-1097 (REE 1792 x 1.2 SF); Weight Used for Energy Requirements:  Current     Protein (g):  115-130 (1.8-2.0g/kg IBW); Weight Used for Protein Requirements:  Ideal        Fluid (ml/day):  9021-4799; Method Used for Fluid Requirements:  1 ml/kcal      Nutrition Related Findings:  +I&Os (+2.3 L), 1+ edema, hypoactive BS, rounded/distended abd, constipation, A&O x 4, puncture site, redness to buttocks, hyperkalemia      Wounds:  Multiple, Surgical Incision (Incisions x 2)       Current Nutrition Therapies:    ADULT DIET; Regular; 4 carb choices (60 gm/meal); Low Potassium (Less than 3000 mg/day);  No Fluids on Tray    Anthropometric Measures:  · Height: 5' 6\" (167.6 cm)  · Current Body Weight: 243 lb (110.2 kg) (7/12, standing scale)   · Admission Body Weight: 242 lb (109.8 kg) (7/10, bedscale)    · Usual Body Weight: 228 lb (103.4 kg) (1/5/21, actual)     · Ideal Body Weight: 142 lbs; % Ideal Body Weight 171.1 %   · BMI: 39.2  · BMI Categories: Obese Class 2 (BMI 35.0 -39.9)       Nutrition Diagnosis:   · Increased nutrient needs related to increase demand for energy/nutrients as evidenced by wounds (surgical)      Nutrition Interventions:   Food and/or Nutrient Delivery:  Continue Current Diet, Start Oral Nutrition Supplement  Nutrition Education/Counseling:  Education completed   Coordination of Nutrition Care:  Continue to monitor while inpatient    Goals:  Pt will consume 50-75% of meals served       Nutrition Monitoring and Evaluation:   Behavioral-Environmental Outcomes:  Beliefs and Attitutes   Food/Nutrient Intake Outcomes:  Food and Nutrient Intake, Supplement Intake  Physical Signs/Symptoms Outcomes:  Biochemical Data, Chewing or Swallowing, GI Status, Fluid Status or Edema, Hemodynamic Status, Meal Time Behavior, Constipation, Nutrition Focused Physical Findings, Skin, Weight     Discharge Planning:     Too soon to determine     Electronically signed by Melba Taylor RD, LD on 7/12/21 at 1:29 PM EDT    Contact: [phone]

## 2021-07-12 NOTE — PROGRESS NOTES
Cardiac Electrophysiology Inpatient Progress Note    Geremias Stringer  1950  Date of Service: 7/12/2021  PCP: Shilpa Hunt MD          Subjective: Geremias Stringer is seen in hospital follow-up and management of: AF    Geremias Stringer is a 65 yo male who I was asked to see in Cardiac Electrophysiology consultation for post op atrial fibrillation.       He has a hx of persistent atrial fibrillation sp DCCV x 2 (2/15/21-failed; 3/12/21), CAD, HTN, DM, obesity, binge EtOH drinking (former alcoholic, now drinks ~5 drinks once a month), diabetic neuropathy, ED, and gout. He was diagnosed with atrial fibrillation in 1/2021 shortly after COVID-19 diagnosis in 12/2020. His AF symptoms are fatigue. He had a failed DCCV on 2/15/21, then started on multaq (atenolol discontinued at this time) and repeat DCCV on 3/12/21, which successfully restored sinus rhythm and resolved patient's symptom of fatigue for ~3 days, then returned to AF. He saw Dr Anton Bhakta with above hx and  stress test recommended for chest pain and rhythm control. Stress test ordered was positive and patient was referred for cardiac cath done on 6/16 revealing severe triple vessel CAD     He underwent Coronary artery bypass grafting x2 (left internal mammary  artery to left anterior descending artery and reverse saphenous vein  graft to the obtuse marginal branch of the circumflex), Maze procedure using pulmonary vein isolation, Left atrial appendage ligation using a 40-mm AtriCure appendage clip on 7/9/21     7/10/21 : he was in NSR recovering     7/11/21 : today, In Afib rate 100, asymptomatic except for incisional pain    7/12/21 : he remains in AF, rate 79.  He is getting stronger slowly    Patient Active Problem List   Diagnosis    Type 2 diabetes mellitus with hyperglycemia, without long-term current use of insulin ([address]    HTN (hypertension)    Coronary atherosclerosis of native coronary artery    Hyperlipidemia    Class 2 severe obesity due to excess calories with serious comorbidity and body mass index (BMI) of 38.0 to 38.9 in adult [facility])    Atrial fibrillation (HCC)    Carotid stenosis, right    CAD in native artery         Scheduled Medications:   sodium zirconium cyclosilicate  10 g Oral Once    enoxaparin  30 mg Subcutaneous Daily    aspirin  81 mg Oral Daily    bisacodyl  5 mg Oral Daily    sennosides-docusate sodium  1 tablet Oral BID    magnesium hydroxide  30 mL Oral Daily    ferrous sulfate  325 mg Oral BID WC    vitamin C  500 mg Oral BID    folic acid  1 mg Oral Daily    insulin lispro  0-6 Units Subcutaneous TID WC    insulin lispro  0-3 Units Subcutaneous Nightly    bisacodyl  10 mg Rectal Daily    sodium chloride flush  10 mL Intravenous 2 times per day    magnesium oxide  400 mg Oral Daily    pantoprazole  40 mg Oral Daily    ipratropium-albuterol  1 ampule Inhalation Q4H WA    insulin glargine  0.15 Units/kg Subcutaneous Nightly    tamsulosin  0.4 mg Oral Daily    pravastatin  20 mg Oral Nightly       Infusion Medications:   amiodarone 450mg/250ml D5W infusion 0.5 mg/min (07/12/21 1019)       PRN Medications:  acetaminophen, oxyCODONE-acetaminophen **OR** oxyCODONE-acetaminophen, ondansetron, diphenhydrAMINE, sodium chloride flush    Allergies   Allergen Reactions    Adhesive Tape Dermatitis    Crestor [Rosuvastatin Calcium]      Muscles aches     Lipitor [Atorvastatin]      Muscle aches       Wt Readings from Last 3 Encounters:   07/12/21 243 lb 9.6 oz (110.5 kg)   07/07/21 236 lb (107 kg)   07/06/21 236 lb (107 kg)     Temp Readings from Last 3 Encounters:   07/12/21 96.7 °F (35.9 °C) (Temporal)   07/09/21 97 °F (36.1 °C)   07/07/21 96.5 °F (35.8 °C) (Temporal)     BP Readings from Last 3 Encounters:   07/12/21 123/63   07/07/21 (S) (!) 127/57   07/06/21 122/80     Pulse Readings from Last 3 Encounters:   07/12/21 87   07/07/21 77   07/06/21 72         Intake/Output Summary (Last 24 hours) at 7/12/2021 1024  Last data filed at 7/12/2021 0100  Gross per 24 hour   Intake 618.4 ml   Output 775 ml   Net -156.6 ml         PHYSICAL EXAM:  Vitals:    07/12/21 0352 07/12/21 0429 07/12/21 0730 07/12/21 0751   BP: 127/83  123/63    Pulse: 86  87    Resp: 18  20    Temp: 96.2 °F (35.7 °C)  96.7 °F (35.9 °C)    TempSrc: Temporal  Temporal    SpO2: 92%  93% 96%   Weight:  243 lb 9.6 oz (110.5 kg)     Height:         Constitutional: Oriented to person, place, and time. Well-developed and cooperative. Head: Normocephalic and atraumatic. Eyes: Conjunctivae are normal.  Neck: No  JVD present. Cardiovascular: S1 normal, S2 normal  An irregular rhythm present. Pulmonary/Chest: Effort normal and breath sounds normal. No respiratory distress. Abdominal: Soft. Normal appearance   Musculoskeletal: Normal range of motion of all extremities, no muscle weakness. Neurological: Alert and oriented to person, place, and time. Skin: Skin is warm and dry. No bruising, no ecchymosis and no rash noted. Extremity: No clubbing or cyanosis. No edema. Psychiatric: Normal mood and affect. Thought content normal.       Pertinent Labs:  CBC:   Recent Labs     07/10/21  0425 07/11/21  0452 07/12/21  0611   WBC 10.1 12.6* 11.5   HGB 12.9 12.8 12.6   HCT 37.4 37.7 37.8   * 93* 96*     BMP:  Recent Labs     07/11/21  0452 07/11/21  1516 07/12/21  0611    131* 129*   K 5.2* 5.2* 5.2*    98 97*   CO2 18* 20* 19*   BUN 33* 39* 44*   CREATININE 1.4* 1.4* 1.3*   CALCIUM 8.9 8.9 8.8     ABGs:   Lab Results   Component Value Date    PO2ART 262.5 07/09/2021    CIH7UWN 43.2 07/09/2021     INR:   Recent Labs     07/09/21  1143   INR 1.3     BNP: No results for input(s): BNP in the last 72 hours. TSH:   Lab Results   Component Value Date    TSH 1.380 07/24/2019      Cardiac Injury Profile: No results for input(s): CKTOTAL, CKMB, CKMBINDEX, TROPONINI in the last 72 hours.    Lipid Profile:   Lab Results   Component Value Date TRIG 75 2021    HDL 46 2021    LDLCALC 91 2021    CHOL 152 2021      Hemoglobin A1C: No components found for: HGBA1C   Xray: XR CHEST (2 VW)    Result Date: 2021    No acute process. CT chest without contrast    Result Date: 2021  Chronic appearing findings. No significant acute cardiopulmonary process identified. Pertinent Cardiac Testin21 cardiac Cath  LEFT VENTRICULOGRAM:  Revealed mid to basal inferior wall akinesis with  an ejection fraction of 45%-50%.  No mitral regurgitation was noted.     IMPRESSION:  1.  Severe triple-vessel CAD. 2.  Mid to basal inferior wall akinesis with an ejection fraction of  45%-50%. 3.  Elevated LVEDP at 20 mmHg.     3/12/21 CRYSTAL   Normal left ventricle size and systolic function.   Mildly dilated left atrium.   Physiologic and/or trace mitral regurgitation is present.   Mild tricuspid regurgitation.   Mild plaque noted in the descending aorta.       Telemetry2021: AF rate 60-70      I have independently reviewed all of the ECGs and rhythm strips per above. I have personally reviewed the laboratory, cardiac diagnostic and radiographic testing as outlined above. I have reviewed previous records noted in [address]. Impression:      1. Atrial fibrillation  - Diagnosed with atrial fibrillation in 2021 shortly after COVID-19 diagnosis in 2020  - persistent atrial fibrillation sp DCCV x 2 (2/15/21-failed; 3/12/21~ lasted for 3 days)  - now POD # 3 from CABG, MAZE, Atrial clip  - Last NSR was 7/10 pm.   - Ok with IV amiodarone to see if we can get him back into rhythm; 150 mg IV bolus given with gtt  - Start anticoagulation   - Rate in AF stable  - Epicardial wires not reliably capturing  - Wean amio gtt, start oral amio 200 mg daily     2. CAD  - s/p CABG 11  - Mid to basal inferior wall akinesis with an ejection fraction of 45%-50% on Cath     3. Obesity  - Lifestyle modification     4.  SANJIV  - resolving    Recommendations:    1. Wean amio gtt, start oral amio 200 mg daily        I have spent a total of 35 minutes with the patient and his/her family reviewing the above stated recommendations. A total of >50% of that time involved face-to-face time providing counseling and or coordination of care with the other providers. Thank you for allowing me to participate in your patient's care. Immanuel Escobar M.D  [facility]    NOTE: This report was transcribed using voice recognition software. Every effort was made to ensure accuracy; however, inadvertent computerized transcription errors may be present.

## 2021-07-12 NOTE — PROGRESS NOTES
Bladder scanned for 179 ml. Messaged Dr. Adityha Arnold re: not voided after 8-10 hrs since last straight cathed. 1100 Orders obtained to place a cruz. 1115 Cruz inserted no difficulties.

## 2021-07-13 LAB
ANION GAP SERPL CALCULATED.3IONS-SCNC: 10 MMOL/L (ref 7–16)
BUN BLDV-MCNC: 54 MG/DL (ref 6–23)
CALCIUM SERPL-MCNC: 8.7 MG/DL (ref 8.6–10.2)
CHLORIDE BLD-SCNC: 100 MMOL/L (ref 98–107)
CO2: 24 MMOL/L (ref 22–29)
CREAT SERPL-MCNC: 1.4 MG/DL (ref 0.7–1.2)
EKG ATRIAL RATE: 202 BPM
EKG Q-T INTERVAL: 380 MS
EKG QRS DURATION: 98 MS
EKG QTC CALCULATION (BAZETT): 435 MS
EKG R AXIS: 54 DEGREES
EKG T AXIS: 35 DEGREES
EKG VENTRICULAR RATE: 79 BPM
GFR AFRICAN AMERICAN: >60
GFR NON-AFRICAN AMERICAN: 50 ML/MIN/1.73
GLUCOSE BLD-MCNC: 238 MG/DL (ref 74–99)
HCT VFR BLD CALC: 34.8 % (ref 37–54)
HEMOGLOBIN: 11.8 G/DL (ref 12.5–16.5)
MCH RBC QN AUTO: 32.6 PG (ref 26–35)
MCHC RBC AUTO-ENTMCNC: 33.9 % (ref 32–34.5)
MCV RBC AUTO: 96.1 FL (ref 80–99.9)
METER GLUCOSE: 226 MG/DL (ref 74–99)
METER GLUCOSE: 248 MG/DL (ref 74–99)
METER GLUCOSE: 249 MG/DL (ref 74–99)
METER GLUCOSE: 257 MG/DL (ref 74–99)
PDW BLD-RTO: 13 FL (ref 11.5–15)
PLATELET # BLD: 117 E9/L (ref 130–450)
PMV BLD AUTO: 11.2 FL (ref 7–12)
POTASSIUM SERPL-SCNC: 4.8 MMOL/L (ref 3.5–5)
RBC # BLD: 3.62 E12/L (ref 3.8–5.8)
SODIUM BLD-SCNC: 134 MMOL/L (ref 132–146)
WBC # BLD: 9.6 E9/L (ref 4.5–11.5)

## 2021-07-13 PROCEDURE — 93005 ELECTROCARDIOGRAM TRACING: CPT | Performed by: INTERNAL MEDICINE

## 2021-07-13 PROCEDURE — 36415 COLL VENOUS BLD VENIPUNCTURE: CPT

## 2021-07-13 PROCEDURE — 99233 SBSQ HOSP IP/OBS HIGH 50: CPT | Performed by: INTERNAL MEDICINE

## 2021-07-13 PROCEDURE — 6370000000 HC RX 637 (ALT 250 FOR IP): Performed by: THORACIC SURGERY (CARDIOTHORACIC VASCULAR SURGERY)

## 2021-07-13 PROCEDURE — 6370000000 HC RX 637 (ALT 250 FOR IP): Performed by: NURSE PRACTITIONER

## 2021-07-13 PROCEDURE — 2700000000 HC OXYGEN THERAPY PER DAY

## 2021-07-13 PROCEDURE — 2580000003 HC RX 258: Performed by: THORACIC SURGERY (CARDIOTHORACIC VASCULAR SURGERY)

## 2021-07-13 PROCEDURE — 82962 GLUCOSE BLOOD TEST: CPT

## 2021-07-13 PROCEDURE — 2140000000 HC CCU INTERMEDIATE R&B

## 2021-07-13 PROCEDURE — 93798 PHYS/QHP OP CAR RHAB W/ECG: CPT

## 2021-07-13 PROCEDURE — 94760 N-INVAS EAR/PLS OXIMETRY 1: CPT

## 2021-07-13 PROCEDURE — 80048 BASIC METABOLIC PNL TOTAL CA: CPT

## 2021-07-13 PROCEDURE — P9047 ALBUMIN (HUMAN), 25%, 50ML: HCPCS | Performed by: NURSE PRACTITIONER

## 2021-07-13 PROCEDURE — 93010 ELECTROCARDIOGRAM REPORT: CPT | Performed by: INTERNAL MEDICINE

## 2021-07-13 PROCEDURE — 6360000002 HC RX W HCPCS: Performed by: NURSE PRACTITIONER

## 2021-07-13 PROCEDURE — 6370000000 HC RX 637 (ALT 250 FOR IP): Performed by: PHYSICIAN ASSISTANT

## 2021-07-13 PROCEDURE — 94640 AIRWAY INHALATION TREATMENT: CPT

## 2021-07-13 PROCEDURE — 85027 COMPLETE CBC AUTOMATED: CPT

## 2021-07-13 PROCEDURE — 6360000002 HC RX W HCPCS: Performed by: INTERNAL MEDICINE

## 2021-07-13 RX ORDER — ALBUMIN (HUMAN) 12.5 G/50ML
50 SOLUTION INTRAVENOUS ONCE
Status: COMPLETED | OUTPATIENT
Start: 2021-07-13 | End: 2021-07-13

## 2021-07-13 RX ORDER — FUROSEMIDE 10 MG/ML
40 INJECTION INTRAMUSCULAR; INTRAVENOUS ONCE
Status: COMPLETED | OUTPATIENT
Start: 2021-07-13 | End: 2021-07-13

## 2021-07-13 RX ADMIN — IPRATROPIUM BROMIDE AND ALBUTEROL SULFATE 1 AMPULE: 2.5; .5 SOLUTION RESPIRATORY (INHALATION) at 19:22

## 2021-07-13 RX ADMIN — OXYCODONE AND ACETAMINOPHEN 1 TABLET: 5; 325 TABLET ORAL at 05:33

## 2021-07-13 RX ADMIN — INSULIN LISPRO 3 UNITS: 100 INJECTION, SOLUTION INTRAVENOUS; SUBCUTANEOUS at 12:38

## 2021-07-13 RX ADMIN — SODIUM CHLORIDE, PRESERVATIVE FREE 10 ML: 5 INJECTION INTRAVENOUS at 09:17

## 2021-07-13 RX ADMIN — ASPIRIN 81 MG: 81 TABLET, COATED ORAL at 09:17

## 2021-07-13 RX ADMIN — AMIODARONE HYDROCHLORIDE 200 MG: 200 TABLET ORAL at 09:16

## 2021-07-13 RX ADMIN — IPRATROPIUM BROMIDE AND ALBUTEROL SULFATE 1 AMPULE: 2.5; .5 SOLUTION RESPIRATORY (INHALATION) at 10:01

## 2021-07-13 RX ADMIN — RIVAROXABAN 20 MG: 20 TABLET, FILM COATED ORAL at 16:40

## 2021-07-13 RX ADMIN — IPRATROPIUM BROMIDE AND ALBUTEROL SULFATE 1 AMPULE: 2.5; .5 SOLUTION RESPIRATORY (INHALATION) at 16:33

## 2021-07-13 RX ADMIN — FOLIC ACID 1 MG: 1 TABLET ORAL at 09:17

## 2021-07-13 RX ADMIN — PANTOPRAZOLE SODIUM 40 MG: 40 TABLET, DELAYED RELEASE ORAL at 09:16

## 2021-07-13 RX ADMIN — FUROSEMIDE 40 MG: 10 INJECTION, SOLUTION INTRAMUSCULAR; INTRAVENOUS at 16:40

## 2021-07-13 RX ADMIN — FERROUS SULFATE TAB 325 MG (65 MG ELEMENTAL FE) 325 MG: 325 (65 FE) TAB at 09:17

## 2021-07-13 RX ADMIN — INSULIN GLARGINE 16 UNITS: 100 INJECTION, SOLUTION SUBCUTANEOUS at 20:31

## 2021-07-13 RX ADMIN — DOCUSATE SODIUM 50 MG AND SENNOSIDES 8.6 MG 1 TABLET: 8.6; 5 TABLET, FILM COATED ORAL at 09:17

## 2021-07-13 RX ADMIN — SODIUM CHLORIDE, PRESERVATIVE FREE 10 ML: 5 INJECTION INTRAVENOUS at 16:41

## 2021-07-13 RX ADMIN — IPRATROPIUM BROMIDE AND ALBUTEROL SULFATE 1 AMPULE: 2.5; .5 SOLUTION RESPIRATORY (INHALATION) at 13:56

## 2021-07-13 RX ADMIN — INSULIN LISPRO 1 UNITS: 100 INJECTION, SOLUTION INTRAVENOUS; SUBCUTANEOUS at 20:31

## 2021-07-13 RX ADMIN — FERROUS SULFATE TAB 325 MG (65 MG ELEMENTAL FE) 325 MG: 325 (65 FE) TAB at 16:40

## 2021-07-13 RX ADMIN — PRAVASTATIN SODIUM 20 MG: 20 TABLET ORAL at 20:30

## 2021-07-13 RX ADMIN — INSULIN LISPRO 3 UNITS: 100 INJECTION, SOLUTION INTRAVENOUS; SUBCUTANEOUS at 16:40

## 2021-07-13 RX ADMIN — TAMSULOSIN HYDROCHLORIDE 0.4 MG: 0.4 CAPSULE ORAL at 09:16

## 2021-07-13 RX ADMIN — BISACODYL 5 MG: 5 TABLET, COATED ORAL at 09:16

## 2021-07-13 RX ADMIN — DOCUSATE SODIUM 50 MG AND SENNOSIDES 8.6 MG 1 TABLET: 8.6; 5 TABLET, FILM COATED ORAL at 20:30

## 2021-07-13 RX ADMIN — OXYCODONE HYDROCHLORIDE AND ACETAMINOPHEN 500 MG: 500 TABLET ORAL at 20:32

## 2021-07-13 RX ADMIN — ALBUMIN (HUMAN) 50 G: 0.25 INJECTION, SOLUTION INTRAVENOUS at 12:37

## 2021-07-13 RX ADMIN — SODIUM CHLORIDE, PRESERVATIVE FREE 10 ML: 5 INJECTION INTRAVENOUS at 20:32

## 2021-07-13 RX ADMIN — Medication 400 MG: at 09:17

## 2021-07-13 RX ADMIN — OXYCODONE HYDROCHLORIDE AND ACETAMINOPHEN 500 MG: 500 TABLET ORAL at 09:17

## 2021-07-13 RX ADMIN — INSULIN LISPRO 2 UNITS: 100 INJECTION, SOLUTION INTRAVENOUS; SUBCUTANEOUS at 09:17

## 2021-07-13 ASSESSMENT — PAIN SCALES - GENERAL
PAINLEVEL_OUTOF10: 0
PAINLEVEL_OUTOF10: 6

## 2021-07-13 ASSESSMENT — PAIN DESCRIPTION - DESCRIPTORS: DESCRIPTORS: ACHING

## 2021-07-13 ASSESSMENT — PAIN DESCRIPTION - LOCATION: LOCATION: GENERALIZED

## 2021-07-13 NOTE — PROGRESS NOTES
Department of Internal Medicine  Nephrology Attending Consult Note    Events reviewed. SUBJECTIVE:  We are following Mr. Erika Tang for SANJIV. Reports feeling better, has no complaints.     PHYSICAL EXAM:      Vitals:    VITALS:  /70   Pulse 79   Temp 98.1 °F (36.7 °C) (Oral)   Resp 16   Ht 5' 6\" (1.676 m)   Wt 241 lb 6.4 oz (109.5 kg)   SpO2 93%   BMI 38.96 kg/m²   24HR INTAKE/OUTPUT:      Intake/Output Summary (Last 24 hours) at 7/13/2021 1027  Last data filed at 7/13/2021 1018  Gross per 24 hour   Intake 129.62 ml   Output 1950 ml   Net -1820.38 ml       Constitutional: Patient is awake alert no distress  HEENT: Pupils are equal reactive  Respiratory: Decreased breath sounds at bases  Cardiovascular/Edema: Heart sounds are irregularly irregular  Gastrointestinal: Abdomen soft  Neurologic: Nonfocal  Skin: No lesions  Other: trace edema in the thighs and sacrum    Scheduled Meds:   rivaroxaban  20 mg Oral Daily    amiodarone  200 mg Oral Daily    aspirin  81 mg Oral Daily    bisacodyl  5 mg Oral Daily    sennosides-docusate sodium  1 tablet Oral BID    magnesium hydroxide  30 mL Oral Daily    ferrous sulfate  325 mg Oral BID WC    vitamin C  500 mg Oral BID    folic acid  1 mg Oral Daily    insulin lispro  0-6 Units Subcutaneous TID WC    insulin lispro  0-3 Units Subcutaneous Nightly    bisacodyl  10 mg Rectal Daily    sodium chloride flush  10 mL Intravenous 2 times per day    magnesium oxide  400 mg Oral Daily    pantoprazole  40 mg Oral Daily    ipratropium-albuterol  1 ampule Inhalation Q4H WA    insulin glargine  0.15 Units/kg Subcutaneous Nightly    tamsulosin  0.4 mg Oral Daily    pravastatin  20 mg Oral Nightly     Continuous Infusions:  PRN Meds:.acetaminophen, oxyCODONE-acetaminophen **OR** oxyCODONE-acetaminophen, ondansetron, diphenhydrAMINE, sodium chloride flush    DATA:    CBC with Differential:    Lab Results   Component Value Date    WBC 9.6 07/13/2021    RBC 3.62 07/13/2021    HGB 11.8 07/13/2021    HCT 34.8 07/13/2021     07/13/2021    MCV 96.1 07/13/2021    MCH 32.6 07/13/2021    MCHC 33.9 07/13/2021    RDW 13.0 07/13/2021    SEGSPCT 79 10/27/2011    LYMPHOPCT 27.3 07/24/2019    MONOPCT 7.2 07/24/2019    BASOPCT 0.8 07/24/2019    MONOSABS 0.37 07/24/2019    LYMPHSABS 1.40 07/24/2019    EOSABS 0.15 07/24/2019    BASOSABS 0.04 07/24/2019     CMP:    Lab Results   Component Value Date     07/13/2021    K 4.8 07/13/2021     07/13/2021    CO2 24 07/13/2021    BUN 54 07/13/2021    CREATININE 1.4 07/13/2021    GFRAA >60 07/13/2021    LABGLOM 50 07/13/2021    GLUCOSE 238 07/13/2021    GLUCOSE 149 11/01/2011    PROT 6.5 07/07/2021    LABALBU 4.3 07/07/2021    LABALBU 3.6 11/01/2011    CALCIUM 8.7 07/13/2021    BILITOT 0.8 07/07/2021    ALKPHOS 55 07/07/2021    AST 17 07/07/2021    ALT 15 07/07/2021     Magnesium:    Lab Results   Component Value Date    MG 2.1 07/11/2021     Phosphorus:  No results found for: PHOS     Radiology Review:      Chest x-ray July 11, 2021   Stable postoperative chest with mild CHF.               BRIEF SUMMARY OF INITIAL CONSULT:    Briefly Mr. Lisa Huggins is a 68-year-old man with history of HTN, type II DM, recently diagnosed with CAD with severe triple-vessel disease by catheterization on June 16, hyperlipidemia, permanent AF s/p DCCV x2, right carotid artery stenosis, COVID-19, gout, BPH, who was admitted on July 9, 2021 for elective CABG. Post surgery his creatinine has a brisk increase from 1 to 1.6 mg/dL, although has slowly improve has not completely recover to its baseline with a most recent creatinine level 1.3 mg/dL, he has well has developed hyponatremia with a sodium level of 129, reasons for this consultation. Reported no nausea, vomiting, diarrhea. Reports having some shortness of breath.     IMPRESSION/RECOMMENDATIONS:      SANJIV stage I, CABG associated SANJIV, probably hemodynamically mediated prerenal SANJIV (cardiorenal syndrome), proBNP 8362, urine Na < 20. Renal function stable last 48 hours with fair response to Lasix 40 mg IV. Hyponatremia, probably hemodynamically mediated due to underlying heart failure? With some component of translocation due to hyperglycemia. Resolve with fluid restriction and Lasix.     Severe triple-vessel disease CAD, status post CABG x2 July 9, 2021  Mild hyperkalemia, 2/2 decreased GFR, hyperglycemia, resolved  Low bicarbonate levels,2/2 respiratory alkalosis, venous PH: 7.41 (calculated arterial 7.44)  bicarbonate levels back to normal.  HTN, BP medications on hold  Persistent AF, status post maze procedure and PORSCHE exclusion, on amiodarone drip, rivaroxaban  BPH, on tamsulosin    Plan:    Repeat Lasix 40 mg IV    Fluid restriction to 1 L  Continue low potassium diet  Better glucose control  Continue to monitor sodium levels  Continue to monitor renal function

## 2021-07-13 NOTE — PLAN OF CARE
Problem: Gas Exchange - Impaired:  Goal: Levels of oxygenation will improve  Description: Levels of oxygenation will improve  Outcome: Met This Shift     Problem: Pain:  Goal: Pain level will decrease  Description: Pain level will decrease  Outcome: Met This Shift     Problem:  Activity Intolerance:  Goal: Able to perform prescribed physical activity  Description: Able to perform prescribed physical activity  Outcome: Ongoing     Problem: Falls - Risk of:  Goal: Will remain free from falls  Description: Will remain free from falls  Outcome: Met This Shift  Goal: Absence of physical injury  Description: Absence of physical injury  Outcome: Met This Shift     Problem: Skin Integrity:  Goal: Absence of new skin breakdown  Description: Absence of new skin breakdown  Outcome: Met This Shift     Problem: Pain:  Goal: Pain level will decrease  Description: Pain level will decrease  Outcome: Met This Shift

## 2021-07-13 NOTE — CARE COORDINATION
SOCIAL WORK/CASEMANAGEMENT TRANSITION OF CARE [id number] [place], [address], [place] -[phone]): I met with pt and wife in the room this a.m. pt had not ambulated with cardiac rehab yet this a.m. and ekg was to be done in room. Pod 4 cabg x2. Waiting to see if pt will be able to go home vs c. Wife wants [facility] , referral made. Yulia/denice to follow.  KHLOE Rivera  7/13/2021

## 2021-07-13 NOTE — HOME CARE
[address] referral received. Attempted to reach patient on hospital phone, no answer. Awaiting return cll from wife to verify demos. Plan to see after discharge. Yoan Mares LPN [address].

## 2021-07-13 NOTE — PROGRESS NOTES
POD#4 Awake, alert. No complaints. Up in chair. Denies CP, palpitations, SOB at rest, dizziness/lightheadedness. Vitals:    07/13/21 0800 07/13/21 1001 07/13/21 1030 07/13/21 1051   BP: 108/70   103/65   Pulse: 79   99   Resp: 18 16     Temp: 98.1 °F (36.7 °C)   98.2 °F (36.8 °C)   TempSrc: Oral   Temporal   SpO2: 95% 93% 93%    Weight:       Height:         O2: none      Intake/Output Summary (Last 24 hours) at 7/13/2021 1323  Last data filed at 7/13/2021 1259  Gross per 24 hour   Intake 309.62 ml   Output 2475 ml   Net -2165.38 ml         +BM on 7/13    UO: 500mL/8hr         Recent Labs     07/11/21  0452 07/12/21  0611 07/13/21  0507   WBC 12.6* 11.5 9.6   HGB 12.8 12.6 11.8*   HCT 37.7 37.8 34.8*   PLT 93* 96* 117*      Recent Labs     07/12/21  0611 07/12/21  1336 07/13/21  0507   BUN 44* 49* 54*   CREATININE 1.3* 1.3* 1.4*           Telemetry: afib cvr      PE  Cardiac: irreg  Lungs: decreased bases  Chest incision with intact MAEVE DSD. Sternum stable. Prior chest tube site incisions C/D/I, no erythema with intact sutures. Epicardial pacing wires present and secure. Abd: Soft, nontender, +BS  Ext: Incisions C/D/I, approximated, no erythema, +minimal edema           A/P: POD#4  1. CAD  --Stable s/p CABG x2 (LIMA-LAD, SVG-OM), Coronary endarterectomy (OM), MAZE, LAAL, KLS plating on 7/9/2021  --Post op CRYSTAL reveals preserved EF  -- ASA/statin  --reinforce sternal precautions  --continue epicardial pacing wires        2. Acute blood loss anemia secondary to open heart surgery  --stable  - hgb 11.8        3. Afib   - Hx of afib  - s/p maze procedure and PORSCHE exclusion   - has seen EP in past with afib and hx of heart block - EP consulted  - Recs per EP: amiodarone 200mg daily with plans for EKG one week after discharge at which time BB might be initiated---NO beta blocker at this time  - Home Xarelto resumed 7/12        4.  Prolonged postoperative respiratory insufficiency  --wean oxygen to keep SpO2 greater than or equal to 92%  --continue duonebs with ezpap  --encourage C&DB, SMI  --currently on RA        5. Constipation  --resolved  --Encouraged continued increase in oral intake and activity.         6. SANJIV  - Baseline Scr 1.0   - Scr 1.4 today, yesterday 1.4  --SPA  - nephrology following        7.  Hyperkalemia/ hyponatremia  - K+ 4.8 and Na 134 today  - no EKG changes  - Nephrology following         8. Post operative deconditioning  --Increase activity as tolerated  --PT/OT  --currently ambulating 80ft        9.  Thrombocytopenia  --no s/s of active bleeding  - Plts slowly improving, today 117K           DVT prophylaxis:  --continue bilateral knee high BLANCA hose  --continue PCDs  --continue progressive ambulation  --Xarelto         Dispo: home vs. Rehab pending progression in activity level       This patient's case and care plan was discussed with the attending surgeon

## 2021-07-13 NOTE — PROGRESS NOTES
Educated about Fluid restriction. Patient understands the importance. No questions and this time. Verbally understands.

## 2021-07-13 NOTE — PATIENT CARE CONFERENCE
[facility] Quality Flow/Interdisciplinary Rounds Progress Note        Quality Flow Rounds held on July 13, 2021    Disciplines Attending:  Bedside Nurse, ,  and Nursing Unit Leadership    Ken Gonzalez was admitted on 7/9/2021  5:11 AM    Anticipated Discharge Date:  Expected Discharge Date: 07/14/21    Disposition:    Davion Score:  Davion Scale Score: 18    Readmission Risk              Risk of Unplanned Readmission:  14           Discussed patient goal for the day, patient clinical progression, and barriers to discharge.   The following Goal(s) of the Day/Commitment(s) have been identified:  Labs - Report Results and Medications - Obtain Order to Convert IV to [address], RN  July 13, 2021

## 2021-07-13 NOTE — PROGRESS NOTES
Messaged Maury Hernandez re: clarification about beta blockers. Maury Hernandez states will do an EKG in 1 week and then EP will evaluated a beta blocker.              Leia Perrin RN

## 2021-07-14 LAB
ANION GAP SERPL CALCULATED.3IONS-SCNC: 11 MMOL/L (ref 7–16)
BUN BLDV-MCNC: 48 MG/DL (ref 6–23)
CALCIUM SERPL-MCNC: 8.7 MG/DL (ref 8.6–10.2)
CHLORIDE BLD-SCNC: 100 MMOL/L (ref 98–107)
CO2: 25 MMOL/L (ref 22–29)
CREAT SERPL-MCNC: 1.1 MG/DL (ref 0.7–1.2)
GFR AFRICAN AMERICAN: >60
GFR NON-AFRICAN AMERICAN: >60 ML/MIN/1.73
GLUCOSE BLD-MCNC: 177 MG/DL (ref 74–99)
HCT VFR BLD CALC: 35.8 % (ref 37–54)
HEMOGLOBIN: 12.1 G/DL (ref 12.5–16.5)
MCH RBC QN AUTO: 32.8 PG (ref 26–35)
MCHC RBC AUTO-ENTMCNC: 33.8 % (ref 32–34.5)
MCV RBC AUTO: 97 FL (ref 80–99.9)
METER GLUCOSE: 178 MG/DL (ref 74–99)
METER GLUCOSE: 183 MG/DL (ref 74–99)
METER GLUCOSE: 192 MG/DL (ref 74–99)
METER GLUCOSE: 239 MG/DL (ref 74–99)
PDW BLD-RTO: 13.4 FL (ref 11.5–15)
PLATELET # BLD: 117 E9/L (ref 130–450)
PMV BLD AUTO: 10.9 FL (ref 7–12)
POTASSIUM SERPL-SCNC: 4.3 MMOL/L (ref 3.5–5)
RBC # BLD: 3.69 E12/L (ref 3.8–5.8)
SODIUM BLD-SCNC: 136 MMOL/L (ref 132–146)
WBC # BLD: 8.7 E9/L (ref 4.5–11.5)

## 2021-07-14 PROCEDURE — 93798 PHYS/QHP OP CAR RHAB W/ECG: CPT

## 2021-07-14 PROCEDURE — 36415 COLL VENOUS BLD VENIPUNCTURE: CPT

## 2021-07-14 PROCEDURE — 6370000000 HC RX 637 (ALT 250 FOR IP): Performed by: THORACIC SURGERY (CARDIOTHORACIC VASCULAR SURGERY)

## 2021-07-14 PROCEDURE — 80048 BASIC METABOLIC PNL TOTAL CA: CPT

## 2021-07-14 PROCEDURE — 94640 AIRWAY INHALATION TREATMENT: CPT

## 2021-07-14 PROCEDURE — 2140000000 HC CCU INTERMEDIATE R&B

## 2021-07-14 PROCEDURE — 97162 PT EVAL MOD COMPLEX 30 MIN: CPT

## 2021-07-14 PROCEDURE — 97530 THERAPEUTIC ACTIVITIES: CPT

## 2021-07-14 PROCEDURE — 6370000000 HC RX 637 (ALT 250 FOR IP): Performed by: NURSE PRACTITIONER

## 2021-07-14 PROCEDURE — 85027 COMPLETE CBC AUTOMATED: CPT

## 2021-07-14 PROCEDURE — 97165 OT EVAL LOW COMPLEX 30 MIN: CPT

## 2021-07-14 PROCEDURE — 6370000000 HC RX 637 (ALT 250 FOR IP): Performed by: PHYSICIAN ASSISTANT

## 2021-07-14 PROCEDURE — 2580000003 HC RX 258: Performed by: THORACIC SURGERY (CARDIOTHORACIC VASCULAR SURGERY)

## 2021-07-14 PROCEDURE — 82962 GLUCOSE BLOOD TEST: CPT

## 2021-07-14 PROCEDURE — 6370000000 HC RX 637 (ALT 250 FOR IP): Performed by: INTERNAL MEDICINE

## 2021-07-14 PROCEDURE — 97535 SELF CARE MNGMENT TRAINING: CPT

## 2021-07-14 RX ORDER — PANTOPRAZOLE SODIUM 40 MG/1
40 TABLET, DELAYED RELEASE ORAL DAILY
Qty: 14 TABLET | Refills: 0
Start: 2021-07-15 | End: 2021-09-21 | Stop reason: ALTCHOICE

## 2021-07-14 RX ORDER — AMIODARONE HYDROCHLORIDE 200 MG/1
200 TABLET ORAL DAILY
Qty: 14 TABLET | Refills: 0
Start: 2021-07-15 | End: 2021-08-16 | Stop reason: CLARIF

## 2021-07-14 RX ORDER — ASCORBIC ACID 500 MG
500 TABLET ORAL 2 TIMES DAILY
Qty: 60 TABLET | Refills: 0
Start: 2021-07-14 | End: 2021-08-16 | Stop reason: CLARIF

## 2021-07-14 RX ORDER — HYDROCODONE BITARTRATE AND ACETAMINOPHEN 5; 325 MG/1; MG/1
1 TABLET ORAL EVERY 4 HOURS PRN
Qty: 42 TABLET | Refills: 0 | Status: SHIPPED | OUTPATIENT
Start: 2021-07-14 | End: 2021-07-21

## 2021-07-14 RX ORDER — BUMETANIDE 1 MG/1
1 TABLET ORAL DAILY
Qty: 7 TABLET | Refills: 0
Start: 2021-07-15 | End: 2021-07-27 | Stop reason: ALTCHOICE

## 2021-07-14 RX ORDER — BUMETANIDE 1 MG/1
1 TABLET ORAL DAILY
Status: DISCONTINUED | OUTPATIENT
Start: 2021-07-14 | End: 2021-07-15 | Stop reason: HOSPADM

## 2021-07-14 RX ORDER — SENNA AND DOCUSATE SODIUM 50; 8.6 MG/1; MG/1
1 TABLET, FILM COATED ORAL 2 TIMES DAILY PRN
Qty: 30 TABLET | Refills: 0
Start: 2021-07-14 | End: 2021-08-13

## 2021-07-14 RX ORDER — FOLIC ACID 1 MG/1
1 TABLET ORAL DAILY
Qty: 30 TABLET | Refills: 0
Start: 2021-07-15 | End: 2022-08-29

## 2021-07-14 RX ORDER — FERROUS SULFATE 325(65) MG
325 TABLET ORAL 2 TIMES DAILY WITH MEALS
Qty: 60 TABLET | Refills: 0
Start: 2021-07-14 | End: 2021-08-16 | Stop reason: CLARIF

## 2021-07-14 RX ADMIN — PANTOPRAZOLE SODIUM 40 MG: 40 TABLET, DELAYED RELEASE ORAL at 09:12

## 2021-07-14 RX ADMIN — TAMSULOSIN HYDROCHLORIDE 0.4 MG: 0.4 CAPSULE ORAL at 09:13

## 2021-07-14 RX ADMIN — Medication 400 MG: at 09:12

## 2021-07-14 RX ADMIN — OXYCODONE HYDROCHLORIDE AND ACETAMINOPHEN 500 MG: 500 TABLET ORAL at 20:55

## 2021-07-14 RX ADMIN — IPRATROPIUM BROMIDE AND ALBUTEROL SULFATE 1 AMPULE: 2.5; .5 SOLUTION RESPIRATORY (INHALATION) at 20:02

## 2021-07-14 RX ADMIN — INSULIN LISPRO 1 UNITS: 100 INJECTION, SOLUTION INTRAVENOUS; SUBCUTANEOUS at 11:54

## 2021-07-14 RX ADMIN — OXYCODONE HYDROCHLORIDE AND ACETAMINOPHEN 500 MG: 500 TABLET ORAL at 09:12

## 2021-07-14 RX ADMIN — FERROUS SULFATE TAB 325 MG (65 MG ELEMENTAL FE) 325 MG: 325 (65 FE) TAB at 09:12

## 2021-07-14 RX ADMIN — PRAVASTATIN SODIUM 20 MG: 20 TABLET ORAL at 20:55

## 2021-07-14 RX ADMIN — FERROUS SULFATE TAB 325 MG (65 MG ELEMENTAL FE) 325 MG: 325 (65 FE) TAB at 16:51

## 2021-07-14 RX ADMIN — MAGNESIUM HYDROXIDE 30 ML: 2400 SUSPENSION ORAL at 09:12

## 2021-07-14 RX ADMIN — ASPIRIN 81 MG: 81 TABLET, COATED ORAL at 09:12

## 2021-07-14 RX ADMIN — SODIUM CHLORIDE, PRESERVATIVE FREE 10 ML: 5 INJECTION INTRAVENOUS at 20:56

## 2021-07-14 RX ADMIN — INSULIN LISPRO 1 UNITS: 100 INJECTION, SOLUTION INTRAVENOUS; SUBCUTANEOUS at 20:56

## 2021-07-14 RX ADMIN — DOCUSATE SODIUM 50 MG AND SENNOSIDES 8.6 MG 1 TABLET: 8.6; 5 TABLET, FILM COATED ORAL at 20:55

## 2021-07-14 RX ADMIN — INSULIN GLARGINE 16 UNITS: 100 INJECTION, SOLUTION SUBCUTANEOUS at 20:55

## 2021-07-14 RX ADMIN — INSULIN LISPRO 1 UNITS: 100 INJECTION, SOLUTION INTRAVENOUS; SUBCUTANEOUS at 16:51

## 2021-07-14 RX ADMIN — DOCUSATE SODIUM 50 MG AND SENNOSIDES 8.6 MG 1 TABLET: 8.6; 5 TABLET, FILM COATED ORAL at 09:12

## 2021-07-14 RX ADMIN — IPRATROPIUM BROMIDE AND ALBUTEROL SULFATE 1 AMPULE: 2.5; .5 SOLUTION RESPIRATORY (INHALATION) at 13:08

## 2021-07-14 RX ADMIN — IPRATROPIUM BROMIDE AND ALBUTEROL SULFATE 1 AMPULE: 2.5; .5 SOLUTION RESPIRATORY (INHALATION) at 16:58

## 2021-07-14 RX ADMIN — SODIUM CHLORIDE, PRESERVATIVE FREE 10 ML: 5 INJECTION INTRAVENOUS at 09:11

## 2021-07-14 RX ADMIN — BISACODYL 5 MG: 5 TABLET, COATED ORAL at 09:13

## 2021-07-14 RX ADMIN — IPRATROPIUM BROMIDE AND ALBUTEROL SULFATE 1 AMPULE: 2.5; .5 SOLUTION RESPIRATORY (INHALATION) at 08:37

## 2021-07-14 RX ADMIN — RIVAROXABAN 20 MG: 20 TABLET, FILM COATED ORAL at 17:44

## 2021-07-14 RX ADMIN — INSULIN LISPRO 1 UNITS: 100 INJECTION, SOLUTION INTRAVENOUS; SUBCUTANEOUS at 07:10

## 2021-07-14 RX ADMIN — FOLIC ACID 1 MG: 1 TABLET ORAL at 09:12

## 2021-07-14 RX ADMIN — BUMETANIDE 1 MG: 1 TABLET ORAL at 11:54

## 2021-07-14 RX ADMIN — AMIODARONE HYDROCHLORIDE 200 MG: 200 TABLET ORAL at 09:13

## 2021-07-14 ASSESSMENT — PAIN SCALES - GENERAL
PAINLEVEL_OUTOF10: 0
PAINLEVEL_OUTOF10: 0

## 2021-07-14 NOTE — PLAN OF CARE
Problem: Discharge Planning:  Goal: Discharged to appropriate level of care  Description: Discharged to appropriate level of care  Outcome: Met This Shift     Problem: Gas Exchange - Impaired:  Goal: Levels of oxygenation will improve  Description: Levels of oxygenation will improve  Outcome: Met This Shift     Problem: Gas Exchange - Impaired:  Goal: Ability to maintain adequate ventilation will improve  Description: Ability to maintain adequate ventilation will improve  Outcome: Met This Shift     Problem: Pain:  Goal: Pain level will decrease  Description: Pain level will decrease  Outcome: Met This Shift     Problem: Activity Intolerance:  Goal: Able to perform prescribed physical activity  Description: Able to perform prescribed physical activity  Outcome: Met This Shift     Problem:  Activity Intolerance:  Goal: Ability to tolerate increased activity will improve  Description: Ability to tolerate increased activity will improve  Outcome: Met This Shift

## 2021-07-14 NOTE — CARE COORDINATION
SOCIAL WORK/CASEMANAGEMENT TRANSITION OF CARE [id number] [place], [address], [place], [phone]): met with pt and wife this a.m. ctvs feels pt needs to go to rehab. Pt is refusing to go to [facility]. Wife said pt will agree to go to aru here and dr. Lang Mcguire is to come and see pt as he did and pt told him he walked independently around the unit and was improving. The wife and I spoke and she feels pt needs to go to rehab. We agreed to a referral to [facility] and they have accepted pt for today after 5 p.m under click six. KHLOE Hyman  7/14/2021  I met with pt, wife and ctvs and went over with pt again need for [facility]. He refused to go today and wants to talk with his family. [facility] will have the bed thru tomorrow. Left all discharge paper work, procotol and exempt in soft chart with a rapid covid.  KHLOE Hyman  .7/14/2021

## 2021-07-14 NOTE — PROGRESS NOTES
Physical Therapy  Physical Therapy Initial Assessment     Name: Cindy Telles  : 1950  MRN: [id number]      Date of Service: 2021    Evaluating PT:  Noah Adame, PT, DPT [id number]    Room #:  7829/7990-Z  Diagnosis:  CAD in native artery [I25.10]  PMHx/PSHx:  HTN, DM, MI, Gout, HLD, Afib, CAD  Procedure/Surgery:  CABGx2 on   Precautions:  Falls, Sternal precuations  Equipment Needs:  n/a    SUBJECTIVE:    Pt lives with wife in 1 story home with 1+1 steps to enter. Pt previously independent with functional mobility and ADL activity prior to admission. OBJECTIVE:   Initial Evaluation  Date: 2021 Treatment Short Term/ Long Term   Goals   AM-PAC 6 Clicks 51/75     Was pt agreeable to Eval/treatment? yes     Does pt have pain? Sternal pain, 6/10 with activity     Bed Mobility  Rolling: NT  Supine to sit: NT  Sit to supine: NT  Scooting: NT  Rolling: mod I  Supine to sit: mod I  Sit to supine: mod I  Scooting: mod I   Transfers Sit to stand: Tyra  Stand to sit: Tyra  Stand pivot: Tyra  Sit to stand: mod I  Stand to sit: mod I  Stand pivot: mod I   Ambulation   45'x2  25'x2, no AD, Tyra  150', no AD, mod I   Stair negotiation: ascended and descended NT  2 steps, no HR, mod I   ROM BUE:  WFL to 90 degrees  BLE:  WFL     Strength BUE:  NT 2/2 sternal precautions  BLE:  WFL  n/a   Balance Sitting EOB:  Sup sitting edge of seat unsupported  Dynamic Standing:  Tyra  Sitting EOB:  Mod I  Dynamic Standing: Mod I     Pt is A & O x 3, to person, place, and time. Sensation:  Pt denies numbness and tingling to extremities  Edema:  unremarkable    Vitals: spo2 > 90% on room air, HR peaking in 110's during amb    Patient education  Pt educated on role of PT, tx, amb, balance, sternal precautions, current status and POC, d/c planning.      Patient response to education:   Pt verbalized understanding Pt demonstrated skill Pt requires further education in this area   yes partial All areas ASSESSMENT:    Conditions Requiring Skilled Therapeutic Intervention:    []Decreased strength     []Decreased ROM  [x]Decreased functional mobility  [x]Decreased balance   [x]Decreased endurance   []Decreased posture  []Decreased sensation  []Decreased coordination   []Decreased vision  [x]Decreased safety awareness   [x]Increased pain       Comments:  Pt seated in bedside chair with cruz and temporary pacer wires intact, NAD. RN clearance prior to session. Educated pt on sternal precautions prior to mobility, occ cueing provided throughout activity to maintain compliance. Pt demonstrates deficits in functional strength, balance, and endurance compared to baseline. Pt requires physical assist to complete STS and stand pivot tx, steadying assist required to amb. Pt having to stop and take standing rest breaks x3 and seated rest break x1 during amb in hallway 2/2 fatigue and SOB. Pt demo'd 1 episode of mild knee buckling 2/2 fatigue but no overt LOB. Pt would benefit from continued therapy services to address deficits listed, working towards goals mentioned above. Treatment:  Patient practiced and was instructed in the following treatment:     STS- x3 during session, Tyra to assist, verbal cueing for set up scooting hips towards edge of seat and to maintain compliance with sternal precautions   Gaits- 45'x2, 25'x2, Tyra to steady, sig increased time and effort to complete, standing and seated rest breaks 2/2 SOB and fatigue    Pt's/ family goals   1. Get stronger    Prognosis is good for reaching above PT goals. Patient and or family understand(s) diagnosis, prognosis, and plan of care.   yes    PHYSICAL THERAPY PLAN OF CARE:    PT POC is established based on physician order and patient diagnosis     Referring provider/PT Order:    Start   Ordering Provider    07/14/21 0900  PT eval and treat Start: 07/14/21 0900, End: 07/14/21 0900, ONE TIME, Standing Count: 1 Occurrences, R      Herman Reynaga, APRN - CNP        Diagnosis:  CAD in native artery [I25.10]  Specific instructions for next treatment:  [place] amb distance    Current Treatment Recommendations:     [x] Strengthening to improve independence with functional mobility   [] ROM to improve independence with functional mobility   [x] Balance Training to improve static/dynamic balance and to reduce fall risk  [x] Endurance Training to improve activity tolerance during functional mobility   [x] Transfer Training to improve safety and independence with all functional transfers   [x] Gait Training to improve gait mechanics, endurance and assess need for appropriate assistive device  [x] Stair Training in preparation for safe discharge home and/or into the community   [] Positioning to prevent skin breakdown and contractures  [x] Safety and Education Training   [] Patient/Caregiver Education   [] HEP  [] Other     PT long term treatment goals are located in above grid    Frequency of treatments: 2-5x/week x 1-2 weeks. Time in  0900  Time out  0940    Total Treatment Time 25 minutes     Evaluation Time includes thorough review of current medical information, gathering information on past medical history/social history and prior level of function, completion of standardized testing/informal observation of tasks, assessment of data and education on plan of care and goals.     CPT codes:  [] Low Complexity PT evaluation 80061  [x] Moderate Complexity PT evaluation 23903  [] High Complexity PT evaluation 36608  [] PT Re-evaluation 61287  [] Gait training 21630 0 minutes  [] Manual therapy 07913 0 minutes  [x] Therapeutic activities 87380 25 minutes  [] Therapeutic exercises 22224 0 minutes  [] Neuromuscular reeducation 32246 0 minutes     Twyla Smart, PT, DPT  [id number]

## 2021-07-14 NOTE — PROGRESS NOTES
POD#5 Awake, alert. No complaints. Denies CP, palpitations, SOB at rest, dizziness/lightheadedness. Vitals:    07/14/21 0337 07/14/21 0624 07/14/21 0755 07/14/21 0837   BP: 112/67 111/72     Pulse: 78 76     Resp: 20 16     Temp: 98.7 °F (37.1 °C) 96.8 °F (36 °C)     TempSrc: Oral Temporal     SpO2: 97% 94% 99% 96%   Weight: 237 lb 2 oz (107.6 kg)      Height:         O2: none      Intake/Output Summary (Last 24 hours) at 7/14/2021 1007  Last data filed at 7/14/2021 1001  Gross per 24 hour   Intake 1000 ml   Output 2375 ml   Net -1375 ml         +BM on 7/13    UO: 500mL/8hr       Recent Labs     07/12/21  0611 07/13/21  0507 07/14/21  0636   WBC 11.5 9.6 8.7   HGB 12.6 11.8* 12.1*   HCT 37.8 34.8* 35.8*   PLT 96* 117* 117*      Recent Labs     07/12/21  1336 07/13/21  0507 07/14/21  0636   BUN 49* 54* 48*   CREATININE 1.3* 1.4* 1.1       Telemetry: afib cvr        PE  Cardiac: irreg  Lungs: decreased bases  Chest incision with intact MAEVE DSD. Sternum stable. Prior chest tube site incisions C/D/I, no erythema with intact sutures. Epicardial pacing wires present and secure. Abd: Soft, nontender, +BS  Ext: Incisions C/D/I, approximated, no erythema, +minimal edema               A/P: POD#5  1. CAD  --Stable s/p CABG x2 (LIMA-LAD, SVG-OM), Coronary endarterectomy (OM), MAZE, LAAL, KLS plating on 7/9/2021  --Post op CRYSTAL reveals preserved EF  -- ASA/statin  --reinforce sternal precautions  --Epicardial pacing wires cut without difficulty. Patient tolerated well        2. Acute blood loss anemia secondary to open heart surgery  --stable  - hgb 12.1        3. Afib   - Hx of afib  - s/p maze procedure and PORSCHE exclusion   - has seen EP in past with afib and hx of heart block - EP consulted  - Recs per EP: amiodarone 200mg daily with plans for EKG one week after discharge at which time BB might be initiated---NO beta blocker at this time  - Home Xarelto resumed 7/12        4.  Prolonged postoperative respiratory insufficiency  --wean oxygen to keep SpO2 greater than or equal to 92%  --continue duonebs with ezpap  --encourage C&DB, SMI  --currently on RA        5. Constipation  --resolved  --Encouraged continued increase in oral intake and activity.         6. SANJIV  - Baseline Scr 1.0   - Scr 1.1 today, yesterday 1.4  --SPA  - nephrology following        7.  Hyperkalemia/ hyponatremia  - K+ 4.3 and Na 136 today  - no EKG changes  - Nephrology following         8. Post operative deconditioning  --Increase activity as tolerated  --PT/OT  --currently ambulating 100ft        9. Thrombocytopenia  --no s/s of active bleeding  - Plts slowly improving, today 117K        10.  Urinary retention  --required re-insertion of cruz catheter on 7/12  --continue flomax  --plan for voiding trial once more ambulatory--will plan for tomorrow AM         DVT prophylaxis:  --continue bilateral knee high BLANCA hose  --continue PCDs  --continue progressive ambulation  --Xarelto         Dispo: home vs. Rehab pending progression in activity level--plan for dc tomorrow--if he can reach 400ft home, otherwise rehab--discussed with patient and wife present         This patient's case and care plan was discussed with the attending surgeon

## 2021-07-14 NOTE — PROGRESS NOTES
HGB 12.1 07/14/2021    HCT 35.8 07/14/2021     07/14/2021    MCV 97.0 07/14/2021    MCH 32.8 07/14/2021    MCHC 33.8 07/14/2021    RDW 13.4 07/14/2021    SEGSPCT 79 10/27/2011    LYMPHOPCT 27.3 07/24/2019    MONOPCT 7.2 07/24/2019    BASOPCT 0.8 07/24/2019    MONOSABS 0.37 07/24/2019    LYMPHSABS 1.40 07/24/2019    EOSABS 0.15 07/24/2019    BASOSABS 0.04 07/24/2019     CMP:    Lab Results   Component Value Date     07/14/2021    K 4.3 07/14/2021     07/14/2021    CO2 25 07/14/2021    BUN 48 07/14/2021    CREATININE 1.1 07/14/2021    GFRAA >60 07/14/2021    LABGLOM >60 07/14/2021    GLUCOSE 177 07/14/2021    GLUCOSE 149 11/01/2011    PROT 6.5 07/07/2021    LABALBU 4.3 07/07/2021    LABALBU 3.6 11/01/2011    CALCIUM 8.7 07/14/2021    BILITOT 0.8 07/07/2021    ALKPHOS 55 07/07/2021    AST 17 07/07/2021    ALT 15 07/07/2021     Magnesium:    Lab Results   Component Value Date    MG 2.1 07/11/2021     Phosphorus:  No results found for: PHOS     Radiology Review:      Chest x-ray July 11, 2021   Stable postoperative chest with mild CHF.               BRIEF SUMMARY OF INITIAL CONSULT:    Briefly Mr. Chau Hartmann is a 69-year-old man with history of HTN, type II DM, recently diagnosed with CAD with severe triple-vessel disease by catheterization on June 16, hyperlipidemia, permanent AF s/p DCCV x2, right carotid artery stenosis, COVID-19, gout, BPH, who was admitted on July 9, 2021 for elective CABG. Post surgery his creatinine has a brisk increase from 1 to 1.6 mg/dL, although has slowly improve has not completely recover to its baseline with a most recent creatinine level 1.3 mg/dL, he has well has developed hyponatremia with a sodium level of 129, reasons for this consultation. Reported no nausea, vomiting, diarrhea. Reports having some shortness of breath. Problems resolved:    Hyponatremia, probably hemodynamically mediated due to underlying heart failure?   With some component of translocation due to hyperglycemia. Resolve with fluid restriction and Lasix. Mild hyperkalemia, 2/2 decreased GFR, hyperglycemia, resolved  Low bicarbonate levels,2/2 respiratory alkalosis, venous PH: 7.41 (calculated arterial 7.44)  bicarbonate levels back to normal.    IMPRESSION/RECOMMENDATIONS:      SANJIV stage I, CABG associated SANJIV, probably hemodynamically mediated prerenal SANJIV (cardiorenal syndrome), proBNP 8362, urine Na < 20.  Resolved. Renal function has continued to improve with adequate response to Lasix.     HFpEF 64%, proBNP 8362  Severe triple-vessel disease CAD, status post CABG x2 July 9, 2021  HTN, BP medications on hold  Persistent AF, status post maze procedure and PORSCHE exclusion, on amiodarone, rivaroxaban  BPH, on tamsulosin    Plan:    Start Bumex 1 mg daily  Continue fluid restriction to 1 L  Continue low potassium diet  Continue to monitor renal function

## 2021-07-14 NOTE — PROGRESS NOTES
Pt. Wants another day to think about going to [facility]. Was made aware that bed will be available tomorrow for pt. Pt. Wants to speak with family and decide what he wants to do.

## 2021-07-14 NOTE — PLAN OF CARE
Problem: Discharge Planning:  Goal: Discharged to appropriate level of care  Description: Discharged to appropriate level of care  Outcome: Met This Shift     Problem: Gas Exchange - Impaired:  Goal: Levels of oxygenation will improve  Description: Levels of oxygenation will improve  7/14/2021 0925 by Lien Talbert RN  Outcome: Met This Shift     Problem: Gas Exchange - Impaired:  Goal: Ability to maintain adequate ventilation will improve  Description: Ability to maintain adequate ventilation will improve  7/14/2021 0925 by Lien Talbert RN  Outcome: Met This Shift     Problem: Pain:  Goal: Pain level will decrease  Description: Pain level will decrease  7/14/2021 0925 by Lien Talbert RN  Outcome: Met This Shift

## 2021-07-14 NOTE — CONSULTS
[address]                  [address] [facility]  [facility]                                  CONSULTATION    PATIENT NAME: Kathie Khanna                    :        1950  MED REC NO:   [id number]                            ROOM:       4528  ACCOUNT NO:   [de-identified]                           ADMIT DATE: 2021  PROVIDER:     Margaret Montes MD    CONSULT DATE:  2021    CHIEF COMPLAINT:  Weakness. HISTORY OF PRESENT ILLNESS:  The patient had coronary artery disease. He failed attempts at stenting and also had problems with cardiac  arrhythmia. He was admitted to [facility] on 2021 and had  coronary artery bypass grafting. He tolerated the surgery well. Postop, he developed some renal impairment, although that has  stabilized. He has been evaluated by Therapy. For OT, he is at a  minimal assist for his upper body ADLs, but mod to max for his lowers. For Physical Therapy, he was ambulating about 45 feet with minimal  assist and I was asked to see him for planning further rehab. PAST MEDICAL HISTORY:  1. Hypertension. 2.  Gout. 3.  Type 2 diabetes mellitus. 4.  Atrial fibrillation. ALLERGIES:  ADHESIVE TAPE, CRESTOR, and LIPITOR. CURRENT MEDICATIONS:  Cordarone, aspirin, Bumex, iron, folic acid, short  and long-acting insulin, Protonix, Pravachol, and Xarelto with Percocet  as needed for pain. SOCIAL HISTORY:  He lives with his wife in a one-story home. REVIEW OF SYSTEMS:  Negative for prior HEENT problems. Negative for  prior pulmonary problems. Cardiac is positive for atrial fib and chest  pain. GI and  were negative. Orthopedic is negative. Neurologic is  negative. PHYSICAL EXAMINATION:  GENERAL:  Well-nourished, well-developed white male in no acute  distress. HEAD:  Normocephalic and atraumatic. EYES:  Pupils equal, round, and reactive to light.   Pharynx normal.  LUNGS:  Clear to P and A. HEART:  Regular rate and rhythm. S1, S2 normal.  No murmurs or gallops  appreciated. ABDOMEN:  Bowel sounds normal.  Soft and nontender. No masses. EXTREMITIES:  2+ edema. MENTAL STATUS:  Alert and oriented. SENSATION:  Intact throughout. NEUROMUSCULAR:  4+/5 strength throughout, just limited due to some pain  at the sternum. PROBLEM LIST:  1. Cardiac debility. 2.  Status post coronary artery bypass grafting. 3.  Hypertension. 4.  Type 2 diabetes mellitus. 5.  Atrial fibrillation. RECOMMENDATIONS:  Initially, I thought the patient would be a potential  candidate for acute rehab based on the therapy notes I am seeing. He  tells me, however, that in a later session with _____, he was able to  walk a longer distance and he seems to be getting his strength and  endurance back. If he is better,then his previous therapy eval, then I  think he is probably going to be too good to meet insurance criteria for  coming to acute rehab. I will check the next set of therapy notes, but  at this point, I do not think he would qualify if he is progressing the  way he described to me.         Jaky Rodriguez MD    D: 07/14/2021 13:00:37       T: 07/14/2021 13:08:28     [email or web address]  Job#: [id number]     Doc#: [id number]    CC:

## 2021-07-14 NOTE — PATIENT CARE CONFERENCE
[facility] Quality Flow/Interdisciplinary Rounds Progress Note        Quality Flow Rounds held on July 14, 2021    Disciplines Attending:  Bedside Nurse, ,  and Nursing Unit Leadership    Edilberto Matson was admitted on 7/9/2021  5:11 AM    Anticipated Discharge Date:  Expected Discharge Date: 07/16/21    Disposition:    Davion Score:  Davion Scale Score: 18    Readmission Risk              Risk of Unplanned Readmission:  14           Discussed patient goal for the day, patient clinical progression, and barriers to discharge. The following Goal(s) of the Day/Commitment(s) have been identified:   To continue to have patient  Work with therapy      Oksana Flores RN  July 14, 2021

## 2021-07-14 NOTE — DISCHARGE INSTR - COC
completed shifts: In: 700 [P.O.:580; I.V.:120]  Out: 2375 [Urine:2375]    Safety Concerns: At Risk for Falls and sternal precautions    Impairments/Disabilities:      Vision    Nutrition Therapy:  Current Nutrition Therapy:   - Oral Diet:  Carb Control 5 carbs/meal (2000kcals/day) and Cardiac    Routes of Feeding: Oral  Liquids: No Restrictions  Daily Fluid Restriction: no  Last Modified Barium Swallow with Video (Video Swallowing Test): not done    Treatments at the Time of Hospital Discharge:   Respiratory Treatments:   Oxygen Therapy:  is not on home oxygen therapy. Ventilator:    - No ventilator support    Rehab Therapies:  PT and OT to eval and treat   Weight Bearing Status/Restrictions: No weight bearing restirctions  Other Medical Equipment (for information only, NOT a DME order):     Other Treatments: please follow the cardiac rehab protocol enclosed!!!    Patient's personal belongings (please select all that are sent with patient):  Azalia    RN SIGNATURE:  Electronically signed by Rahat Rizvi RN on 7/15/21 at 11:22 AM EDT    CASE MANAGEMENT/SOCIAL WORK SECTION    Inpatient Status Date: ***    Readmission Risk Assessment Score:  Readmission Risk              Risk of Unplanned Readmission:  14           Discharging to Facility/ Agency   · Name: [facility]   · Address:  · Phone:  · Fax:    Dialysis Facility (if applicable)   · Name:  · Address:  · Dialysis Schedule:  · Phone:  · Fax:    / signature: Electronically signed by KHLOE Murillo on 7/14/2021 at 2:45 PM      PHYSICIAN SECTION    Prognosis: {Prognosis:9857590126}    Condition at Discharge: [facility] Patient Condition:[id number]}    Rehab Potential (if transferring to Rehab): {Prognosis:0651444928}    Recommended Labs or Other Treatments After Discharge: ***    Physician Certification: I certify the above information and transfer of Kierra Parlor  is necessary for the continuing treatment of the diagnosis listed and that he requires [facility] for less 30 days.      Update Admission H&P: Changes in H&P as follows - see todays progress note    PHYSICIAN SIGNATURE:  Electronically signed by RAMAN Zarate on 7/14/21 at 3:39 PM EDT

## 2021-07-14 NOTE — PROGRESS NOTES
Occupational Therapy  OCCUPATIONAL THERAPY INITIAL EVALUATION    [facility] [address] [address]      Date:2021                                                Patient Name: Edilberto Matson  MRN: [id number]  : 1950  Room: [address] [place] [address]    Evaluating OT: Rena Barrientos OTR/L #[id number]     Referring Provider: LINNETTE Grant CNP  Specific Provider Orders/Date: OT eval and treat 21    Diagnosis: CAD in native artery [I25.10]   Pt admitted to hospital for scheduled surgery     Surgery / Procedure: 21 CABG x2 (LIMA-LAD, SVG-OM), Coronary endarterectomy (OM), MAZE, LAAL, KLS plating     Pertinent Medical History:  has a past medical history of A-fib ([address] CAD (coronary artery disease), Carotid stenosis, right, Chronic venous insufficiency, COVID-19, Decreased pulses in feet, Diabetes mellitus ([address] DM (diabetes mellitus), type 2 ([address] ED (erectile dysfunction), Gout, Heart attack ([address] HTN (hypertension), Hyperlipidemia, Hypertension, Lateral myocardial infarction ([address] and Leg swelling.        Precautions:  Fall Risk, sternal precautions    Assessment of current deficits    [x] Functional mobility  [x]ADLs  [x] Strength               []Cognition    [x] Functional transfers   [x] IADLs         [x] Safety Awareness   [x]Endurance    [] Fine Coordination              [x] Balance      [] Vision/perception   []Sensation     []Gross Motor Coordination  [] ROM  [] Delirium                   [] Motor Control     OT PLAN OF CARE   OT POC based on physician orders, patient diagnosis and results of clinical assessment    Frequency/Duration 1-3 days/wk for 2 weeks PRN   Specific OT Treatment Interventions to include:   * Instruction/training on adapted ADL techniques and AE recommendations to increase functional independence within precautions       * Training on energy conservation strategies, correct breathing pattern and techniques to supine: NT   Supine to sit: Modified [place]   Sit to supine: Modified [place]    Functional Transfers Minimal Assist   Modified [place]    Functional Mobility Minimal Assist     Ambulated in room including to/from bathroom without AD; + unsteadiness (O2 sats greater than 94%)  Modified [place]    Balance Sitting:     Static:  SBA    Dynamic:SBA  Standing: min A     Activity Tolerance F-    Limited due to SOB and fatigue;  F+   Visual/  Perceptual Glasses: yes  wfl                  Hand Dominance left   Strength ROM Additional Info:    RUE  Wfl, formal MMT deferred due to sternal precautions  wfl (within sternal precautions) good  and wfl FMC/dexterity noted during ADL tasks     LUE Wfl, formal MMT deferred due to sternal precautions  wfl (within sternal precautions) good  and wfl FMC/dexterity noted during ADL tasks     Sensation:wfl  Tone: wfl  Edema:none noted      Comments: Upon arrival patient seated in chair and agreeable to OT Session - wife present. Therapist educated pt on role of OT and sternal precautions. At end of session, patient seated in chair with call light and phone within reach, all lines intact. Overall patient demonstrated decreased independence and safety during completion of ADL/functional transfer/mobility tasks. Pt would benefit from continued skilled OT to increase safety and independence with completion of ADL/IADL tasks for functional independence and quality of life. Treatment: OT treatment provided this date includes: Therapist educated pt on role of OT and sternal precautions. Therapist facilitated functional transfers (various surfaces; chair and toilet), standing tolerance tasks (addressing posture, balance and activity tolerance) and functional ambulation task without AD including to/from bathroom (+ unsteadiness);  skilled cuing on sternal precautions, use of sternal pillow, body mechanics and safety.   Therapist facilitated self-care retraining: education regarding UB/LB self-care tasks (modified techniques and limitations with precautions ). Therapist facilitated UB / LB dressing tasks (gown, robe, socks), toileting tasks (all aspects) and standing / seated grooming task educating pt on modified techniques, posture, safety and energy conservation techniques. Skilled monitoring of HR, O2 sats and pts response to treatment. Rehab Potential: Good  for established goals     Patient / Family Goal: none stated      Patient and/or family were instructed on functional diagnosis, prognosis/goals and OT plan of care. Demonstrated fair understanding. Eval Complexity: Low    Time In: 855  Time Out: 955  Total Treatment Time: 23 minutues    Min Units   OT Eval Low 97165  x  1   OT Eval Medium 33116      OT Eval High 23594      OT Re-Eval [id number]       Therapeutic Ex 05214       Therapeutic Activities 50609  9  1   ADL/Self Care 88881  14  1   Orthotic Management 46325       Manual [id number]     Neuro Re-Ed 20022       Non-Billable Time          Evaluation Time additionally includes thorough review of current medical information, gathering information on past medical history/social history and prior level of function, interpretation of standardized testing/informal observation of tasks, assessment of data and development of plan of care and goals.           Danita Seip OTR/L #[id number]

## 2021-07-15 VITALS
OXYGEN SATURATION: 96 % | RESPIRATION RATE: 18 BRPM | HEART RATE: 87 BPM | SYSTOLIC BLOOD PRESSURE: 102 MMHG | HEIGHT: 66 IN | DIASTOLIC BLOOD PRESSURE: 58 MMHG | WEIGHT: 233.6 LBS | BODY MASS INDEX: 37.54 KG/M2 | TEMPERATURE: 98.5 F

## 2021-07-15 LAB
ANION GAP SERPL CALCULATED.3IONS-SCNC: 12 MMOL/L (ref 7–16)
BUN BLDV-MCNC: 43 MG/DL (ref 6–23)
CALCIUM SERPL-MCNC: 8.6 MG/DL (ref 8.6–10.2)
CHLORIDE BLD-SCNC: 99 MMOL/L (ref 98–107)
CO2: 26 MMOL/L (ref 22–29)
CREAT SERPL-MCNC: 1.1 MG/DL (ref 0.7–1.2)
GFR AFRICAN AMERICAN: >60
GFR NON-AFRICAN AMERICAN: >60 ML/MIN/1.73
GLUCOSE BLD-MCNC: 181 MG/DL (ref 74–99)
HCT VFR BLD CALC: 35.9 % (ref 37–54)
HEMOGLOBIN: 12 G/DL (ref 12.5–16.5)
MCH RBC QN AUTO: 32.9 PG (ref 26–35)
MCHC RBC AUTO-ENTMCNC: 33.4 % (ref 32–34.5)
MCV RBC AUTO: 98.4 FL (ref 80–99.9)
METER GLUCOSE: 189 MG/DL (ref 74–99)
METER GLUCOSE: 249 MG/DL (ref 74–99)
PDW BLD-RTO: 13.8 FL (ref 11.5–15)
PLATELET # BLD: 119 E9/L (ref 130–450)
PMV BLD AUTO: 11.1 FL (ref 7–12)
POTASSIUM SERPL-SCNC: 4.3 MMOL/L (ref 3.5–5)
RBC # BLD: 3.65 E12/L (ref 3.8–5.8)
SARS-COV-2, NAAT: NOT DETECTED
SODIUM BLD-SCNC: 137 MMOL/L (ref 132–146)
WBC # BLD: 8 E9/L (ref 4.5–11.5)

## 2021-07-15 PROCEDURE — 6370000000 HC RX 637 (ALT 250 FOR IP): Performed by: THORACIC SURGERY (CARDIOTHORACIC VASCULAR SURGERY)

## 2021-07-15 PROCEDURE — 94640 AIRWAY INHALATION TREATMENT: CPT

## 2021-07-15 PROCEDURE — 36415 COLL VENOUS BLD VENIPUNCTURE: CPT

## 2021-07-15 PROCEDURE — 6370000000 HC RX 637 (ALT 250 FOR IP): Performed by: NURSE PRACTITIONER

## 2021-07-15 PROCEDURE — 87635 SARS-COV-2 COVID-19 AMP PRB: CPT

## 2021-07-15 PROCEDURE — 82962 GLUCOSE BLOOD TEST: CPT

## 2021-07-15 PROCEDURE — 6370000000 HC RX 637 (ALT 250 FOR IP): Performed by: INTERNAL MEDICINE

## 2021-07-15 PROCEDURE — 85027 COMPLETE CBC AUTOMATED: CPT

## 2021-07-15 PROCEDURE — 94760 N-INVAS EAR/PLS OXIMETRY 1: CPT

## 2021-07-15 PROCEDURE — 80048 BASIC METABOLIC PNL TOTAL CA: CPT

## 2021-07-15 PROCEDURE — 93798 PHYS/QHP OP CAR RHAB W/ECG: CPT

## 2021-07-15 RX ORDER — BISACODYL 10 MG
10 SUPPOSITORY, RECTAL RECTAL DAILY PRN
Status: DISCONTINUED | OUTPATIENT
Start: 2021-07-15 | End: 2021-07-15 | Stop reason: HOSPADM

## 2021-07-15 RX ADMIN — BISACODYL 5 MG: 5 TABLET, COATED ORAL at 08:25

## 2021-07-15 RX ADMIN — TAMSULOSIN HYDROCHLORIDE 0.4 MG: 0.4 CAPSULE ORAL at 08:25

## 2021-07-15 RX ADMIN — Medication 400 MG: at 08:25

## 2021-07-15 RX ADMIN — INSULIN LISPRO 2 UNITS: 100 INJECTION, SOLUTION INTRAVENOUS; SUBCUTANEOUS at 12:18

## 2021-07-15 RX ADMIN — AMIODARONE HYDROCHLORIDE 200 MG: 200 TABLET ORAL at 08:25

## 2021-07-15 RX ADMIN — IPRATROPIUM BROMIDE AND ALBUTEROL SULFATE 1 AMPULE: 2.5; .5 SOLUTION RESPIRATORY (INHALATION) at 08:50

## 2021-07-15 RX ADMIN — FERROUS SULFATE TAB 325 MG (65 MG ELEMENTAL FE) 325 MG: 325 (65 FE) TAB at 08:25

## 2021-07-15 RX ADMIN — FOLIC ACID 1 MG: 1 TABLET ORAL at 08:25

## 2021-07-15 RX ADMIN — BUMETANIDE 1 MG: 1 TABLET ORAL at 08:25

## 2021-07-15 RX ADMIN — ASPIRIN 81 MG: 81 TABLET, COATED ORAL at 08:25

## 2021-07-15 RX ADMIN — OXYCODONE HYDROCHLORIDE AND ACETAMINOPHEN 500 MG: 500 TABLET ORAL at 08:25

## 2021-07-15 RX ADMIN — INSULIN LISPRO 1 UNITS: 100 INJECTION, SOLUTION INTRAVENOUS; SUBCUTANEOUS at 08:25

## 2021-07-15 RX ADMIN — PANTOPRAZOLE SODIUM 40 MG: 40 TABLET, DELAYED RELEASE ORAL at 08:25

## 2021-07-15 ASSESSMENT — PAIN SCALES - GENERAL: PAINLEVEL_OUTOF10: 0

## 2021-07-15 NOTE — PROGRESS NOTES
Constipation  --resolved  --Encouraged continued increase in oral intake and activity.         6. SANJIV  - Baseline Scr 1.0   - Scr 1.1 today, yesterday 1.1  - nephrology following--bumex per nephrology         7.  Hyperkalemia/ hyponatremia  - K+ 4.3 and Na 137 today  - no EKG changes  - Nephrology following         8. Post operative deconditioning  --Increase activity as tolerated  --PT/OT  --currently ambulating 200ft        9. Thrombocytopenia  --no s/s of active bleeding  - Plts slowly improving, today 119K        10.  Urinary retention  --required re-insertion of cruz catheter on 7/12  --continue flomax  --voiding trial done today and patient now voids without difficulty             DVT prophylaxis:  --continue bilateral knee high BLANCA hose  --continue PCDs  --continue progressive ambulation  --Xarelto         Dispo: rehab today       This patient's case and care plan was discussed with the attending surgeon

## 2021-07-15 NOTE — PLAN OF CARE
Problem: Discharge Planning:  Goal: Discharged to appropriate level of care  Description: Discharged to appropriate level of care  Outcome: Met This Shift     Problem: Gas Exchange - Impaired:  Goal: Levels of oxygenation will improve  Description: Levels of oxygenation will improve  Outcome: Met This Shift     Problem: Gas Exchange - Impaired:  Goal: Ability to maintain adequate ventilation will improve  Description: Ability to maintain adequate ventilation will improve  Outcome: Met This Shift     Problem: Pain:  Goal: Pain level will decrease  Description: Pain level will decrease  Outcome: Met This Shift     Problem: Pain:  Goal: Control of acute pain  Description: Control of acute pain  Outcome: Met This Shift     Problem: Pain:  Goal: Control of chronic pain  Description: Control of chronic pain  Outcome: Met This Shift     Problem: Tissue Perfusion - Cardiopulmonary, Altered:  Goal: Absence of angina  Description: Absence of angina  Outcome: Met This Shift     Problem: Tissue Perfusion - Cardiopulmonary, Altered:  Goal: Hemodynamic stability will improve  Description: Hemodynamic stability will improve  Outcome: Met This Shift     Problem: Tissue Perfusion - Cardiopulmonary, Altered:  Goal: Will show no evidence of cardiac arrhythmias  Description: Will show no evidence of cardiac arrhythmias  Outcome: Met This Shift     Problem: Falls - Risk of:  Goal: Will remain free from falls  Description: Will remain free from falls  Outcome: Met This Shift     Problem: Falls - Risk of:  Goal: Absence of physical injury  Description: Absence of physical injury  Outcome: Met This Shift     Problem:  Activity Intolerance:  Goal: Ability to tolerate increased activity will improve  Description: Ability to tolerate increased activity will improve  7/15/2021 0203 by Torin Allen RN  Outcome: Met This Shift     Problem: Anxiety:  Goal: Level of anxiety will decrease  Description: Level of anxiety will decrease  7/15/2021 0203 by Zayra Coreas RN  Outcome: Met This Shift     Problem: Cardiac Output - Decreased:  Goal: Cardiac output within specified parameters  Description: Cardiac output within specified parameters  7/15/2021 0203 by Zayra Coreas RN  Outcome: Met This Shift     Problem: Fluid Volume - Imbalance:  Goal: Ability to achieve a balanced intake and output will improve  Description: Ability to achieve a balanced intake and output will improve  Outcome: Met This Shift     Problem: Skin Integrity:  Goal: Will show no infection signs and symptoms  Description: Will show no infection signs and symptoms  Outcome: Met This Shift     Problem: Skin Integrity:  Goal: Absence of new skin breakdown  Description: Absence of new skin breakdown  Outcome: Met This Shift     Problem: Pain:  Goal: Pain level will decrease  Description: Pain level will decrease  Outcome: Met This Shift     Problem: Pain:  Goal: Control of acute pain  Description: Control of acute pain  Outcome: Met This Shift     Problem: Pain:  Goal: Control of chronic pain  Description: Control of chronic pain  Outcome: Met This Shift     Problem: Fluid Volume - Imbalance:  Goal: Chest tube drainage is within specified parameters  Description: Chest tube drainage is within specified parameters  Outcome: Completed

## 2021-07-15 NOTE — PLAN OF CARE
Problem:  Activity Intolerance:  Goal: Ability to tolerate increased activity will improve  Description: Ability to tolerate increased activity will improve  7/15/2021 0742 by Hesham Moreno RN  Outcome: Met This Shift

## 2021-07-15 NOTE — CARE COORDINATION
SOCIAL WORK/CASEMANAGEMENT TRANSITION OF CARE [id number] [address] Bruce, [address], Derrick Vyas, -[phone]): I met with pt , daughter and wife this a.m. the plan is to go to [facility] and pt is in full agreement. rn to do covid test and waiting for pt to urinate then will set up [facility] ambulette. [facility] is aware and in agreement.  KHLOE Tapia  7/15/2021

## 2021-07-15 NOTE — PROGRESS NOTES
Department of Internal Medicine  Nephrology Attending Consult Note    Events reviewed. SUBJECTIVE:  We are following Mr. Lisa Huggins for SANJIV. Reports feeling better, has no complaints.     PHYSICAL EXAM:      Vitals:    VITALS:  BP (!) 96/58   Pulse 87   Temp 98.5 °F (36.9 °C) (Temporal)   Resp 18   Ht 5' 6\" (1.676 m)   Wt 233 lb 9.6 oz (106 kg)   SpO2 94%   BMI 37.70 kg/m²   24HR INTAKE/OUTPUT:      Intake/Output Summary (Last 24 hours) at 7/15/2021 1130  Last data filed at 7/15/2021 1030  Gross per 24 hour   Intake 540 ml   Output 2875 ml   Net -2335 ml       Constitutional: Patient is awake alert no distress  HEENT: Pupils are equal reactive  Respiratory: Decreased breath sounds at bases  Cardiovascular/Edema: Heart sounds are irregularly irregular  Gastrointestinal: Abdomen soft  Neurologic: Nonfocal  Skin: No lesions  Other: trace edema in the thighs and sacrum    Scheduled Meds:   bumetanide  1 mg Oral Daily    rivaroxaban  20 mg Oral Daily    amiodarone  200 mg Oral Daily    aspirin  81 mg Oral Daily    bisacodyl  5 mg Oral Daily    sennosides-docusate sodium  1 tablet Oral BID    magnesium hydroxide  30 mL Oral Daily    ferrous sulfate  325 mg Oral BID WC    vitamin C  500 mg Oral BID    folic acid  1 mg Oral Daily    insulin lispro  0-6 Units Subcutaneous TID WC    insulin lispro  0-3 Units Subcutaneous Nightly    sodium chloride flush  10 mL Intravenous 2 times per day    magnesium oxide  400 mg Oral Daily    pantoprazole  40 mg Oral Daily    ipratropium-albuterol  1 ampule Inhalation Q4H WA    insulin glargine  0.15 Units/kg Subcutaneous Nightly    tamsulosin  0.4 mg Oral Daily    pravastatin  20 mg Oral Nightly     Continuous Infusions:  PRN Meds:.bisacodyl, acetaminophen, oxyCODONE-acetaminophen **OR** oxyCODONE-acetaminophen, ondansetron, diphenhydrAMINE, sodium chloride flush    DATA:    CBC with Differential:    Lab Results   Component Value Date    WBC 8.0 07/15/2021    RBC 3.65 07/15/2021    HGB 12.0 07/15/2021    HCT 35.9 07/15/2021     07/15/2021    MCV 98.4 07/15/2021    MCH 32.9 07/15/2021    MCHC 33.4 07/15/2021    RDW 13.8 07/15/2021    SEGSPCT 79 10/27/2011    LYMPHOPCT 27.3 07/24/2019    MONOPCT 7.2 07/24/2019    BASOPCT 0.8 07/24/2019    MONOSABS 0.37 07/24/2019    LYMPHSABS 1.40 07/24/2019    EOSABS 0.15 07/24/2019    BASOSABS 0.04 07/24/2019     CMP:    Lab Results   Component Value Date     07/15/2021    K 4.3 07/15/2021    CL 99 07/15/2021    CO2 26 07/15/2021    BUN 43 07/15/2021    CREATININE 1.1 07/15/2021    GFRAA >60 07/15/2021    LABGLOM >60 07/15/2021    GLUCOSE 181 07/15/2021    GLUCOSE 149 11/01/2011    PROT 6.5 07/07/2021    LABALBU 4.3 07/07/2021    LABALBU 3.6 11/01/2011    CALCIUM 8.6 07/15/2021    BILITOT 0.8 07/07/2021    ALKPHOS 55 07/07/2021    AST 17 07/07/2021    ALT 15 07/07/2021     Magnesium:    Lab Results   Component Value Date    MG 2.1 07/11/2021     Phosphorus:  No results found for: PHOS     Radiology Review:      Chest x-ray July 11, 2021   Stable postoperative chest with mild CHF.               BRIEF SUMMARY OF INITIAL CONSULT:    Briefly Mr. Erika Tang is a 57-year-old man with history of HTN, type II DM, recently diagnosed with CAD with severe triple-vessel disease by catheterization on June 16, hyperlipidemia, permanent AF s/p DCCV x2, right carotid artery stenosis, COVID-19, gout, BPH, who was admitted on July 9, 2021 for elective CABG. Post surgery his creatinine has a brisk increase from 1 to 1.6 mg/dL, although has slowly improve has not completely recover to its baseline with a most recent creatinine level 1.3 mg/dL, he has well has developed hyponatremia with a sodium level of 129, reasons for this consultation. Reported no nausea, vomiting, diarrhea. Reports having some shortness of breath. Problems resolved:    Hyponatremia, probably hemodynamically mediated due to underlying heart failure?   With some component of translocation due to hyperglycemia. Resolve with fluid restriction and Lasix. Mild hyperkalemia, 2/2 decreased GFR, hyperglycemia, resolved  Low bicarbonate levels,2/2 respiratory alkalosis, venous PH: 7.41 (calculated arterial 7.44)  bicarbonate levels back to normal.    IMPRESSION/RECOMMENDATIONS:      SANJIV stage I, CABG associated SANJIV, probably hemodynamically mediated prerenal SANJIV (cardiorenal syndrome), proBNP 8362, urine Na < 20.  Resolved. Renal function stable with excellent urine output.     HFpEF 64%, proBNP 8362, on Bumex 1 mg p.o. daily  Severe triple-vessel disease CAD, status post CABG x2 July 9, 2021  HTN, BP medications on hold  Persistent AF, status post maze procedure and PORSCHE exclusion, on amiodarone, rivaroxaban  BPH, on tamsulosin    Plan:    Continue t Bumex 1 mg daily  Discontinue fluid restriction  Continue low potassium diet  Continue to monitor renal function  Okay to discharge from renal point of view  Follow-up with us in 3 to 4 weeks  BMP in 2 weeks

## 2021-07-16 ENCOUNTER — OUTSIDE SERVICES (OUTPATIENT)
Dept: PRIMARY CARE CLINIC | Age: 71
End: 2021-07-16
Payer: MEDICARE

## 2021-07-16 ENCOUNTER — TELEPHONE (OUTPATIENT)
Dept: CARDIOLOGY CLINIC | Age: 71
End: 2021-07-16

## 2021-07-16 VITALS
TEMPERATURE: 98.4 F | SYSTOLIC BLOOD PRESSURE: 100 MMHG | RESPIRATION RATE: 18 BRPM | DIASTOLIC BLOOD PRESSURE: 60 MMHG | HEART RATE: 78 BPM | OXYGEN SATURATION: 99 %

## 2021-07-16 DIAGNOSIS — I10 ESSENTIAL HYPERTENSION: Chronic | ICD-10-CM

## 2021-07-16 DIAGNOSIS — I25.10 ATHEROSCLEROSIS OF NATIVE CORONARY ARTERY OF NATIVE HEART WITHOUT ANGINA PECTORIS: Primary | ICD-10-CM

## 2021-07-16 DIAGNOSIS — E11.65 TYPE 2 DIABETES MELLITUS WITH HYPERGLYCEMIA, WITHOUT LONG-TERM CURRENT USE OF INSULIN (HCC): ICD-10-CM

## 2021-07-16 DIAGNOSIS — R53.81 DEBILITY: ICD-10-CM

## 2021-07-16 DIAGNOSIS — D64.9 ANEMIA, UNSPECIFIED TYPE: ICD-10-CM

## 2021-07-16 DIAGNOSIS — I48.19 PERSISTENT ATRIAL FIBRILLATION (HCC): ICD-10-CM

## 2021-07-16 DIAGNOSIS — N40.0 BENIGN PROSTATIC HYPERPLASIA WITHOUT LOWER URINARY TRACT SYMPTOMS: ICD-10-CM

## 2021-07-16 DIAGNOSIS — E78.5 HYPERLIPIDEMIA, UNSPECIFIED HYPERLIPIDEMIA TYPE: ICD-10-CM

## 2021-07-16 PROCEDURE — 99306 1ST NF CARE HIGH MDM 50: CPT | Performed by: STUDENT IN AN ORGANIZED HEALTH CARE EDUCATION/TRAINING PROGRAM

## 2021-07-16 RX ORDER — ACETAMINOPHEN 325 MG/1
650 TABLET ORAL EVERY 4 HOURS PRN
COMMUNITY
End: 2021-08-16 | Stop reason: CLARIF

## 2021-07-16 RX ORDER — BISACODYL 10 MG
10 SUPPOSITORY, RECTAL RECTAL DAILY PRN
COMMUNITY
End: 2021-08-16 | Stop reason: CLARIF

## 2021-07-16 RX ORDER — SODIUM PHOSPHATE, DIBASIC AND SODIUM PHOSPHATE, MONOBASIC 7; 19 G/133ML; G/133ML
1 ENEMA RECTAL DAILY PRN
COMMUNITY
End: 2021-08-16 | Stop reason: CLARIF

## 2021-07-16 NOTE — PROGRESS NOTES
[facility] Admission History and Physical  [facility] ([facility]) Physicians    Kierra Berrios  : 1950  Visit Date: 2021  Facility:  [facility]      CC: Admission History and Physical for   Chief Complaint   Patient presents with    Coronary Artery Disease     s/p CABG    H&P     for skilled rehabilitation       History of Present Illness:      Hospital Course:  Kierra Berrios is a 66 y.o. male with a past medical history of coronary artery disease status post recent CABG, essential hypertension, hyperlipidemia, type 2 diabetes mellitus, atrial fibrillation, gout, and previous COVID-19 who was admitted to [facility] on 2021 for elective CABG. He was found to have severe triple-vessel disease and was referred for CABG in . The patient underwent CABG x2 with coronary endarterectomy, MAZE procedure, left atrial appendage ligation, and rigid sternal fixation with KLS plating on 5556 with no complications noted. The patient had postoperative AF RVR with electrophysiology recommending amiodarone and anticoagulation. He was seen by nephrology due to SANJIV and hyponatremia. The patient was referred for possible acute rehabilitation but had enough functional improvement during hospitalization but he did not qualify. The patient was then transferred to the [place] [id number] on 7/15/2021 for skilled rehabilitation. Functional Status Prior to Admission: The patient lives with his wife in a one-story home and was independent with functional ability and ADLs. Hospital Labs: Baseline prior to surgery: CBC with hemoglobin 14.6; CMP with BUN 22, creatinine 1.1; hemoglobin A1c 7.2%; lipid panel unremarkable. Labs immediately postoperative: CBC with hemoglobin 12.9, platelets 202; BMP with potassium 5.6, BUN 24, creatinine 1.6. Lowest sodium 128; highest creatinine 1.6; lowest platelets 86.     Labs prior to discharge: CBC with hemoglobin 12.0, MCV 98.4, platelets 192; BMP with sodium 137, BUN 43, creatinine 1.1. Hospital Imaging: CXR immediately postoperative: New sternotomy and chest tubes. Small bibasilar atelectasis without effusions. No pneumothorax. CXR 7/11/2021: Cardiomegaly with postoperative changes in the chest.  Vascular congestion with improving atelectasis and pleural effusion in the lung bases. Interval History: The patient was seen and examined in his room. The patient states that he is overall feeling well. He does have some discomfort at the surgical site but will hold his pillow and it passes. He denies any angina at this time. He denies any shortness of breath. He does have lower extremity edema. He denies abdominal pain but has had some constipation. He denies other issues at this time or other needs.     Advance Care Planning: Full code    Lab Results   Component Value Date    WBC 8.0 07/15/2021    HGB 12.0 (L) 07/15/2021    HCT 35.9 (L) 07/15/2021    MCV 98.4 07/15/2021     (L) 07/15/2021    LYMPHOPCT 27.3 07/24/2019    RBC 3.65 (L) 07/15/2021    MCH 32.9 07/15/2021    MCHC 33.4 07/15/2021    RDW 13.8 07/15/2021       Lab Results   Component Value Date     07/15/2021    K 4.3 07/15/2021    CL 99 07/15/2021    CO2 26 07/15/2021    BUN 43 (H) 07/15/2021    CREATININE 1.1 07/15/2021    GLUCOSE 181 (H) 07/15/2021    CALCIUM 8.6 07/15/2021    PROT 6.5 07/07/2021    LABALBU 4.3 07/07/2021    BILITOT 0.8 07/07/2021    ALKPHOS 55 07/07/2021    AST 17 07/07/2021    ALT 15 07/07/2021    LABGLOM >60 07/15/2021    GFRAA >60 07/15/2021       Lab Results   Component Value Date    TSH 1.380 07/24/2019       Hemoglobin A1C   Date Value Ref Range Status   07/07/2021 7.2 (H) 4.0 - 5.6 % Final       Lab Results   Component Value Date    INR 1.3 07/09/2021    INR 1.1 07/07/2021    INR 1.3 06/14/2021    PROTIME 14.4 (H) 07/09/2021    PROTIME 12.1 07/07/2021    PROTIME 13.6 (H) 06/14/2021         Allergies:  Adhesive tape, Crestor [rosuvastatin calcium], and Lipitor [atorvastatin]    Past Medical History:   Diagnosis Date    A-fib [facility])     for cardioversion 3-12-21     CAD (coronary artery disease) 10/27/2011    Dr. Brad Warren Carotid stenosis, right 5/6/2021    Chronic venous insufficiency 3/2/2017    COVID-19 01/2021    resolved as of 3-3-21     Decreased pulses in feet     Diabetes mellitus ([facility]     DM (diabetes mellitus), type 2 ([facility] 10/27/2011    ED (erectile dysfunction)     Gout 10/31/2011    Heart attack ([facility]     HTN (hypertension) 10/27/2011    Hyperlipidemia 6/7/2012    Hypertension     Lateral myocardial infarction ([facility] 10/08    due to circumflex occlusion    Leg swelling 3/2/2017       Current Outpatient Medications   Medication Sig Dispense Refill    acetaminophen (TYLENOL) 325 MG tablet Take 650 mg by mouth every 4 hours as needed for Pain or Fever      bisacodyl (DULCOLAX) 10 MG suppository Place 10 mg rectally daily as needed for Constipation      magnesium hydroxide (MILK OF MAGNESIA) 400 MG/5ML suspension Take 30 mLs by mouth daily as needed for Constipation      Sodium Phosphates (FLEET) 7-19 GM/118ML Place 1 enema rectally daily as needed      HYDROcodone-acetaminophen (NORCO) 5-325 MG per tablet Take 1 tablet by mouth every 4 hours as needed for Pain for up to 7 days. Intended supply: 7 days.  Take lowest dose possible to manage pain 42 tablet 0    amiodarone (CORDARONE) 200 MG tablet Take 1 tablet by mouth daily for 14 days 14 tablet 0    bumetanide (BUMEX) 1 MG tablet Take 1 tablet by mouth daily for 7 days 7 tablet 0    ferrous sulfate (IRON 325) 325 (65 Fe) MG tablet Take 1 tablet by mouth 2 times daily (with meals) 60 tablet 0    folic acid (FOLVITE) 1 MG tablet Take 1 tablet by mouth daily 30 tablet 0    sennosides-docusate sodium (SENOKOT-S) 8.6-50 MG tablet Take 1 tablet by mouth 2 times daily as needed for Constipation 30 tablet 0    magnesium oxide (MAG-OX) 400 (241.3 Mg) MG HERNIA REPAIR  @ age 13    KNEE SURGERY      left-ligament torn    OTHER SURGICAL HISTORY  10/31/2011    cystoscopy retrograde;ESWL;stent on left    TESTICLE SURGERY      as child     WISDOM TOOTH EXTRACTION         Family History   Problem Relation Age of Onset    COPD Mother     COPD Father     Heart Attack Father 79    Down Syndrome Sister     Crohn's Disease Brother     Atrial Fibrillation Sister     No Known Problems Sister     No Known Problems Brother     No Known Problems Brother        Social History     Socioeconomic History    Marital status:      Spouse name: Not on file    Number of children: 2    Years of education: Not on file    Highest education level: Not on file   Occupational History    Occupation: self-employed     Comment: print shop   Tobacco Use    Smoking status: Never Smoker    Smokeless tobacco: Never Used   Vaping Use    Vaping Use: Never used   Substance and Sexual Activity    Alcohol use: Yes     Comment: occasional    Drug use: Never    Sexual activity: Not on file   Other Topics Concern    Not on file   Social History Narrative    Not on file     Social Determinants of Health     Financial Resource Strain:     Difficulty of Paying Living Expenses:    Food Insecurity:     Worried About [address] in the Last Year:     [address] in the Last Year:    Transportation Needs:     Lack of Transportation (Medical):      Lack of Transportation (Non-Medical):    Physical Activity:     Days of Exercise per Week:     Minutes of Exercise per Session:    Stress:     Feeling of Stress :    Social Connections:     Frequency of Communication with Friends and Family:     Frequency of Social Gatherings with Friends and Family:     Attends Protestant Services:     Active Member of Clubs or Organizations:     Attends Club or Organization Meetings:     Marital Status:    Intimate Partner Violence:     Fear of Current or Ex-Partner:     Emotionally Abused:     Physically Abused:     Sexually Abused:        Review of Systems   Constitutional: Negative for appetite change, chills and fever. HENT: Negative for trouble swallowing. Respiratory: Negative for cough and shortness of breath. Cardiovascular: Positive for chest pain (at surgical site). Gastrointestinal: Positive for constipation. Negative for abdominal pain and nausea. Genitourinary: Negative for difficulty urinating and dysuria. Musculoskeletal: Negative for arthralgias and back pain. Skin: Positive for wound (surgical). Neurological: Negative for dizziness and light-headedness. Psychiatric/Behavioral: Negative for dysphoric mood. The patient is not nervous/anxious. /60   Pulse 78   Temp 98.4 °F (36.9 °C)   Resp 18   SpO2 99%     Physical Exam  Vitals reviewed. Constitutional:       General: He is not in acute distress. Appearance: He is well-developed. Comments: Elderly gentleman sitting in wheelchair, calm and cooperative. HENT:      Head: Normocephalic and atraumatic. Right Ear: External ear normal.      Left Ear: External ear normal.      Nose: Nose normal.      Mouth/Throat:      Mouth: Mucous membranes are moist.   Eyes:      Extraocular Movements: Extraocular movements intact. Conjunctiva/sclera: Conjunctivae normal.      Pupils: Pupils are equal, round, and reactive to light. Neck:      Thyroid: No thyromegaly. Vascular: No carotid bruit or JVD. Cardiovascular:      Rate and Rhythm: Normal rate and regular rhythm. Occasional extrasystoles are present. Pulses: Normal pulses. Heart sounds: No murmur heard. Pulmonary:      Effort: Pulmonary effort is normal.      Breath sounds: Normal breath sounds. No rhonchi or rales. Abdominal:      General: Bowel sounds are normal. There is no distension. Palpations: Abdomen is soft. There is no hepatomegaly or splenomegaly. Tenderness:  There is no abdominal tenderness. Musculoskeletal:      Cervical back: Neck supple. Right lower leg: Edema present. Left lower leg: Edema present. Lymphadenopathy:      Cervical: No cervical adenopathy. Skin:     General: Skin is warm and dry. Neurological:      General: No focal deficit present. Mental Status: He is alert and oriented to person, place, and time. Cranial Nerves: Cranial nerves are intact. Sensory: Sensation is intact. Motor: Motor function is intact. No tremor. Deep Tendon Reflexes:      Reflex Scores:       Bicep reflexes are 2+ on the right side and 2+ on the left side. Patellar reflexes are 1+ on the right side and 1+ on the left side. Psychiatric:         Attention and Perception: Attention normal.         Mood and Affect: Mood and affect normal.         Speech: Speech normal.         Behavior: Behavior normal. Behavior is cooperative. Cognition and Memory: Cognition normal.         Assessment and Plan:    I have spent over 51% of the total time (45 minutes) of this patient encounter reviewing hospital, EPIC and facility records, coordinating care with facility staff and counseling/educating the patient/family regarding the patient's plan of care as outlined below. Coronary artery disease  · Status post CABG with coronary endarterectomy without significant complication. · Patient has expected postoperative pain without new angina. Continue hydrocodone-APAP for postsurgical pain. · Continue aspirin, pravastatin, furosemide. · Ensure cardiology and surgery follow-up. Atrial fibrillation  · Status post left atrial appendage closure and maze procedure. · Appears to be in regular rhythm on exam today. · Complete amiodarone course per electrophysiology. · Continue rivaroxaban. Essential hypertension  · Blood pressure controlled since admission to the facility. · Continue furosemide and monitor.     Type 2 diabetes mellitus  · Per history with most recent hemoglobin A1c 7.2% in July. · Blood sugars have been mildly elevated since admission. · Given importance of post-CABG blood sugar control, start low-dose sliding scale insulin. · Continue glipizide and metformin. Hyperlipidemia  · Continue pravastatin. Anemia  · Patient with mild drop in hemoglobin with normocytic cells after surgery likely due to blood loss. · Patient was prescribed twice daily iron but has had some constipation; as patient is unlikely to be fully absorbing the iron given and is having side effects, transition to 1 tablet every other day as this has been shown to be as effective as daily dosing. · Monitor CBC. Benign prostatic hyperplasia  · Continue tamsulosin. Debility  · Multifactorial related to multimorbidity and recent cardiothoracic surgery combined with advanced age. · PT/OT evaluation and treatment. · Patient is appropriate for skilled rehabilitation level of care. Diagnosis Orders   1. Atherosclerosis of native coronary artery of native heart without angina pectoris     2. Persistent atrial fibrillation ([address]     3. Essential hypertension     4. Type 2 diabetes mellitus with hyperglycemia, without long-term current use of insulin (HCC)     5. Hyperlipidemia, unspecified hyperlipidemia type     6. Anemia, unspecified type     7. Benign prostatic hyperplasia without lower urinary tract symptoms     8. Ronel Older continues to require nursing level care due to need for assistance with ADLs. Advanced directives, recent labs, MAR, TAR were reviewed. At risk for unavoidable skin breakdown due to incontinence and limited mobility. Continue preventive measures. Follow up: Return in about 1 week (around 7/23/2021), or for acute issues. Princess Ashraf MD  7/16/2021     This report was generated using a computer driven dictation system. This system may results in transcription errors.  Every effort is made to identify and correct errors, however errors may still occur.

## 2021-07-17 LAB
SARS-COV-2: NOT DETECTED
SOURCE: NORMAL

## 2021-07-19 LAB
ALBUMIN SERPL-MCNC: 3.9 G/DL (ref 3.5–5.2)
ALP BLD-CCNC: 86 U/L (ref 40–129)
ALT SERPL-CCNC: 47 U/L (ref 0–40)
ANION GAP SERPL CALCULATED.3IONS-SCNC: 15 MMOL/L (ref 7–16)
AST SERPL-CCNC: 26 U/L (ref 0–39)
BILIRUB SERPL-MCNC: 1.1 MG/DL (ref 0–1.2)
BUN BLDV-MCNC: 31 MG/DL (ref 6–23)
CALCIUM SERPL-MCNC: 9.2 MG/DL (ref 8.6–10.2)
CHLORIDE BLD-SCNC: 96 MMOL/L (ref 98–107)
CO2: 27 MMOL/L (ref 22–29)
CREAT SERPL-MCNC: 1.2 MG/DL (ref 0.7–1.2)
GFR AFRICAN AMERICAN: >60
GFR NON-AFRICAN AMERICAN: 59 ML/MIN/1.73
GLUCOSE BLD-MCNC: 132 MG/DL (ref 74–99)
HCT VFR BLD CALC: 37.2 % (ref 37–54)
HEMOGLOBIN: 12.1 G/DL (ref 12.5–16.5)
MCH RBC QN AUTO: 32.9 PG (ref 26–35)
MCHC RBC AUTO-ENTMCNC: 32.5 % (ref 32–34.5)
MCV RBC AUTO: 101.1 FL (ref 80–99.9)
PDW BLD-RTO: 13.3 FL (ref 11.5–15)
PLATELET # BLD: 183 E9/L (ref 130–450)
PMV BLD AUTO: 11.3 FL (ref 7–12)
POTASSIUM SERPL-SCNC: 4.2 MMOL/L (ref 3.5–5)
RBC # BLD: 3.68 E12/L (ref 3.8–5.8)
SODIUM BLD-SCNC: 138 MMOL/L (ref 132–146)
TOTAL PROTEIN: 7 G/DL (ref 6.4–8.3)
WBC # BLD: 7.2 E9/L (ref 4.5–11.5)

## 2021-07-19 NOTE — DISCHARGE SUMMARY
Physician Discharge Summary     Patient ID:  Lissa Grove  [id number]  56 y.o.  1950    Admit date: 7/9/2021    Discharge date and time: 7/15/2021  3:04 PM     Admitting Physician: Merle Khan DO     Discharge Physician: same    Admission Diagnoses: CAD in native artery [I25.10]    Discharge Diagnoses: Coronary artery disease and atrial  Fibrillation. Post op SANJIV, seen by renal    Post op deconditioning       PROCEDURE PERFORMED:  1. Coronary artery bypass grafting x2 using left internal mammary  artery to left anterior descending artery and reverse saphenous vein  graft to the obtuse marginal branch of the circumflex. 2.  Open coronary endarterectomy of the obtuse marginal branch of the  circumflex. 3.  Maze procedure using pulmonary vein isolation. 4.  Left atrial appendage ligation using a 40-mm AtriCure appendage  clip. 5.  Rigid sternal fixation with the KLS plating system    Admission Condition: good    Discharged Condition: good    Indication for Admission: Omar Burr a 66 y. o. male who recently  presented to office upon referral from cardiology for continued evaluation and recommendations regarding his CAD. Patient hx is significant for CAD ( MI in 2008) SHANNAN, and afib s/p DCCV x 2 (2/15/21-failed; 3/12/21)  Patient reported over last few months exertional CP.  Stress test was ordered which was positive and patient was referred for cardiac cath done on 6/16 revealing severe triple vessel CAD  Patient denies any CP at present time or CP at rest.  No SOB. Hospital Course: On 7/9/21 patient underwent CABG x 2, Maze, PORSCHE exclusion by Dr. Ricarda Meehan. The patient tolerated the operation well and was transferred to CVICU in stable condition. The patient was extubated several hours postoperatively. POD 1 transferred out of the ICU. POD 2 chest tubes were removed in the usual fashion. EP was following along due to hx of Afib and reported hx of heart block. POD 3 xarelto was resumed. Nephrology was consulted for Garth as Scr bumped to 1.6. By POD 5 Scr returned to normal, 1.1. He was weaned off supplemental oxygen. With improvements in mobility and dietary intake, the patient was deemed ready for discharge to rehab on POD 6.         Consults: cardiology and nephrology    Discharge Exam:  Vitals   Vitals:     07/15/21 0750 07/15/21 0850 07/15/21 1100 07/15/21 1215   BP:     (!) 96/58 (!) 102/58   Pulse:     87     Resp:   18 18     Temp:     98.5 °F (36.9 °C)     TempSrc:     Temporal     SpO2: 97% 94%       Weight:           Height:                 O2: none        Intake/Output Summary (Last 24 hours) at 7/15/2021 1227  Last data filed at 7/15/2021 1030  Gross per 24 hour   Intake 540 ml   Output 2875 ml   Net -2335 ml            +BM on 7/14     UO: 475mL/8hr                  Recent Labs     07/13/21  0507 07/14/21  0636 07/15/21  0519   WBC 9.6 8.7 8.0   HGB 11.8* 12.1* 12.0*   HCT 34.8* 35.8* 35.9*   * 117* 119*            Recent Labs     07/13/21  0507 07/14/21  0636 07/15/21  0519   BUN 54* 48* 43*   CREATININE 1.4* 1.1 1.1             Disposition: rehab    Patient Instructions:    [facility] Medication Instructions ISB:[id number]    Printed on:07/19/21 1219   Medication Information                      amiodarone (CORDARONE) 200 MG tablet  Take 1 tablet by mouth daily for 14 days             ascorbic acid (VITAMIN C) 500 MG tablet  Take 1 tablet by mouth 2 times daily             aspirin 81 MG EC tablet  Take 81 mg by mouth daily             b complex vitamins capsule  Take 1 capsule by mouth daily             blood glucose test strips (ONETOUCH ULTRA) strip  TEST BLOOD SUGAR TWICE DAILY AS NEEDED             bumetanide (BUMEX) 1 MG tablet  Take 1 tablet by mouth daily for 7 days             Elastic Bandages & Supports (JOBST KNEE HIGH COMPRESSION SM) MISC  Knee high with 20- 30 mmhg of compression             ferrous sulfate (IRON 325) 325 (65 Fe) MG tablet  Take 1 tablet by mouth 2 times daily (with meals)             folic acid (FOLVITE) 1 MG tablet  Take 1 tablet by mouth daily             glipiZIDE (GLUCOTROL) 5 MG tablet  Take 1 tablet by mouth 4 times daily             HYDROcodone-acetaminophen (NORCO) 5-325 MG per tablet  Take 1 tablet by mouth every 4 hours as needed for Pain for up to 7 days. Intended supply: 7 days.  Take lowest dose possible to manage pain             magnesium oxide (MAG-OX) 400 (241.3 Mg) MG TABS tablet  Take 1 tablet by mouth daily for 14 days             metFORMIN (GLUCOPHAGE) 500 MG tablet  TAKE 2 TABLETS BY MOUTH 2 TIMES DAILY (WITH MEALS)             pantoprazole (PROTONIX) 40 MG tablet  Take 1 tablet by mouth daily for 14 days             pravastatin (PRAVACHOL) 20 MG tablet  Take 1 tablet by mouth daily             rivaroxaban (XARELTO) 20 MG TABS tablet  Take 1 tablet by mouth Daily with supper Lot: [id number], EXP: 09/2022 one bottle  Lot: [id number], EXP: 10/2022 three bottles             sennosides-docusate sodium (SENOKOT-S) 8.6-50 MG tablet  Take 1 tablet by mouth 2 times daily as needed for Constipation             tamsulosin (FLOMAX) 0.4 MG capsule  TAKE ONE CAPSULE BY MOUTH EVERY DAY             vitamin B-12 (CYANOCOBALAMIN) 1000 MCG tablet  Take 1,000 mcg by mouth daily Has not taken recently             vitamin D (CHOLECALCIFEROL) 1000 UNIT TABS tablet  Take 400 Units by mouth daily              zinc gluconate 50 MG tablet  Take 50 mg by mouth daily Not taking               Activity: sternal precautions  Diet: cardiac diet  Wound Care: keep wound clean and dry    Future Appointments   Date Time Provider Jose Cruz Mcdonald   7/20/2021  9:40 AM SCHEDULE, EP LAB/MA ELECTRO PHYS [facility]   8/17/2021 12:30 PM Izabela Ferrell DO CARDIO SURG [facility]   10/5/2021  9:30 AM Jai Nguyen MD Y[facility] IM CANFD [facility]   5/12/2022  8:00 AM Amaya Lopez MD VASC/MED [facility]         Smoking cessation education provided prior to discharge    Discussed with patient the benefits of participation in cardiac rehab after discharge once approved safe by the surgeon and that a referral will be made at the follow up appointment. Pt verbalized comprehension.     Signed:  Tadeo Resendiz PA-C

## 2021-07-20 ENCOUNTER — NURSE ONLY (OUTPATIENT)
Dept: NON INVASIVE DIAGNOSTICS | Age: 71
End: 2021-07-20
Payer: MEDICARE

## 2021-07-20 DIAGNOSIS — I48.0 PAROXYSMAL ATRIAL FIBRILLATION (HCC): Primary | ICD-10-CM

## 2021-07-20 PROCEDURE — 99306 1ST NF CARE HIGH MDM 50: CPT | Performed by: NURSE PRACTITIONER

## 2021-07-20 PROCEDURE — 93000 ELECTROCARDIOGRAM COMPLETE: CPT | Performed by: INTERNAL MEDICINE

## 2021-07-20 NOTE — PROGRESS NOTES
Patient was seen in the Office today to have EKG per Dr. Chacha Avery.  Electronically signed by Yadira Dhillon MA on 7/20/2021 at 10:03 AM

## 2021-07-21 ENCOUNTER — OUTSIDE SERVICES (OUTPATIENT)
Dept: PRIMARY CARE CLINIC | Age: 71
End: 2021-07-21
Payer: MEDICARE

## 2021-07-21 VITALS
DIASTOLIC BLOOD PRESSURE: 82 MMHG | HEART RATE: 75 BPM | TEMPERATURE: 97.4 F | OXYGEN SATURATION: 96 % | RESPIRATION RATE: 17 BRPM | SYSTOLIC BLOOD PRESSURE: 120 MMHG

## 2021-07-21 DIAGNOSIS — N40.0 BENIGN PROSTATIC HYPERPLASIA WITHOUT LOWER URINARY TRACT SYMPTOMS: ICD-10-CM

## 2021-07-21 DIAGNOSIS — D64.9 ANEMIA, UNSPECIFIED TYPE: ICD-10-CM

## 2021-07-21 DIAGNOSIS — I48.19 PERSISTENT ATRIAL FIBRILLATION (HCC): ICD-10-CM

## 2021-07-21 DIAGNOSIS — I10 ESSENTIAL HYPERTENSION: ICD-10-CM

## 2021-07-21 DIAGNOSIS — R53.81 DEBILITY: ICD-10-CM

## 2021-07-21 DIAGNOSIS — E78.5 HYPERLIPIDEMIA, UNSPECIFIED HYPERLIPIDEMIA TYPE: ICD-10-CM

## 2021-07-21 DIAGNOSIS — I25.10 ATHEROSCLEROSIS OF NATIVE CORONARY ARTERY OF NATIVE HEART WITHOUT ANGINA PECTORIS: Primary | ICD-10-CM

## 2021-07-21 DIAGNOSIS — E11.65 TYPE 2 DIABETES MELLITUS WITH HYPERGLYCEMIA, WITHOUT LONG-TERM CURRENT USE OF INSULIN (HCC): ICD-10-CM

## 2021-07-21 PROCEDURE — 99309 SBSQ NF CARE MODERATE MDM 30: CPT | Performed by: STUDENT IN AN ORGANIZED HEALTH CARE EDUCATION/TRAINING PROGRAM

## 2021-07-21 ASSESSMENT — ENCOUNTER SYMPTOMS
SHORTNESS OF BREATH: 1
CONSTIPATION: 1
COUGH: 0

## 2021-07-21 NOTE — PROGRESS NOTES
Skilled Rehabilitation Unit Follow-up Visit  [facility]) Physicians    Torrie Connell  : 1950    Visit Date: 2021  Facility:  [facility] [address]      CC: Skilled Nursing Follow Up for:   Chief Complaint   Patient presents with    Coronary Artery Disease    Diabetes        HPI:    Admitted to [address] 7/15/2021 following hospitalization for elective CABG. Pain: The patient reports chest pain is controlled on current regimen. Bowels: Moving regularly. Blood pressures controlled: Yes., Blood sugars controlled: Yes. and Weights stable: Yes. Updates from therapy 2021:  Ambulates 100-200 ft without assistive device and SBA/CGA. Stairs moderate assistance, Transfers minimum assistance/CGA, Bed mobility minimum assistance, Balance fair/fair-, Upper body ADLs CGA, Lower body ADLs minimum assistance, Toileting moderate assistance. Interval history: The patient was seen and examined in his room. The patient feels that he is doing okay today. He does feel like he is not quite as effective with therapy as he was before. He has some dyspnea on exertion especially with therapy. He denies feelings of fluid retention. His pain is doing well and he has no angina. His wife is concerned about the way his surgical leg wound appears with concern about an infection. There has been some scant drainage at times. He has had no fevers. He denies other specific complaints or needs at this time.     History Reviewed:     Past Medical History:   Diagnosis Date    A-fib [facility])     for cardioversion 3-12-21     CAD (coronary artery disease) 10/27/2011    Dr. Vnace Raza Carotid stenosis, right 2021    Chronic venous insufficiency 3/2/2017    COVID-19 2021    resolved as of 3-3-21     Decreased pulses in feet     Diabetes mellitus [address] [address]     DM (diabetes mellitus), type 2 ([address] 10/27/2011    ED (erectile dysfunction)     Gout 10/31/2011    Heart attack ([facility] [address]     HTN (hypertension) 10/27/2011    Hyperlipidemia 6/7/2012    Hypertension     Lateral myocardial infarction ([facility] [address] 10/08    due to circumflex occlusion    Leg swelling 3/2/2017       Allergies   Allergen Reactions    Adhesive Tape Dermatitis    Crestor [Rosuvastatin Calcium]      Muscles aches     Lipitor [Atorvastatin]      Muscle aches       Current Outpatient Medications   Medication Sig Dispense Refill    acetaminophen (TYLENOL) 325 MG tablet Take 650 mg by mouth every 4 hours as needed for Pain or Fever      bisacodyl (DULCOLAX) 10 MG suppository Place 10 mg rectally daily as needed for Constipation      magnesium hydroxide (MILK OF MAGNESIA) 400 MG/5ML suspension Take 30 mLs by mouth daily as needed for Constipation      Sodium Phosphates (FLEET) 7-19 GM/118ML Place 1 enema rectally daily as needed      HYDROcodone-acetaminophen (NORCO) 5-325 MG per tablet Take 1 tablet by mouth every 4 hours as needed for Pain for up to 7 days. Intended supply: 7 days.  Take lowest dose possible to manage pain 42 tablet 0    amiodarone (CORDARONE) 200 MG tablet Take 1 tablet by mouth daily for 14 days 14 tablet 0    bumetanide (BUMEX) 1 MG tablet Take 1 tablet by mouth daily for 7 days 7 tablet 0    ferrous sulfate (IRON 325) 325 (65 Fe) MG tablet Take 1 tablet by mouth 2 times daily (with meals) 60 tablet 0    folic acid (FOLVITE) 1 MG tablet Take 1 tablet by mouth daily 30 tablet 0    sennosides-docusate sodium (SENOKOT-S) 8.6-50 MG tablet Take 1 tablet by mouth 2 times daily as needed for Constipation 30 tablet 0    magnesium oxide (MAG-OX) 400 (241.3 Mg) MG TABS tablet Take 1 tablet by mouth daily for 14 days 14 tablet 0    pantoprazole (PROTONIX) 40 MG tablet Take 1 tablet by mouth daily for 14 days 14 tablet 0    ascorbic acid (VITAMIN C) 500 MG tablet Take 1 tablet by mouth 2 times daily 60 tablet 0    blood glucose test strips (ONETOUCH ULTRA) strip TEST BLOOD SUGAR TWICE DAILY AS NEEDED 100 strip 3    pravastatin (PRAVACHOL) 20 MG tablet Take 1 tablet by mouth daily 90 tablet 5    aspirin 81 MG EC tablet Take 81 mg by mouth daily      tamsulosin (FLOMAX) 0.4 MG capsule TAKE ONE CAPSULE BY MOUTH EVERY DAY 90 capsule 3    rivaroxaban (XARELTO) 20 MG TABS tablet Take 1 tablet by mouth Daily with supper Lot: [id number], EXP: 09/2022 one bottle  Lot: [id number], EXP: 10/2022 three bottles 30 tablet 2    glipiZIDE (GLUCOTROL) 5 MG tablet Take 1 tablet by mouth 4 times daily 360 tablet 3    zinc gluconate 50 MG tablet Take 50 mg by mouth daily Not taking      metFORMIN (GLUCOPHAGE) 500 MG tablet TAKE 2 TABLETS BY MOUTH 2 TIMES DAILY (WITH MEALS) 360 tablet 2    b complex vitamins capsule Take 1 capsule by mouth daily      Elastic Bandages & Supports (JOBST KNEE HIGH COMPRESSION ) MISC Knee high with 20- 30 mmhg of compression 1 each 10    vitamin B-12 (CYANOCOBALAMIN) 1000 MCG tablet Take 1,000 mcg by mouth daily Has not taken recently      vitamin D (CHOLECALCIFEROL) 1000 UNIT TABS tablet Take 400 Units by mouth daily        No current facility-administered medications for this visit. Review of Systems   Constitutional: Negative for fever. Respiratory: Positive for shortness of breath (with exertion). Negative for cough. Cardiovascular: Positive for chest pain (sternum) and leg swelling. Negative for palpitations. Gastrointestinal: Positive for constipation. Musculoskeletal: Negative for arthralgias and back pain. Skin: Positive for rash and wound. Neurological: Negative for light-headedness. /82   Pulse 75   Temp 97.4 °F (36.3 °C)   Resp 17   SpO2 96%     Physical Exam  Vitals reviewed. Constitutional:       General: He is not in acute distress. Appearance: He is well-developed. Comments: Elderly gentleman sitting in wheelchair, calm and cooperative, in no distress. HENT:      Head: Normocephalic and atraumatic. Neck:      Thyroid: No thyromegaly. Vascular: No JVD. Cardiovascular:      Rate and Rhythm: Normal rate. Rhythm regularly irregular. Pulses: Normal pulses. Heart sounds: No murmur heard. Comments: Right middle finger with pallor at times but good refill. Pulmonary:      Effort: Pulmonary effort is normal.      Breath sounds: Normal breath sounds. No rales. Abdominal:      General: Bowel sounds are normal. There is no distension. Palpations: Abdomen is soft. There is no hepatomegaly or splenomegaly. Tenderness: There is no abdominal tenderness. Musculoskeletal:      Right lower leg: Edema present. Left lower leg: Edema present. Lymphadenopathy:      Cervical: No cervical adenopathy. Skin:     General: Skin is warm and dry. Comments: Left lower extremity surgical incisions from vein harvesting with surrounding erythema but no warmth, tenderness, swelling, or obvious underlying fluctuance or purulence. See photo below. Neurological:      General: No focal deficit present. Mental Status: He is alert. Mental status is at baseline. Psychiatric:         Attention and Perception: Attention normal.         Mood and Affect: Mood and affect normal.         Speech: Speech normal.         Behavior: Behavior normal. Behavior is cooperative.          Cognition and Memory: Cognition normal.             Recent Labs:    Lab Results   Component Value Date    WBC 7.2 07/19/2021    HGB 12.1 (L) 07/19/2021    HCT 37.2 07/19/2021    .1 (H) 07/19/2021     07/19/2021    LYMPHOPCT 27.3 07/24/2019    RBC 3.68 (L) 07/19/2021    MCH 32.9 07/19/2021    MCHC 32.5 07/19/2021    RDW 13.3 07/19/2021       Lab Results   Component Value Date     07/19/2021    K 4.2 07/19/2021    CL 96 (L) 07/19/2021    CO2 27 07/19/2021    BUN 31 (H) 07/19/2021    CREATININE 1.2 07/19/2021    GLUCOSE 132 (H) 07/19/2021    CALCIUM 9.2 07/19/2021    PROT 7.0 07/19/2021    LABALBU 3.9 07/19/2021    BILITOT 1.1 07/19/2021    ALKPHOS 86 07/19/2021 7.2% in July. · Blood sugars controlled on current regimen. · Continue glipizide, metformin, corrective sliding scale.     Hyperlipidemia  · Continue pravastatin.     Anemia  · Patient with mild drop in hemoglobin with normocytic cells after surgery likely due to blood loss. · Currently on every other day iron. · Most recent CBC shows a mild macrocytosis; will need followed up as outpatient.     Benign prostatic hyperplasia  · Continue tamsulosin.     Debility  · Multifactorial related to multimorbidity and recent cardiothoracic surgery combined with advanced age. · PT/OT evaluation and treatment. · Patient is appropriate for skilled rehabilitation level of care. Diagnosis Orders   1. Atherosclerosis of native coronary artery of native heart without angina pectoris     2. Persistent atrial fibrillation ([facility] [address]     3. Essential hypertension     4. Type 2 diabetes mellitus with hyperglycemia, without long-term current use of insulin ([facility])     5. Hyperlipidemia, unspecified hyperlipidemia type     6. Anemia, unspecified type     7. Benign prostatic hyperplasia without lower urinary tract symptoms     8. Debility         Continues to benefit from skilled services. Goal is to transition to their home with Homecare and Family Assistance at discharge. Follow up: Return in about 1 week (around 7/28/2021), or for acute issues. Horacio Rico MD  7/21/2021    This report was generated using a computer driven dictation system. This system may results in transcription errors. Every effort is made to identify and correct errors, however errors may still occur.

## 2021-07-22 ENCOUNTER — TELEPHONE (OUTPATIENT)
Dept: CARDIOTHORACIC SURGERY | Age: 71
End: 2021-07-22

## 2021-07-22 ENCOUNTER — OUTSIDE SERVICES (OUTPATIENT)
Dept: PRIMARY CARE CLINIC | Age: 71
End: 2021-07-22
Payer: MEDICARE

## 2021-07-22 DIAGNOSIS — I25.10 ATHEROSCLEROSIS OF NATIVE CORONARY ARTERY OF NATIVE HEART WITHOUT ANGINA PECTORIS: ICD-10-CM

## 2021-07-22 DIAGNOSIS — R23.9 ALTERATION IN SKIN INTEGRITY RELATED TO SURGICAL INCISION: Primary | ICD-10-CM

## 2021-07-22 DIAGNOSIS — I25.10 CAD IN NATIVE ARTERY: ICD-10-CM

## 2021-07-22 RX ORDER — CEPHALEXIN 500 MG/1
500 CAPSULE ORAL 3 TIMES DAILY
Qty: 21 CAPSULE | Refills: 0 | Status: SHIPPED | OUTPATIENT
Start: 2021-07-22 | End: 2021-07-29

## 2021-07-22 ASSESSMENT — ENCOUNTER SYMPTOMS
RESPIRATORY NEGATIVE: 1
GASTROINTESTINAL NEGATIVE: 1
ALLERGIC/IMMUNOLOGIC NEGATIVE: 1
EYES NEGATIVE: 1

## 2021-07-22 NOTE — TELEPHONE ENCOUNTER
Picture reviewed. Let family know this is not out of the norm for incisions.   Keep clean, dry and keep ACE or compression wraps on and keep legs elevated as much as possible

## 2021-07-22 NOTE — TELEPHONE ENCOUNTER
[address] sent pics of pts incisions . They are in media. They are red but no drainage or warm, no fever. Zach boudreaux not concerned however family insisting Haleigh Fitzgerald look at them.  Please advise

## 2021-07-22 NOTE — TELEPHONE ENCOUNTER
We can cover with Keflex in the meantime since we are not able to inspect it daily.   Rx to be escribed

## 2021-07-22 NOTE — PROGRESS NOTES
Seen 2021  Lola Shah (:  1950) is a 66 y.o. male,New patient, here for evaluation of the following chief complaint(s): Called to unit by nurse to examine left leg incision for drainage and redness post CABG. ASSESSMENT/PLAN:  1. Alteration in skin integrity related to surgical incision  2. Atherosclerosis of native coronary artery of native heart without angina pectoris  3. CAD in native artery    Plan:  Order larger hu hose to decrease compression of lower extremities  4x4 gauze to incision on Left lower leg. Continue to monitor for increase in redness, fever, drainage    Return today (on 2021). Subjective   SUBJECTIVE/OBJECTIVE:  HPI Post CABG, recovery well with Therapies in progress. Review of Systems   Constitutional: Negative for fever ( afebrile). HENT: Negative. Eyes: Negative. Respiratory: Negative. Cardiovascular: Negative. Gastrointestinal: Negative. Endocrine: Negative. Genitourinary: Negative. Musculoskeletal: Negative. Skin: Positive for wound ( Left leg incision is red approximately 1/2 inch in diameter without tenderness or pain). Allergic/Immunologic: Negative. Neurological: Negative. Hematological: Negative. Psychiatric/Behavioral: Negative. Objective   Physical Exam   Left leg incision is red approximately 1/2 inch in diameter without tenderness or pain). Small dried drainage noted pea-sized to proximal left leg incision. Hu hose on. An electronic signature was used to authenticate this note.     --Ritesh Gómez, LINNETTE - CNP

## 2021-07-23 ENCOUNTER — OUTSIDE SERVICES (OUTPATIENT)
Dept: PRIMARY CARE CLINIC | Age: 71
End: 2021-07-23
Payer: MEDICARE

## 2021-07-23 DIAGNOSIS — N40.0 BENIGN PROSTATIC HYPERPLASIA WITHOUT LOWER URINARY TRACT SYMPTOMS: ICD-10-CM

## 2021-07-23 DIAGNOSIS — I25.10 ATHEROSCLEROSIS OF NATIVE CORONARY ARTERY OF NATIVE HEART WITHOUT ANGINA PECTORIS: Primary | ICD-10-CM

## 2021-07-23 DIAGNOSIS — R53.81 DEBILITY: ICD-10-CM

## 2021-07-23 DIAGNOSIS — D64.9 ANEMIA, UNSPECIFIED TYPE: ICD-10-CM

## 2021-07-23 DIAGNOSIS — I10 ESSENTIAL HYPERTENSION: ICD-10-CM

## 2021-07-23 DIAGNOSIS — E78.5 HYPERLIPIDEMIA, UNSPECIFIED HYPERLIPIDEMIA TYPE: ICD-10-CM

## 2021-07-23 DIAGNOSIS — I48.19 PERSISTENT ATRIAL FIBRILLATION (HCC): ICD-10-CM

## 2021-07-23 DIAGNOSIS — E11.65 TYPE 2 DIABETES MELLITUS WITH HYPERGLYCEMIA, WITHOUT LONG-TERM CURRENT USE OF INSULIN (HCC): ICD-10-CM

## 2021-07-23 PROCEDURE — 99316 NF DSCHRG MGMT 30 MIN+: CPT | Performed by: STUDENT IN AN ORGANIZED HEALTH CARE EDUCATION/TRAINING PROGRAM

## 2021-07-23 ASSESSMENT — ENCOUNTER SYMPTOMS
SHORTNESS OF BREATH: 1
CONSTIPATION: 0
DIARRHEA: 0
ABDOMINAL PAIN: 0

## 2021-07-26 ENCOUNTER — TELEPHONE (OUTPATIENT)
Dept: NON INVASIVE DIAGNOSTICS | Age: 71
End: 2021-07-26

## 2021-07-26 LAB
ALBUMIN SERPL-MCNC: 3.7 G/DL (ref 3.5–5.2)
ALP BLD-CCNC: 84 U/L (ref 40–129)
ALT SERPL-CCNC: 21 U/L (ref 0–40)
ANION GAP SERPL CALCULATED.3IONS-SCNC: 13 MMOL/L (ref 7–16)
AST SERPL-CCNC: 25 U/L (ref 0–39)
BILIRUB SERPL-MCNC: 0.6 MG/DL (ref 0–1.2)
BUN BLDV-MCNC: 15 MG/DL (ref 6–23)
CALCIUM SERPL-MCNC: 9.3 MG/DL (ref 8.6–10.2)
CHLORIDE BLD-SCNC: 103 MMOL/L (ref 98–107)
CO2: 24 MMOL/L (ref 22–29)
CREAT SERPL-MCNC: 1.3 MG/DL (ref 0.7–1.2)
GFR AFRICAN AMERICAN: >60
GFR NON-AFRICAN AMERICAN: 54 ML/MIN/1.73
GLUCOSE BLD-MCNC: 100 MG/DL (ref 74–99)
HCT VFR BLD CALC: 37 % (ref 37–54)
HEMOGLOBIN: 11.8 G/DL (ref 12.5–16.5)
MCH RBC QN AUTO: 33.5 PG (ref 26–35)
MCHC RBC AUTO-ENTMCNC: 31.9 % (ref 32–34.5)
MCV RBC AUTO: 105.1 FL (ref 80–99.9)
PDW BLD-RTO: 13.7 FL (ref 11.5–15)
PLATELET # BLD: 205 E9/L (ref 130–450)
PMV BLD AUTO: 12.3 FL (ref 7–12)
POTASSIUM SERPL-SCNC: 4.6 MMOL/L (ref 3.5–5)
RBC # BLD: 3.52 E12/L (ref 3.8–5.8)
SODIUM BLD-SCNC: 140 MMOL/L (ref 132–146)
TOTAL PROTEIN: 6.6 G/DL (ref 6.4–8.3)
WBC # BLD: 5.3 E9/L (ref 4.5–11.5)

## 2021-07-26 NOTE — TELEPHONE ENCOUNTER
Called pt and left VM for him to call [phone] back to schedule a 6 week OV F/U.     BEATRIZ Fernandez    ----- Message from Violette Foster sent at 7/26/2021 11:00 AM EDT -----    ----- Message -----  From: Ramsey Montalvo MD  Sent: 7/26/2021  10:56 AM EDT  To: Violette Foster    6 week office f/u

## 2021-07-27 ENCOUNTER — HOSPITAL ENCOUNTER (OUTPATIENT)
Age: 71
Discharge: HOME | End: 2021-07-29
Payer: MEDICARE

## 2021-07-27 ENCOUNTER — HOSPITAL ENCOUNTER (OUTPATIENT)
Dept: GENERAL RADIOLOGY | Age: 71
Discharge: HOME | End: 2021-07-29
Payer: MEDICARE

## 2021-07-27 DIAGNOSIS — R06.02 SOBOE (SHORTNESS OF BREATH ON EXERTION): ICD-10-CM

## 2021-07-27 PROCEDURE — 71046 X-RAY EXAM CHEST 2 VIEWS: CPT

## 2021-08-02 ENCOUNTER — HOSPITAL ENCOUNTER (OUTPATIENT)
Dept: CARDIAC REHAB | Age: 71
Setting detail: THERAPIES SERIES
Discharge: HOME | End: 2021-08-02
Payer: MEDICARE

## 2021-08-10 ENCOUNTER — HOSPITAL ENCOUNTER (OUTPATIENT)
Dept: CARDIAC REHAB | Age: 71
Setting detail: THERAPIES SERIES
Discharge: HOME | End: 2021-08-10
Payer: MEDICARE

## 2021-08-16 ENCOUNTER — OFFICE VISIT (OUTPATIENT)
Dept: CARDIOLOGY CLINIC | Age: 71
End: 2021-08-16
Payer: MEDICARE

## 2021-08-16 VITALS
BODY MASS INDEX: 38.41 KG/M2 | RESPIRATION RATE: 18 BRPM | HEART RATE: 82 BPM | HEIGHT: 66 IN | WEIGHT: 239 LBS | SYSTOLIC BLOOD PRESSURE: 113 MMHG | DIASTOLIC BLOOD PRESSURE: 70 MMHG

## 2021-08-16 DIAGNOSIS — I25.10 ATHEROSCLEROSIS OF NATIVE CORONARY ARTERY OF NATIVE HEART WITHOUT ANGINA PECTORIS: ICD-10-CM

## 2021-08-16 DIAGNOSIS — I48.19 PERSISTENT ATRIAL FIBRILLATION (HCC): ICD-10-CM

## 2021-08-16 PROCEDURE — 99214 OFFICE O/P EST MOD 30 MIN: CPT | Performed by: INTERNAL MEDICINE

## 2021-08-16 PROCEDURE — 93000 ELECTROCARDIOGRAM COMPLETE: CPT | Performed by: INTERNAL MEDICINE

## 2021-08-16 NOTE — PROGRESS NOTES
tablet by mouth daily for 14 days 14 tablet 0    pravastatin (PRAVACHOL) 20 MG tablet Take 1 tablet by mouth daily 90 tablet 5    aspirin 81 MG EC tablet Take 81 mg by mouth daily      tamsulosin (FLOMAX) 0.4 MG capsule TAKE ONE CAPSULE BY MOUTH EVERY DAY 90 capsule 3    glipiZIDE (GLUCOTROL) 5 MG tablet Take 1 tablet by mouth 4 times daily 360 tablet 3    zinc gluconate 50 MG tablet Take 50 mg by mouth daily Not taking      metFORMIN (GLUCOPHAGE) 500 MG tablet TAKE 2 TABLETS BY MOUTH 2 TIMES DAILY (WITH MEALS) (Patient taking differently: Take 500 mg by mouth 2 times daily (with meals) ) 360 tablet 2    b complex vitamins capsule Take 1 capsule by mouth daily      vitamin B-12 (CYANOCOBALAMIN) 1000 MCG tablet Take 1,000 mcg by mouth daily Has not taken recently      vitamin D (CHOLECALCIFEROL) 1000 UNIT TABS tablet Take 400 Units by mouth daily       pantoprazole (PROTONIX) 40 MG tablet Take 1 tablet by mouth daily for 14 days 14 tablet 0     No current facility-administered medications for this visit.         Allergies   Allergen Reactions    Adhesive Tape Dermatitis    Crestor [Rosuvastatin Calcium]      Muscles aches     Lipitor [Atorvastatin]      Muscle aches       Vitals:    08/16/21 0930   BP: 113/70   Pulse: 82   Resp: 18   Weight: 239 lb (108.4 kg)   Height: 5' 6\" (1.676 m)       Social History     Socioeconomic History    Marital status:      Spouse name: Not on file    Number of children: 2    Years of education: Not on file    Highest education level: Not on file   Occupational History    Occupation: self-employed     Comment: print shop   Tobacco Use    Smoking status: Never Smoker    Smokeless tobacco: Never Used   Vaping Use    Vaping Use: Never used   Substance and Sexual Activity    Alcohol use: Yes     Comment: occasional    Drug use: Never    Sexual activity: Not on file   Other Topics Concern    Not on file   Social History Narrative    Not on file     Social Determinants of Health     Financial Resource Strain:     Difficulty of Paying Living Expenses:    Food Insecurity:     Worried About Running Out of Food in the Last Year:     [address] in the Last Year:    Transportation Needs:     Lack of Transportation (Medical):  Lack of Transportation (Non-Medical):    Physical Activity:     Days of Exercise per Week:     Minutes of Exercise per Session:    Stress:     Feeling of Stress :    Social Connections:     Frequency of Communication with Friends and Family:     Frequency of Social Gatherings with Friends and Family:     Attends Spiritism Services:     Active Member of Clubs or Organizations:     Attends Club or Organization Meetings:     Marital Status:    Intimate Partner Violence:     Fear of Current or Ex-Partner:     Emotionally Abused:     Physically Abused:     Sexually Abused:        Family History   Problem Relation Age of Onset    COPD Mother     COPD Father     Heart Attack Father 79    Down Syndrome Sister     Crohn's Disease Brother     Atrial Fibrillation Sister     No Known Problems Sister     No Known Problems Brother     No Known Problems Brother        SUBJECTIVE: Angela [place] presents to the office today for re-evaluation of chronic cardiac diagnoses. Since our last visit has had two vessel ACB  With MAZE and LA exclusion. Seen by dr Juan Khan for recurrent AF - ekg one as OP one week later showed AF   He complains of NO new compliants, and has been taking his lasix qd. Is walking and feels stronger. Yet to see CTS  Compliant with NOAC with no missed doses. No bleeds. No SHANNAN diagnosis. Review of Systems   Other than stated in HPI, negative 10-point system review. Objective:   Physical Exam   Constitutional: He is oriented to person, place, and time. He is cooperative. Obese     HENT:   Head: Normocephalic and atraumatic.    Eyes: Conjunctivae are normal. Pupils are equal, round,   Neck: No JVD present. Carotid bruit is not present. Cardiovascular: irregular rhythm, S1 normal, S2 normal and intact distal pulses. PMI is not displaced. Exam reveals no gallop. No murmur heard. Pulmonary/Chest: Effort normal and breath sounds normal. No respiratory distress. Abdominal: Soft. Normal appearance and bowel sounds are normal. He exhibits no abdominal bruit and no pulsatile midline mass. There is no hepatomegaly. There is no tenderness. Musculoskeletal: Normal range of motion. He exhibits 2+ bilateral edema. Neurological: He is alert and oriented to person, place, and time. Gait normal.   Skin: Skin is warm and dry. No bruising, no ecchymosis and no rash noted. He is not diaphoretic. No cyanosis. Psychiatric: He has a normal mood and affect. His behavior is normal. Thought content normal.     EKG: atrial fibrillation, rate 82 bpm, axis +52, NT.       ASSESSMENT & PLAN:    Patient Active Problem List   Diagnoses    DM (diabetes mellitus), type 2    HTN (hypertension):     Coronary atherosclerosis of native coronary artery: s/p lateral MI 2008 due to circumflex occlusion treated conservatively due to completed, uncomplicated infarction. Now with two vessel ACB    Will have him take bid lasix for a week to mobilize fluid. Cardiac rehab already set up               * Hyperlipidemia: intolerant to multiple statin agents- (crestor and atorvastatin) tolerating pravastatin 20 mg. If cannot tolerate will need Repatha    Atrial fibrillation : s/p MAZE and LA exclusion. Recurrent AF documented on first ekg one week post dishcarge. Dr Nuvia Crisostomo to opine.   I refilled his xarelto       OV 3 months

## 2021-08-17 ENCOUNTER — OFFICE VISIT (OUTPATIENT)
Dept: CARDIOTHORACIC SURGERY | Age: 71
End: 2021-08-17

## 2021-08-17 VITALS
HEART RATE: 88 BPM | HEIGHT: 67 IN | DIASTOLIC BLOOD PRESSURE: 67 MMHG | SYSTOLIC BLOOD PRESSURE: 119 MMHG | WEIGHT: 235 LBS | BODY MASS INDEX: 36.88 KG/M2

## 2021-08-17 DIAGNOSIS — Z95.1 S/P CABG (CORONARY ARTERY BYPASS GRAFT): Primary | ICD-10-CM

## 2021-08-17 PROCEDURE — 99024 POSTOP FOLLOW-UP VISIT: CPT | Performed by: PHYSICIAN ASSISTANT

## 2021-08-17 ASSESSMENT — ENCOUNTER SYMPTOMS
WHEEZING: 0
SHORTNESS OF BREATH: 0
COUGH: 0

## 2021-08-17 NOTE — PROGRESS NOTES
Subjective:      Chief Complaint   Patient presents with    Post-Op Check     cabg x 2 MAZE       Patient ID: Deandra Minor is a 66 y.o. male who presents to office for routine 1 month follow up s/p CABG x 2, maze on 7/9. Patient was initially [address] to rehab, but now is home. He states he is doing \"fairly well\"  He denies CP or SOB. He does state that at night he is having a hard time sleeping in bed, he wakes frequently, and \"does not feel like he can get enough air\" during day he denies any SOB. He also complains of LE edema and left leg incision seeping and some redness. He was seen by cardiology yesterday who placed him on bid lasix. He also finished a course of Keflex while in rehab    HPI    Review of Systems   Constitutional: Negative for chills and fever. Respiratory: Negative for cough, shortness of breath and wheezing. Cardiovascular: Negative for chest pain, palpitations and leg swelling. Neurological: Negative for syncope. Objective:   Physical Exam  Constitutional:       Appearance: Normal appearance. Cardiovascular:      Rate and Rhythm: Normal rate and regular rhythm. Pulses: Normal pulses. Heart sounds: Normal heart sounds. Pulmonary:      Effort: Pulmonary effort is normal.      Breath sounds: Normal breath sounds. Comments: midsternal and chest tube incisions well healed without evidence of infection. Sternum stable. Abdominal:      General: Bowel sounds are normal.   Musculoskeletal:         General: Swelling present. Normal range of motion. Cervical back: Normal range of motion and neck supple. Comments: + significant LE edema bilaterally, L slight more than R + 2-3. Left lower 2 leg incisions with superficial dehscience and some clear serous drainage. Mild generalized erythema, probably related more to the edema. Non blanching, mild TTP no significant warmth    Skin:     General: Skin is warm and dry.    Neurological:      Mental Status: He is alert and oriented to person, place, and time. Assessment:      S/p CABG, Maze    LE edema    Superficial incisional dehiscence       Plan:      Remove sternal precautions after Friday   Cardiac rehab referral  Lasix per cardiology  Betadine leg incisions bid x 10-14 days keep clean and dry, follow up if no improvement in 2-3 weeks, or if any worsening   Follow with PCP for possible sleep study due to night time waking and feeling of loss of breath and snoring at night    Continue follow up with PCP, Cardiology as scheduled. Encouraged to call office with any questions, concerns. Otherwise no further follow up necessary from CTS standpoint.

## 2021-08-18 ENCOUNTER — HOSPITAL ENCOUNTER (OUTPATIENT)
Dept: CARDIAC REHAB | Age: 71
Setting detail: THERAPIES SERIES
Discharge: HOME | End: 2021-08-18
Payer: MEDICARE

## 2021-08-21 VITALS
HEART RATE: 78 BPM | OXYGEN SATURATION: 96 % | TEMPERATURE: 97.2 F | DIASTOLIC BLOOD PRESSURE: 70 MMHG | SYSTOLIC BLOOD PRESSURE: 128 MMHG | RESPIRATION RATE: 18 BRPM

## 2021-08-21 ASSESSMENT — ENCOUNTER SYMPTOMS
CONSTIPATION: 1
ABDOMINAL PAIN: 0
BACK PAIN: 0
TROUBLE SWALLOWING: 0
NAUSEA: 0
SHORTNESS OF BREATH: 0
COUGH: 0
BACK PAIN: 0
BACK PAIN: 0
COUGH: 0

## 2021-09-14 ENCOUNTER — HOSPITAL ENCOUNTER (OUTPATIENT)
Dept: GENERAL RADIOLOGY | Age: 71
Discharge: HOME | End: 2021-09-16
Payer: MEDICARE

## 2021-09-14 ENCOUNTER — HOSPITAL ENCOUNTER (OUTPATIENT)
Age: 71
Discharge: HOME | End: 2021-09-16
Payer: MEDICARE

## 2021-09-14 DIAGNOSIS — I25.10 ATHEROSCLEROSIS OF NATIVE CORONARY ARTERY OF NATIVE HEART WITHOUT ANGINA PECTORIS: ICD-10-CM

## 2021-09-14 PROCEDURE — 71046 X-RAY EXAM CHEST 2 VIEWS: CPT

## 2021-09-29 ENCOUNTER — OFFICE VISIT (OUTPATIENT)
Dept: NON INVASIVE DIAGNOSTICS | Age: 71
End: 2021-09-29
Payer: MEDICARE

## 2021-09-29 VITALS
RESPIRATION RATE: 16 BRPM | OXYGEN SATURATION: 98 % | SYSTOLIC BLOOD PRESSURE: 110 MMHG | HEART RATE: 82 BPM | WEIGHT: 226.4 LBS | HEIGHT: 67 IN | BODY MASS INDEX: 35.53 KG/M2 | DIASTOLIC BLOOD PRESSURE: 72 MMHG

## 2021-09-29 DIAGNOSIS — Z95.1 HX OF CABG: ICD-10-CM

## 2021-09-29 DIAGNOSIS — Z98.890 H/O MAZE PROCEDURE: ICD-10-CM

## 2021-09-29 DIAGNOSIS — I48.0 PAROXYSMAL ATRIAL FIBRILLATION (HCC): Primary | ICD-10-CM

## 2021-09-29 DIAGNOSIS — R94.31 ABNORMAL EKG: ICD-10-CM

## 2021-09-29 DIAGNOSIS — E11.9 DM TYPE 2, NOT AT GOAL (HCC): ICD-10-CM

## 2021-09-29 DIAGNOSIS — E66.01 CLASS 2 SEVERE OBESITY DUE TO EXCESS CALORIES WITH SERIOUS COMORBIDITY AND BODY MASS INDEX (BMI) OF 37.0 TO 37.9 IN ADULT (HCC): ICD-10-CM

## 2021-09-29 DIAGNOSIS — I10 ESSENTIAL HYPERTENSION: ICD-10-CM

## 2021-09-29 PROCEDURE — 99215 OFFICE O/P EST HI 40 MIN: CPT | Performed by: INTERNAL MEDICINE

## 2021-09-29 PROCEDURE — 3051F HG A1C>EQUAL 7.0%<8.0%: CPT | Performed by: INTERNAL MEDICINE

## 2021-09-29 PROCEDURE — 93000 ELECTROCARDIOGRAM COMPLETE: CPT | Performed by: INTERNAL MEDICINE

## 2021-09-29 RX ORDER — METOPROLOL SUCCINATE 25 MG/1
12.5 TABLET, EXTENDED RELEASE ORAL DAILY
Qty: 45 TABLET | Refills: 1 | Status: SHIPPED
Start: 2021-09-29 | End: 2022-03-18 | Stop reason: SDUPTHER

## 2021-09-29 ASSESSMENT — ENCOUNTER SYMPTOMS
EYE REDNESS: 0
NAUSEA: 0
COUGH: 0
ABDOMINAL PAIN: 0
SINUS PRESSURE: 0
ABDOMINAL DISTENTION: 0
WHEEZING: 0
COLOR CHANGE: 0
SHORTNESS OF BREATH: 0
CHEST TIGHTNESS: 0
DIARRHEA: 0

## 2021-09-29 NOTE — PROGRESS NOTES
Cardiac Electrophysiology Outpatient Progress Note    Murphy Tucker  1950  Date of Service: 9/29/2021  Referring Provider/PCP: Jai Nguyen MD  Chief Complaint: AF    HISTORY OF PRESENT ILLNESS    Murphy Tucker presents to the office today for follow up of these Electrophysiology conditions: AF.      Ruthie Collier a 28 [facility] I was asked to see in Cardiac Electrophysiology consultation for post op atrial fibrillation.       He has a hx of persistent atrial fibrillation sp DCCV x 2 (2/15/21-failed; 3/12/21), CAD, HTN, DM, obesity, binge EtOH drinking (former alcoholic, now drinks ~5 drinks once a month), diabetic neuropathy, ED, and gout. He was diagnosed with atrial fibrillation in 1/2021 shortly after COVID-19 diagnosis in 12/2020. His AF symptoms are fatigue. He had a failed DCCV on 2/15/21, then started on multaq (atenolol discontinued at this time) and repeat DCCV on 3/12/21, which successfully restored sinus rhythm and resolved patient's symptom of fatigue for ~3 days, then returned to AF. He saw Dr Virginia Corral with above hx and  stress test recommended for chest pain and rhythm control. Stress test ordered was positive and patient was referred for cardiac cath done on 6/16 revealing severe triple vessel CAD. He underwent Coronary artery bypass grafting x2 (left internal mammary artery to left anterior descending artery and reverse saphenous vein graft to the obtuse marginal branch of the circumflex), Maze procedure using pulmonary vein isolation, Left atrial appendage ligation using a 40-mm AtriCure appendage clip on 7/9/21.    7/10/21 : he was in NSR recovering     7/11/21 : today, In Afib rate 100, asymptomatic except for incisional pain     7/12/21 : he remains in AF, rate 79. He is getting stronger slowly     7/13/21 : he is doing well. Remains in AF rate 70-80's asymptomatic     9/29/2021: Mr. Shahla Coelho presents today for hospital follow up.  While in the hospital he was started on Amiodarone, in which he is currently off Amiodarone. He reports feeling overall great and is back to his baseline. He is able to complete his ADL without difficulty. The patient denies any chest pain, dyspnea, palpitations, dizziness, syncope, orthopnea or paroxysmal nocturnal dyspnea. Patient Active Problem List    Diagnosis Date Noted    Type 2 diabetes mellitus with hyperglycemia, without long-term current use of insulin ([facility] 10/27/2011     Priority: High     Class: Chronic    HTN (hypertension) 10/27/2011     Priority: High     Class: Chronic    Coronary atherosclerosis of native coronary artery 10/27/2011     Priority: High     Class: Chronic     Overview Note:       A. Lateral infarction 10/08 due to circumflex occlusion. PCI not performed due to completed infarction. B. Adenosine Myoview 12/14/09 ([facility] Neither fixed nor reversible perfusion defect. LVEF 50%. C. Cath 6/16/21 ([facility]): severe 3 vessel disease, left dominant, EF 45-50%  D. ACB 7/9/21 ([facility]): PEREYRA-LAD, SVG - OM      Carotid stenosis, right 05/06/2021    Atrial fibrillation ([facility] 01/05/2021     Overview Note:     A. CHADS-VASC = 4  B. Unsuccessful DCCV attempt 2/15/2021  C. CRYSTAL guided DCCV 3/12/2021 on Multaq  D.  Maze and AtriCure at time of [facility]      Class 2 severe obesity due to excess calories with serious comorbidity and body mass index (BMI) of 37.0 to 37.9 in adult [facility]) 04/16/2019    Hyperlipidemia 06/07/2012     Overview Note:     Intolerant to multiple statin agents         Family History   Problem Relation Age of Onset    COPD Mother     COPD Father     Heart Attack Father 79    Down Syndrome Sister     Crohn's Disease Brother     Atrial Fibrillation Sister     No Known Problems Sister     No Known Problems Brother     No Known Problems Brother        SOCIAL HISTORY   Social History     Socioeconomic History    Marital status:      Spouse name: Not on file    Number of children: 2    Years of education: Not on file    Highest education level: Not on file   Occupational History    Occupation: self-employed     Comment: print shop   Tobacco Use    Smoking status: Never Smoker    Smokeless tobacco: Never Used   Vaping Use    Vaping Use: Never used   Substance and Sexual Activity    Alcohol use: Yes     Comment: occasional    Drug use: Never    Sexual activity: Not on file   Other Topics Concern    Not on file   Social History Narrative    Not on file     Social Determinants of Health     Financial Resource Strain:     Difficulty of Paying Living Expenses:    Food Insecurity:     Worried About [address] in the Last Year:     [address] [employer] in the Last Year:    Transportation Needs:     Lack of Transportation (Medical):  Lack of Transportation (Non-Medical):    Physical Activity:     Days of Exercise per Week:     Minutes of Exercise per Session:    Stress:     Feeling of Stress :    Social Connections:     Frequency of Communication with Friends and Family:     Frequency of Social Gatherings with Friends and Family:     Attends Mandaen Services:     Active Member of Clubs or Organizations:     Attends Club or Organization Meetings:     Marital Status:    Intimate Partner Violence:     Fear of Current or Ex-Partner:     Emotionally Abused:     Physically Abused:     Sexually Abused:          Past Surgical History:   Procedure Laterality Date   R Stephanie 11  06/16/2021    DR. [facility]AR[facility] EF 45% CTS CONSULT    CARDIOVASCULAR STRESS TEST  12/14/2009    neither fixed nor reversible perfusion defect; LVEF 50%    CATARACT REMOVAL Left     CHOLECYSTECTOMY  1999    CORONARY ARTERY BYPASS GRAFT Left 7/9/2021    CABG CORONARY ARTERY BYPASS, MAZE, LEFT ATRIAL APPENDAGE OCCLUSION performed by Janice Noel DO at [facility]  @ age 13   [facility] KNEE SURGERY      left-ligament torn    OTHER SURGICAL HISTORY  10/31/2011 cystoscopy retrograde;[facility];stent on left    TESTICLE SURGERY      as child     WISDOM TOOTH EXTRACTION         Current Outpatient Medications   Medication Sig Dispense Refill    metoprolol succinate (TOPROL XL) 25 MG extended release tablet Take 0.5 tablets by mouth daily 45 tablet 1    furosemide (LASIX) 20 MG tablet Take 1 tablet by mouth daily Patient takes when needed (Patient taking differently: Take 20 mg by mouth 2 times daily Patient takes when needed) 30 tablet 3    rivaroxaban (XARELTO) 20 MG TABS tablet Take 1 tablet by mouth daily (with breakfast) 30 tablet 1    magnesium oxide (MAG-OX) 400 (241.3 Mg) MG TABS tablet Take 1 tablet by mouth daily for 14 days 14 tablet 0    pravastatin (PRAVACHOL) 20 MG tablet Take 1 tablet by mouth daily 90 tablet 5    aspirin 81 MG EC tablet Take 81 mg by mouth daily      tamsulosin (FLOMAX) 0.4 MG capsule TAKE ONE CAPSULE BY MOUTH EVERY DAY 90 capsule 3    glipiZIDE (GLUCOTROL) 5 MG tablet Take 1 tablet by mouth 4 times daily 360 tablet 3    zinc gluconate 50 MG tablet Take 50 mg by mouth daily Not taking      metFORMIN (GLUCOPHAGE) 500 MG tablet TAKE 2 TABLETS BY MOUTH 2 TIMES DAILY (WITH MEALS) (Patient taking differently: Take 500 mg by mouth 2 times daily (with meals) ) 360 tablet 2    b complex vitamins capsule Take 1 capsule by mouth daily      vitamin B-12 (CYANOCOBALAMIN) 1000 MCG tablet Take 1,000 mcg by mouth daily Has not taken recently      vitamin D (CHOLECALCIFEROL) 1000 UNIT TABS tablet Take 400 Units by mouth daily       folic acid (FOLVITE) 1 MG tablet Take 1 tablet by mouth daily 30 tablet 0     No current facility-administered medications for this visit.         Allergies   Allergen Reactions    Adhesive Tape Dermatitis and Other (See Comments)    Crestor [Rosuvastatin Calcium]      Muscles aches     Lipitor [Atorvastatin]      Muscle aches    Rosuvastatin      Muscles aches            ROS:   Review of Systems   Constitutional: 2021 7.2   07/15/2021 8.0     Hemoglobin (g/dL)   Date Value   2021 11.8 (L)   2021 12.1 (L)   07/15/2021 12.0 (L)     Hematocrit (%)   Date Value   2021 37.0   2021 37.2   07/15/2021 35.9 (L)     Platelets (Q1/I)   Date Value   2021 205   2021 183   07/15/2021 119 (L)      BMP:   Sodium (mmol/L)   Date Value   2021 140   2021 138   07/15/2021 137     Potassium (mmol/L)   Date Value   2021 4.6   2021 4.2   07/15/2021 4.3     Magnesium (mg/dL)   Date Value   2021 2.1   07/10/2021 2.1   07/10/2021 2.1     Chloride (mmol/L)   Date Value   2021 103   2021 96 (L)   07/15/2021 99     CO2 (mmol/L)   Date Value   2021 24   2021 27   07/15/2021 26     BUN (mg/dL)   Date Value   2021 15   2021 31 (H)   07/15/2021 43 (H)     CREATININE (mg/dL)   Date Value   2021 1.3 (H)   2021 1.2   07/15/2021 1.1     Glucose (mg/dL)   Date Value   2021 100 (H)   2021 132 (H)   07/15/2021 181 (H)   2011 149 (H)   10/28/2011 211 (H)   10/27/2011 187 (H)     Calcium (mg/dL)   Date Value   2021 9.3   2021 9.2   07/15/2021 8.6      INR:   INR (no units)   Date Value   2021 1.3   2021 1.1   2021 1.3      BNP: No results found for: BNP   TSH:   TSH (uIU/mL)   Date Value   2019 1.380   2016 1.57   10/28/2011 0.605      Cardiac Injury Profile:    Total CK (U/L)   Date Value   2016 161     Lipid Profile:   Triglycerides (mg/dL)   Date Value   2021 75     HDL (mg/dL)   Date Value   2021 46     LDL Calculated (mg/dL)   Date Value   2021 91     Cholesterol, Total (mg/dL)   Date Value   2021 152      Hemoglobin A1C:   Hemoglobin A1C (%)   Date Value   2021 7.2 (H)       Pertinent Cardiac Testin/16/21 cardiac Cath  LEFT VENTRICULOGRAM:  Revealed mid to basal inferior wall akinesis with  an ejection fraction of 45%-50%.  No mitral regurgitation was noted.     IMPRESSION:  1.  Severe triple-vessel CAD. 2.  Mid to basal inferior wall akinesis with an ejection fraction of  45%-50%. 3.  Elevated LVEDP at 20 mmHg.     3/12/21 CRYSTAL   Normal left ventricle size and systolic function.   Mildly dilated left atrium.   Physiologic and/or trace mitral regurgitation is present.   Mild tricuspid regurgitation.   Mild plaque noted in the descending aorta      ECG 9/29/2021: 1st degree AV block, PVC, rate: 82bpm, SD: 254, QTc: 422, QRS: 102 - see scanned cardiology    I have independently reviewed all of the ECGs and rhythm strips per above    I have personally reviewed the laboratory, cardiac diagnostic and radiographic testing as outlined above: We have requested previous records. 1. Paroxysmal atrial fibrillation ([address]    2. Abnormal EKG    3. Hx of CABG    4. Class 2 severe obesity due to excess calories with serious comorbidity and body mass index (BMI) of 37.0 to 37.9 in adult ([facility]    5. Essential hypertension    6. H/O maze procedure    7. DM type 2, not at goal [facility])         Assessment & Plan    1. Atrial fibrillation  - Diagnosed with atrial fibrillation in 1/2021 shortly after COVID-19 diagnosis in 12/2020  - persistent atrial fibrillation sp DCCV x 2 (2/15/21-failed; 3/12/21~ lasted for 3 days)  - now POD # 3 from CABG, MAZE, Atrial clip with recurrent AF.  He was treated with IV/PO amiodarone  - Now off Amiodarone  - In NSR today, first degree AV block  - [id number] Score: 5  ( age, CAD, HTN, DM, CHF) and AF diagnsois predates CABG thus recommend continuing anticoagulation unless contraindication to this  - Continue to monitor for recurrent arrhythmia  - Start Toprol XL 12.5mg QD as he is not on a BB  - Re-education on importance of well controlled HTN (goal BP < 130/80), adequate weight control (goal BMI of < 27), physical activity consisting of moderate cardiopulmonary exercise up to a goal of 250 min/wk, daily compliance with CPAP in treating sleep apnea, smoking cessation and limited ETOH intake.      2. CAD  - s/p CABG 7/9/11  - Mid to basal inferior wall akinesis with an ejection fraction of 45%-50% on Cath  - LVEF 64% on Stress test     3. Obesity  - Body mass index is 35.99 kg/m². - Lifestyle modification     4. CKD    Lab Results   Component Value Date    CREATININE 1.3 (H) 07/26/2021    BUN 15 07/26/2021     07/26/2021    K 4.6 07/26/2021     07/26/2021    CO2 24 07/26/2021     5. DM-2  - Per PCP    6. Alcohol use  - Discussed cessation    Plan  1. Start Toprol XL 12.5mg daily. 2. Continue life style modifications. 3. Limit alcohol intake. 4. OV follow up in 6 months or sooner PRN. Encouraged the patient to call the office for any questions or concerns. Thank you for allowing me to participate in your patient's care. I have spent a total of 40 minutes with the patient and his/her family reviewing the above stated recommendations. A total of >50% of that time involved face-to-face time providing counseling and or coordination of care with the other providers.     Nicol Petit MD  Cardiac Electrophysiology  [address] [place] [address]

## 2021-11-11 DIAGNOSIS — L03.116 CELLULITIS OF LEFT LEG: ICD-10-CM

## 2021-11-11 LAB
BASOPHILS ABSOLUTE: 0.05 E9/L (ref 0–0.2)
BASOPHILS RELATIVE PERCENT: 1.1 % (ref 0–2)
EOSINOPHILS ABSOLUTE: 0.09 E9/L (ref 0.05–0.5)
EOSINOPHILS RELATIVE PERCENT: 2 % (ref 0–6)
HCT VFR BLD CALC: 37.7 % (ref 37–54)
HEMOGLOBIN: 12.2 G/DL (ref 12.5–16.5)
IMMATURE GRANULOCYTES #: 0 E9/L
IMMATURE GRANULOCYTES %: 0 % (ref 0–5)
LYMPHOCYTES ABSOLUTE: 1.08 E9/L (ref 1.5–4)
LYMPHOCYTES RELATIVE PERCENT: 23.7 % (ref 20–42)
MCH RBC QN AUTO: 32.3 PG (ref 26–35)
MCHC RBC AUTO-ENTMCNC: 32.4 % (ref 32–34.5)
MCV RBC AUTO: 99.7 FL (ref 80–99.9)
MONOCYTES ABSOLUTE: 0.38 E9/L (ref 0.1–0.95)
MONOCYTES RELATIVE PERCENT: 8.4 % (ref 2–12)
NEUTROPHILS ABSOLUTE: 2.95 E9/L (ref 1.8–7.3)
NEUTROPHILS RELATIVE PERCENT: 64.8 % (ref 43–80)
PDW BLD-RTO: 15.9 FL (ref 11.5–15)
PLATELET # BLD: 110 E9/L (ref 130–450)
PMV BLD AUTO: 11.3 FL (ref 7–12)
RBC # BLD: 3.78 E12/L (ref 3.8–5.8)
WBC # BLD: 4.6 E9/L (ref 4.5–11.5)

## 2021-11-12 LAB
ALBUMIN SERPL-MCNC: 4.2 G/DL (ref 3.5–5.2)
ALP BLD-CCNC: 85 U/L (ref 40–129)
ALT SERPL-CCNC: 17 U/L (ref 0–40)
ANION GAP SERPL CALCULATED.3IONS-SCNC: 15 MMOL/L (ref 7–16)
AST SERPL-CCNC: 31 U/L (ref 0–39)
BILIRUB SERPL-MCNC: 1.1 MG/DL (ref 0–1.2)
BUN BLDV-MCNC: 19 MG/DL (ref 6–23)
C-REACTIVE PROTEIN: 0.5 MG/DL (ref 0–0.4)
CALCIUM SERPL-MCNC: 9.3 MG/DL (ref 8.6–10.2)
CHLORIDE BLD-SCNC: 102 MMOL/L (ref 98–107)
CO2: 20 MMOL/L (ref 22–29)
CREAT SERPL-MCNC: 1.3 MG/DL (ref 0.7–1.2)
GFR AFRICAN AMERICAN: >60
GFR NON-AFRICAN AMERICAN: 54 ML/MIN/1.73
GLUCOSE BLD-MCNC: 128 MG/DL (ref 74–99)
POTASSIUM SERPL-SCNC: 4.7 MMOL/L (ref 3.5–5)
SEDIMENTATION RATE, ERYTHROCYTE: 5 MM/HR (ref 0–15)
SODIUM BLD-SCNC: 137 MMOL/L (ref 132–146)
TOTAL PROTEIN: 6.8 G/DL (ref 6.4–8.3)

## 2021-11-16 ENCOUNTER — OFFICE VISIT (OUTPATIENT)
Dept: CARDIOLOGY CLINIC | Age: 71
End: 2021-11-16
Payer: MEDICARE

## 2021-11-16 VITALS
HEIGHT: 66 IN | BODY MASS INDEX: 37.35 KG/M2 | DIASTOLIC BLOOD PRESSURE: 71 MMHG | SYSTOLIC BLOOD PRESSURE: 117 MMHG | WEIGHT: 232.4 LBS | HEART RATE: 86 BPM | RESPIRATION RATE: 16 BRPM

## 2021-11-16 DIAGNOSIS — I48.0 PAROXYSMAL ATRIAL FIBRILLATION (HCC): ICD-10-CM

## 2021-11-16 PROCEDURE — 99214 OFFICE O/P EST MOD 30 MIN: CPT | Performed by: INTERNAL MEDICINE

## 2021-11-16 PROCEDURE — 93000 ELECTROCARDIOGRAM COMPLETE: CPT | Performed by: INTERNAL MEDICINE

## 2021-11-16 NOTE — PROGRESS NOTES
Subjective:      Patient ID: Salinas Barboza is a 66 y.o. male. Chief Complaint   Patient presents with    Atrial Fibrillation    Coronary Artery Disease       Patient Active Problem List    Diagnosis Date Noted    Type 2 diabetes mellitus with hyperglycemia, without long-term current use of insulin ([facility] 10/27/2011     Priority: High     Class: Chronic    HTN (hypertension) 10/27/2011     Priority: High     Class: Chronic    Coronary atherosclerosis of native coronary artery 10/27/2011     Priority: High     Class: Chronic     Overview Note:       A. Lateral infarction 10/08 due to circumflex occlusion. PCI not performed due to completed infarction. B. Adenosine Myoview 12/14/09 ([facility] Neither fixed nor reversible perfusion defect. LVEF 50%. C. Cath 6/16/21 ([facility]): severe 3 vessel disease, left dominant, EF 45-50%  D. ACB 7/9/21 ([facility]): PEREYRA-LAD, SVG - OM      Carotid stenosis, right 05/06/2021    Atrial fibrillation ([facility] 01/05/2021     Overview Note:     A. CHADS-VASC = 5  B. Unsuccessful DCCV attempt 2/15/2021  C. CRYSTAL guided DCCV 3/12/2021 on Multaq  D.  Maze and AtriCure at time of [facility]      Class 2 severe obesity due to excess calories with serious comorbidity and body mass index (BMI) of 37.0 to 37.9 in adult [facility]) 04/16/2019    Hyperlipidemia 06/07/2012     Overview Note:     Intolerant to multiple statin agents         Current Outpatient Medications   Medication Sig Dispense Refill    metFORMIN (GLUCOPHAGE) 500 MG tablet Take 500 mg by mouth 2 times daily (with meals) Indications: taking 2 tablets in am and 2 tablets in pm      cephALEXin (KEFLEX) 500 MG capsule Take 1 capsule by mouth 3 times daily for 10 days 30 capsule 0    rivaroxaban (XARELTO) 20 MG TABS tablet Take 1 tablet by mouth daily (with breakfast) 30 tablet 5    furosemide (LASIX) 20 MG tablet Take 1 tablet by mouth 2 times daily Patient takes when needed 60 tablet 3    metoprolol succinate (TOPROL XL) 25 MG extended release tablet Take 0.5 tablets by mouth daily 45 tablet 1    folic acid (FOLVITE) 1 MG tablet Take 1 tablet by mouth daily 30 tablet 0    magnesium oxide (MAG-OX) 400 (241.3 Mg) MG TABS tablet Take 1 tablet by mouth daily for 14 days 14 tablet 0    pravastatin (PRAVACHOL) 20 MG tablet Take 1 tablet by mouth daily 90 tablet 5    aspirin 81 MG EC tablet Take 81 mg by mouth daily      tamsulosin (FLOMAX) 0.4 MG capsule TAKE ONE CAPSULE BY MOUTH EVERY DAY 90 capsule 3    glipiZIDE (GLUCOTROL) 5 MG tablet Take 1 tablet by mouth 4 times daily 360 tablet 3    zinc gluconate 50 MG tablet Take 50 mg by mouth daily Not taking      b complex vitamins capsule Take 1 capsule by mouth daily      vitamin B-12 (CYANOCOBALAMIN) 1000 MCG tablet Take 1,000 mcg by mouth daily Has not taken recently      vitamin D (CHOLECALCIFEROL) 1000 UNIT TABS tablet Take 400 Units by mouth daily        No current facility-administered medications for this visit.         Allergies   Allergen Reactions    Adhesive Tape Dermatitis and Other (See Comments)    Crestor [Rosuvastatin Calcium]      Muscles aches     Lipitor [Atorvastatin]      Muscle aches    Rosuvastatin      Muscles aches        Vitals:    11/16/21 0828   BP: 117/71   Pulse: 86   Resp: 16   Weight: 232 lb 6.4 oz (105.4 kg)   Height: 5' 6\" (1.676 m)       Social History     Socioeconomic History    Marital status:      Spouse name: Not on file    Number of children: 2    Years of education: Not on file    Highest education level: Not on file   Occupational History    Occupation: self-employed     Comment: print shop   Tobacco Use    Smoking status: Never Smoker    Smokeless tobacco: Never Used   Vaping Use    Vaping Use: Never used   Substance and Sexual Activity    Alcohol use: Yes     Comment: occasional    Drug use: Never    Sexual activity: Not on file   Other Topics Concern    Not on file   Social History Narrative    Not on file     Social negative 10-point system review. Objective:   Physical Exam   Constitutional: He is oriented to person, place, and time. He is cooperative. Obese     HENT:   Head: Normocephalic and atraumatic. Eyes: Conjunctivae are normal. Pupils are equal, round,   Neck: No JVD present. Carotid bruit is not present. Cardiovascular: irregular rhythm, S1 normal, S2 normal and intact distal pulses. PMI is not displaced. Exam reveals no gallop. No murmur heard. Pulmonary/Chest: Effort normal and breath sounds normal. No respiratory distress. Abdominal: Soft. Normal appearance and bowel sounds are normal. He exhibits no abdominal bruit and no pulsatile midline mass. There is no hepatomegaly. There is no tenderness. Musculoskeletal: Normal range of motion. He exhibits 2-3+ bilateral edema to thigh. Neurological: He is alert and oriented to person, place, and time. Gait normal.   Skin: Skin is warm and dry. No bruising, no ecchymosis and no rash noted. He is not diaphoretic. No cyanosis. Psychiatric: He has a normal mood and affect. His behavior is normal. Thought content normal.     EKG: possible atrial fibrillation, rate 86 bpm, axis +43, NT.       ASSESSMENT & PLAN:    Patient Active Problem List   Diagnoses    DM (diabetes mellitus), type 2    HTN (hypertension):     Coronary atherosclerosis of native coronary artery: s/p lateral MI 2008 due to circumflex occlusion treated conservatively due to completed, uncomplicated infarction. Now with two vessel ACB    Will have him increase to 40 mg bid lasix                * Hyperlipidemia: intolerant to multiple statin agents- (crestor and atorvastatin) tolerating pravastatin 20 mg. If cannot tolerate will need Repatha    Atrial fibrillation : s/p MAZE and LA exclusion. Recurrent AF documented on first ekg one week post dishcarge.   Possible AF today, rate acceptable  Ultimate management per EP       OV 1 month

## 2021-11-24 DIAGNOSIS — R60.0 BILATERAL LEG EDEMA: ICD-10-CM

## 2021-11-24 LAB
ANION GAP SERPL CALCULATED.3IONS-SCNC: 13 MMOL/L (ref 7–16)
BUN BLDV-MCNC: 28 MG/DL (ref 6–23)
CALCIUM SERPL-MCNC: 9.6 MG/DL (ref 8.6–10.2)
CHLORIDE BLD-SCNC: 101 MMOL/L (ref 98–107)
CO2: 24 MMOL/L (ref 22–29)
CREAT SERPL-MCNC: 1.4 MG/DL (ref 0.7–1.2)
GFR AFRICAN AMERICAN: >60
GFR NON-AFRICAN AMERICAN: 50 ML/MIN/1.73
GLUCOSE BLD-MCNC: 145 MG/DL (ref 74–99)
POTASSIUM SERPL-SCNC: 4.8 MMOL/L (ref 3.5–5)
SODIUM BLD-SCNC: 138 MMOL/L (ref 132–146)

## 2021-12-27 ENCOUNTER — OFFICE VISIT (OUTPATIENT)
Dept: CARDIOLOGY CLINIC | Age: 71
End: 2021-12-27
Payer: MEDICARE

## 2021-12-27 VITALS
RESPIRATION RATE: 18 BRPM | BODY MASS INDEX: 35.75 KG/M2 | SYSTOLIC BLOOD PRESSURE: 127 MMHG | HEART RATE: 83 BPM | WEIGHT: 227.8 LBS | DIASTOLIC BLOOD PRESSURE: 71 MMHG | HEIGHT: 67 IN

## 2021-12-27 DIAGNOSIS — I25.10 ATHEROSCLEROSIS OF NATIVE CORONARY ARTERY OF NATIVE HEART WITHOUT ANGINA PECTORIS: Primary | ICD-10-CM

## 2021-12-27 DIAGNOSIS — I48.0 PAROXYSMAL ATRIAL FIBRILLATION (HCC): ICD-10-CM

## 2021-12-27 PROCEDURE — 93000 ELECTROCARDIOGRAM COMPLETE: CPT | Performed by: INTERNAL MEDICINE

## 2021-12-27 PROCEDURE — 99214 OFFICE O/P EST MOD 30 MIN: CPT | Performed by: INTERNAL MEDICINE

## 2021-12-27 RX ORDER — TORSEMIDE 20 MG/1
20 TABLET ORAL DAILY
Qty: 30 TABLET | Refills: 3 | Status: SHIPPED
Start: 2021-12-27 | End: 2022-05-04

## 2021-12-27 NOTE — PROGRESS NOTES
Subjective:      Patient ID: Austin Richards is a 66 y.o. male. Chief Complaint   Patient presents with    Coronary Artery Disease    Atrial Fibrillation       Patient Active Problem List    Diagnosis Date Noted    Type 2 diabetes mellitus with hyperglycemia, without long-term current use of insulin [facility] 10/27/2011     Priority: High     Class: Chronic    HTN (hypertension) 10/27/2011     Priority: High     Class: Chronic    Coronary atherosclerosis of native coronary artery 10/27/2011     Priority: High     Class: Chronic     Overview Note:       A. Lateral infarction 10/08 due to circumflex occlusion. PCI not performed due to completed infarction. B. Adenosine Myoview 12/14/09 ([facility] Neither fixed nor reversible perfusion defect. LVEF 50%. C. Cath 6/16/21 ([facility]): severe 3 vessel disease, left dominant, EF 45-50%  D. ACB 7/9/21 ([facility]): LIMA-LAD, SVG - OM      Carotid stenosis, right 05/06/2021    Atrial fibrillation ([facility] 01/05/2021     Overview Note:     A. CHADS-VASC = 5  B. Unsuccessful DCCV attempt 2/15/2021  C. CRYSTAL guided DCCV 3/12/2021 on Multaq  D.  Maze and AtriCure at time of [facility]      Class 2 severe obesity due to excess calories with serious comorbidity and body mass index (BMI) of 37.0 to 37.9 in adult [facility]) 04/16/2019    Hyperlipidemia 06/07/2012     Overview Note:     Intolerant to multiple statin agents         Current Outpatient Medications   Medication Sig Dispense Refill    torsemide (DEMADEX) 20 MG tablet Take 1 tablet by mouth daily 30 tablet 3    furosemide (LASIX) 20 MG tablet Take 2 tablets by mouth 2 times daily 120 tablet 3    metFORMIN (GLUCOPHAGE) 500 MG tablet Take 500 mg by mouth 2 times daily (with meals) Indications: taking 2 tablets in am and 2 tablets in pm      rivaroxaban (XARELTO) 20 MG TABS tablet Take 1 tablet by mouth daily (with breakfast) 30 tablet 5    metoprolol succinate (TOPROL XL) 25 MG extended release tablet Take 0.5 tablets by mouth daily 45 tablet 1    folic acid (FOLVITE) 1 MG tablet Take 1 tablet by mouth daily 30 tablet 0    pravastatin (PRAVACHOL) 20 MG tablet Take 1 tablet by mouth daily 90 tablet 5    aspirin 81 MG EC tablet Take 81 mg by mouth daily      tamsulosin (FLOMAX) 0.4 MG capsule TAKE ONE CAPSULE BY MOUTH EVERY DAY 90 capsule 3    glipiZIDE (GLUCOTROL) 5 MG tablet Take 1 tablet by mouth 4 times daily 360 tablet 3    zinc gluconate 50 MG tablet Take 50 mg by mouth daily Not taking      b complex vitamins capsule Take 1 capsule by mouth daily      vitamin B-12 (CYANOCOBALAMIN) 1000 MCG tablet Take 1,000 mcg by mouth daily Has not taken recently      vitamin D (CHOLECALCIFEROL) 1000 UNIT TABS tablet Take 400 Units by mouth daily       magnesium oxide (MAG-OX) 400 (241.3 Mg) MG TABS tablet Take 1 tablet by mouth daily for 14 days 14 tablet 0     No current facility-administered medications for this visit.         Allergies   Allergen Reactions    Adhesive Tape Dermatitis and Other (See Comments)    Crestor [Rosuvastatin Calcium]      Muscles aches     Lipitor [Atorvastatin]      Muscle aches    Rosuvastatin      Muscles aches        Vitals:    12/27/21 0751   BP: 127/71   Pulse: 83   Resp: 18   Weight: 227 lb 12.8 oz (103.3 kg)   Height: 5' 6.5\" (1.689 m)       Social History     Socioeconomic History    Marital status:      Spouse name: Not on file    Number of children: 2    Years of education: Not on file    Highest education level: Not on file   Occupational History    Occupation: self-employed     Comment: print shop   Tobacco Use    Smoking status: Never Smoker    Smokeless tobacco: Never Used   Vaping Use    Vaping Use: Never used   Substance and Sexual Activity    Alcohol use: Yes     Comment: occasional    Drug use: Never    Sexual activity: Not on file   Other Topics Concern    Not on file   Social History Narrative    Not on file     Social Determinants of Health     Financial Resource Strain: person, place, and time. He is cooperative. Obese     HENT:   Head: Normocephalic and atraumatic. Eyes: Conjunctivae are normal. Pupils are equal, round,   Neck: No JVD present. Carotid bruit is not present. Cardiovascular: irregular rhythm, S1 normal, S2 normal and intact distal pulses. PMI is not displaced. Exam reveals no gallop. No murmur heard. Pulmonary/Chest: Effort normal and breath sounds normal. No respiratory distress. Abdominal: Soft. Normal appearance and bowel sounds are normal. He exhibits no abdominal bruit and no pulsatile midline mass. There is no hepatomegaly. There is no tenderness. Musculoskeletal: Normal range of motion. He exhibits 1-2+ right ankle/shin edema , left 1+. Neurological: He is alert and oriented to person, place, and time. Gait normal.   Skin: Skin is warm and dry. No bruising, no ecchymosis and no rash noted. He is not diaphoretic. No cyanosis. Psychiatric: He has a normal mood and affect. His behavior is normal. Thought content normal.     EKG: possible atrial fibrillation, rate 83 bpm, axis +43, NT.       ASSESSMENT & PLAN:    Patient Active Problem List   Diagnoses    DM (diabetes mellitus), type 2    HTN (hypertension):     Coronary atherosclerosis of native coronary artery: s/p lateral MI 2008 due to circumflex occlusion treated conservatively due to completed, uncomplicated infarction. Now with two vessel ACB. EF 45-50% on pre-op cath   * Peripheral edema: will change to torsemide 20 mg qd and  response. I read dr nava's note and see creat at baseline               * Hyperlipidemia: intolerant to multiple statin agents- (crestor and atorvastatin) only taking  pravastatin 20 mg intermittently. Stressed importance of compliance. If cannot tolerate will need Repatha    Atrial fibrillation : s/p MAZE and LA exclusion. Recurrent AF documented on first ekg one week post dishcarge.   Possible AF today on ekg, but exam with regular rhythm  On NOAC , rate acceptable  Ultimate management per EP       OV 3 months

## 2022-02-21 DIAGNOSIS — E11.65 TYPE 2 DIABETES MELLITUS WITH HYPERGLYCEMIA, WITHOUT LONG-TERM CURRENT USE OF INSULIN (HCC): ICD-10-CM

## 2022-02-21 LAB
ALBUMIN SERPL-MCNC: 4 G/DL (ref 3.5–5.2)
ALP BLD-CCNC: 121 U/L (ref 40–129)
ALT SERPL-CCNC: 19 U/L (ref 0–40)
ANION GAP SERPL CALCULATED.3IONS-SCNC: 12 MMOL/L (ref 7–16)
AST SERPL-CCNC: 27 U/L (ref 0–39)
BILIRUB SERPL-MCNC: 1.1 MG/DL (ref 0–1.2)
BUN BLDV-MCNC: 27 MG/DL (ref 6–23)
CALCIUM SERPL-MCNC: 9.1 MG/DL (ref 8.6–10.2)
CHLORIDE BLD-SCNC: 105 MMOL/L (ref 98–107)
CHOLESTEROL, TOTAL: 131 MG/DL (ref 0–199)
CO2: 24 MMOL/L (ref 22–29)
CREAT SERPL-MCNC: 1.3 MG/DL (ref 0.7–1.2)
GFR AFRICAN AMERICAN: >60
GFR NON-AFRICAN AMERICAN: 54 ML/MIN/1.73
GLUCOSE BLD-MCNC: 132 MG/DL (ref 74–99)
HBA1C MFR BLD: 6.5 % (ref 4–5.6)
HDLC SERPL-MCNC: 40 MG/DL
LDL CHOLESTEROL CALCULATED: 80 MG/DL (ref 0–99)
POTASSIUM SERPL-SCNC: 4.7 MMOL/L (ref 3.5–5)
SODIUM BLD-SCNC: 141 MMOL/L (ref 132–146)
TOTAL PROTEIN: 7 G/DL (ref 6.4–8.3)
TRIGL SERPL-MCNC: 53 MG/DL (ref 0–149)
VLDLC SERPL CALC-MCNC: 11 MG/DL

## 2022-03-14 ENCOUNTER — HOSPITAL ENCOUNTER (EMERGENCY)
Age: 72
Discharge: HOME | End: 2022-03-15
Attending: EMERGENCY MEDICINE
Payer: MEDICARE

## 2022-03-14 ENCOUNTER — APPOINTMENT (OUTPATIENT)
Dept: GENERAL RADIOLOGY | Age: 72
End: 2022-03-14
Payer: MEDICARE

## 2022-03-14 DIAGNOSIS — K59.00 CONSTIPATION, UNSPECIFIED CONSTIPATION TYPE: Primary | ICD-10-CM

## 2022-03-14 DIAGNOSIS — K56.41 FECAL IMPACTION IN RECTUM (HCC): ICD-10-CM

## 2022-03-14 PROCEDURE — 74018 RADEX ABDOMEN 1 VIEW: CPT

## 2022-03-14 PROCEDURE — 99284 EMERGENCY DEPT VISIT MOD MDM: CPT

## 2022-03-14 ASSESSMENT — PAIN SCALES - GENERAL: PAINLEVEL_OUTOF10: 10

## 2022-03-14 ASSESSMENT — PAIN DESCRIPTION - LOCATION: LOCATION: RECTUM

## 2022-03-14 ASSESSMENT — PAIN - FUNCTIONAL ASSESSMENT: PAIN_FUNCTIONAL_ASSESSMENT: 0-10

## 2022-03-14 ASSESSMENT — PAIN DESCRIPTION - PAIN TYPE: TYPE: ACUTE PAIN

## 2022-03-15 ENCOUNTER — APPOINTMENT (OUTPATIENT)
Dept: CT IMAGING | Age: 72
End: 2022-03-15
Payer: MEDICARE

## 2022-03-15 VITALS
RESPIRATION RATE: 14 BRPM | BODY MASS INDEX: 27.94 KG/M2 | SYSTOLIC BLOOD PRESSURE: 121 MMHG | OXYGEN SATURATION: 98 % | HEIGHT: 67 IN | DIASTOLIC BLOOD PRESSURE: 70 MMHG | HEART RATE: 71 BPM | WEIGHT: 178 LBS | TEMPERATURE: 97.8 F

## 2022-03-15 LAB
ALBUMIN SERPL-MCNC: 4.5 G/DL (ref 3.5–5.2)
ALP BLD-CCNC: 114 U/L (ref 40–129)
ALT SERPL-CCNC: 27 U/L (ref 0–40)
ANION GAP SERPL CALCULATED.3IONS-SCNC: 15 MMOL/L (ref 7–16)
AST SERPL-CCNC: 33 U/L (ref 0–39)
BACTERIA: ABNORMAL /HPF
BASOPHILS ABSOLUTE: 0.03 E9/L (ref 0–0.2)
BASOPHILS RELATIVE PERCENT: 0.5 % (ref 0–2)
BILIRUB SERPL-MCNC: 1.2 MG/DL (ref 0–1.2)
BILIRUBIN URINE: NEGATIVE
BLOOD, URINE: ABNORMAL
BUN BLDV-MCNC: 26 MG/DL (ref 6–23)
CALCIUM SERPL-MCNC: 9.9 MG/DL (ref 8.6–10.2)
CHLORIDE BLD-SCNC: 102 MMOL/L (ref 98–107)
CLARITY: CLEAR
CO2: 22 MMOL/L (ref 22–29)
COLOR: YELLOW
CREAT SERPL-MCNC: 1 MG/DL (ref 0.7–1.2)
EOSINOPHILS ABSOLUTE: 0.1 E9/L (ref 0.05–0.5)
EOSINOPHILS RELATIVE PERCENT: 1.5 % (ref 0–6)
GFR AFRICAN AMERICAN: >60
GFR NON-AFRICAN AMERICAN: >60 ML/MIN/1.73
GLUCOSE BLD-MCNC: 125 MG/DL (ref 74–99)
GLUCOSE URINE: NEGATIVE MG/DL
HCT VFR BLD CALC: 42.4 % (ref 37–54)
HEMOGLOBIN: 14.6 G/DL (ref 12.5–16.5)
IMMATURE GRANULOCYTES #: 0.02 E9/L
IMMATURE GRANULOCYTES %: 0.3 % (ref 0–5)
KETONES, URINE: ABNORMAL MG/DL
LACTIC ACID, SEPSIS: 1.3 MMOL/L (ref 0.5–1.9)
LEUKOCYTE ESTERASE, URINE: ABNORMAL
LYMPHOCYTES ABSOLUTE: 1.59 E9/L (ref 1.5–4)
LYMPHOCYTES RELATIVE PERCENT: 24.1 % (ref 20–42)
MCH RBC QN AUTO: 33.6 PG (ref 26–35)
MCHC RBC AUTO-ENTMCNC: 34.4 % (ref 32–34.5)
MCV RBC AUTO: 97.5 FL (ref 80–99.9)
MONOCYTES ABSOLUTE: 0.46 E9/L (ref 0.1–0.95)
MONOCYTES RELATIVE PERCENT: 7 % (ref 2–12)
NEUTROPHILS ABSOLUTE: 4.41 E9/L (ref 1.8–7.3)
NEUTROPHILS RELATIVE PERCENT: 66.6 % (ref 43–80)
NITRITE, URINE: NEGATIVE
PDW BLD-RTO: 13 FL (ref 11.5–15)
PH UA: 7 (ref 5–9)
PLATELET # BLD: 159 E9/L (ref 130–450)
PMV BLD AUTO: 10.3 FL (ref 7–12)
POTASSIUM REFLEX MAGNESIUM: 3.9 MMOL/L (ref 3.5–5)
PROTEIN UA: NEGATIVE MG/DL
RBC # BLD: 4.35 E12/L (ref 3.8–5.8)
RBC UA: >20 /HPF (ref 0–2)
SODIUM BLD-SCNC: 139 MMOL/L (ref 132–146)
SPECIFIC GRAVITY UA: 1.01 (ref 1–1.03)
TOTAL PROTEIN: 7.7 G/DL (ref 6.4–8.3)
UROBILINOGEN, URINE: 1 E.U./DL
WBC # BLD: 6.6 E9/L (ref 4.5–11.5)
WBC UA: ABNORMAL /HPF (ref 0–5)

## 2022-03-15 PROCEDURE — 85025 COMPLETE CBC W/AUTO DIFF WBC: CPT

## 2022-03-15 PROCEDURE — 80053 COMPREHEN METABOLIC PANEL: CPT

## 2022-03-15 PROCEDURE — 83605 ASSAY OF LACTIC ACID: CPT

## 2022-03-15 PROCEDURE — 74177 CT ABD & PELVIS W/CONTRAST: CPT

## 2022-03-15 PROCEDURE — 36415 COLL VENOUS BLD VENIPUNCTURE: CPT

## 2022-03-15 PROCEDURE — 81001 URINALYSIS AUTO W/SCOPE: CPT

## 2022-03-15 PROCEDURE — 6360000004 HC RX CONTRAST MEDICATION: Performed by: RADIOLOGY

## 2022-03-15 RX ORDER — DOCUSATE SODIUM 100 MG/1
100 CAPSULE, LIQUID FILLED ORAL 2 TIMES DAILY
Qty: 28 CAPSULE | Refills: 0 | Status: SHIPPED | OUTPATIENT
Start: 2022-03-15 | End: 2022-04-04 | Stop reason: SDUPTHER

## 2022-03-15 RX ORDER — POLYETHYLENE GLYCOL 3350 17 G/17G
17 POWDER, FOR SOLUTION ORAL DAILY
Qty: 1530 G | Refills: 0 | Status: SHIPPED | OUTPATIENT
Start: 2022-03-15 | End: 2022-03-28 | Stop reason: CLARIF

## 2022-03-15 RX ADMIN — IOPAMIDOL 75 ML: 755 INJECTION, SOLUTION INTRAVENOUS at 02:02

## 2022-03-15 ASSESSMENT — ENCOUNTER SYMPTOMS
VOMITING: 0
ABDOMINAL PAIN: 0
SHORTNESS OF BREATH: 0
NAUSEA: 0
CONSTIPATION: 1
RECTAL PAIN: 1
EYE REDNESS: 0

## 2022-03-15 NOTE — ED PROVIDER NOTES
Chief complaint: Constipation and rectal pressure      HPI:  3/15/22, Time: 1:57 AM EDT    HPI               Arden Aguilar is a 66 y.o. male presenting to the ED for constipation and rectal pressure. The history is obtained from the patient as well as patient's medical record. The patient is presenting emergency department the chief complaint of constipation and rectal pressure. The patient reports that his last bowel movement was 2 days ago. He states this was very small. He denies passing flatus at this time. He does have pain and pressure in his rectum. This is been constant since onset. Nothing makes it better. Nothing makes it worse. He did attempt a self disimpaction with no relief. The patient is he does have a history of obstruction. Denies any fevers, chills, nausea, vomiting, dysuria or hematuria. ROS:   Review of Systems   Constitutional: Negative for chills and fever. HENT: Negative for congestion. Eyes: Negative for redness. Respiratory: Negative for shortness of breath. Cardiovascular: Negative for chest pain. Gastrointestinal: Positive for constipation and rectal pain. Negative for abdominal pain, nausea and vomiting. Genitourinary: Negative for dysuria. Musculoskeletal: Negative for arthralgias. Skin: Negative for rash. Neurological: Negative for light-headedness. Psychiatric/Behavioral: Negative for confusion.    All other systems reviewed and are negative.      --------------------------------------------- PAST HISTORY ---------------------------------------------  Past Medical History:  has a past medical history of A-fib ([facility] CAD (coronary artery disease), Carotid stenosis, right, Chronic venous insufficiency, COVID-19, Decreased pulses in feet, Diabetes mellitus ([facility] DM (diabetes mellitus), type 2 ([facility] ED (erectile dysfunction), Gout, Heart attack ([facility] HTN (hypertension), Hyperlipidemia, Hypertension, Lateral myocardial infarction ([facility] and Leg swelling. Past Surgical History:  has a past surgical history that includes knee surgery; [place] tooth extraction; other surgical history (10/31/2011); cardiovascular stress test (12/14/2009); Cholecystectomy (1999); Cataract removal (Left); hernia repair (@ age 13); Testicle surgery; Cardiac catheterization; Cardiac catheterization (06/16/2021); and Coronary artery bypass graft (Left, 7/9/2021). Social History:  reports that he has never smoked. He has never used smokeless tobacco. He reports current alcohol use. He reports that he does not use drugs. Family History: family history includes Atrial Fibrillation in his sister; COPD in his father and mother; Crohn's Disease in his brother; Down Syndrome in his sister; Heart Attack (age of onset: 79) in his father; No Known Problems in his brother, brother, and sister. The patients home medications have been reviewed. Allergies: Adhesive tape, Crestor [rosuvastatin calcium], Lipitor [atorvastatin], and Rosuvastatin    ---------------------------------------------------PHYSICAL EXAM--------------------------------------    Constitutional/General: Alert and oriented x3, well appearing, non toxic in NAD  Head: Normocephalic and atraumatic  Mouth: Oropharynx clear, handling secretions, no trismus  Neck: Supple, full ROM,  Pulmonary: Lungs clear to auscultation bilaterally, no wheezes, rales, or rhonchi. Not in respiratory distress  Cardiovascular:  Regular rate. Regular rhythm. No murmurs  Chest: no chest wall tenderness  Abdomen: Soft. Non tender. Non distended. No rebound, guarding, or rigidity. No pulsatile masses appreciated. Musculoskeletal: Moves all extremities x 4. Warm and well perfused, no clubbing, cyanosis, or edema. Capillary refill <3 seconds  Skin: warm and dry. No rashes.    Neurologic: GCS 15, no gross focal neurologic deficits  Psych: Normal Affect    -------------------------------------------------- RESULTS -------------------------------------------------  I have personally reviewed all laboratory and imaging results for this patient. Results are listed below.      LABS:  Results for orders placed or performed during the hospital encounter of 03/14/22   CBC with Auto Differential   Result Value Ref Range    WBC 6.6 4.5 - 11.5 E9/L    RBC 4.35 3.80 - 5.80 E12/L    Hemoglobin 14.6 12.5 - 16.5 g/dL    Hematocrit 42.4 37.0 - 54.0 %    MCV 97.5 80.0 - 99.9 fL    MCH 33.6 26.0 - 35.0 pg    MCHC 34.4 32.0 - 34.5 %    RDW 13.0 11.5 - 15.0 fL    Platelets 897 109 - 491 E9/L    MPV 10.3 7.0 - 12.0 fL    Neutrophils % 66.6 43.0 - 80.0 %    Immature Granulocytes % 0.3 0.0 - 5.0 %    Lymphocytes % 24.1 20.0 - 42.0 %    Monocytes % 7.0 2.0 - 12.0 %    Eosinophils % 1.5 0.0 - 6.0 %    Basophils % 0.5 0.0 - 2.0 %    Neutrophils Absolute 4.41 1.80 - 7.30 E9/L    Immature Granulocytes # 0.02 E9/L    Lymphocytes Absolute 1.59 1.50 - 4.00 E9/L    Monocytes Absolute 0.46 0.10 - 0.95 E9/L    Eosinophils Absolute 0.10 0.05 - 0.50 E9/L    Basophils Absolute 0.03 0.00 - 0.20 E9/L   Comprehensive Metabolic Panel w/ Reflex to MG   Result Value Ref Range    Sodium 139 132 - 146 mmol/L    Potassium reflex Magnesium 3.9 3.5 - 5.0 mmol/L    Chloride 102 98 - 107 mmol/L    CO2 22 22 - 29 mmol/L    Anion Gap 15 7 - 16 mmol/L    Glucose 125 (H) 74 - 99 mg/dL    BUN 26 (H) 6 - 23 mg/dL    CREATININE 1.0 0.7 - 1.2 mg/dL    GFR Non-African American >60 >=60 mL/min/1.73    GFR African American >60     Calcium 9.9 8.6 - 10.2 mg/dL    Total Protein 7.7 6.4 - 8.3 g/dL    Albumin 4.5 3.5 - 5.2 g/dL    Total Bilirubin 1.2 0.0 - 1.2 mg/dL    Alkaline Phosphatase 114 40 - 129 U/L    ALT 27 0 - 40 U/L    AST 33 0 - 39 U/L   Lactate, Sepsis   Result Value Ref Range    Lactic Acid, Sepsis 1.3 0.5 - 1.9 mmol/L   Urinalysis with Microscopic   Result Value Ref Range    Color, UA Yellow Straw/Yellow    Clarity, UA Clear Clear    Glucose, Ur Negative Negative mg/dL Bilirubin Urine Negative Negative    Ketones, Urine TRACE (A) Negative mg/dL    Specific Gravity, UA 1.015 1.005 - 1.030    Blood, Urine LARGE (A) Negative    pH, UA 7.0 5.0 - 9.0    Protein, UA Negative Negative mg/dL    Urobilinogen, Urine 1.0 <2.0 E.U./dL    Nitrite, Urine Negative Negative    Leukocyte Esterase, Urine TRACE (A) Negative    WBC, UA 1-3 0 - 5 /HPF    RBC, UA >20 0 - 2 /HPF    Bacteria, UA RARE (A) None Seen /HPF       RADIOLOGY:  Interpreted by Radiologist.  CT ABDOMEN PELVIS W IV CONTRAST Additional Contrast? None   Final Result   Large amount of stool in the rectal vault consistent with fecal impaction. Mild thickening of the rectum with adjacent fat stranding may be reactive or   due to prostatitis. Neoplasia less likely. Moderate-large stool burden otherwise throughout the colon. Nonobstructive nephrolithiasis. RECOMMENDATIONS:   Unavailable         XR ABDOMEN (KUB) (SINGLE AP VIEW)   Final Result   1. Findings compatible with constipation without bowel obstruction   2. Nephrolithiasis                 ------------------------- NURSING NOTES AND VITALS REVIEWED ---------------------------   The nursing notes within the ED encounter and vital signs as below have been reviewed by myself. /70   Pulse 71   Temp 97.8 °F (36.6 °C) (Oral)   Resp 14   Ht 5' 6.5\" (1.689 m)   Wt 178 lb (80.7 kg)   SpO2 98%   BMI 28.30 kg/m²   Oxygen Saturation Interpretation: Normal    The patients available past medical records and past encounters were reviewed. ------------------------------ ED COURSE/MEDICAL DECISION MAKING----------------------  Medications   iopamidol (ISOVUE-370) 76 % injection 75 mL (75 mLs IntraVENous Given 3/15/22 0202)             Medical Decision Making:   I, Dr. Clint Liu am the primary physician of record. Kian Rodriguez is a 66 y.o. male who presents to the ED for constipation.   Differential diagnosis benign to constipation, colitis, bowel obstruction, impaction. Patient did have labs and imaging which were reviewed. Patient did have CBC, CMP fairly unremarkable, CT abdomen pelvis did demonstrate fecal impaction with large stool burden. Patient was given soapsuds enema with production of large department emergency department. He states he does feel significant improvement. He will be discharged home on MiraLAX and Colace      Re-Evaluations/Consultations:             ED Course as of 03/17/22 0714   Tue Mar 15, 2022   0403 The patient did have a bowel movement in emergency department does feel improvement. Patient will be discharged home on MiraLAX and Colace. [MT]   3158 Is in the bed states that he is having significant improvement in symptoms. He was able to have a large bowel movement [MT]      ED Course User Index  [MT] Mark Anthony Marquez DO               This patient's ED course included: History, physical examination, reevaluation prior to disposition, labs, imaging, soapsuds    This patient has remained hemodynamically stable during their ED course. Counseling: The emergency provider has spoken with the patient and discussed todays results, in addition to providing specific details for the plan of care and counseling regarding the diagnosis and prognosis. Questions are answered at this time and they are agreeable with the plan.       --------------------------------- IMPRESSION AND DISPOSITION ---------------------------------    IMPRESSION  1. Constipation, unspecified constipation type    2. Fecal impaction in rectum ([facility]        DISPOSITION  Disposition: Discharge to home  Patient condition is stable        NOTE: This report was transcribed using voice recognition software.  Every effort was made to ensure accuracy; however, inadvertent computerized transcription errors may be present         Mark Anthony Marquez DO  03/17/22 9558

## 2022-03-15 NOTE — ED PROVIDER NOTES
FIRST PROVIDER CONTACT ASSESSMENT NOTE           Department of Emergency Medicine                 First Provider Note            3/14/22  8:32 PM EDT    Date of Encounter: No admission date for patient encounter. Patient Name: Jorge Velez  : 1950  MRN: [id number]    Chief Complaint: Constipation (Pt stated he had a bowel movement on saturday)      History of Present Illness:   Jorge Velez is a 66 y.o. male who presents to the ED for constipation. States last BM was Saturday. Has pain in rectum. Cramping. States he can feel the stool and tried to get it out himself without success. Denies fever, chills, vomiting or abdominal pain. Did not try any meds at home. Focused Physical Exam:  VS:    ED Triage Vitals   BP Temp Temp src Pulse Resp SpO2 Height Weight   -- -- -- -- -- -- -- --        Physical Ex: Constitutional: Alert and non-toxic. Medical History:  has a past medical history of A-fib ([address] CAD (coronary artery disease), Carotid stenosis, right, Chronic venous insufficiency, COVID-19, Decreased pulses in feet, Diabetes mellitus ([address] DM (diabetes mellitus), type 2 ([address] ED (erectile dysfunction), Gout, Heart attack ([address] HTN (hypertension), Hyperlipidemia, Hypertension, Lateral myocardial infarction ([address] and Leg swelling. Surgical History:  has a past surgical history that includes knee surgery; [place] tooth extraction; other surgical history (10/31/2011); cardiovascular stress test (2009); Cholecystectomy (); Cataract removal (Left); hernia repair (@ age 13); Testicle surgery; Cardiac catheterization; Cardiac catheterization (2021); and Coronary artery bypass graft (Left, 2021). Social History:  reports that he has never smoked. He has never used smokeless tobacco. He reports current alcohol use. He reports that he does not use drugs.   Family History: family history includes Atrial Fibrillation in his sister; COPD in his father and mother; Crohn's Disease in his brother; Down Syndrome in his sister; Heart Attack (age of onset: 79) in his father; No Known Problems in his brother, brother, and sister.     Allergies: Adhesive tape, Crestor [rosuvastatin calcium], Lipitor [atorvastatin], and Rosuvastatin     Initial Plan of Care: Initiate Treatment-Testing, Proceed toTreatment Area When Bed Available for ED Attending/MLP to Continue Care      ---END OF FIRST PROVIDER CONTACT ASSESSMENT NOTE---  Electronically signed by Elly Betancourt PA-C   DD: 3/14/22       Elly Betancourt PA-C  03/14/22 2033

## 2022-03-28 ENCOUNTER — OFFICE VISIT (OUTPATIENT)
Dept: CARDIOLOGY CLINIC | Age: 72
End: 2022-03-28
Payer: MEDICARE

## 2022-03-28 VITALS
HEIGHT: 66 IN | RESPIRATION RATE: 12 BRPM | DIASTOLIC BLOOD PRESSURE: 64 MMHG | SYSTOLIC BLOOD PRESSURE: 139 MMHG | HEART RATE: 70 BPM | BODY MASS INDEX: 31.98 KG/M2 | WEIGHT: 199 LBS

## 2022-03-28 DIAGNOSIS — I48.19 PERSISTENT ATRIAL FIBRILLATION (HCC): Primary | ICD-10-CM

## 2022-03-28 DIAGNOSIS — I25.10 ATHEROSCLEROSIS OF NATIVE CORONARY ARTERY OF NATIVE HEART WITHOUT ANGINA PECTORIS: ICD-10-CM

## 2022-03-28 PROCEDURE — 99214 OFFICE O/P EST MOD 30 MIN: CPT | Performed by: INTERNAL MEDICINE

## 2022-03-28 PROCEDURE — 93000 ELECTROCARDIOGRAM COMPLETE: CPT | Performed by: INTERNAL MEDICINE

## 2022-03-28 NOTE — PROGRESS NOTES
Subjective:      Patient ID: Marcela Desai is a 66 y.o. male. Chief Complaint   Patient presents with    Atrial Fibrillation    Coronary Artery Disease       Patient Active Problem List    Diagnosis Date Noted    Type 2 diabetes mellitus with hyperglycemia, without long-term current use of insulin ([facility] [address] 10/27/2011     Priority: High     Class: Chronic    HTN (hypertension) 10/27/2011     Priority: High     Class: Chronic    Coronary atherosclerosis of native coronary artery 10/27/2011     Priority: High     Class: Chronic     Overview Note:       A. Lateral infarction 10/08 due to circumflex occlusion. PCI not performed due to completed infarction. B. Adenosine Myoview 12/14/09 ([facility] Neither fixed nor reversible perfusion defect. LVEF 50%. C. Cath 6/16/21 ([facility]): severe 3 vessel disease, left dominant, EF 45-50%  D. ACB 7/9/21 ([facility]): EPREYRA-LAD, SVG - OM      Carotid stenosis, right 05/06/2021    Atrial fibrillation ([facility] [address] 01/05/2021     Overview Note:     A. CHADS-VASC = 5  B. Unsuccessful DCCV attempt 2/15/2021  C. CRYSTAL guided DCCV 3/12/2021 on Multaq  D.  Maze and AtriCure at time of [facility]      Class 1 obesity due to excess calories with serious comorbidity and body mass index (BMI) of 31.0 to 31.9 in adult 04/16/2019    Hyperlipidemia 06/07/2012     Overview Note:     Intolerant to multiple statin agents         Current Outpatient Medications   Medication Sig Dispense Refill    rivaroxaban (XARELTO) 20 MG TABS tablet Take 20 mg by mouth      metoprolol succinate (TOPROL XL) 25 MG extended release tablet Take 0.5 tablets by mouth daily 45 tablet 2    docusate sodium (COLACE) 100 MG capsule Take 1 capsule by mouth 2 times daily for 14 days 28 capsule 0    metFORMIN (GLUCOPHAGE) 500 MG tablet Take 2 tablets by mouth 2 times daily (with meals) 120 tablet 3    glipiZIDE (GLUCOTROL) 5 MG tablet Take 1 tablet by mouth 4 times daily 360 tablet 3    torsemide (DEMADEX) 20 MG tablet Take 1 tablet by mouth daily 30 tablet 3    folic acid (FOLVITE) 1 MG tablet Take 1 tablet by mouth daily 30 tablet 0    magnesium oxide (MAG-OX) 400 (241.3 Mg) MG TABS tablet Take 1 tablet by mouth daily for 14 days 14 tablet 0    pravastatin (PRAVACHOL) 20 MG tablet Take 1 tablet by mouth daily 90 tablet 5    aspirin 81 MG EC tablet Take 81 mg by mouth daily      tamsulosin (FLOMAX) 0.4 MG capsule TAKE ONE CAPSULE BY MOUTH EVERY DAY 90 capsule 3    zinc gluconate 50 MG tablet Take 50 mg by mouth daily Not taking      b complex vitamins capsule Take 1 capsule by mouth daily      vitamin B-12 (CYANOCOBALAMIN) 1000 MCG tablet Take 1,000 mcg by mouth daily Has not taken recently      vitamin D (CHOLECALCIFEROL) 1000 UNIT TABS tablet Take 400 Units by mouth daily        No current facility-administered medications for this visit.         Allergies   Allergen Reactions    Adhesive Tape Dermatitis and Other (See Comments)    Crestor [Rosuvastatin Calcium]      Muscles aches     Lipitor [Atorvastatin]      Muscle aches    Rosuvastatin      Muscles aches        Vitals:    03/28/22 0734   BP: 139/64   Pulse: 70   Resp: 12   Weight: 199 lb (90.3 kg)   Height: 5' 6\" (1.676 m)       Social History     Socioeconomic History    Marital status:      Spouse name: Not on file    Number of children: 2    Years of education: Not on file    Highest education level: Not on file   Occupational History    Occupation: self-employed     Comment: print shop   Tobacco Use    Smoking status: Never Smoker    Smokeless tobacco: Never Used   Vaping Use    Vaping Use: Never used   Substance and Sexual Activity    Alcohol use: Yes     Comment: occasional    Drug use: Never    Sexual activity: Not on file   Other Topics Concern    Not on file   Social History Narrative    Not on file     Social Determinants of Health     Financial Resource Strain:     Difficulty of Paying Living Expenses: Not on file   Food Insecurity:     Worried About [address] [employer] in the Last Year: Not on file    Michelle of Food in the Last Year: Not on file   Transportation Needs:     Lack of Transportation (Medical): Not on file    Lack of Transportation (Non-Medical): Not on file   Physical Activity:     Days of Exercise per Week: Not on file    Minutes of Exercise per Session: Not on file   Stress:     Feeling of Stress : Not on file   Social Connections:     Frequency of Communication with Friends and Family: Not on file    Frequency of Social Gatherings with Friends and Family: Not on file    Attends Worship Services: Not on file    Active Member of Clubs or Organizations: Not on file    Attends Club or Organization Meetings: Not on file    Marital Status: Not on file   Intimate Partner Violence:     Fear of Current or Ex-Partner: Not on file    Emotionally Abused: Not on file    Physically Abused: Not on file    Sexually Abused: Not on file   Housing Stability:     Unable to Pay for Housing in the Last Year: Not on file    Number of [place] in the Last Year: Not on file    Unstable Housing in the Last Year: Not on file       Family History   Problem Relation Age of Onset    COPD Mother     COPD Father     Heart Attack Father 79    Down Syndrome Sister     Crohn's Disease Brother     Atrial Fibrillation Sister     No Known Problems Sister     No Known Problems Brother     No Known Problems Brother        SUBJECTIVE: Tri Montalvo presents to the office today for re-evaluation of chronic cardiac diagnoses. Since our last visit he has 28 LBS, presumably water weight. He complains of no unstable cardiac symptoms  Compliant with NOAC with no missed doses. No SHANNAN diagnosis. Bleeds easily with trauma, no spontaneous bleeding    Review of Systems   Other than stated in HPI, negative 10-point system review. Objective:   Physical Exam   Constitutional: He is oriented to person, place, and time.  He is cooperative. Obese     HENT:   Head: Normocephalic and atraumatic. Eyes: Conjunctivae are normal. Pupils are equal, round,   Neck: No JVD present. Carotid bruit is not present. Cardiovascular: irregular rhythm, S1 normal, S2 normal and intact distal pulses. PMI is not displaced. Exam reveals no gallop. No murmur heard. Pulmonary/Chest: Effort normal and breath sounds normal. No respiratory distress. Abdominal: Soft. Normal appearance and bowel sounds are normal. He exhibits no abdominal bruit and no pulsatile midline mass. There is no hepatomegaly. There is no tenderness. Musculoskeletal: Normal range of motion. He exhibits no edema   Neurological: He is alert and oriented to person, place, and time. Gait normal.   Skin: Skin is warm and dry. No bruising, no ecchymosis and no rash noted. He is not diaphoretic. No cyanosis. Psychiatric: He has a normal mood and affect. His behavior is normal. Thought content normal.     EKG: NSR, remote septal MI        ASSESSMENT & PLAN:    Patient Active Problem List   Diagnoses    DM (diabetes mellitus), type 2    HTN (hypertension):     Coronary atherosclerosis of native coronary artery: s/p lateral MI 2008 due to circumflex occlusion treated conservatively due to completed, uncomplicated infarction. Now with two vessel ACB. EF 45-50% on pre-op cath   * Peripheral edema: resolved on torsemide with stable renal function               * Hyperlipidemia: intolerant to multiple statin agents- (crestor and atorvastatin) only taking  pravastatin 20 mg intermittently. Stressed importance of compliance. If cannot tolerate will need Repatha    Atrial fibrillation : s/p MAZE and LA exclusion. Recurrent AF documented on first ekg one week post dishcarge. Now in sinus.  Will stop ASA to lessen bleeding risk and use NOAC as single agent       OV 1 year

## 2022-05-04 RX ORDER — TORSEMIDE 20 MG/1
TABLET ORAL
Qty: 30 TABLET | Refills: 5 | Status: SHIPPED | OUTPATIENT
Start: 2022-05-04

## 2022-05-12 ENCOUNTER — OFFICE VISIT (OUTPATIENT)
Dept: VASCULAR SURGERY | Age: 72
End: 2022-05-12
Payer: MEDICARE

## 2022-05-12 ENCOUNTER — TELEPHONE (OUTPATIENT)
Dept: VASCULAR SURGERY | Age: 72
End: 2022-05-12

## 2022-05-12 VITALS — HEIGHT: 66 IN | WEIGHT: 199 LBS | BODY MASS INDEX: 31.98 KG/M2

## 2022-05-12 DIAGNOSIS — I65.21 CAROTID STENOSIS, RIGHT: Primary | ICD-10-CM

## 2022-05-12 PROCEDURE — 99214 OFFICE O/P EST MOD 30 MIN: CPT | Performed by: SURGERY

## 2022-05-12 NOTE — TELEPHONE ENCOUNTER
Scheduled carotid u/s at [facility] 5/17 at 9:00 a.m, message left on pt's answering machine.

## 2022-05-12 NOTE — PROGRESS NOTES
Chief Complaint:   Chief Complaint   Patient presents with    Circulatory Problem     carotid stenosis rt.          HPI: Patient came to the office, with his family for evaluation of history of carotid testing, overall doing well, doing better      Patient denies any focal lateralizing neurological symptoms like loss of speech, vision or loss of function of extremity    Patient can walk a few blocks , and denies any symptoms of rest pain    Allergies   Allergen Reactions    Adhesive Tape Dermatitis and Other (See Comments)    Crestor [Rosuvastatin Calcium]      Muscles aches     Lipitor [Atorvastatin]      Muscle aches    Rosuvastatin      Muscles aches        Current Outpatient Medications   Medication Sig Dispense Refill    torsemide (DEMADEX) 20 MG tablet TAKE ONE TABLET BY MOUTH DAILY 30 tablet 5    rivaroxaban (XARELTO) 20 MG TABS tablet Take 20 mg by mouth      metoprolol succinate (TOPROL XL) 25 MG extended release tablet Take 0.5 tablets by mouth daily 45 tablet 2    metFORMIN (GLUCOPHAGE) 500 MG tablet Take 2 tablets by mouth 2 times daily (with meals) 120 tablet 3    glipiZIDE (GLUCOTROL) 5 MG tablet Take 1 tablet by mouth 4 times daily 360 tablet 3    pravastatin (PRAVACHOL) 20 MG tablet Take 1 tablet by mouth daily 90 tablet 5    tamsulosin (FLOMAX) 0.4 MG capsule TAKE ONE CAPSULE BY MOUTH EVERY DAY 90 capsule 3    zinc gluconate 50 MG tablet Take 50 mg by mouth daily Not taking      b complex vitamins capsule Take 1 capsule by mouth daily      vitamin B-12 (CYANOCOBALAMIN) 1000 MCG tablet Take 1,000 mcg by mouth daily Has not taken recently      vitamin D (CHOLECALCIFEROL) 1000 UNIT TABS tablet Take 400 Units by mouth daily       folic acid (FOLVITE) 1 MG tablet Take 1 tablet by mouth daily 30 tablet 0    magnesium oxide (MAG-OX) 400 (241.3 Mg) MG TABS tablet Take 1 tablet by mouth daily for 14 days 14 tablet 0    aspirin 81 MG EC tablet Take 81 mg by mouth daily (Patient not taking: Reported on 5/12/2022)       No current facility-administered medications for this visit. Past Medical History:   Diagnosis Date    A-fib [facility])     for cardioversion 3-12-21     CAD (coronary artery disease) 10/27/2011    Dr. Rani Kelly Carotid stenosis, right 5/6/2021    Chronic venous insufficiency 3/2/2017    COVID-19 01/2021    resolved as of 3-3-21     Decreased pulses in feet     Diabetes mellitus ([address]     DM (diabetes mellitus), type 2 ([address] 10/27/2011    ED (erectile dysfunction)     Gout 10/31/2011    Heart attack ([address]     HTN (hypertension) 10/27/2011    Hyperlipidemia 6/7/2012    Hypertension     Lateral myocardial infarction ([address] 10/08    due to circumflex occlusion    Leg swelling 3/2/2017       Past Surgical History:   Procedure Laterality Date    CARDIAC CATHETERIZATION      CARDIAC CATHETERIZATION  06/16/2021    DR. CANDELARIA [facility] EF 45% CTS CONSULT    CARDIOVASCULAR STRESS TEST  12/14/2009    neither fixed nor reversible perfusion defect; LVEF 50%    CARDIOVERSION      CATARACT REMOVAL Left     CHOLECYSTECTOMY  1999    CORONARY ARTERY BYPASS GRAFT Left 07/09/2021    CABG CORONARY ARTERY BYPASS, MAZE, LEFT ATRIAL APPENDAGE OCCLUSION performed by Shelby Flower DO at [facility]  @ age 13   [place] [facility]      left-ligament torn    OTHER SURGICAL HISTORY  10/31/2011    cystoscopy retrograde;ES[facility];stent on left    TESTICLE SURGERY      as child     WISDOM TOOTH EXTRACTION         Family History   Problem Relation Age of Onset    COPD Mother     COPD Father     Heart Attack Father 79    Heart Disease Father     Down Syndrome Sister     Atrial Fibrillation Sister     No Known Problems Sister     Crohn's Disease Brother     No Known Problems Brother     No Known Problems Brother        Social History     Socioeconomic History    Marital status:      Spouse name: Not on file    Number of children: 2    Years of education: Not on file    Highest education level: Not on file   Occupational History    Occupation: self-employed     Comment: print shop   Tobacco Use    Smoking status: Never Smoker    Smokeless tobacco: Never Used   Vaping Use    Vaping Use: Never used   Substance and Sexual Activity    Alcohol use: Yes     Comment: occasional    Drug use: Never    Sexual activity: Not on file   Other Topics Concern    Not on file   Social History Narrative    Not on file     Social Determinants of Health     Financial Resource Strain:     Difficulty of Paying Living Expenses: Not on file   Food Insecurity:     Worried About [address] in the Last Year: Not on file    [address] in the Last Year: Not on file   Transportation Needs:     Lack of Transportation (Medical): Not on file    Lack of Transportation (Non-Medical): Not on file   Physical Activity: Sufficiently Active    Days of Exercise per Week: 7 days    Minutes of Exercise per Session: 60 min   Stress:     Feeling of Stress : Not on file   Social Connections:     Frequency of Communication with Friends and Family: Not on file    Frequency of Social Gatherings with Friends and Family: Not on file    Attends Baptism Services: Not on file    Active Member of Clubs or Organizations: Not on file    Attends Club or Organization Meetings: Not on file    Marital Status: Not on file   Intimate Partner Violence:     Fear of Current or Ex-Partner: Not on file    Emotionally Abused: Not on file    Physically Abused: Not on file    Sexually Abused: Not on file   Housing Stability:     Unable to Pay for Housing in the Last Year: Not on file    Number of [place] in the Last Year: Not on file    Unstable Housing in the Last Year: Not on file       Review of Systems:    Eyes:  No blurring, diplopia or vision loss. Respiratory:  No cough, pleuritic chest pain, dyspnea, or wheezing. Cardiovascular: No angina, palpitations .   Hypertension, hyperlipidemia  Musculoskeletal:  No arthritis or weakness. Neurologic:  No paralysis, paresis, paresthesia, seizures or headache. Endocrinology: Diabetes mellitus          Physical Exam:  General appearance:  Alert, awake, oriented x 3. No distress. Eyes:  Conjunctivae appear normal; PERRL  Neck:  No jugular venous distention, lymphadenopathy or thyromegaly. Carotid bruit noted  Lungs:  Clear to ausculation bilaterally. No rhonchi, crackles, wheezes  Heart:  Regular rate and rhythm. No rub or murmur  Abdomen:  Soft, non-tender. No masses, organomegaly. Musculoskeletal : No joint effusions, tenderness swelling    Neuro: Speech is intact. Moving all extremities. No focal motor or sensory deficits      Extremities:  Both feet are warm to touch. The color of both feet is normal.        Pulses Right  Left    Brachial 3 3    Radial    3=normal   Femoral 2 2  2=diminished   Popliteal    1=barely palpable   Dorsalis pedis    0=absent   Posterior tibial    4=aneurysmal             Other pertinent information:1. The past medical records were reviewed. Assessment:    1. Carotid stenosis, right              Plan:       Discussed patient with family, they have recommended follow-up carotid ultrasound to monitor the right carotid artery disease but call me anytime she has any strokelike symptoms, explained              Patient was instructed to continue walking program and to call if any worsening of symptoms and to call if any focal lateralizing neurological symptoms like loss of speech, vision or loss of function of extremity. All the questions were answered. Orders Placed This Encounter   Procedures    US CAROTID ARTERY BILATERAL             Indicated follow-up: Return in about 1 year (around 5/12/2023), or if symptoms worsen or fail to improve.

## 2022-05-17 ENCOUNTER — HOSPITAL ENCOUNTER (OUTPATIENT)
Dept: ULTRASOUND IMAGING | Age: 72
Discharge: HOME | End: 2022-05-19
Payer: MEDICARE

## 2022-05-17 DIAGNOSIS — I65.21 CAROTID STENOSIS, RIGHT: ICD-10-CM

## 2022-05-17 PROCEDURE — 93880 EXTRACRANIAL BILAT STUDY: CPT

## 2022-05-19 ENCOUNTER — TELEPHONE (OUTPATIENT)
Dept: VASCULAR SURGERY | Age: 72
End: 2022-05-19

## 2022-05-19 NOTE — TELEPHONE ENCOUNTER
Discussed the patient's wife Kirk [place], last 2 ultrasounds reviewed, stenosis mild to moderate at the maximum 30 to 40%, reassured, recommend follow-up in 2 years rather than 1 year, but call immediately with any strokelike symptoms, explained, all the questions were answered

## 2022-06-15 NOTE — TELEPHONE ENCOUNTER
Dr. Merline Altes - patient's pravastatin prescription expires soon and he will be due for a refill. I have pended a refill request for your convenience. Last OV 3/28/22. Next OV 3/28/23. Thank you,  Darlin Helm, PharmD, [facility] // Department, toll free [phone], option 1  ==============================================================    [facility]: STATIN THERAPY REVIEW  Identified statin use in persons with cardiovascular disease care gap per [facility]. Records dated 6/6/22. Last Office Visit: 3/28/22 (cardio)    Patient also appears to be taking: glipizide and metformin     Patient found in [facility] [place] and is currently eligible for TIP    ASSESSMENT:  Patient has been identified as having a diagnosis for clinical ASCVD or event (e.g., inpatient hospitalization for MI, CABG, PCI or other revascularization procedures) in the measurement year and not currently filling a moderate or high intensity statin. Patients included in this care gap are males age 18-72 and females age 43-69. Per chart review, patient is prescribed pravastatin 20 mg daily. Patient's prescription expires on 7/2/22 and he is due for a refill soon. Per cardiology OV note on 3/28/22: \"intolerant to multiple statin agents- (crestor and atorvastatin) only taking  pravastatin 20 mg intermittently. \" Likely unable to tolerate a higher dose?     Lab Results   Component Value Date    CHOL 131 02/21/2022    TRIG 53 02/21/2022    HDL 40 02/21/2022    LDLCALC 80 02/21/2022     ALT   Date Value Ref Range Status   03/15/2022 27 0 - 40 U/L Final     AST   Date Value Ref Range Status   03/15/2022 33 0 - 39 U/L Final       The 10-year ASCVD risk score (Lady Morin, et al., 2013) is: 30%    Values used to calculate the score:      Age: 74 years      Sex: Male      Is Non- : No      Diabetic: Yes      Tobacco smoker: No      Systolic Blood Pressure: 100 mmHg      Is BP treated: Yes      HDL Cholesterol: 40 mg/dL      Total Cholesterol: 131 mg/dL     Hyperlipidemia Goal: Patient is prescribed low-intensity statin therapy. PLAN:  Reached patient to review. Patient reviewed medication bottle, he has a couple of weeks left. States that he takes pravastatin daily - may miss a dose here or there, but not often. He is tolerating pravastatin dose well, no issues/side effects. States that he could not tolerate other agents, but pravastatin is working well. Advised patient that his prescription expires at the beginning of July and I will send Dr. Arslan Beavers a pro-active refill request. Completed TIP.     Future Appointments   Date Time Provider Jose Cruz Mcdonald   8/29/2022  9:45 AM LINNETTE Horn CNP [facility]   3/28/2023  7:30 AM David Judd MD [facility]   4/26/2023 10:30 AM LINNETTE Horn CNP [facility]   5/16/2024  8:00 AM Adilson Salinas MD [facility]       Beatriz Kilgore PharmD, [facility] // Department, toll free [phone], option 1

## 2022-06-16 RX ORDER — PRAVASTATIN SODIUM 40 MG
20 TABLET ORAL DAILY
Qty: 45 TABLET | Refills: 3 | Status: SHIPPED | OUTPATIENT
Start: 2022-06-16

## 2022-06-16 NOTE — TELEPHONE ENCOUNTER
Thank you for the quick response! Will sign off.      Antwan Carrillo, PharmD, [facility] // Department, toll free [phone], option [address] [place] [address] in place:  No   Recommendation Provided To: Provider: 1 via Note to Provider and Patient/Caregiver: 1 via Telephone   Intervention Detail: Adherence Monitorin and Refill(s) Provided   Gap Closed?: Yes    Intervention Accepted By: Provider: 1 and Patient/Caregiver: 1   Time Spent (min): 20

## 2022-07-23 ENCOUNTER — HOSPITAL ENCOUNTER (EMERGENCY)
Age: 72
Discharge: HOME | End: 2022-07-23
Attending: EMERGENCY MEDICINE
Payer: MEDICARE

## 2022-07-23 ENCOUNTER — APPOINTMENT (OUTPATIENT)
Dept: CT IMAGING | Age: 72
End: 2022-07-23
Payer: MEDICARE

## 2022-07-23 VITALS
OXYGEN SATURATION: 100 % | HEART RATE: 69 BPM | TEMPERATURE: 97.8 F | HEIGHT: 66 IN | SYSTOLIC BLOOD PRESSURE: 141 MMHG | DIASTOLIC BLOOD PRESSURE: 77 MMHG | RESPIRATION RATE: 14 BRPM | BODY MASS INDEX: 30.53 KG/M2 | WEIGHT: 190 LBS

## 2022-07-23 DIAGNOSIS — K59.00 CONSTIPATION, UNSPECIFIED CONSTIPATION TYPE: Primary | ICD-10-CM

## 2022-07-23 DIAGNOSIS — R33.9 URINARY RETENTION: ICD-10-CM

## 2022-07-23 LAB
ALBUMIN SERPL-MCNC: 4.5 G/DL (ref 3.5–5.2)
ALP BLD-CCNC: 75 U/L (ref 40–129)
ALT SERPL-CCNC: 20 U/L (ref 0–40)
ANION GAP SERPL CALCULATED.3IONS-SCNC: 12 MMOL/L (ref 7–16)
AST SERPL-CCNC: 24 U/L (ref 0–39)
BACTERIA: ABNORMAL /HPF
BASOPHILS ABSOLUTE: 0.03 E9/L (ref 0–0.2)
BASOPHILS RELATIVE PERCENT: 0.4 % (ref 0–2)
BILIRUB SERPL-MCNC: 0.8 MG/DL (ref 0–1.2)
BILIRUBIN URINE: NEGATIVE
BLOOD, URINE: NEGATIVE
BUN BLDV-MCNC: 25 MG/DL (ref 6–23)
CALCIUM SERPL-MCNC: 9.9 MG/DL (ref 8.6–10.2)
CHLORIDE BLD-SCNC: 106 MMOL/L (ref 98–107)
CLARITY: CLEAR
CO2: 21 MMOL/L (ref 22–29)
COLOR: YELLOW
CREAT SERPL-MCNC: 1 MG/DL (ref 0.7–1.2)
EOSINOPHILS ABSOLUTE: 0.1 E9/L (ref 0.05–0.5)
EOSINOPHILS RELATIVE PERCENT: 1.5 % (ref 0–6)
GFR AFRICAN AMERICAN: >60
GFR NON-AFRICAN AMERICAN: >60 ML/MIN/1.73
GLUCOSE BLD-MCNC: 177 MG/DL (ref 74–99)
GLUCOSE URINE: 250 MG/DL
HCT VFR BLD CALC: 39.8 % (ref 37–54)
HEMOGLOBIN: 13.8 G/DL (ref 12.5–16.5)
IMMATURE GRANULOCYTES #: 0.01 E9/L
IMMATURE GRANULOCYTES %: 0.1 % (ref 0–5)
KETONES, URINE: 15 MG/DL
LACTIC ACID: 1.5 MMOL/L (ref 0.5–2.2)
LEUKOCYTE ESTERASE, URINE: NEGATIVE
LIPASE: 55 U/L (ref 13–60)
LYMPHOCYTES ABSOLUTE: 1.36 E9/L (ref 1.5–4)
LYMPHOCYTES RELATIVE PERCENT: 20 % (ref 20–42)
MCH RBC QN AUTO: 33.8 PG (ref 26–35)
MCHC RBC AUTO-ENTMCNC: 34.7 % (ref 32–34.5)
MCV RBC AUTO: 97.5 FL (ref 80–99.9)
MONOCYTES ABSOLUTE: 0.47 E9/L (ref 0.1–0.95)
MONOCYTES RELATIVE PERCENT: 6.9 % (ref 2–12)
NEUTROPHILS ABSOLUTE: 4.84 E9/L (ref 1.8–7.3)
NEUTROPHILS RELATIVE PERCENT: 71.1 % (ref 43–80)
NITRITE, URINE: NEGATIVE
PDW BLD-RTO: 12.1 FL (ref 11.5–15)
PH UA: 6 (ref 5–9)
PLATELET # BLD: 121 E9/L (ref 130–450)
PMV BLD AUTO: 10 FL (ref 7–12)
POTASSIUM REFLEX MAGNESIUM: 4.5 MMOL/L (ref 3.5–5)
PROTEIN UA: NEGATIVE MG/DL
RBC # BLD: 4.08 E12/L (ref 3.8–5.8)
RBC UA: ABNORMAL /HPF (ref 0–2)
SODIUM BLD-SCNC: 139 MMOL/L (ref 132–146)
SPECIFIC GRAVITY UA: 1.02 (ref 1–1.03)
TOTAL PROTEIN: 7.1 G/DL (ref 6.4–8.3)
UROBILINOGEN, URINE: 0.2 E.U./DL
WBC # BLD: 6.8 E9/L (ref 4.5–11.5)
WBC UA: ABNORMAL /HPF (ref 0–5)

## 2022-07-23 PROCEDURE — 83690 ASSAY OF LIPASE: CPT

## 2022-07-23 PROCEDURE — 36415 COLL VENOUS BLD VENIPUNCTURE: CPT

## 2022-07-23 PROCEDURE — 51702 INSERT TEMP BLADDER CATH: CPT

## 2022-07-23 PROCEDURE — 85025 COMPLETE CBC W/AUTO DIFF WBC: CPT

## 2022-07-23 PROCEDURE — 81001 URINALYSIS AUTO W/SCOPE: CPT

## 2022-07-23 PROCEDURE — 83605 ASSAY OF LACTIC ACID: CPT

## 2022-07-23 PROCEDURE — 99285 EMERGENCY DEPT VISIT HI MDM: CPT

## 2022-07-23 PROCEDURE — 80053 COMPREHEN METABOLIC PANEL: CPT

## 2022-07-23 PROCEDURE — 6360000004 HC RX CONTRAST MEDICATION: Performed by: RADIOLOGY

## 2022-07-23 PROCEDURE — 74177 CT ABD & PELVIS W/CONTRAST: CPT

## 2022-07-23 RX ORDER — FENTANYL CITRATE 50 UG/ML
50 INJECTION, SOLUTION INTRAMUSCULAR; INTRAVENOUS ONCE
Status: DISCONTINUED | OUTPATIENT
Start: 2022-07-23 | End: 2022-07-23 | Stop reason: HOSPADM

## 2022-07-23 RX ADMIN — IOPAMIDOL 60 ML: 755 INJECTION, SOLUTION INTRAVENOUS at 08:40

## 2022-07-23 ASSESSMENT — ENCOUNTER SYMPTOMS
SORE THROAT: 0
EYE PAIN: 0
SINUS PRESSURE: 0
VOMITING: 0
DIARRHEA: 0
COUGH: 0
EYE REDNESS: 0
WHEEZING: 0
CONSTIPATION: 1
ABDOMINAL PAIN: 0
EYE DISCHARGE: 0
BACK PAIN: 0
NAUSEA: 0
SHORTNESS OF BREATH: 0

## 2022-07-23 ASSESSMENT — PAIN - FUNCTIONAL ASSESSMENT
PAIN_FUNCTIONAL_ASSESSMENT: NONE - DENIES PAIN
PAIN_FUNCTIONAL_ASSESSMENT: NONE - DENIES PAIN

## 2022-07-23 NOTE — ED NOTES
Pt states that he does not want the IV pain med at this time.      Nydia Castorena RN  07/23/22 5899

## 2022-07-23 NOTE — ED PROVIDER NOTES
[facility]  Department of Emergency Medicine     Written by: Caroline Bence, DO  Patient Name: Alaina Velazquez  Attending Provider: Shalini Bautista DO  Admit Date: 2022  6:03 AM  MRN: [id number]                   : 1950        Chief Complaint   Patient presents with    Constipation     Have not gone since Thursday     Urinary Retention    - Chief complaint    Patient is a 75-year-old male past medical history of hypertension, diabetes, hyperlipidemia, BPH, A. fib and CAD. Patient presents with chief complaint of urinary retention and constipation. Patient states that he has not had a bowel movement and 5 days. Patient also notes that he has not been able to urinate since yesterday afternoon. Patient is noting significant pain that he describes with distention and pressure-like feeling in his lower abdomen. Patient currently rates pain a 8 out of 10. Patient denies any exacerbating relieving factors. Patient stated symptoms have been moderate in severity and constant since onset. Patient denies any exacerbating or relieving factors. Patient denies any similar episodes in the past.  Patient denies any fever, chills, nausea, vomiting, chest pain, cough, headache, lightheadedness, numbness or tingling. The history is provided by the patient. No  was used. Review of Systems   Constitutional:  Negative for chills and fever. HENT:  Negative for ear pain, sinus pressure and sore throat. Eyes:  Negative for pain, discharge and redness. Respiratory:  Negative for cough, shortness of breath and wheezing. Cardiovascular:  Negative for chest pain. Gastrointestinal:  Positive for constipation. Negative for abdominal pain, diarrhea, nausea and vomiting. Genitourinary:  Positive for difficulty urinating. Negative for dysuria and frequency. Musculoskeletal:  Negative for arthralgias and back pain. Skin:  Negative for rash and wound. Neurological:  Negative for weakness and headaches. Hematological:  Negative for adenopathy. All other systems reviewed and are negative. Physical Exam  Vitals and nursing note reviewed. Constitutional:       Appearance: He is well-developed. HENT:      Head: Normocephalic and atraumatic. Eyes:      Conjunctiva/sclera: Conjunctivae normal.   Cardiovascular:      Rate and Rhythm: Normal rate and regular rhythm. Heart sounds: Normal heart sounds. No murmur heard. Pulmonary:      Effort: Pulmonary effort is normal. No respiratory distress. Breath sounds: Normal breath sounds. No wheezing or rales. Abdominal:      General: Bowel sounds are normal. There is distension. Palpations: Abdomen is soft. Tenderness: There is abdominal tenderness. There is no guarding or rebound. Comments: Mild distention mild suprapubic tenderness   Musculoskeletal:         General: No tenderness or deformity. Cervical back: Normal range of motion and neck supple. Skin:     General: Skin is warm and dry. Neurological:      Mental Status: He is alert and oriented to person, place, and time. Cranial Nerves: No cranial nerve deficit. Coordination: Coordination normal.        Procedures       MDM  Number of Diagnoses or Management Options  Constipation, unspecified constipation type  Urinary retention  Diagnosis management comments: Patient is a 77-year-old male past medical history of hypertension, diabetes, hyperlipidemia, BPH, A. fib and CAD. Patient resents with chief complaint of constipation and urinary retention. Vital signs stable presentation. On physical exam heart regular rate and rhythm, lungs clear to auscultation bilaterally, abdomen soft with mild distention mild suprapubic tenderness. Laboratory work obtained CBC unremarkable, CMP unremarkable, urinalysis not indicative infection, lactic acid 1.5, lipase 55.   Dueñas placed with significant improvement in symptoms over 1 L drained. CT scan of the abdomen pelvis obtained demonstrated significant constipation with likely fecal impaction. Patient given soapsuds enema which he tolerated well with good bowel movements thereafter. Patient notes significant provement in discomfort. Findings consistent with urinary retention likely secondary to constipation. Overall patient is well-appearing he has significant relief after Dueñas and soapsuds enema. Imaging and laboratory work are reassuring. Decision made to discharge patient. Patient instructed to leave Dueñas in place and to continue at home Flomax. In addition patient also instructed to continue MiraLAX twice daily and to follow-up with urology as well as primary care doctor on Monday. Patient notes any new worrisome symptoms she was instructed to return to the emergency department for evaluation. Plan of care discussed with patient including discharge, all questions were answered, patient was in agreement plan of care and discharged home in stable condition.        Amount and/or Complexity of Data Reviewed  Clinical lab tests: ordered and reviewed  Tests in the radiology section of CPT®: ordered and reviewed  Decide to obtain previous medical records or to obtain history from someone other than the patient: yes    Risk of Complications, Morbidity, and/or Mortality  Presenting problems: moderate  Diagnostic procedures: moderate  Management options: moderate    Patient Progress  Patient progress: stable             --------------------------------------------- PAST HISTORY ---------------------------------------------  Past Medical History:  has a past medical history of A-fib ([facility] [address] CAD (coronary artery disease), Carotid stenosis, right, Chronic venous insufficiency, COVID-19, Decreased pulses in feet, Diabetes mellitus ([address] DM (diabetes mellitus), type 2 ([address] ED (erectile dysfunction), Gout, Heart attack ([facility] [address] HTN (hypertension), Hyperlipidemia, Hypertension, Lateral myocardial infarction [facility]), and Leg swelling. Past Surgical History:  has a past surgical history that includes knee surgery; [place] tooth extraction; other surgical history (10/31/2011); cardiovascular stress test (12/14/2009); Cholecystectomy (1999); Cataract removal (Left); hernia repair (@ age 13); Testicle surgery; Cardiac catheterization; Cardiac catheterization (06/16/2021); Coronary artery bypass graft (Left, 07/09/2021); and Cardioversion. Social History:  reports that he has never smoked. He has never used smokeless tobacco. He reports current alcohol use. He reports that he does not use drugs. Family History: family history includes Atrial Fibrillation in his sister; COPD in his father and mother; Crohn's Disease in his brother; Down Syndrome in his sister; Heart Attack (age of onset: 79) in his father; Heart Disease in his father; No Known Problems in his brother, brother, and sister. The patients home medications have been reviewed.     Allergies: Adhesive tape, Crestor [rosuvastatin calcium], Lipitor [atorvastatin], and Rosuvastatin    -------------------------------------------------- RESULTS -------------------------------------------------  Labs:  Results for orders placed or performed during the hospital encounter of 07/23/22   CBC with Auto Differential   Result Value Ref Range    WBC 6.8 4.5 - 11.5 E9/L    RBC 4.08 3.80 - 5.80 E12/L    Hemoglobin 13.8 12.5 - 16.5 g/dL    Hematocrit 39.8 37.0 - 54.0 %    MCV 97.5 80.0 - 99.9 fL    MCH 33.8 26.0 - 35.0 pg    MCHC 34.7 (H) 32.0 - 34.5 %    RDW 12.1 11.5 - 15.0 fL    Platelets 120 (L) 560 - 450 E9/L    MPV 10.0 7.0 - 12.0 fL    Neutrophils % 71.1 43.0 - 80.0 %    Immature Granulocytes % 0.1 0.0 - 5.0 %    Lymphocytes % 20.0 20.0 - 42.0 %    Monocytes % 6.9 2.0 - 12.0 %    Eosinophils % 1.5 0.0 - 6.0 %    Basophils % 0.4 0.0 - 2.0 %    Neutrophils Absolute 4.84 1.80 - 7.30 E9/L    Immature Granulocytes # 0.01 E9/L    Lymphocytes Absolute 1.36 (L) 1.50 - 4.00 E9/L    Monocytes Absolute 0.47 0.10 - 0.95 E9/L    Eosinophils Absolute 0.10 0.05 - 0.50 E9/L    Basophils Absolute 0.03 0.00 - 0.20 E9/L   Comprehensive Metabolic Panel w/ Reflex to MG   Result Value Ref Range    Sodium 139 132 - 146 mmol/L    Potassium reflex Magnesium 4.5 3.5 - 5.0 mmol/L    Chloride 106 98 - 107 mmol/L    CO2 21 (L) 22 - 29 mmol/L    Anion Gap 12 7 - 16 mmol/L    Glucose 177 (H) 74 - 99 mg/dL    BUN 25 (H) 6 - 23 mg/dL    Creatinine 1.0 0.7 - 1.2 mg/dL    GFR Non-African American >60 >=60 mL/min/1.73    GFR African American >60     Calcium 9.9 8.6 - 10.2 mg/dL    Total Protein 7.1 6.4 - 8.3 g/dL    Albumin 4.5 3.5 - 5.2 g/dL    Total Bilirubin 0.8 0.0 - 1.2 mg/dL    Alkaline Phosphatase 75 40 - 129 U/L    ALT 20 0 - 40 U/L    AST 24 0 - 39 U/L   Lipase   Result Value Ref Range    Lipase 55 13 - 60 U/L   Urinalysis with Microscopic   Result Value Ref Range    Color, UA Yellow Straw/Yellow    Clarity, UA Clear Clear    Glucose, Ur 250 (A) Negative mg/dL    Bilirubin Urine Negative Negative    Ketones, Urine 15 (A) Negative mg/dL    Specific Gravity, UA 1.020 1.005 - 1.030    Blood, Urine Negative Negative    pH, UA 6.0 5.0 - 9.0    Protein, UA Negative Negative mg/dL    Urobilinogen, Urine 0.2 <2.0 E.U./dL    Nitrite, Urine Negative Negative    Leukocyte Esterase, Urine Negative Negative    WBC, UA 0-1 0 - 5 /HPF    RBC, UA NONE 0 - 2 /HPF    Bacteria, UA NONE SEEN None Seen /HPF   Lactic Acid   Result Value Ref Range    Lactic Acid 1.5 0.5 - 2.2 mmol/L       Radiology:  CT ABDOMEN PELVIS W IV CONTRAST Additional Contrast? None   Final Result   Large stool burden within the rectum to suggest fecal impaction with moderate   stool burden seen throughout the remainder of the colon with no signs of   significant distension to suggest obstruction.   Findings to suggest clinical   presentation of constipation.             ------------------------- NURSING NOTES AND VITALS REVIEWED ---------------------------  Date / Time Roomed:  7/23/2022  6:03 AM  ED Bed Assignment:  12/12    The nursing notes within the ED encounter and vital signs as below have been reviewed. /60   Pulse 75   Temp 97.8 °F (36.6 °C) (Tympanic)   Resp 16   Ht 5' 6\" (1.676 m)   Wt 190 lb (86.2 kg)   SpO2 100%   BMI 30.67 kg/m²   Oxygen Saturation Interpretation: Normal      ------------------------------------------ PROGRESS NOTES ------------------------------------------  10:40 AM EDT  I have spoken with the patient and discussed todays results, in addition to providing specific details for the plan of care and counseling regarding the diagnosis and prognosis. Their questions are answered at this time and they are agreeable with the plan. I discussed at length with them reasons for immediate return here for re evaluation. They will followup with their  Urologist and primary care physician by calling their office on Monday.      --------------------------------- ADDITIONAL PROVIDER NOTES ---------------------------------  At this time the patient is without objective evidence of an acute process requiring hospitalization or inpatient management. They have remained hemodynamically stable throughout their entire ED visit and are stable for discharge with outpatient follow-up. The plan has been discussed in detail and they are aware of the specific conditions for emergent return, as well as the importance of follow-up. New Prescriptions    No medications on file       Diagnosis:  1. Constipation, unspecified constipation type    2. Urinary retention        Disposition:  Patient's disposition: Discharge to home  Patient's condition is stable. Patient was seen and evaluated by myself and my attending Bard Maxime DO. Assessment and Plan discussed with attending provider, please see attestation for final plan of care.      DO Maru Wilde DO  Resident  07/23/22 3211

## 2022-08-29 DIAGNOSIS — E11.65 TYPE 2 DIABETES MELLITUS WITH HYPERGLYCEMIA, WITHOUT LONG-TERM CURRENT USE OF INSULIN (HCC): ICD-10-CM

## 2022-08-29 LAB
ALBUMIN SERPL-MCNC: 4.3 G/DL (ref 3.5–5.2)
ALP BLD-CCNC: 78 U/L (ref 40–129)
ALT SERPL-CCNC: 19 U/L (ref 0–40)
ANION GAP SERPL CALCULATED.3IONS-SCNC: 13 MMOL/L (ref 7–16)
AST SERPL-CCNC: 22 U/L (ref 0–39)
BILIRUB SERPL-MCNC: 0.9 MG/DL (ref 0–1.2)
BUN BLDV-MCNC: 18 MG/DL (ref 6–23)
CALCIUM SERPL-MCNC: 9.7 MG/DL (ref 8.6–10.2)
CHLORIDE BLD-SCNC: 103 MMOL/L (ref 98–107)
CHOLESTEROL, TOTAL: 149 MG/DL (ref 0–199)
CO2: 25 MMOL/L (ref 22–29)
CREAT SERPL-MCNC: 1.2 MG/DL (ref 0.7–1.2)
GFR AFRICAN AMERICAN: >60
GFR NON-AFRICAN AMERICAN: 59 ML/MIN/1.73
GLUCOSE BLD-MCNC: 149 MG/DL (ref 74–99)
HBA1C MFR BLD: 6.1 % (ref 4–5.6)
HDLC SERPL-MCNC: 54 MG/DL
LDL CHOLESTEROL CALCULATED: 81 MG/DL (ref 0–99)
POTASSIUM SERPL-SCNC: 4.7 MMOL/L (ref 3.5–5)
SODIUM BLD-SCNC: 141 MMOL/L (ref 132–146)
TOTAL PROTEIN: 7 G/DL (ref 6.4–8.3)
TRIGL SERPL-MCNC: 68 MG/DL (ref 0–149)
VLDLC SERPL CALC-MCNC: 14 MG/DL

## 2022-09-22 ENCOUNTER — HOSPITAL ENCOUNTER (OUTPATIENT)
Age: 72
Discharge: HOME | End: 2022-09-22
Payer: MEDICARE

## 2022-09-22 LAB — PROSTATE SPECIFIC ANTIGEN: 0.85 NG/ML (ref 0–4)

## 2022-09-22 PROCEDURE — 36415 COLL VENOUS BLD VENIPUNCTURE: CPT

## 2022-09-22 PROCEDURE — G0103 PSA SCREENING: HCPCS

## 2022-10-26 ENCOUNTER — APPOINTMENT (OUTPATIENT)
Dept: ULTRASOUND IMAGING | Age: 72
End: 2022-10-26
Payer: MEDICARE

## 2022-10-26 ENCOUNTER — HOSPITAL ENCOUNTER (OUTPATIENT)
Dept: ULTRASOUND IMAGING | Age: 72
Discharge: HOME | End: 2022-10-28
Payer: MEDICARE

## 2022-10-26 DIAGNOSIS — Q55.22 RETRACTILE TESTIS: ICD-10-CM

## 2022-10-26 PROCEDURE — 76870 US EXAM SCROTUM: CPT

## 2022-11-10 RX ORDER — RIVAROXABAN 20 MG/1
TABLET, FILM COATED ORAL
Qty: 30 TABLET | Refills: 5 | Status: SHIPPED | OUTPATIENT
Start: 2022-11-10

## 2022-11-11 ENCOUNTER — TELEPHONE (OUTPATIENT)
Dept: PHARMACY | Facility: CLINIC | Age: 72
End: 2022-11-11

## 2022-11-11 NOTE — TELEPHONE ENCOUNTER
POPULATION HEALTH CLINICAL PHARMACY: ADHERENCE REVIEW  Identified care gap per [place]: fills at [facility]: Diabetes and Statin adherence    [facility] [address] [facility] [phone] Tom Almaraz [phone]  [facility] [address]  Phone: [phone] Fax: [phone]       Last Visit: 8/29/22 with Mary Kay Leon MD       Patient prescribed:    Current Outpatient Medications   Medication Instructions    b complex vitamins capsule 1 capsule, Oral, DAILY    blood glucose test strips (ONETOUCH ULTRA) strip TEST ONCE A DAY. docusate sodium (COLACE) 100 MG capsule TAKE ONE CAPSULE BY MOUTH TWO TIMES A DAY    glipiZIDE (GLUCOTROL) 5 mg, Oral, 4 TIMES DAILY    metFORMIN (GLUCOPHAGE) 500 MG tablet TAKE TWO TABLETS BY MOUTH TWO TIMES A DAY WITH MEALS    metoprolol succinate (TOPROL XL) 12.5 mg, Oral, DAILY    pravastatin (PRAVACHOL) 20 mg, Oral, DAILY    tamsulosin (FLOMAX) 0.4 MG capsule TAKE ONE CAPSULE BY MOUTH EVERY DAY    torsemide (DEMADEX) 20 MG tablet TAKE ONE TABLET BY MOUTH DAILY    vitamin B-12 (CYANOCOBALAMIN) 1,000 mcg, Oral, DAILY, Has not taken recently    vitamin D (CHOLECALCIFEROL) 400 Units, Oral, DAILY    XARELTO 20 MG TABS tablet TAKE ONE TABLET BY MOUTH DAILY WITH BREAKFAST    zinc gluconate 50 mg, Oral, DAILY, Not taking       ASSESSMENT    DIABETES ADHERENCE  Insurance Records claims through 11/7/22  [facility] [place] =  90%; Potential Fail Date: 12/25/22  Metformin last filled for 30 day supply. Next refill due: 11/13/22    Per Reconciled Dispense Report:  Metformin 500 Mg    Tab Asce 10/14/2022 30 120 each Mary Kay Leon MD [facility] #[id number] - PO. ..    3 refills remaining. Per [place] Portal:  Same as above; 3 refill(s) remaining. Lab Results   Component Value Date    LABA1C 6.1 (H) 08/29/2022    LABA1C 6.5 (H) 02/21/2022    LABA1C 7.2 (H) 07/07/2021     NOTE A1c <9%      STATIN ADHERENCE  Insurance Records claims through 11/7/22  [facility] [place] =  79%;  Potential Fail Date: 11/15/22  Pravastatin last filled for 90 day supply. Next refill due: 9/14/22    Per Reconciled Dispense Report:  Pravastatin 40 Mg   Tab Teva 06/16/2022 90 45 each ROSSY BUTLER MD [facility] #[id number] - PO   3 Refills remaining. Per [place] Portal:  Same as above; 3 refill(s) remaining. Lab Results   Component Value Date    CHOL 149 08/29/2022    TRIG 68 08/29/2022    HDL 54 08/29/2022    LDLCALC 81 08/29/2022     ALT   Date Value Ref Range Status   08/29/2022 19 0 - 40 U/L Final     AST   Date Value Ref Range Status   08/29/2022 22 0 - 39 U/L Final     The 10-year ASCVD risk score (Jamison MARTINEZ, et al., 2019) is: 28.4%    Values used to calculate the score:      Age: 74 years      Sex: Male      Is Non- : No      Diabetic: Yes      Tobacco smoker: No      Systolic Blood Pressure: 279 mmHg      Is BP treated: Yes      HDL Cholesterol: 54 mg/dL      Total Cholesterol: 149 mg/dL       PLAN  The following are interventions that have been identified:   - Patient overdue refilling pravastatin and metformin and active on home medication list.   Patient fills at [address] [place] [address]  patient's home phone number and spoke to patient's spouse to remind to refill both metformin and pravastatin at [address] [facility] [address]. She will check with patient regarding overdue refill of pravastatin. Reached patient to review.     Future Appointments   Date Time Provider Jose Cruz Mcdonald   11/18/2022 10:40 AM Mary Kay Leon MD [facility] [place] [facility]   3/28/2023  7:30 AM Varun Patel MD [address] [facility]   4/26/2023  2:00 PM MD OWEN Ugarte [facility] [place] [facility]   5/16/2024  8:00 AM Madelyn Petit MD [facility]/[address], PharmD, [facility]  [facility], toll free: [phone], option 1  =======================================================    For Pharmacy [address] [facility] in

## 2023-01-06 LAB — URINE CULTURE, ROUTINE: NORMAL

## 2023-02-01 ENCOUNTER — HOSPITAL ENCOUNTER (OUTPATIENT)
Dept: CT IMAGING | Age: 73
Discharge: HOME | End: 2023-02-03
Payer: MEDICARE

## 2023-02-01 ENCOUNTER — HOSPITAL ENCOUNTER (OUTPATIENT)
Age: 73
Discharge: HOME | End: 2023-02-01
Payer: MEDICARE

## 2023-02-01 DIAGNOSIS — N50.89 OTHER SPECIFIED DISORDERS OF THE MALE GENITAL ORGANS: ICD-10-CM

## 2023-02-01 LAB
ALBUMIN SERPL-MCNC: 4.1 G/DL (ref 3.5–5.2)
ALP BLD-CCNC: 84 U/L (ref 40–129)
ALT SERPL-CCNC: 20 U/L (ref 0–40)
ANION GAP SERPL CALCULATED.3IONS-SCNC: 9 MMOL/L (ref 7–16)
AST SERPL-CCNC: 23 U/L (ref 0–39)
BASOPHILS ABSOLUTE: 0.04 E9/L (ref 0–0.2)
BASOPHILS RELATIVE PERCENT: 0.7 % (ref 0–2)
BILIRUB SERPL-MCNC: 0.9 MG/DL (ref 0–1.2)
BUN BLDV-MCNC: 27 MG/DL (ref 6–23)
CALCIUM SERPL-MCNC: 9.5 MG/DL (ref 8.6–10.2)
CHLORIDE BLD-SCNC: 102 MMOL/L (ref 98–107)
CHOLESTEROL, TOTAL: 149 MG/DL (ref 0–199)
CO2: 25 MMOL/L (ref 22–29)
CREAT SERPL-MCNC: 1.3 MG/DL (ref 0.7–1.2)
EOSINOPHILS ABSOLUTE: 0.12 E9/L (ref 0.05–0.5)
EOSINOPHILS RELATIVE PERCENT: 2.2 % (ref 0–6)
GFR SERPL CREATININE-BSD FRML MDRD: 57 ML/MIN/1.73
GLUCOSE BLD-MCNC: 184 MG/DL (ref 74–99)
HBA1C MFR BLD: 6.7 % (ref 4–5.6)
HCT VFR BLD CALC: 40.5 % (ref 37–54)
HDLC SERPL-MCNC: 50 MG/DL
HEMOGLOBIN: 13.8 G/DL (ref 12.5–16.5)
IMMATURE GRANULOCYTES #: 0.02 E9/L
IMMATURE GRANULOCYTES %: 0.4 % (ref 0–5)
LDL CHOLESTEROL CALCULATED: 84 MG/DL (ref 0–99)
LYMPHOCYTES ABSOLUTE: 1.41 E9/L (ref 1.5–4)
LYMPHOCYTES RELATIVE PERCENT: 25.3 % (ref 20–42)
MCH RBC QN AUTO: 32.7 PG (ref 26–35)
MCHC RBC AUTO-ENTMCNC: 34.1 % (ref 32–34.5)
MCV RBC AUTO: 96 FL (ref 80–99.9)
MONOCYTES ABSOLUTE: 0.4 E9/L (ref 0.1–0.95)
MONOCYTES RELATIVE PERCENT: 7.2 % (ref 2–12)
NEUTROPHILS ABSOLUTE: 3.58 E9/L (ref 1.8–7.3)
NEUTROPHILS RELATIVE PERCENT: 64.2 % (ref 43–80)
PDW BLD-RTO: 13.2 FL (ref 11.5–15)
PLATELET # BLD: 145 E9/L (ref 130–450)
PMV BLD AUTO: 10.1 FL (ref 7–12)
POTASSIUM SERPL-SCNC: 4.5 MMOL/L (ref 3.5–5)
RBC # BLD: 4.22 E12/L (ref 3.8–5.8)
SODIUM BLD-SCNC: 136 MMOL/L (ref 132–146)
TOTAL PROTEIN: 7.2 G/DL (ref 6.4–8.3)
TRIGL SERPL-MCNC: 76 MG/DL (ref 0–149)
VLDLC SERPL CALC-MCNC: 15 MG/DL
WBC # BLD: 5.6 E9/L (ref 4.5–11.5)

## 2023-02-01 PROCEDURE — 6360000004 HC RX CONTRAST MEDICATION: Performed by: RADIOLOGY

## 2023-02-01 PROCEDURE — 80061 LIPID PANEL: CPT

## 2023-02-01 PROCEDURE — 74178 CT ABD&PLV WO CNTR FLWD CNTR: CPT

## 2023-02-01 PROCEDURE — 80053 COMPREHEN METABOLIC PANEL: CPT

## 2023-02-01 PROCEDURE — 83036 HEMOGLOBIN GLYCOSYLATED A1C: CPT

## 2023-02-01 PROCEDURE — 36415 COLL VENOUS BLD VENIPUNCTURE: CPT

## 2023-02-01 PROCEDURE — 85025 COMPLETE CBC W/AUTO DIFF WBC: CPT

## 2023-02-01 RX ADMIN — IOPAMIDOL 75 ML: 755 INJECTION, SOLUTION INTRAVENOUS at 13:31

## 2023-03-27 ENCOUNTER — HOSPITAL ENCOUNTER (EMERGENCY)
Age: 73
Discharge: HOME | End: 2023-03-27
Attending: STUDENT IN AN ORGANIZED HEALTH CARE EDUCATION/TRAINING PROGRAM
Payer: MEDICARE

## 2023-03-27 ENCOUNTER — APPOINTMENT (OUTPATIENT)
Dept: CT IMAGING | Age: 73
End: 2023-03-27
Payer: MEDICARE

## 2023-03-27 ENCOUNTER — APPOINTMENT (OUTPATIENT)
Dept: GENERAL RADIOLOGY | Age: 73
End: 2023-03-27
Payer: MEDICARE

## 2023-03-27 VITALS
OXYGEN SATURATION: 97 % | BODY MASS INDEX: 32.14 KG/M2 | WEIGHT: 200 LBS | DIASTOLIC BLOOD PRESSURE: 64 MMHG | RESPIRATION RATE: 13 BRPM | HEIGHT: 66 IN | SYSTOLIC BLOOD PRESSURE: 125 MMHG | TEMPERATURE: 97.3 F | HEART RATE: 75 BPM

## 2023-03-27 DIAGNOSIS — K59.00 CONSTIPATION, UNSPECIFIED CONSTIPATION TYPE: Primary | ICD-10-CM

## 2023-03-27 DIAGNOSIS — I45.10 RIGHT BUNDLE BRANCH BLOCK: ICD-10-CM

## 2023-03-27 DIAGNOSIS — K40.90 UNILATERAL INGUINAL HERNIA WITHOUT OBSTRUCTION OR GANGRENE, RECURRENCE NOT SPECIFIED: ICD-10-CM

## 2023-03-27 LAB
ALBUMIN SERPL-MCNC: 4.4 G/DL (ref 3.5–5.2)
ALP SERPL-CCNC: 100 U/L (ref 40–129)
ALT SERPL-CCNC: 23 U/L (ref 0–40)
ANION GAP SERPL CALCULATED.3IONS-SCNC: 13 MMOL/L (ref 7–16)
AST SERPL-CCNC: 30 U/L (ref 0–39)
BASOPHILS # BLD: 0.03 E9/L (ref 0–0.2)
BASOPHILS NFR BLD: 0.5 % (ref 0–2)
BILIRUB SERPL-MCNC: 1.1 MG/DL (ref 0–1.2)
BNP BLD-MCNC: 2521 PG/ML (ref 0–450)
BUN SERPL-MCNC: 18 MG/DL (ref 6–23)
CALCIUM SERPL-MCNC: 9.6 MG/DL (ref 8.6–10.2)
CHLORIDE SERPL-SCNC: 100 MMOL/L (ref 98–107)
CO2 SERPL-SCNC: 22 MMOL/L (ref 22–29)
CREAT SERPL-MCNC: 1 MG/DL (ref 0.7–1.2)
EOSINOPHIL # BLD: 0.02 E9/L (ref 0.05–0.5)
EOSINOPHIL NFR BLD: 0.3 % (ref 0–6)
ERYTHROCYTE [DISTWIDTH] IN BLOOD BY AUTOMATED COUNT: 13.5 FL (ref 11.5–15)
GLUCOSE SERPL-MCNC: 218 MG/DL (ref 74–99)
HCT VFR BLD AUTO: 42.8 % (ref 37–54)
HGB BLD-MCNC: 14.6 G/DL (ref 12.5–16.5)
IMM GRANULOCYTES # BLD: 0.01 E9/L
IMM GRANULOCYTES NFR BLD: 0.2 % (ref 0–5)
LACTATE BLDV-SCNC: 1.6 MMOL/L (ref 0.5–2.2)
LIPASE: 22 U/L (ref 13–60)
LYMPHOCYTES # BLD: 0.7 E9/L (ref 1.5–4)
LYMPHOCYTES NFR BLD: 11.3 % (ref 20–42)
MCH RBC QN AUTO: 32.9 PG (ref 26–35)
MCHC RBC AUTO-ENTMCNC: 34.1 % (ref 32–34.5)
MCV RBC AUTO: 96.4 FL (ref 80–99.9)
MONOCYTES # BLD: 0.25 E9/L (ref 0.1–0.95)
MONOCYTES NFR BLD: 4 % (ref 2–12)
NEUTROPHILS # BLD: 5.2 E9/L (ref 1.8–7.3)
NEUTS SEG NFR BLD: 83.7 % (ref 43–80)
PLATELET # BLD AUTO: 141 E9/L (ref 130–450)
PMV BLD AUTO: 10.3 FL (ref 7–12)
POTASSIUM SERPL-SCNC: 4.2 MMOL/L (ref 3.5–5)
PROT SERPL-MCNC: 7.5 G/DL (ref 6.4–8.3)
RBC # BLD AUTO: 4.44 E12/L (ref 3.8–5.8)
SODIUM SERPL-SCNC: 135 MMOL/L (ref 132–146)
TROPONIN, HIGH SENSITIVITY: 37 NG/L (ref 0–11)
TROPONIN, HIGH SENSITIVITY: 41 NG/L (ref 0–11)
WBC # BLD: 6.2 E9/L (ref 4.5–11.5)

## 2023-03-27 PROCEDURE — 93005 ELECTROCARDIOGRAM TRACING: CPT

## 2023-03-27 PROCEDURE — 96374 THER/PROPH/DIAG INJ IV PUSH: CPT

## 2023-03-27 PROCEDURE — 83605 ASSAY OF LACTIC ACID: CPT

## 2023-03-27 PROCEDURE — 96375 TX/PRO/DX INJ NEW DRUG ADDON: CPT

## 2023-03-27 PROCEDURE — 96376 TX/PRO/DX INJ SAME DRUG ADON: CPT

## 2023-03-27 PROCEDURE — 71045 X-RAY EXAM CHEST 1 VIEW: CPT

## 2023-03-27 PROCEDURE — 84484 ASSAY OF TROPONIN QUANT: CPT

## 2023-03-27 PROCEDURE — 83690 ASSAY OF LIPASE: CPT

## 2023-03-27 PROCEDURE — 83880 ASSAY OF NATRIURETIC PEPTIDE: CPT

## 2023-03-27 PROCEDURE — 93005 ELECTROCARDIOGRAM TRACING: CPT | Performed by: PHYSICIAN ASSISTANT

## 2023-03-27 PROCEDURE — 6360000004 HC RX CONTRAST MEDICATION: Performed by: RADIOLOGY

## 2023-03-27 PROCEDURE — 36415 COLL VENOUS BLD VENIPUNCTURE: CPT

## 2023-03-27 PROCEDURE — 85025 COMPLETE CBC W/AUTO DIFF WBC: CPT

## 2023-03-27 PROCEDURE — 74177 CT ABD & PELVIS W/CONTRAST: CPT

## 2023-03-27 PROCEDURE — 99285 EMERGENCY DEPT VISIT HI MDM: CPT

## 2023-03-27 PROCEDURE — 80053 COMPREHEN METABOLIC PANEL: CPT

## 2023-03-27 PROCEDURE — 6360000002 HC RX W HCPCS

## 2023-03-27 PROCEDURE — 6370000000 HC RX 637 (ALT 250 FOR IP)

## 2023-03-27 RX ORDER — LACTULOSE 10 G/15ML
20 SOLUTION ORAL ONCE
Status: COMPLETED | OUTPATIENT
Start: 2023-03-27 | End: 2023-03-27

## 2023-03-27 RX ORDER — ONDANSETRON 2 MG/ML
4 INJECTION INTRAMUSCULAR; INTRAVENOUS ONCE
Status: COMPLETED | OUTPATIENT
Start: 2023-03-27 | End: 2023-03-27

## 2023-03-27 RX ADMIN — ONDANSETRON HYDROCHLORIDE 4 MG: 2 SOLUTION INTRAMUSCULAR; INTRAVENOUS at 19:01

## 2023-03-27 RX ADMIN — HYDROMORPHONE HYDROCHLORIDE 0.5 MG: 0.5 INJECTION, SOLUTION INTRAMUSCULAR; INTRAVENOUS; SUBCUTANEOUS at 21:19

## 2023-03-27 RX ADMIN — IOPAMIDOL 75 ML: 755 INJECTION, SOLUTION INTRAVENOUS at 20:14

## 2023-03-27 RX ADMIN — LACTULOSE 20 G: 20 SOLUTION ORAL at 23:00

## 2023-03-27 RX ADMIN — ONDANSETRON 4 MG: 2 INJECTION INTRAMUSCULAR; INTRAVENOUS at 21:19

## 2023-03-27 ASSESSMENT — PAIN DESCRIPTION - ORIENTATION: ORIENTATION: LOWER

## 2023-03-27 ASSESSMENT — LIFESTYLE VARIABLES
HOW OFTEN DO YOU HAVE A DRINK CONTAINING ALCOHOL: NEVER
HOW MANY STANDARD DRINKS CONTAINING ALCOHOL DO YOU HAVE ON A TYPICAL DAY: PATIENT DOES NOT DRINK

## 2023-03-27 ASSESSMENT — PAIN DESCRIPTION - DESCRIPTORS: DESCRIPTORS: ACHING

## 2023-03-27 ASSESSMENT — PAIN - FUNCTIONAL ASSESSMENT: PAIN_FUNCTIONAL_ASSESSMENT: 0-10

## 2023-03-27 ASSESSMENT — PAIN DESCRIPTION - LOCATION
LOCATION: ABDOMEN

## 2023-03-27 ASSESSMENT — PAIN SCALES - GENERAL
PAINLEVEL_OUTOF10: 7
PAINLEVEL_OUTOF10: 10
PAINLEVEL_OUTOF10: 10

## 2023-03-27 ASSESSMENT — PAIN DESCRIPTION - PAIN TYPE: TYPE: ACUTE PAIN

## 2023-03-27 ASSESSMENT — PAIN DESCRIPTION - FREQUENCY: FREQUENCY: CONTINUOUS

## 2023-03-27 NOTE — ED NOTES
Department of Emergency Medicine    FIRST PROVIDER TRIAGE NOTE             Independent MLP           3/27/23  3:48 PM EDT    Date of Encounter: 3/27/23   MRN: [id number]    Vitals:    03/27/23 1614   BP: (!) 144/70   Pulse: 74   Resp: 15   Temp: 97.3 °F (36.3 °C)   TempSrc: Oral   SpO2: 100%   Weight: 200 lb (90.7 kg)   Height: 5' 6\" (1.676 m)      HPI: Danilo Espinal is a 74 y.o. male who presents to the ED for Chest Pain (Started today, hx of valve replacement ), Abdominal Pain (\"A lot of gas\" ), and Constipation (No BM since Saturday )     ROS: Negative for vomiting or urinary complaints. Physical Exam:   Gen Appearance/Constitutional: alert  CV: regular rate     Initial Plan of Care: All treatment areas with department are currently occupied. Plan to order/Initiate the following while awaiting opening in ED: labs, EKG, and imaging studies.     Initial Plan of Care: Initiate Treatment-Testing, Proceed toTreatment Area When Bed Available for ED Attending/MLP to Continue Care    Electronically signed by Wilma Christian PA-C   DD: 3/27/23       Wilma Christian PA-C  03/27/23 1980

## 2023-03-27 NOTE — ED PROVIDER NOTES
Rosuvastatin    FAMILYHISTORY       Family History   Problem Relation Age of Onset    COPD Mother     COPD Father     Heart Attack Father 79    Heart Disease Father     Down Syndrome Sister     Atrial Fibrillation Sister     No Known Problems Sister     Crohn's Disease Brother     No Known Problems Brother     No Known Problems Brother         SOCIAL HISTORY       Social History     Tobacco Use    Smoking status: Never    Smokeless tobacco: Never   Vaping Use    Vaping Use: Never used   Substance Use Topics    Alcohol use: Not Currently    Drug use: Never       SCREENINGS        [place] Coma Scale  Eye Opening: Spontaneous  Best Verbal Response: Oriented  Best Motor Response: Obeys commands  [place] Coma Scale Score: 15                CIWA Assessment  BP: 125/64  Heart Rate: 75  Clinical Opiate Withdrawal Scale Score: 0 (03/27/23 1809)        PHYSICAL EXAM  1 or more Elements     ED Triage Vitals [03/27/23 1614]   BP Temp Temp Source Heart Rate Resp SpO2 Height Weight   (!) 144/70 97.3 °F (36.3 °C) Oral 74 15 100 % 5' 6\" (1.676 m) 200 lb (90.7 kg)         Constitutional/General: Alert and oriented x3  Head: Normocephalic and atraumatic  Eyes: PERRL, EOMI, conjunctiva normal, sclera non icteric  ENT:  Oropharynx clear, handling secretions  Neck: Supple, full ROM, no stridor  Respiratory: Lungs clear to auscultation bilaterally, no wheezes, rales, or rhonchi. Not in respiratory distress  Cardiovascular:  Regular rate. Regular rhythm. 2+ distal pulses. Equal extremity pulses. Chest: No chest wall tenderness  GI:  Abdomen Soft, tenderness in the left lower inguinal region, hernia palpated, Non distended. No rebound, guarding, or rigidity. Musculoskeletal: Moves all extremities x 4. Warm and well perfused, no cyanosis, no edema. Capillary refill <3 seconds  Integument: skin warm and dry. No rashes.    Neurologic: GCS 15, no focal deficits, symmetric strength 5/5 in the upper and lower extremities Radiologist below, if available at the time of this note:    CT ABDOMEN PELVIS W IV CONTRAST Additional Contrast? None   Final Result   Moderate colonic stool burden progressing through the distal colonic segments   and rectum with left direct inguinal hernia containing nondilated   rectosigmoid colon with associated fluid. Right direct inguinal hernia   without associated bowel demonstrating fluid. Left inguinal hernia contains   fluid and may demonstrate components of incarceration given adjacent   pericolonic stranding somewhat indeterminate without dilated segment. Bilateral nonobstructing nephrolithiasis without overt hydronephrosis      Hepatic steatosis         XR CHEST PORTABLE   Final Result   No acute process. Mild cardiomegaly. XR CHEST PORTABLE    Result Date: 3/27/2023  EXAMINATION: ONE XRAY VIEW OF THE CHEST 3/27/2023 4:41 pm COMPARISON: None. HISTORY: ORDERING SYSTEM PROVIDED HISTORY: Shortness of breath TECHNOLOGIST PROVIDED HISTORY: Reason for exam:->Shortness of breath FINDINGS: The lungs are without acute focal process. There is no effusion or pneumothorax. The cardiomediastinal silhouette is without acute process. The osseous structures are without acute process. Linear atelectasis in the left mid lung. No acute process. Mild cardiomegaly. No results found. PROCEDURES   Unless otherwise noted below, none    PAST MEDICAL HISTORY/Chronic Conditions Affecting Care      has a past medical history of A-fib ([address] CAD (coronary artery disease) (10/27/2011), Carotid stenosis, right (5/6/2021), Chronic venous insufficiency (3/2/2017), COVID-19 (01/2021), Decreased pulses in feet, Diabetes mellitus ([address] DM (diabetes mellitus), type 2 ([address] (10/27/2011), ED (erectile dysfunction), Gout (10/31/2011), Heart attack ([address] HTN (hypertension) (10/27/2011), Hyperlipidemia (6/7/2012), Hypertension, Lateral myocardial infarction ([address] (10/08), and Leg swelling (3/2/2017).

## 2023-03-28 ENCOUNTER — OFFICE VISIT (OUTPATIENT)
Dept: CARDIOLOGY CLINIC | Age: 73
End: 2023-03-28
Payer: MEDICARE

## 2023-03-28 VITALS
RESPIRATION RATE: 16 BRPM | SYSTOLIC BLOOD PRESSURE: 125 MMHG | OXYGEN SATURATION: 98 % | HEART RATE: 77 BPM | DIASTOLIC BLOOD PRESSURE: 66 MMHG | HEIGHT: 66 IN | BODY MASS INDEX: 33.27 KG/M2 | WEIGHT: 207 LBS

## 2023-03-28 DIAGNOSIS — I25.10 ATHEROSCLEROSIS OF NATIVE CORONARY ARTERY OF NATIVE HEART WITHOUT ANGINA PECTORIS: Primary | ICD-10-CM

## 2023-03-28 DIAGNOSIS — K40.90 INGUINAL HERNIA WITHOUT OBSTRUCTION OR GANGRENE, RECURRENCE NOT SPECIFIED, UNSPECIFIED LATERALITY: ICD-10-CM

## 2023-03-28 DIAGNOSIS — I48.19 PERSISTENT ATRIAL FIBRILLATION (HCC): ICD-10-CM

## 2023-03-28 DIAGNOSIS — Z01.810 PREOP CARDIOVASCULAR EXAM: ICD-10-CM

## 2023-03-28 LAB
EKG ATRIAL RATE: 74 BPM
EKG ATRIAL RATE: 77 BPM
EKG P AXIS: 82 DEGREES
EKG P AXIS: 85 DEGREES
EKG P-R INTERVAL: 178 MS
EKG P-R INTERVAL: 196 MS
EKG Q-T INTERVAL: 446 MS
EKG Q-T INTERVAL: 452 MS
EKG QRS DURATION: 128 MS
EKG QRS DURATION: 128 MS
EKG QTC CALCULATION (BAZETT): 488 MS
EKG QTC CALCULATION (BAZETT): 501 MS
EKG R AXIS: -66 DEGREES
EKG R AXIS: -93 DEGREES
EKG T AXIS: 43 DEGREES
EKG T AXIS: 77 DEGREES
EKG VENTRICULAR RATE: 72 BPM
EKG VENTRICULAR RATE: 74 BPM

## 2023-03-28 PROCEDURE — 1123F ACP DISCUSS/DSCN MKR DOCD: CPT | Performed by: INTERNAL MEDICINE

## 2023-03-28 PROCEDURE — 3078F DIAST BP <80 MM HG: CPT | Performed by: INTERNAL MEDICINE

## 2023-03-28 PROCEDURE — 93010 ELECTROCARDIOGRAM REPORT: CPT | Performed by: INTERNAL MEDICINE

## 2023-03-28 PROCEDURE — 93000 ELECTROCARDIOGRAM COMPLETE: CPT | Performed by: INTERNAL MEDICINE

## 2023-03-28 PROCEDURE — 99215 OFFICE O/P EST HI 40 MIN: CPT | Performed by: INTERNAL MEDICINE

## 2023-03-28 PROCEDURE — 3074F SYST BP LT 130 MM HG: CPT | Performed by: INTERNAL MEDICINE

## 2023-03-28 RX ORDER — AMIODARONE HYDROCHLORIDE 400 MG/1
400 TABLET ORAL DAILY
Qty: 30 TABLET | Refills: 5 | Status: SHIPPED | OUTPATIENT
Start: 2023-03-28

## 2023-03-28 NOTE — PROGRESS NOTES
irregular rhythm, S1 normal, S2 normal and intact distal pulses. PMI is not displaced. Exam reveals no gallop. No murmur heard. Pulmonary/Chest: Effort normal and breath sounds normal. No respiratory distress. Abdominal: Soft. Normal appearance and bowel sounds are normal.   Musculoskeletal: He exhibits trace bilateral  edema   Neurological: He is alert and oriented to person, place, and time. Gait normal.   Skin: Skin is warm and dry. Psychiatric: He has a normal mood and affect. His behavior is normal. Thought content normal.     EKG:  atrial flutter with AVB, RBBB, LAFB      ASSESSMENT & PLAN:    Patient Active Problem List   Diagnoses    DM (diabetes mellitus), type 2    HTN (hypertension):     Coronary atherosclerosis of native coronary artery: s/p lateral MI 2008 due to circumflex occlusion treated conservatively due to completed, uncomplicated infarction. Now with two vessel ACB. EF 45-50% on pre-op cath   * Peripheral edema: on torsemide with stable renal function               * Hyperlipidemia: intolerant to multiple statin agents- (crestor and atorvastatin) only taking  pravastatin 20 mg intermittently. Stressed importance of compliance. If cannot tolerate will need Repatha    Atrial fibrillation : s/p MAZE and LA exclusion. Recurrent AF documented on first ekg one week post discharge treated with amiodarone per EP for a time until converted. Now back in atrial flutter. Will start amiodarone 400 mg qd, stop metoprolol, arrange OP DCCV. If successful, would wait one month after before stopping NOAC for hernia surgery.

## 2023-03-28 NOTE — DISCHARGE INSTRUCTIONS
Please return the emergency department develop worsening symptoms or new symptoms. Cardiology: Have your cardiologist clear you for surgery at your appointment tomorrow    After cardiology appointment tomorrow please follow-up with Dr. Ewelina Pizraro as soon as possible. Call his office at the number provided. He agreed to see you tomorrow.

## 2023-03-31 PROBLEM — D68.69 SECONDARY HYPERCOAGULABLE STATE (HCC): Status: ACTIVE | Noted: 2023-03-31

## 2023-04-02 NOTE — H&P
Please see progress note Mony Villarreal of March 28, 2023 to serve as history and physical. Anticoagulation reviewed.

## 2023-04-03 ENCOUNTER — PREP FOR PROCEDURE (OUTPATIENT)
Dept: CARDIOLOGY | Age: 73
End: 2023-04-03

## 2023-04-03 RX ORDER — SODIUM CHLORIDE 0.9 % (FLUSH) 0.9 %
5-40 SYRINGE (ML) INJECTION EVERY 12 HOURS SCHEDULED
Status: CANCELLED | OUTPATIENT
Start: 2023-04-03

## 2023-04-03 RX ORDER — SODIUM CHLORIDE 9 MG/ML
INJECTION, SOLUTION INTRAVENOUS PRN
Status: CANCELLED | OUTPATIENT
Start: 2023-04-03

## 2023-04-03 RX ORDER — SODIUM CHLORIDE 0.9 % (FLUSH) 0.9 %
5-40 SYRINGE (ML) INJECTION PRN
Status: CANCELLED | OUTPATIENT
Start: 2023-04-03

## 2023-04-04 ENCOUNTER — TELEPHONE (OUTPATIENT)
Dept: CARDIOLOGY CLINIC | Age: 73
End: 2023-04-04

## 2023-04-04 NOTE — PROGRESS NOTES
Pt scheduled for DCCV 4/5/23. Called for PAT assessment/instruction, pt stated he does not want to have procedure tomorrow, wants 2nd opinion. Patient was encouraged to notify Dr. Jacki De La Cruz' office. States he will and will also call his PCP to notify. Dr. Jacki De La Cruz' office notified.

## 2023-04-04 NOTE — TELEPHONE ENCOUNTER
This is a Dr. Mary Paz patient. Patient was transferred to me and left message on my voicemail stating he did not authorize this procedure and that he will not be there. He states he is getting a second opinion from his PCP.

## 2023-04-04 NOTE — TELEPHONE ENCOUNTER
Preservice called stating that patient does not want to proceed with a cardioversion and he is going to get a second opinion.

## 2023-04-05 ENCOUNTER — HOSPITAL ENCOUNTER (OUTPATIENT)
Dept: INPATIENT UNIT | Age: 73
Setting detail: OUTPATIENT SURGERY
Discharge: HOME | End: 2023-04-05

## 2023-04-17 ENCOUNTER — APPOINTMENT (OUTPATIENT)
Dept: CT IMAGING | Age: 73
End: 2023-04-17
Payer: MEDICARE

## 2023-04-17 ENCOUNTER — HOSPITAL ENCOUNTER (EMERGENCY)
Age: 73
Discharge: HOME | End: 2023-04-17
Attending: EMERGENCY MEDICINE
Payer: MEDICARE

## 2023-04-17 VITALS
TEMPERATURE: 98.2 F | DIASTOLIC BLOOD PRESSURE: 80 MMHG | WEIGHT: 197 LBS | BODY MASS INDEX: 30.92 KG/M2 | HEIGHT: 67 IN | SYSTOLIC BLOOD PRESSURE: 114 MMHG | OXYGEN SATURATION: 100 % | HEART RATE: 78 BPM | RESPIRATION RATE: 16 BRPM

## 2023-04-17 DIAGNOSIS — K40.90 HERNIA, INGUINAL, LEFT: Primary | ICD-10-CM

## 2023-04-17 LAB
ALBUMIN SERPL-MCNC: 4.1 G/DL (ref 3.5–5.2)
ALP SERPL-CCNC: 73 U/L (ref 40–129)
ALT SERPL-CCNC: 22 U/L (ref 0–40)
ANION GAP SERPL CALCULATED.3IONS-SCNC: 14 MMOL/L (ref 7–16)
AST SERPL-CCNC: 39 U/L (ref 0–39)
BACTERIA URNS QL MICRO: ABNORMAL /HPF
BASOPHILS # BLD: 0.03 E9/L (ref 0–0.2)
BASOPHILS NFR BLD: 0.5 % (ref 0–2)
BILIRUB SERPL-MCNC: 1.7 MG/DL (ref 0–1.2)
BILIRUB UR QL STRIP: NEGATIVE
BUN SERPL-MCNC: 15 MG/DL (ref 6–23)
CALCIUM SERPL-MCNC: 9.7 MG/DL (ref 8.6–10.2)
CHLORIDE SERPL-SCNC: 101 MMOL/L (ref 98–107)
CLARITY UR: CLEAR
CO2 SERPL-SCNC: 21 MMOL/L (ref 22–29)
COLOR UR: YELLOW
CREAT SERPL-MCNC: 1 MG/DL (ref 0.7–1.2)
EOSINOPHIL # BLD: 0.04 E9/L (ref 0.05–0.5)
EOSINOPHIL NFR BLD: 0.6 % (ref 0–6)
ERYTHROCYTE [DISTWIDTH] IN BLOOD BY AUTOMATED COUNT: 13.4 FL (ref 11.5–15)
GLUCOSE SERPL-MCNC: 183 MG/DL (ref 74–99)
GLUCOSE UR STRIP-MCNC: NEGATIVE MG/DL
HCT VFR BLD AUTO: 42.2 % (ref 37–54)
HGB BLD-MCNC: 14.7 G/DL (ref 12.5–16.5)
HGB UR QL STRIP: ABNORMAL
IMM GRANULOCYTES # BLD: 0.01 E9/L
IMM GRANULOCYTES NFR BLD: 0.2 % (ref 0–5)
KETONES UR STRIP-MCNC: 15 MG/DL
LACTATE BLDV-SCNC: 1.5 MMOL/L (ref 0.5–2.2)
LEUKOCYTE ESTERASE UR QL STRIP: NEGATIVE
LIPASE: 26 U/L (ref 13–60)
LYMPHOCYTES # BLD: 0.89 E9/L (ref 1.5–4)
LYMPHOCYTES NFR BLD: 13.8 % (ref 20–42)
MCH RBC QN AUTO: 33.4 PG (ref 26–35)
MCHC RBC AUTO-ENTMCNC: 34.8 % (ref 32–34.5)
MCV RBC AUTO: 95.9 FL (ref 80–99.9)
MONOCYTES # BLD: 0.43 E9/L (ref 0.1–0.95)
MONOCYTES NFR BLD: 6.7 % (ref 2–12)
NEUTROPHILS # BLD: 5.06 E9/L (ref 1.8–7.3)
NEUTS SEG NFR BLD: 78.2 % (ref 43–80)
NITRITE UR QL STRIP: NEGATIVE
PH UR STRIP: 6.5 [PH] (ref 5–9)
PLATELET # BLD AUTO: 151 E9/L (ref 130–450)
PMV BLD AUTO: 10.1 FL (ref 7–12)
POTASSIUM SERPL-SCNC: 5.2 MMOL/L (ref 3.5–5)
PROT SERPL-MCNC: 6.8 G/DL (ref 6.4–8.3)
PROT UR STRIP-MCNC: NEGATIVE MG/DL
RBC # BLD AUTO: 4.4 E12/L (ref 3.8–5.8)
RBC #/AREA URNS HPF: ABNORMAL /HPF (ref 0–2)
SODIUM SERPL-SCNC: 136 MMOL/L (ref 132–146)
SP GR UR STRIP: 1.01 (ref 1–1.03)
UROBILINOGEN UR STRIP-ACNC: 0.2 E.U./DL
WBC # BLD: 6.5 E9/L (ref 4.5–11.5)
WBC #/AREA URNS HPF: ABNORMAL /HPF (ref 0–5)

## 2023-04-17 PROCEDURE — 83605 ASSAY OF LACTIC ACID: CPT

## 2023-04-17 PROCEDURE — 2500000003 HC RX 250 WO HCPCS: Performed by: EMERGENCY MEDICINE

## 2023-04-17 PROCEDURE — 85025 COMPLETE CBC W/AUTO DIFF WBC: CPT

## 2023-04-17 PROCEDURE — 81001 URINALYSIS AUTO W/SCOPE: CPT

## 2023-04-17 PROCEDURE — 96374 THER/PROPH/DIAG INJ IV PUSH: CPT

## 2023-04-17 PROCEDURE — A4216 STERILE WATER/SALINE, 10 ML: HCPCS | Performed by: EMERGENCY MEDICINE

## 2023-04-17 PROCEDURE — 2580000003 HC RX 258: Performed by: EMERGENCY MEDICINE

## 2023-04-17 PROCEDURE — 99285 EMERGENCY DEPT VISIT HI MDM: CPT

## 2023-04-17 PROCEDURE — 96375 TX/PRO/DX INJ NEW DRUG ADDON: CPT

## 2023-04-17 PROCEDURE — 6360000004 HC RX CONTRAST MEDICATION: Performed by: RADIOLOGY

## 2023-04-17 PROCEDURE — 6360000002 HC RX W HCPCS: Performed by: EMERGENCY MEDICINE

## 2023-04-17 PROCEDURE — 83690 ASSAY OF LIPASE: CPT

## 2023-04-17 PROCEDURE — 74177 CT ABD & PELVIS W/CONTRAST: CPT

## 2023-04-17 PROCEDURE — 80053 COMPREHEN METABOLIC PANEL: CPT

## 2023-04-17 RX ORDER — ONDANSETRON 2 MG/ML
4 INJECTION INTRAMUSCULAR; INTRAVENOUS ONCE
Status: COMPLETED | OUTPATIENT
Start: 2023-04-17 | End: 2023-04-17

## 2023-04-17 RX ORDER — SENNA PLUS 8.6 MG/1
1 TABLET ORAL 2 TIMES DAILY
Qty: 60 TABLET | Refills: 11 | Status: SHIPPED | OUTPATIENT
Start: 2023-04-17 | End: 2024-04-16

## 2023-04-17 RX ORDER — OXYCODONE HYDROCHLORIDE AND ACETAMINOPHEN 5; 325 MG/1; MG/1
1 TABLET ORAL EVERY 6 HOURS PRN
Qty: 12 TABLET | Refills: 0 | Status: SHIPPED | OUTPATIENT
Start: 2023-04-17 | End: 2023-04-20

## 2023-04-17 RX ORDER — 0.9 % SODIUM CHLORIDE 0.9 %
1000 INTRAVENOUS SOLUTION INTRAVENOUS ONCE
Status: COMPLETED | OUTPATIENT
Start: 2023-04-17 | End: 2023-04-17

## 2023-04-17 RX ADMIN — FAMOTIDINE 20 MG: 10 INJECTION, SOLUTION INTRAVENOUS at 10:10

## 2023-04-17 RX ADMIN — ONDANSETRON 4 MG: 2 INJECTION INTRAMUSCULAR; INTRAVENOUS at 10:12

## 2023-04-17 RX ADMIN — SODIUM CHLORIDE 1000 ML: 9 INJECTION, SOLUTION INTRAVENOUS at 10:05

## 2023-04-17 RX ADMIN — IOPAMIDOL 75 ML: 755 INJECTION, SOLUTION INTRAVENOUS at 12:01

## 2023-04-17 ASSESSMENT — ENCOUNTER SYMPTOMS
BACK PAIN: 0
SHORTNESS OF BREATH: 0
ABDOMINAL PAIN: 1
EYE REDNESS: 0
COUGH: 0
SINUS PAIN: 0
RHINORRHEA: 0

## 2023-04-17 ASSESSMENT — PAIN - FUNCTIONAL ASSESSMENT: PAIN_FUNCTIONAL_ASSESSMENT: 0-10

## 2023-04-17 ASSESSMENT — PAIN SCALES - GENERAL: PAINLEVEL_OUTOF10: 10

## 2023-04-17 ASSESSMENT — PAIN DESCRIPTION - LOCATION: LOCATION: ABDOMEN

## 2023-04-17 ASSESSMENT — PAIN DESCRIPTION - DESCRIPTORS: DESCRIPTORS: CRAMPING

## 2023-04-17 NOTE — CONSULTS
ureterolithiasis or hydronephrosis. GI/Bowel: There is herniation of the proximal sigmoid colon into the left inguinal canal.  No gross bowel wall thickening or obstruction is seen. Moderate fecal retention is seen in the colon. Normal appendix. Pelvis: Bilateral inguinal hernias are noted. There is herniation of proximal sigmoid colon on the left, as described above. Fluid is seen in the inguinal canals bilaterally. Peritoneum/Retroperitoneum: Moderate calcified atherosclerosis is seen in the aorta. No aneurysm. No lymphadenopathy. No ascites. No free air. Bones/Soft Tissues: The visualized bones are intact without fracture or focal lesion. 1. Bilateral inguinal hernias, with proximal sigmoid colon within the left inguinal hernia. Hydroceles in the bilateral inguinal canals. 2. Bilateral nephrolithiasis. No hydronephrosis.  RECOMMENDATIONS: Unavailable         ASSESSMENT:  74 y.o. male with non-obstructing, chronic left inguinal hernia     PLAN:  - hernia reducible at bedside without signs of obstruction   - follow up with Dr. Trent Johns in her office this week   - bowel regimen and PRN pain medication sent to his pharmacy     DW Dr. Trent Johns     Electronically signed by Katerina Brown MD on 4/17/23 at 4:14 PM EDT

## 2023-04-24 ENCOUNTER — OFFICE VISIT (OUTPATIENT)
Dept: SURGERY | Age: 73
End: 2023-04-24
Payer: MEDICARE

## 2023-04-24 VITALS
WEIGHT: 203 LBS | HEIGHT: 66 IN | DIASTOLIC BLOOD PRESSURE: 61 MMHG | RESPIRATION RATE: 16 BRPM | HEART RATE: 67 BPM | BODY MASS INDEX: 32.62 KG/M2 | SYSTOLIC BLOOD PRESSURE: 121 MMHG

## 2023-04-24 DIAGNOSIS — I48.19 PERSISTENT ATRIAL FIBRILLATION (HCC): ICD-10-CM

## 2023-04-24 DIAGNOSIS — K40.90 SCROTAL HERNIA: Primary | ICD-10-CM

## 2023-04-24 DIAGNOSIS — E66.09 CLASS 1 OBESITY DUE TO EXCESS CALORIES WITH SERIOUS COMORBIDITY AND BODY MASS INDEX (BMI) OF 31.0 TO 31.9 IN ADULT: ICD-10-CM

## 2023-04-24 PROCEDURE — 3078F DIAST BP <80 MM HG: CPT | Performed by: SURGERY

## 2023-04-24 PROCEDURE — 1123F ACP DISCUSS/DSCN MKR DOCD: CPT | Performed by: SURGERY

## 2023-04-24 PROCEDURE — 3074F SYST BP LT 130 MM HG: CPT | Performed by: SURGERY

## 2023-04-24 PROCEDURE — 99204 OFFICE O/P NEW MOD 45 MIN: CPT | Performed by: SURGERY

## 2023-04-24 RX ORDER — OXYCODONE HYDROCHLORIDE AND ACETAMINOPHEN 5; 325 MG/1; MG/1
1 TABLET ORAL EVERY 4 HOURS PRN
COMMUNITY

## 2023-04-24 NOTE — PROGRESS NOTES
History and Physical - General Surgery    Patient's Name/Date of Birth: Cinthia Wesley / 1950    Date: 4/24/2023    PCP: Tariq Vergara MD    Referring Physician:   Lori Kirkpatrick, [place]  [phone]      CHIEF COMPLAINT:    Chief Complaint   Patient presents with    New Patient    Inguinal Hernia         HISTORY OF PRESENT ILLNESS:    Cinthia Wesley is an 74 y.o. male who presents with a large left scrotal hernia. He said he has been in the ER a few times with constipation. It is reducible. He has had testicular surgery on the right for an undescended testicle which was not successful. He said he also has cardiac issues and is seeing a cardiologist for a possible cardioversion soon. Past Medical History:   Past Medical History:   Diagnosis Date    A-fib [facility])     for cardioversion 3-12-21     CAD (coronary artery disease) 10/27/2011    Dr. Prasanth Salinas     Carotid stenosis, right 5/6/2021    Chronic venous insufficiency 3/2/2017    COVID-19 01/2021    resolved as of 3-3-21     Decreased pulses in feet     Diabetes mellitus ([address]     DM (diabetes mellitus), type 2 ([address] 10/27/2011    ED (erectile dysfunction)     Gout 10/31/2011    Heart attack ([address]     HTN (hypertension) 10/27/2011    Hyperlipidemia 6/7/2012    Hypertension     Lateral myocardial infarction ([address] 10/08    due to circumflex occlusion    Leg swelling 3/2/2017        Past Surgical History:   Past Surgical History:   Procedure Laterality Date    CARDIAC CATHETERIZATION      CARDIAC CATHETERIZATION  06/16/2021    DR. CANDELARIA [facility] EF 45% CTS CONSULT    CARDIOVASCULAR STRESS TEST  12/14/2009    neither fixed nor reversible perfusion defect; LVEF 50%    CARDIOVERSION      CATARACT REMOVAL Left     CHOLECYSTECTOMY  1999    CORONARY ARTERY BYPASS GRAFT Left 07/09/2021    CABG CORONARY ARTERY BYPASS, MAZE, LEFT ATRIAL APPENDAGE OCCLUSION performed by Sukhwinder Messina DO at [facility]  @ age 13    KNEE SURGERY      left-ligament torn

## 2023-04-25 ENCOUNTER — TELEPHONE (OUTPATIENT)
Dept: ADMINISTRATIVE | Age: 73
End: 2023-04-25

## 2023-04-25 DIAGNOSIS — Z01.818 PREOPERATIVE CLEARANCE: ICD-10-CM

## 2023-04-25 DIAGNOSIS — K40.00 BILATERAL INGUINAL HERNIA WITH OBSTRUCTION AND WITHOUT GANGRENE, RECURRENCE NOT SPECIFIED: Primary | ICD-10-CM

## 2023-04-25 NOTE — TELEPHONE ENCOUNTER
There is a referral in the workque per Dr. Alexa Moore dx: Preop/hernia. Last seen in [address] by Dr. Mary Paz on: 3/28/23. Advise please.

## 2023-04-26 RX ORDER — SODIUM CHLORIDE 0.9 % (FLUSH) 0.9 %
5-40 SYRINGE (ML) INJECTION EVERY 12 HOURS SCHEDULED
OUTPATIENT
Start: 2023-04-26

## 2023-04-26 RX ORDER — SODIUM CHLORIDE 9 MG/ML
INJECTION, SOLUTION INTRAVENOUS PRN
OUTPATIENT
Start: 2023-04-26

## 2023-04-26 RX ORDER — SODIUM CHLORIDE 0.9 % (FLUSH) 0.9 %
5-40 SYRINGE (ML) INJECTION PRN
OUTPATIENT
Start: 2023-04-26

## 2023-05-03 ENCOUNTER — HOSPITAL ENCOUNTER (OUTPATIENT)
Dept: INPATIENT UNIT | Age: 73
Setting detail: OUTPATIENT SURGERY
Discharge: HOME | End: 2023-05-03

## 2023-05-03 LAB
EKG ATRIAL RATE: 65 BPM
EKG P AXIS: 87 DEGREES
EKG P-R INTERVAL: 282 MS
EKG Q-T INTERVAL: 480 MS
EKG QRS DURATION: 128 MS
EKG QTC CALCULATION (BAZETT): 499 MS
EKG R AXIS: -60 DEGREES
EKG T AXIS: 74 DEGREES
EKG VENTRICULAR RATE: 65 BPM

## 2023-05-24 DIAGNOSIS — E11.65 TYPE 2 DIABETES MELLITUS WITH HYPERGLYCEMIA, WITHOUT LONG-TERM CURRENT USE OF INSULIN (HCC): ICD-10-CM

## 2023-05-24 LAB — HBA1C MFR BLD: 5.9 % (ref 4–5.6)

## 2023-05-25 LAB
ALBUMIN SERPL-MCNC: 4 G/DL (ref 3.5–5.2)
ALP SERPL-CCNC: 88 U/L (ref 40–129)
ALT SERPL-CCNC: 25 U/L (ref 0–40)
ANION GAP SERPL CALCULATED.3IONS-SCNC: 14 MMOL/L (ref 7–16)
AST SERPL-CCNC: 32 U/L (ref 0–39)
BILIRUB SERPL-MCNC: 1 MG/DL (ref 0–1.2)
BUN SERPL-MCNC: 24 MG/DL (ref 6–23)
CALCIUM SERPL-MCNC: 9.1 MG/DL (ref 8.6–10.2)
CHLORIDE SERPL-SCNC: 109 MMOL/L (ref 98–107)
CHOLESTEROL, TOTAL: 138 MG/DL (ref 0–199)
CO2 SERPL-SCNC: 21 MMOL/L (ref 22–29)
CREAT SERPL-MCNC: 1.4 MG/DL (ref 0.7–1.2)
GLUCOSE SERPL-MCNC: 127 MG/DL (ref 74–99)
HDLC SERPL-MCNC: 52 MG/DL
LDLC SERPL CALC-MCNC: 78 MG/DL (ref 0–99)
POTASSIUM SERPL-SCNC: 4.5 MMOL/L (ref 3.5–5)
PROT SERPL-MCNC: 6.6 G/DL (ref 6.4–8.3)
SODIUM SERPL-SCNC: 144 MMOL/L (ref 132–146)
TRIGL SERPL-MCNC: 41 MG/DL (ref 0–149)
VLDLC SERPL CALC-MCNC: 8 MG/DL

## 2023-06-09 ENCOUNTER — TELEPHONE (OUTPATIENT)
Dept: SURGERY | Age: 73
End: 2023-06-09

## 2023-06-09 NOTE — TELEPHONE ENCOUNTER
Left a message with dr. Michael Lee office - checking to see if patient followed throught with his tests and is cleared for hernia surgery

## 2023-06-19 ENCOUNTER — TELEPHONE (OUTPATIENT)
Dept: SURGERY | Age: 73
End: 2023-06-19

## 2023-06-19 NOTE — TELEPHONE ENCOUNTER
Prior Authorization Form:      DEMOGRAPHICS:                     Patient Name:  Ricco Rodney  Patient :  1950            Insurance:  Payor: Ang Carranza / Plan: David Gerardo ESSENTIAL/PLUS / Product Type: *No Product type* /   Insurance ID Number:    Payer/Plan Subscr  Sex Relation Sub. Ins. ID Effective Group Num   1.  BCBS MEDICAREJeannette Graven 1950 Male Self [id number]                                    [id number]         DIAGNOSIS & PROCEDURE:                       Procedure/Operation: LAP ROBOTIC BILATERIAL INGUINAL HERNIA           CPT Code: 45831    Diagnosis:  BLATERIAL INGUINAL HERNIA    ICD10 Code: K4.90    Location:  [facility]    Surgeon:  Jean-Claude Murillo    SCHEDULING INFORMATION:                          Date: 2023     Time: 9:51              Anesthesia:  General                                                       Status:  Outpatient        Special Comments:         Electronically signed by Tali Porras MA on 2023 at 9:20 AM

## 2023-06-26 PROBLEM — N18.30 CHRONIC RENAL DISEASE, STAGE III (HCC): Status: ACTIVE | Noted: 2023-06-26

## 2023-06-27 ENCOUNTER — ANESTHESIA EVENT (OUTPATIENT)
Dept: OPERATING ROOM | Age: 73
End: 2023-06-27
Payer: MEDICARE

## 2023-06-28 ENCOUNTER — HOSPITAL ENCOUNTER (OUTPATIENT)
Age: 73
Setting detail: OUTPATIENT SURGERY
Discharge: HOME | End: 2023-06-28
Attending: SURGERY | Admitting: SURGERY
Payer: MEDICARE

## 2023-06-28 ENCOUNTER — ANESTHESIA (OUTPATIENT)
Dept: OPERATING ROOM | Age: 73
End: 2023-06-28
Payer: MEDICARE

## 2023-06-28 VITALS
BODY MASS INDEX: 30.61 KG/M2 | SYSTOLIC BLOOD PRESSURE: 143 MMHG | HEART RATE: 56 BPM | TEMPERATURE: 97.4 F | DIASTOLIC BLOOD PRESSURE: 64 MMHG | WEIGHT: 195 LBS | OXYGEN SATURATION: 54 % | HEIGHT: 67 IN | RESPIRATION RATE: 18 BRPM

## 2023-06-28 DIAGNOSIS — G89.18 POSTOPERATIVE PAIN: Primary | ICD-10-CM

## 2023-06-28 PROBLEM — K40.90 INGUINAL HERNIA OF LEFT SIDE WITHOUT OBSTRUCTION OR GANGRENE: Status: ACTIVE | Noted: 2023-06-28

## 2023-06-28 LAB
ANION GAP SERPL CALCULATED.3IONS-SCNC: 8 MMOL/L (ref 7–16)
BUN SERPL-MCNC: 20 MG/DL (ref 6–23)
CALCIUM SERPL-MCNC: 9.3 MG/DL (ref 8.6–10.2)
CHLORIDE SERPL-SCNC: 101 MMOL/L (ref 98–107)
CO2 SERPL-SCNC: 26 MMOL/L (ref 22–29)
CREAT SERPL-MCNC: 1.6 MG/DL (ref 0.7–1.2)
ERYTHROCYTE [DISTWIDTH] IN BLOOD BY AUTOMATED COUNT: 14.6 FL (ref 11.5–15)
GLUCOSE SERPL-MCNC: 139 MG/DL (ref 74–99)
HCT VFR BLD AUTO: 40 % (ref 37–54)
HGB BLD-MCNC: 13.2 G/DL (ref 12.5–16.5)
MCH RBC QN AUTO: 33.9 PG (ref 26–35)
MCHC RBC AUTO-ENTMCNC: 33 % (ref 32–34.5)
MCV RBC AUTO: 102.8 FL (ref 80–99.9)
METER GLUCOSE: 166 MG/DL (ref 74–99)
PLATELET # BLD AUTO: 112 E9/L (ref 130–450)
PMV BLD AUTO: 11.1 FL (ref 7–12)
POTASSIUM SERPL-SCNC: 4.9 MMOL/L (ref 3.5–5)
RBC # BLD AUTO: 3.89 E12/L (ref 3.8–5.8)
SODIUM SERPL-SCNC: 135 MMOL/L (ref 132–146)
WBC # BLD: 4.6 E9/L (ref 4.5–11.5)

## 2023-06-28 PROCEDURE — 7100000011 HC PHASE II RECOVERY - ADDTL 15 MIN: Performed by: SURGERY

## 2023-06-28 PROCEDURE — 82962 GLUCOSE BLOOD TEST: CPT

## 2023-06-28 PROCEDURE — 6370000000 HC RX 637 (ALT 250 FOR IP)

## 2023-06-28 PROCEDURE — 2709999900 HC NON-CHARGEABLE SUPPLY: Performed by: SURGERY

## 2023-06-28 PROCEDURE — 7100000000 HC PACU RECOVERY - FIRST 15 MIN: Performed by: SURGERY

## 2023-06-28 PROCEDURE — 3700000001 HC ADD 15 MINUTES (ANESTHESIA): Performed by: SURGERY

## 2023-06-28 PROCEDURE — 6360000002 HC RX W HCPCS: Performed by: SURGERY

## 2023-06-28 PROCEDURE — 49650 LAP ING HERNIA REPAIR INIT: CPT | Performed by: SURGERY

## 2023-06-28 PROCEDURE — 3700000000 HC ANESTHESIA ATTENDED CARE: Performed by: SURGERY

## 2023-06-28 PROCEDURE — S2900 ROBOTIC SURGICAL SYSTEM: HCPCS | Performed by: SURGERY

## 2023-06-28 PROCEDURE — 2500000003 HC RX 250 WO HCPCS: Performed by: NURSE ANESTHETIST, CERTIFIED REGISTERED

## 2023-06-28 PROCEDURE — 36415 COLL VENOUS BLD VENIPUNCTURE: CPT

## 2023-06-28 PROCEDURE — 3600000019 HC SURGERY ROBOT ADDTL 15MIN: Performed by: SURGERY

## 2023-06-28 PROCEDURE — 3600000009 HC SURGERY ROBOT BASE: Performed by: SURGERY

## 2023-06-28 PROCEDURE — 2580000003 HC RX 258: Performed by: SURGERY

## 2023-06-28 PROCEDURE — 2500000003 HC RX 250 WO HCPCS

## 2023-06-28 PROCEDURE — 80048 BASIC METABOLIC PNL TOTAL CA: CPT

## 2023-06-28 PROCEDURE — 7100000001 HC PACU RECOVERY - ADDTL 15 MIN: Performed by: SURGERY

## 2023-06-28 PROCEDURE — 2580000003 HC RX 258

## 2023-06-28 PROCEDURE — 6360000002 HC RX W HCPCS: Performed by: ANESTHESIOLOGY

## 2023-06-28 PROCEDURE — 6360000002 HC RX W HCPCS

## 2023-06-28 PROCEDURE — 7100000010 HC PHASE II RECOVERY - FIRST 15 MIN: Performed by: SURGERY

## 2023-06-28 PROCEDURE — 85027 COMPLETE CBC AUTOMATED: CPT

## 2023-06-28 PROCEDURE — C1781 MESH (IMPLANTABLE): HCPCS | Performed by: SURGERY

## 2023-06-28 DEVICE — LAPAROSCOPIC SELF-FIXATING MESH POLYESTER WITH POLYLACTIC ACID GRIPS AND COLLAGEN FILM
Type: IMPLANTABLE DEVICE | Site: INGUINAL | Status: FUNCTIONAL
Brand: PROGRIP

## 2023-06-28 RX ORDER — LIDOCAINE HYDROCHLORIDE 20 MG/ML
INJECTION, SOLUTION EPIDURAL; INFILTRATION; INTRACAUDAL; PERINEURAL PRN
Status: DISCONTINUED | OUTPATIENT
Start: 2023-06-28 | End: 2023-06-28 | Stop reason: SDUPTHER

## 2023-06-28 RX ORDER — HYDROCODONE BITARTRATE AND ACETAMINOPHEN 5; 325 MG/1; MG/1
TABLET ORAL
Status: COMPLETED
Start: 2023-06-28 | End: 2023-06-28

## 2023-06-28 RX ORDER — SODIUM CHLORIDE 9 MG/ML
INJECTION, SOLUTION INTRAVENOUS PRN
Status: DISCONTINUED | OUTPATIENT
Start: 2023-06-28 | End: 2023-06-28 | Stop reason: HOSPADM

## 2023-06-28 RX ORDER — HYDROCODONE BITARTRATE AND ACETAMINOPHEN 5; 325 MG/1; MG/1
1 TABLET ORAL EVERY 4 HOURS PRN
Qty: 18 TABLET | Refills: 0 | Status: SHIPPED | OUTPATIENT
Start: 2023-06-28 | End: 2023-07-01

## 2023-06-28 RX ORDER — SODIUM CHLORIDE 0.9 % (FLUSH) 0.9 %
5-40 SYRINGE (ML) INJECTION EVERY 12 HOURS SCHEDULED
Status: DISCONTINUED | OUTPATIENT
Start: 2023-06-28 | End: 2023-06-28 | Stop reason: HOSPADM

## 2023-06-28 RX ORDER — ROCURONIUM BROMIDE 10 MG/ML
INJECTION, SOLUTION INTRAVENOUS PRN
Status: DISCONTINUED | OUTPATIENT
Start: 2023-06-28 | End: 2023-06-28 | Stop reason: SDUPTHER

## 2023-06-28 RX ORDER — HYDROCODONE BITARTRATE AND ACETAMINOPHEN 5; 325 MG/1; MG/1
1 TABLET ORAL ONCE
Status: COMPLETED | OUTPATIENT
Start: 2023-06-28 | End: 2023-06-28

## 2023-06-28 RX ORDER — ONDANSETRON 2 MG/ML
INJECTION INTRAMUSCULAR; INTRAVENOUS PRN
Status: DISCONTINUED | OUTPATIENT
Start: 2023-06-28 | End: 2023-06-28 | Stop reason: SDUPTHER

## 2023-06-28 RX ORDER — GLYCOPYRROLATE 1 MG/5 ML
SYRINGE (ML) INTRAVENOUS PRN
Status: DISCONTINUED | OUTPATIENT
Start: 2023-06-28 | End: 2023-06-28 | Stop reason: SDUPTHER

## 2023-06-28 RX ORDER — PROPOFOL 10 MG/ML
INJECTION, EMULSION INTRAVENOUS PRN
Status: DISCONTINUED | OUTPATIENT
Start: 2023-06-28 | End: 2023-06-28 | Stop reason: SDUPTHER

## 2023-06-28 RX ORDER — SODIUM CHLORIDE 0.9 % (FLUSH) 0.9 %
5-40 SYRINGE (ML) INJECTION PRN
Status: DISCONTINUED | OUTPATIENT
Start: 2023-06-28 | End: 2023-06-28 | Stop reason: HOSPADM

## 2023-06-28 RX ORDER — SODIUM CHLORIDE 9 MG/ML
INJECTION, SOLUTION INTRAVENOUS CONTINUOUS PRN
Status: DISCONTINUED | OUTPATIENT
Start: 2023-06-28 | End: 2023-06-28 | Stop reason: SDUPTHER

## 2023-06-28 RX ORDER — FENTANYL CITRATE 50 UG/ML
INJECTION, SOLUTION INTRAMUSCULAR; INTRAVENOUS PRN
Status: DISCONTINUED | OUTPATIENT
Start: 2023-06-28 | End: 2023-06-28 | Stop reason: SDUPTHER

## 2023-06-28 RX ORDER — DEXAMETHASONE SODIUM PHOSPHATE 4 MG/ML
INJECTION, SOLUTION INTRA-ARTICULAR; INTRALESIONAL; INTRAMUSCULAR; INTRAVENOUS; SOFT TISSUE PRN
Status: DISCONTINUED | OUTPATIENT
Start: 2023-06-28 | End: 2023-06-28 | Stop reason: SDUPTHER

## 2023-06-28 RX ORDER — PHENYLEPHRINE HCL IN 0.9% NACL 1 MG/10 ML
SYRINGE (ML) INTRAVENOUS PRN
Status: DISCONTINUED | OUTPATIENT
Start: 2023-06-28 | End: 2023-06-28 | Stop reason: SDUPTHER

## 2023-06-28 RX ADMIN — ROCURONIUM BROMIDE 10 MG: 10 INJECTION, SOLUTION INTRAVENOUS at 11:37

## 2023-06-28 RX ADMIN — Medication 0.2 MG: at 11:59

## 2023-06-28 RX ADMIN — HYDROCODONE BITARTRATE AND ACETAMINOPHEN 1 TABLET: 5; 325 TABLET ORAL at 14:03

## 2023-06-28 RX ADMIN — ROCURONIUM BROMIDE 40 MG: 10 INJECTION, SOLUTION INTRAVENOUS at 11:07

## 2023-06-28 RX ADMIN — PROPOFOL 120 MG: 10 INJECTION, EMULSION INTRAVENOUS at 11:07

## 2023-06-28 RX ADMIN — ONDANSETRON 4 MG: 2 INJECTION INTRAMUSCULAR; INTRAVENOUS at 12:55

## 2023-06-28 RX ADMIN — ROCURONIUM BROMIDE 10 MG: 10 INJECTION, SOLUTION INTRAVENOUS at 11:21

## 2023-06-28 RX ADMIN — Medication 300 MCG: at 12:27

## 2023-06-28 RX ADMIN — Medication 100 MCG: at 13:00

## 2023-06-28 RX ADMIN — HYDROMORPHONE HYDROCHLORIDE 0.25 MG: 0.5 INJECTION, SOLUTION INTRAMUSCULAR; INTRAVENOUS; SUBCUTANEOUS at 14:49

## 2023-06-28 RX ADMIN — Medication 100 MCG: at 12:04

## 2023-06-28 RX ADMIN — Medication 100 MCG: at 12:45

## 2023-06-28 RX ADMIN — SUGAMMADEX 177 MG: 100 INJECTION, SOLUTION INTRAVENOUS at 13:01

## 2023-06-28 RX ADMIN — ROCURONIUM BROMIDE 15 MG: 10 INJECTION, SOLUTION INTRAVENOUS at 12:05

## 2023-06-28 RX ADMIN — Medication 100 MCG: at 12:37

## 2023-06-28 RX ADMIN — DEXAMETHASONE SODIUM PHOSPHATE 10 MG: 4 INJECTION, SOLUTION INTRAMUSCULAR; INTRAVENOUS at 11:07

## 2023-06-28 RX ADMIN — HYDROMORPHONE HYDROCHLORIDE 0.25 MG: 0.5 INJECTION, SOLUTION INTRAMUSCULAR; INTRAVENOUS; SUBCUTANEOUS at 14:44

## 2023-06-28 RX ADMIN — WATER 2000 MG: 1 INJECTION INTRAMUSCULAR; INTRAVENOUS; SUBCUTANEOUS at 11:15

## 2023-06-28 RX ADMIN — SODIUM CHLORIDE: 9 INJECTION, SOLUTION INTRAVENOUS at 11:56

## 2023-06-28 RX ADMIN — Medication 100 MCG: at 11:59

## 2023-06-28 RX ADMIN — SODIUM CHLORIDE: 9 INJECTION, SOLUTION INTRAVENOUS at 10:58

## 2023-06-28 RX ADMIN — ROCURONIUM BROMIDE 10 MG: 10 INJECTION, SOLUTION INTRAVENOUS at 12:32

## 2023-06-28 RX ADMIN — LIDOCAINE HYDROCHLORIDE 40 MG: 20 INJECTION, SOLUTION EPIDURAL; INFILTRATION; INTRACAUDAL; PERINEURAL at 11:07

## 2023-06-28 RX ADMIN — Medication 0.2 MG: at 11:29

## 2023-06-28 RX ADMIN — FENTANYL CITRATE 50 MCG: 50 INJECTION, SOLUTION INTRAMUSCULAR; INTRAVENOUS at 11:07

## 2023-06-28 ASSESSMENT — PAIN SCALES - GENERAL
PAINLEVEL_OUTOF10: 2
PAINLEVEL_OUTOF10: 7
PAINLEVEL_OUTOF10: 7
PAINLEVEL_OUTOF10: 3
PAINLEVEL_OUTOF10: 7
PAINLEVEL_OUTOF10: 8
PAINLEVEL_OUTOF10: 7
PAINLEVEL_OUTOF10: 5
PAINLEVEL_OUTOF10: 8

## 2023-06-28 ASSESSMENT — PAIN DESCRIPTION - LOCATION
LOCATION: ABDOMEN
LOCATION: ABDOMEN;RIB CAGE
LOCATION: ABDOMEN;RIB CAGE
LOCATION: ABDOMEN
LOCATION: ABDOMEN;RIB CAGE
LOCATION: ABDOMEN;RIB CAGE
LOCATION: RIB CAGE;ABDOMEN
LOCATION: ABDOMEN;RIB CAGE
LOCATION: ABDOMEN

## 2023-06-28 ASSESSMENT — PAIN DESCRIPTION - PAIN TYPE
TYPE: ACUTE PAIN;SURGICAL PAIN
TYPE: SURGICAL PAIN

## 2023-06-28 ASSESSMENT — PAIN - FUNCTIONAL ASSESSMENT
PAIN_FUNCTIONAL_ASSESSMENT: PREVENTS OR INTERFERES SOME ACTIVE ACTIVITIES AND ADLS
PAIN_FUNCTIONAL_ASSESSMENT: 0-10

## 2023-06-28 ASSESSMENT — PAIN DESCRIPTION - DESCRIPTORS
DESCRIPTORS: DISCOMFORT
DESCRIPTORS: DISCOMFORT
DESCRIPTORS: DULL;ACHING
DESCRIPTORS: DISCOMFORT

## 2023-06-28 ASSESSMENT — PAIN DESCRIPTION - ORIENTATION
ORIENTATION: LOWER

## 2023-06-28 ASSESSMENT — PAIN DESCRIPTION - FREQUENCY
FREQUENCY: CONTINUOUS
FREQUENCY: INTERMITTENT
FREQUENCY: CONTINUOUS
FREQUENCY: CONTINUOUS

## 2023-07-10 ENCOUNTER — OFFICE VISIT (OUTPATIENT)
Dept: SURGERY | Age: 73
End: 2023-07-10

## 2023-07-10 VITALS
OXYGEN SATURATION: 97 % | DIASTOLIC BLOOD PRESSURE: 66 MMHG | HEART RATE: 56 BPM | WEIGHT: 187 LBS | SYSTOLIC BLOOD PRESSURE: 118 MMHG | BODY MASS INDEX: 29.35 KG/M2 | TEMPERATURE: 97.7 F | HEIGHT: 67 IN

## 2023-07-10 DIAGNOSIS — Z09 POSTOP CHECK: Primary | ICD-10-CM

## 2023-07-10 PROCEDURE — 99024 POSTOP FOLLOW-UP VISIT: CPT | Performed by: SURGERY

## 2023-09-06 RX ORDER — TORSEMIDE 20 MG/1
TABLET ORAL
Qty: 30 TABLET | Refills: 5 | Status: SHIPPED | OUTPATIENT
Start: 2023-09-06

## 2023-10-05 ENCOUNTER — APPOINTMENT (OUTPATIENT)
Dept: CT IMAGING | Age: 73
End: 2023-10-05
Payer: MEDICARE

## 2023-10-05 ENCOUNTER — APPOINTMENT (OUTPATIENT)
Dept: GENERAL RADIOLOGY | Age: 73
End: 2023-10-05
Payer: MEDICARE

## 2023-10-05 ENCOUNTER — HOSPITAL ENCOUNTER (EMERGENCY)
Age: 73
Discharge: HOME | End: 2023-10-05
Attending: EMERGENCY MEDICINE
Payer: MEDICARE

## 2023-10-05 VITALS
HEART RATE: 49 BPM | WEIGHT: 189 LBS | TEMPERATURE: 98 F | BODY MASS INDEX: 30.05 KG/M2 | RESPIRATION RATE: 14 BRPM | SYSTOLIC BLOOD PRESSURE: 125 MMHG | DIASTOLIC BLOOD PRESSURE: 78 MMHG | OXYGEN SATURATION: 99 %

## 2023-10-05 DIAGNOSIS — R00.1 BRADYCARDIA: ICD-10-CM

## 2023-10-05 DIAGNOSIS — R53.83 OTHER FATIGUE: Primary | ICD-10-CM

## 2023-10-05 LAB
ANION GAP SERPL CALCULATED.3IONS-SCNC: 13 MMOL/L (ref 7–16)
B.E.: -0.2 MMOL/L (ref -3–3)
BNP SERPL-MCNC: 5083 PG/ML (ref 0–450)
BUN SERPL-MCNC: 33 MG/DL (ref 6–23)
CALCIUM SERPL-MCNC: 9.2 MG/DL (ref 8.6–10.2)
CHLORIDE SERPL-SCNC: 102 MMOL/L (ref 98–107)
CHP ED QC CHECK: YES
CO2 SERPL-SCNC: 24 MMOL/L (ref 22–29)
COHB: 0.4 % (ref 0–1.5)
CREAT SERPL-MCNC: 1.6 MG/DL (ref 0.7–1.2)
CRITICAL: NORMAL
D DIMER: <200 NG/ML DDU (ref 0–232)
DATE ANALYZED: NORMAL
DATE OF COLLECTION: NORMAL
ERYTHROCYTE [DISTWIDTH] IN BLOOD BY AUTOMATED COUNT: 14.7 % (ref 11.5–15)
GFR SERPL CREATININE-BSD FRML MDRD: 44 ML/MIN/1.73M2
GLUCOSE BLD-MCNC: 99 MG/DL
GLUCOSE BLD-MCNC: 99 MG/DL (ref 74–99)
GLUCOSE SERPL-MCNC: 105 MG/DL (ref 74–99)
HCO3: 23.5 MMOL/L (ref 22–26)
HCT VFR BLD AUTO: 37 % (ref 37–54)
HGB BLD-MCNC: 12.5 G/DL (ref 12.5–16.5)
HHB: 4 % (ref 0–5)
LAB: NORMAL
Lab: 1801
MAGNESIUM SERPL-MCNC: 2.2 MG/DL (ref 1.6–2.6)
MCH RBC QN AUTO: 34.4 PG (ref 26–35)
MCHC RBC AUTO-ENTMCNC: 33.8 G/DL (ref 32–34.5)
MCV RBC AUTO: 101.9 FL (ref 80–99.9)
METHB: 0.3 % (ref 0–1.5)
MODE: NORMAL
O2 CONTENT: 17.3 ML/DL
O2 SATURATION: 96 % (ref 92–98.5)
O2HB: 95.3 % (ref 94–97)
OPERATOR ID: 2323
PATIENT TEMP: 37 C
PCO2: 35.3 MMHG (ref 35–45)
PH BLOOD GAS: 7.44 (ref 7.35–7.45)
PLATELET # BLD AUTO: 135 K/UL (ref 130–450)
PMV BLD AUTO: 11.2 FL (ref 7–12)
PO2: 79.9 MMHG (ref 75–100)
POTASSIUM SERPL-SCNC: 4.8 MMOL/L (ref 3.5–5)
RBC # BLD AUTO: 3.63 M/UL (ref 3.8–5.8)
SARS-COV-2 RDRP RESP QL NAA+PROBE: NOT DETECTED
SODIUM SERPL-SCNC: 139 MMOL/L (ref 132–146)
SOURCE, BLOOD GAS: NORMAL
SPECIMEN DESCRIPTION: NORMAL
THB: 12.9 G/DL (ref 11.5–16.5)
TIME ANALYZED: 1813
TROPONIN I SERPL HS-MCNC: 30 NG/L (ref 0–11)
TROPONIN I SERPL HS-MCNC: 36 NG/L (ref 0–11)
TSH SERPL DL<=0.05 MIU/L-ACNC: 2.05 UIU/ML (ref 0.27–4.2)
WBC OTHER # BLD: 5 K/UL (ref 4.5–11.5)

## 2023-10-05 PROCEDURE — 83735 ASSAY OF MAGNESIUM: CPT

## 2023-10-05 PROCEDURE — 93005 ELECTROCARDIOGRAM TRACING: CPT | Performed by: EMERGENCY MEDICINE

## 2023-10-05 PROCEDURE — 70450 CT HEAD/BRAIN W/O DYE: CPT

## 2023-10-05 PROCEDURE — 85027 COMPLETE CBC AUTOMATED: CPT

## 2023-10-05 PROCEDURE — 84443 ASSAY THYROID STIM HORMONE: CPT

## 2023-10-05 PROCEDURE — 87635 SARS-COV-2 COVID-19 AMP PRB: CPT

## 2023-10-05 PROCEDURE — 99285 EMERGENCY DEPT VISIT HI MDM: CPT

## 2023-10-05 PROCEDURE — 85379 FIBRIN DEGRADATION QUANT: CPT

## 2023-10-05 PROCEDURE — 83880 ASSAY OF NATRIURETIC PEPTIDE: CPT

## 2023-10-05 PROCEDURE — 80048 BASIC METABOLIC PNL TOTAL CA: CPT

## 2023-10-05 PROCEDURE — 71045 X-RAY EXAM CHEST 1 VIEW: CPT

## 2023-10-05 PROCEDURE — 82805 BLOOD GASES W/O2 SATURATION: CPT

## 2023-10-05 PROCEDURE — 82962 GLUCOSE BLOOD TEST: CPT

## 2023-10-05 PROCEDURE — 84484 ASSAY OF TROPONIN QUANT: CPT

## 2023-10-05 RX ORDER — SODIUM CHLORIDE 0.9 % (FLUSH) 0.9 %
10 SYRINGE (ML) INJECTION PRN
Status: DISCONTINUED | OUTPATIENT
Start: 2023-10-05 | End: 2023-10-05 | Stop reason: HOSPADM

## 2023-10-05 ASSESSMENT — LIFESTYLE VARIABLES: HOW OFTEN DO YOU HAVE A DRINK CONTAINING ALCOHOL: MONTHLY OR LESS

## 2023-10-05 ASSESSMENT — PATIENT HEALTH QUESTIONNAIRE - PHQ9
SUM OF ALL RESPONSES TO PHQ9 QUESTIONS 1 & 2: 0
2. FEELING DOWN, DEPRESSED OR HOPELESS: 0
1. LITTLE INTEREST OR PLEASURE IN DOING THINGS: 0
SUM OF ALL RESPONSES TO PHQ QUESTIONS 1-9: 0

## 2023-10-05 ASSESSMENT — ENCOUNTER SYMPTOMS
BACK PAIN: 0
DIARRHEA: 0
SORE THROAT: 0
WHEEZING: 0
ABDOMINAL PAIN: 0
VOMITING: 0
SHORTNESS OF BREATH: 0
COUGH: 0
NAUSEA: 0

## 2023-10-05 ASSESSMENT — PAIN - FUNCTIONAL ASSESSMENT
PAIN_FUNCTIONAL_ASSESSMENT: NONE - DENIES PAIN
PAIN_FUNCTIONAL_ASSESSMENT: NONE - DENIES PAIN

## 2023-10-06 ENCOUNTER — TELEPHONE (OUTPATIENT)
Dept: CARDIOLOGY CLINIC | Age: 73
End: 2023-10-06

## 2023-10-06 LAB
EKG ATRIAL RATE: 51 BPM
EKG Q-T INTERVAL: 530 MS
EKG QRS DURATION: 162 MS
EKG QTC CALCULATION (BAZETT): 502 MS
EKG R AXIS: -86 DEGREES
EKG T AXIS: 99 DEGREES
EKG VENTRICULAR RATE: 54 BPM

## 2023-10-06 PROCEDURE — 93010 ELECTROCARDIOGRAM REPORT: CPT | Performed by: INTERNAL MEDICINE

## 2023-10-06 NOTE — TELEPHONE ENCOUNTER
Patient called and stated he was in the ER yesterday and they instructed him to follow-up with you. They feel his symptoms are due to his medication that keeps his heart in rhythm and they suggested he see you for any changes you may want to make to this.

## 2023-10-10 ENCOUNTER — OFFICE VISIT (OUTPATIENT)
Dept: CARDIOLOGY CLINIC | Age: 73
End: 2023-10-10
Payer: MEDICARE

## 2023-10-10 VITALS
DIASTOLIC BLOOD PRESSURE: 56 MMHG | BODY MASS INDEX: 29.89 KG/M2 | HEART RATE: 53 BPM | RESPIRATION RATE: 12 BRPM | WEIGHT: 186 LBS | SYSTOLIC BLOOD PRESSURE: 104 MMHG | HEIGHT: 66 IN

## 2023-10-10 DIAGNOSIS — I25.10 ATHEROSCLEROSIS OF NATIVE CORONARY ARTERY OF NATIVE HEART WITHOUT ANGINA PECTORIS: ICD-10-CM

## 2023-10-10 DIAGNOSIS — I48.19 PERSISTENT ATRIAL FIBRILLATION (HCC): Primary | ICD-10-CM

## 2023-10-10 PROCEDURE — 99215 OFFICE O/P EST HI 40 MIN: CPT | Performed by: INTERNAL MEDICINE

## 2023-10-10 PROCEDURE — 93000 ELECTROCARDIOGRAM COMPLETE: CPT | Performed by: INTERNAL MEDICINE

## 2023-10-10 PROCEDURE — 3074F SYST BP LT 130 MM HG: CPT | Performed by: INTERNAL MEDICINE

## 2023-10-10 PROCEDURE — 3078F DIAST BP <80 MM HG: CPT | Performed by: INTERNAL MEDICINE

## 2023-10-10 PROCEDURE — 1123F ACP DISCUSS/DSCN MKR DOCD: CPT | Performed by: INTERNAL MEDICINE

## 2023-10-11 RX ORDER — PRAVASTATIN SODIUM 40 MG
20 TABLET ORAL DAILY
Qty: 50 TABLET | Refills: 0 | Status: SHIPPED | OUTPATIENT
Start: 2023-10-11

## 2023-10-13 NOTE — TELEPHONE ENCOUNTER
Rx signed by provider - thank you! Letter mailed to patient.     For Pharmacy Admin Tracking Only    Program: Tonie in place:  No  Recommendation Provided To: Provider: 1 via Note to Provider  Intervention Detail: Adherence Monitorin and Refill(s) Provided  Intervention Accepted By: Provider: 1  Gap Closed?: No   Time Spent (min): 10
smoker: No      Systolic Blood Pressure: 680 mmHg      Is BP treated: Yes      HDL Cholesterol: 54 mg/dL      Total Cholesterol: 157 mg/dL     PLAN  The following are interventions that have been identified:   Patient overdue refilling pravastatin and active on home medication list.   One fill, needs refill Rx  Failed prior year; see recent cardiology visit - taking intermittently, importance of compliance stressed  Rx for 1/2 tablet daily     Everett Butcher, PharmD, [address] [place] [address], toll free: [phone], option 1

## 2023-10-19 RX ORDER — AMIODARONE HYDROCHLORIDE 400 MG/1
200 TABLET ORAL DAILY
Qty: 15 TABLET | Refills: 5 | Status: SHIPPED | OUTPATIENT
Start: 2023-10-19

## 2023-12-14 ENCOUNTER — OFFICE VISIT (OUTPATIENT)
Dept: NON INVASIVE DIAGNOSTICS | Age: 73
End: 2023-12-14
Payer: MEDICARE

## 2023-12-14 VITALS
OXYGEN SATURATION: 96 % | RESPIRATION RATE: 16 BRPM | BODY MASS INDEX: 29.73 KG/M2 | DIASTOLIC BLOOD PRESSURE: 68 MMHG | SYSTOLIC BLOOD PRESSURE: 122 MMHG | WEIGHT: 185 LBS | HEIGHT: 66 IN | HEART RATE: 55 BPM

## 2023-12-14 DIAGNOSIS — N18.31 STAGE 3A CHRONIC KIDNEY DISEASE (HCC): ICD-10-CM

## 2023-12-14 DIAGNOSIS — Z79.01 ON ANTICOAGULANT THERAPY: ICD-10-CM

## 2023-12-14 DIAGNOSIS — Z79.899 ON AMIODARONE THERAPY: ICD-10-CM

## 2023-12-14 DIAGNOSIS — R94.31 ABNORMAL ELECTROCARDIOGRAM (ECG) (EKG): ICD-10-CM

## 2023-12-14 DIAGNOSIS — I48.91 ATRIAL FIBRILLATION, UNSPECIFIED TYPE (HCC): Primary | ICD-10-CM

## 2023-12-14 DIAGNOSIS — M79.89 LEG SWELLING: ICD-10-CM

## 2023-12-14 DIAGNOSIS — I25.10 ATHEROSCLEROSIS OF NATIVE CORONARY ARTERY OF NATIVE HEART WITHOUT ANGINA PECTORIS: ICD-10-CM

## 2023-12-14 PROCEDURE — 1123F ACP DISCUSS/DSCN MKR DOCD: CPT | Performed by: NURSE PRACTITIONER

## 2023-12-14 PROCEDURE — 3074F SYST BP LT 130 MM HG: CPT | Performed by: NURSE PRACTITIONER

## 2023-12-14 PROCEDURE — 99214 OFFICE O/P EST MOD 30 MIN: CPT | Performed by: NURSE PRACTITIONER

## 2023-12-14 PROCEDURE — 3078F DIAST BP <80 MM HG: CPT | Performed by: NURSE PRACTITIONER

## 2023-12-14 RX ORDER — AMIODARONE HYDROCHLORIDE 100 MG/1
100 TABLET ORAL DAILY
Qty: 30 TABLET | Refills: 2 | Status: SHIPPED | OUTPATIENT
Start: 2023-12-14

## 2023-12-14 NOTE — PATIENT INSTRUCTIONS
Will call you to schedule a cardioversion   Decrease amiodarone to 100 mg daily- prescription sent to pharmacy   Follow up after in January

## 2023-12-14 NOTE — PROGRESS NOTES
[facility] Electrophysiology  Outpatient Progress Note  Rodríguez Traylor  1950  Date of Service: 12/14/2023  PCP: Melissa Andrew MD  Cardiologist: Dr. Ethel Keita  Electrophysiologist: Dr. Talib Henriquez         Subjective: Rodríguez Traylor is seen for follow-up and management of: Atrial flutter    Last seen in the office with Dr. Talib Henriquez on 5/3/2021  Seen by Dr. Faviola Wray in office on 9/29/2021    PMH as noted below significant for persistent atrial fibrillation, atrial flutter, CAD s/p CABG, HTN, DM2, gout. He also was former alcoholic/binge drinker and has limited drinks currently. He was diagnosed with atrial fibrillation in January 2021 shortly after COVID diagnosis. He failed cardioversion at that time and was started on Multaq with repeat cardioversion restoring sinus rhythm for only a few days. He saw Dr. Talib Henriquez and stress recommended with cardiac catheterization done revealing severe triple-vessel disease. He underwent CABG x 2, maze, PORSCHE a ligation with atrial clip on 7/9/2021. He had postop atrial fibrillation and was started on amiodarone. He has continued to follow-up with Dr. Ethel Ketia since that time and has not been seen in EP since prior to CABG. He presents today for follow-up and appears to be in slow atrial flutter. He has been on amiodarone 200 mg for over 1 year. On review of EKGs he was in atrial flutter/atrial fibrillation in 11/11/2021-but EKG noted to be  Sinus rhythm on 3/28/2022 and the next EKG done March 2023 shows atrial flutter (documented as sinus rhythm) so likely ongoing since at least March 2023 if not prior to that. At that time he was seen by Dr. Ethel Keita and started on amiodarone 400 mg daily. He was scheduled for cardioversion on 5/3/2023 but cardioversion was canceled due to EKG possibly being sinus rhythm, on review of this EKG possible atrial flutter noted.   Amiodarone appears to be decreased to 200 mg daily in

## 2023-12-15 ENCOUNTER — TELEPHONE (OUTPATIENT)
Dept: NON INVASIVE DIAGNOSTICS | Age: 73
End: 2023-12-15

## 2023-12-15 ENCOUNTER — PREP FOR PROCEDURE (OUTPATIENT)
Dept: NON INVASIVE DIAGNOSTICS | Age: 73
End: 2023-12-15

## 2023-12-15 NOTE — TELEPHONE ENCOUNTER
----- Message from LINNETTE Waddell - CNP sent at 12/14/2023  9:28 AM EST -----  Narda-are we able to set him up for a cardioversion next week. He has not been seen with the EP since 2021 but has had ongoing atrial flutter for over 1 year and on amiodarone for almost 10 months. He is not super symptomatic, but does have significant swelling and some fatigue from time to time- so if needs to be after holidays, it would be reasonable.  Thank you

## 2023-12-15 NOTE — TELEPHONE ENCOUNTER
Patient scheduled with Dr Elba Martínez 12/21/23 at 9:30 am.  Patient given instructions and verbalized understanding.

## 2023-12-18 RX ORDER — SODIUM CHLORIDE 0.9 % (FLUSH) 0.9 %
5-40 SYRINGE (ML) INJECTION EVERY 12 HOURS SCHEDULED
Status: CANCELLED | OUTPATIENT
Start: 2023-12-21

## 2023-12-18 RX ORDER — SODIUM CHLORIDE 9 MG/ML
INJECTION, SOLUTION INTRAVENOUS PRN
Status: CANCELLED | OUTPATIENT
Start: 2023-12-21

## 2023-12-18 RX ORDER — SODIUM CHLORIDE 0.9 % (FLUSH) 0.9 %
5-40 SYRINGE (ML) INJECTION PRN
Status: CANCELLED | OUTPATIENT
Start: 2023-12-21

## 2023-12-22 ENCOUNTER — TELEPHONE (OUTPATIENT)
Dept: CARDIOLOGY CLINIC | Age: 73
End: 2023-12-22

## 2023-12-22 NOTE — TELEPHONE ENCOUNTER
Brooks, MD Trinidad Garcia Bridget  Give him an appt date for just AFTER he has his EP appt.        Previous Messages       ----- Message -----  From: Ghulam Mckeon MD  Sent: 12/21/2023  12:34 PM EST  To: Regis Jim DO; Modesto Guy MD  Subject: Inpatient Notes                                  Fyi    EF 35-40% on CRYSTAL today, no recent echo ef previously normal 2021.  Already has outpatient TTE ordered from EP office.    Not on GDMT, currently quite lucy back in sinus on amio    I notified family that your offices would be in touch with any further recommendations    Thanks       Left message for patient to call the office to change his appointment for after EP appt

## 2024-01-05 ENCOUNTER — TELEPHONE (OUTPATIENT)
Dept: NON INVASIVE DIAGNOSTICS | Age: 74
End: 2024-01-05

## 2024-01-05 NOTE — TELEPHONE ENCOUNTER
Spoke with patient and he verbalized understanding.  Patient given phone number for echo department izaiah and transferred to echo department to schedule.

## 2024-01-05 NOTE — TELEPHONE ENCOUNTER
----- Message from Regis Jim DO sent at 1/1/2024  9:28 PM EST -----  Regarding: echo  Please schedule for transthoracic echocardiogram.    -Regis Jim DO

## 2024-01-16 NOTE — PROGRESS NOTES
Amiodarone appears to be decreased to 200 mg daily in October.  He underwent CRYSTAL/cardioversion on 12/21/23.   Back in normal rhythm today, feels less tired.   Feels some SOBOE and tired at times, but short episodes and feels better.   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      Pertinent cardiac testing:  CRYSTAL 12/21/2023: EF 35 to 40% moderate MR moderate TR  LHC: 6/16/2021: Triple-vessel CAD, mid to basal inferior wall akinesis with EF 45 to 50%  Nuclear med stress: 5/27/2021: Fixed apical defect infero-/inferior septal defect consistent with ischemia  TTE: 3/12/2021: LV normal systolic function, mildly dilated LA.    Patient Active Problem List   Diagnosis    Type 2 diabetes mellitus with hyperglycemia, without long-term current use of insulin ([facility])    HTN (hypertension)    Atherosclerosis of native coronary artery of native heart without angina pectoris    Hyperlipidemia    BMI 28.0-28.9,adult    Atrial fibrillation ([facility])    Carotid stenosis, right    Secondary hypercoagulable state ([facility])    Chronic renal disease, stage III ([facility]) [[id number]]    Inguinal hernia of left side without obstruction or gangrene       Current Outpatient Medications   Medication Sig Dispense Refill    docusate sodium (COLACE) 100 MG capsule Take 1 capsule by mouth 2 times daily 60 capsule 3    amiodarone (PACERONE) 100 MG tablet Take 1 tablet by mouth daily 30 tablet 2    rivaroxaban (XARELTO) 20 MG TABS tablet Take 1 tablet by mouth daily (with breakfast) 90 tablet 1    metFORMIN (GLUCOPHAGE) 500 MG tablet TAKE TWO TABLETS BY MOUTH TWO TIMES A DAY WITH MEALS 120 tablet 3    pravastatin (PRAVACHOL) 40 MG tablet Take 0.5 tablets by mouth daily 50 tablet 0    torsemide (DEMADEX) 20 MG tablet TAKE ONE TABLET BY MOUTH DAILY (Patient taking differently: Takes 1/2 tab daily) 30 tablet 5    Boswellia-Glucosamine-Vit D (OSTEO BI-FLEX ONE PER DAY PO) Take 1 tablet by mouth daily      glipiZIDE (GLUCOTROL) 5 MG tablet

## 2024-01-17 ENCOUNTER — OFFICE VISIT (OUTPATIENT)
Dept: NON INVASIVE DIAGNOSTICS | Age: 74
End: 2024-01-17
Payer: MEDICARE

## 2024-01-17 VITALS
HEIGHT: 67 IN | BODY MASS INDEX: 28.25 KG/M2 | HEART RATE: 53 BPM | OXYGEN SATURATION: 96 % | WEIGHT: 180 LBS | DIASTOLIC BLOOD PRESSURE: 54 MMHG | SYSTOLIC BLOOD PRESSURE: 110 MMHG | RESPIRATION RATE: 16 BRPM

## 2024-01-17 DIAGNOSIS — N18.30 STAGE 3 CHRONIC KIDNEY DISEASE, UNSPECIFIED WHETHER STAGE 3A OR 3B CKD (HCC): ICD-10-CM

## 2024-01-17 DIAGNOSIS — Z79.01 ON ANTICOAGULANT THERAPY: ICD-10-CM

## 2024-01-17 DIAGNOSIS — Z79.899 ON AMIODARONE THERAPY: ICD-10-CM

## 2024-01-17 DIAGNOSIS — I49.5 SINUS NODE DYSFUNCTION (HCC): ICD-10-CM

## 2024-01-17 DIAGNOSIS — I48.19 PERSISTENT ATRIAL FIBRILLATION (HCC): Primary | ICD-10-CM

## 2024-01-17 PROCEDURE — 3078F DIAST BP <80 MM HG: CPT | Performed by: NURSE PRACTITIONER

## 2024-01-17 PROCEDURE — 93000 ELECTROCARDIOGRAM COMPLETE: CPT | Performed by: STUDENT IN AN ORGANIZED HEALTH CARE EDUCATION/TRAINING PROGRAM

## 2024-01-17 PROCEDURE — 1123F ACP DISCUSS/DSCN MKR DOCD: CPT | Performed by: NURSE PRACTITIONER

## 2024-01-17 PROCEDURE — 99214 OFFICE O/P EST MOD 30 MIN: CPT | Performed by: NURSE PRACTITIONER

## 2024-01-17 PROCEDURE — 3074F SYST BP LT 130 MM HG: CPT | Performed by: NURSE PRACTITIONER

## 2024-01-24 ENCOUNTER — OFFICE VISIT (OUTPATIENT)
Dept: CARDIOLOGY CLINIC | Age: 74
End: 2024-01-24
Payer: MEDICARE

## 2024-01-24 VITALS
DIASTOLIC BLOOD PRESSURE: 69 MMHG | WEIGHT: 190 LBS | HEART RATE: 54 BPM | SYSTOLIC BLOOD PRESSURE: 132 MMHG | BODY MASS INDEX: 29.82 KG/M2 | RESPIRATION RATE: 16 BRPM | HEIGHT: 67 IN

## 2024-01-24 DIAGNOSIS — I48.19 PERSISTENT ATRIAL FIBRILLATION (HCC): ICD-10-CM

## 2024-01-24 DIAGNOSIS — I25.10 ATHEROSCLEROSIS OF NATIVE CORONARY ARTERY OF NATIVE HEART WITHOUT ANGINA PECTORIS: Primary | ICD-10-CM

## 2024-01-24 DIAGNOSIS — I51.9 LEFT VENTRICULAR SYSTOLIC DYSFUNCTION: ICD-10-CM

## 2024-01-24 PROCEDURE — 3075F SYST BP GE 130 - 139MM HG: CPT | Performed by: INTERNAL MEDICINE

## 2024-01-24 PROCEDURE — 93000 ELECTROCARDIOGRAM COMPLETE: CPT | Performed by: INTERNAL MEDICINE

## 2024-01-24 PROCEDURE — 1123F ACP DISCUSS/DSCN MKR DOCD: CPT | Performed by: INTERNAL MEDICINE

## 2024-01-24 PROCEDURE — 99215 OFFICE O/P EST HI 40 MIN: CPT | Performed by: INTERNAL MEDICINE

## 2024-01-24 PROCEDURE — 3078F DIAST BP <80 MM HG: CPT | Performed by: INTERNAL MEDICINE

## 2024-01-24 NOTE — PROGRESS NOTES
Take 0.5 tablets by mouth daily 50 tablet 0    torsemide (DEMADEX) 20 MG tablet TAKE ONE TABLET BY MOUTH DAILY (Patient taking differently: Takes 1/2 tab daily) 30 tablet 5    glipiZIDE (GLUCOTROL) 5 MG tablet Take 1 tablet by mouth 4 times daily 360 tablet 0    tamsulosin (FLOMAX) 0.4 MG capsule TAKE ONE CAPSULE BY MOUTH EVERY DAY 90 capsule 1    senna (SENOKOT) 8.6 MG tablet Take 1 tablet by mouth 2 times daily 60 tablet 11    tadalafil (CIALIS) 10 MG tablet Take 1 tablet by mouth as needed for Erectile Dysfunction      zinc gluconate 50 MG tablet Take 1 tablet by mouth daily Not taking      vitamin B-12 (CYANOCOBALAMIN) 1000 MCG tablet Take 1 tablet by mouth daily Has not taken recently      vitamin D (CHOLECALCIFEROL) 1000 UNIT TABS tablet Take 400 Units by mouth daily        No current facility-administered medications for this visit.        Allergies   Allergen Reactions    Adhesive Tape Dermatitis and Other (See Comments)    Crestor [Rosuvastatin Calcium]      Muscles aches     Lipitor [Atorvastatin]      Muscle aches    Rosuvastatin      Muscles aches        Vitals:    01/24/24 0731   BP: 132/69   Pulse: 54   Resp: 16   Weight: 86.2 kg (190 lb)   Height: 1.689 m (5' 6.5\")             SUBJECTIVE: Neto Baca presents to the office today for re-evaluation of chronic cardiac diagnoses and f/u post CRYSTAL guided DCCV - I reviewed the actual echo images. Successful in restoring sinus, and is on low dose amio per EP   Does not use support stockings nor follow low salt diet  . Compliant with NOAC with no missed doses.  No SHANNAN diagnosis.             Objective:   Physical Exam   Constitutional: He is oriented to person, place, and time. He is cooperative.        Obese    Neck: No JVD present. Carotid bruit is not present.   Cardiovascular: regular rhythm, S1 normal, S2 normal and intact distal pulses.  PMI is not displaced.  Exam reveals no gallop.    No murmur heard.  Pulmonary/Chest: Effort normal and breath

## 2024-02-15 ENCOUNTER — HOSPITAL ENCOUNTER (EMERGENCY)
Age: 74
Discharge: HOME | End: 2024-02-15
Payer: MEDICARE

## 2024-02-15 ENCOUNTER — APPOINTMENT (OUTPATIENT)
Dept: CT IMAGING | Age: 74
End: 2024-02-15
Payer: MEDICARE

## 2024-02-15 VITALS
RESPIRATION RATE: 16 BRPM | OXYGEN SATURATION: 97 % | SYSTOLIC BLOOD PRESSURE: 143 MMHG | HEART RATE: 79 BPM | WEIGHT: 180 LBS | DIASTOLIC BLOOD PRESSURE: 73 MMHG | BODY MASS INDEX: 28.93 KG/M2 | TEMPERATURE: 97.3 F | HEIGHT: 66 IN

## 2024-02-15 DIAGNOSIS — R10.84 GENERALIZED ABDOMINAL PAIN: ICD-10-CM

## 2024-02-15 DIAGNOSIS — K59.00 CONSTIPATION, UNSPECIFIED CONSTIPATION TYPE: Primary | ICD-10-CM

## 2024-02-15 LAB
ALBUMIN SERPL-MCNC: 3.8 G/DL (ref 3.5–5.2)
ALP SERPL-CCNC: 142 U/L (ref 40–129)
ALT SERPL-CCNC: 24 U/L (ref 0–40)
ANION GAP SERPL CALCULATED.3IONS-SCNC: 9 MMOL/L (ref 7–16)
AST SERPL-CCNC: 36 U/L (ref 0–39)
BASOPHILS # BLD: 0.04 K/UL (ref 0–0.2)
BASOPHILS NFR BLD: 1 % (ref 0–2)
BILIRUB DIRECT SERPL-MCNC: 0.5 MG/DL (ref 0–0.3)
BILIRUB INDIRECT SERPL-MCNC: 0.8 MG/DL (ref 0–1)
BILIRUB SERPL-MCNC: 1.3 MG/DL (ref 0–1.2)
BUN SERPL-MCNC: 26 MG/DL (ref 6–23)
CALCIUM SERPL-MCNC: 9.4 MG/DL (ref 8.6–10.2)
CHLORIDE SERPL-SCNC: 103 MMOL/L (ref 98–107)
CO2 SERPL-SCNC: 22 MMOL/L (ref 22–29)
CREAT SERPL-MCNC: 1 MG/DL (ref 0.7–1.2)
EOSINOPHIL # BLD: 0.05 K/UL (ref 0.05–0.5)
EOSINOPHILS RELATIVE PERCENT: 1 % (ref 0–6)
ERYTHROCYTE [DISTWIDTH] IN BLOOD BY AUTOMATED COUNT: 14.7 % (ref 11.5–15)
GFR SERPL CREATININE-BSD FRML MDRD: >60 ML/MIN/1.73M2
GLUCOSE SERPL-MCNC: 160 MG/DL (ref 74–99)
HCT VFR BLD AUTO: 41.4 % (ref 37–54)
HGB BLD-MCNC: 13.5 G/DL (ref 12.5–16.5)
IMM GRANULOCYTES # BLD AUTO: <0.03 K/UL (ref 0–0.58)
IMM GRANULOCYTES NFR BLD: 0 % (ref 0–5)
LACTATE BLDV-SCNC: 1.3 MMOL/L (ref 0.5–2.2)
LIPASE SERPL-CCNC: 20 U/L (ref 13–60)
LYMPHOCYTES NFR BLD: 0.63 K/UL (ref 1.5–4)
LYMPHOCYTES RELATIVE PERCENT: 8 % (ref 20–42)
MCH RBC QN AUTO: 34.4 PG (ref 26–35)
MCHC RBC AUTO-ENTMCNC: 32.6 G/DL (ref 32–34.5)
MCV RBC AUTO: 105.6 FL (ref 80–99.9)
MONOCYTES NFR BLD: 0.65 K/UL (ref 0.1–0.95)
MONOCYTES NFR BLD: 9 % (ref 2–12)
NEUTROPHILS NFR BLD: 82 % (ref 43–80)
NEUTS SEG NFR BLD: 6.3 K/UL (ref 1.8–7.3)
PLATELET # BLD AUTO: 140 K/UL (ref 130–450)
PMV BLD AUTO: 11.6 FL (ref 7–12)
POTASSIUM SERPL-SCNC: 4.5 MMOL/L (ref 3.5–5)
POTASSIUM SERPL-SCNC: 5.2 MMOL/L (ref 3.5–5)
PROT SERPL-MCNC: 7.6 G/DL (ref 6.4–8.3)
RBC # BLD AUTO: 3.92 M/UL (ref 3.8–5.8)
SODIUM SERPL-SCNC: 134 MMOL/L (ref 132–146)
TROPONIN I SERPL HS-MCNC: 27 NG/L (ref 0–11)
TROPONIN I SERPL HS-MCNC: 28 NG/L (ref 0–11)
WBC OTHER # BLD: 7.7 K/UL (ref 4.5–11.5)

## 2024-02-15 PROCEDURE — 82248 BILIRUBIN DIRECT: CPT

## 2024-02-15 PROCEDURE — 84132 ASSAY OF SERUM POTASSIUM: CPT

## 2024-02-15 PROCEDURE — 74178 CT ABD&PLV WO CNTR FLWD CNTR: CPT

## 2024-02-15 PROCEDURE — 85025 COMPLETE CBC W/AUTO DIFF WBC: CPT

## 2024-02-15 PROCEDURE — 83690 ASSAY OF LIPASE: CPT

## 2024-02-15 PROCEDURE — 99285 EMERGENCY DEPT VISIT HI MDM: CPT

## 2024-02-15 PROCEDURE — 80053 COMPREHEN METABOLIC PANEL: CPT

## 2024-02-15 PROCEDURE — 84484 ASSAY OF TROPONIN QUANT: CPT

## 2024-02-15 PROCEDURE — 6360000004 HC RX CONTRAST MEDICATION: Performed by: RADIOLOGY

## 2024-02-15 PROCEDURE — 83605 ASSAY OF LACTIC ACID: CPT

## 2024-02-15 RX ORDER — KETOROLAC TROMETHAMINE 30 MG/ML
15 INJECTION, SOLUTION INTRAMUSCULAR; INTRAVENOUS ONCE
Status: DISCONTINUED | OUTPATIENT
Start: 2024-02-15 | End: 2024-02-15

## 2024-02-15 RX ORDER — AMOXICILLIN 250 MG
1 CAPSULE ORAL DAILY PRN
Qty: 7 TABLET | Refills: 0 | Status: SHIPPED | OUTPATIENT
Start: 2024-02-15 | End: 2024-02-22

## 2024-02-15 RX ORDER — POLYETHYLENE GLYCOL 3350 17 G/17G
17 POWDER, FOR SOLUTION ORAL DAILY PRN
Qty: 30 PACKET | Refills: 0 | Status: SHIPPED | OUTPATIENT
Start: 2024-02-15 | End: 2024-03-16

## 2024-02-15 RX ADMIN — IOPAMIDOL 75 ML: 755 INJECTION, SOLUTION INTRAVENOUS at 15:06

## 2024-02-16 NOTE — DISCHARGE INSTRUCTIONS
CT ABDOMEN PELVIS W WO CONTRAST Additional Contrast? None (Final result)  Result time 02/15/24 15:48:44  Final result by Hernandez Carl MD (02/15/24 15:48:44)                Impression:      1.  Possible thin normal appendix extending medially from the posterior cecum  though not definitive.  No pericecal inflammatory changes noted.    2.  Cecal and right colon constipation with some possible minimal scattered  constipation elsewhere within the colon.  Otherwise nonspecific bowel gas  pattern.    3.  Bilateral 5 mm or smaller nonobstructing intrarenal calculi with no  ureteral calculi or obstructive uropathy noted.    4.  Small amount of posterior pelvic fluid of indeterminate etiology in this  male patient.    5.  Borderline to mild cardiomegaly, unchanged.    6.  Otherwise no acute pathology or significant change noted.                Your workup reveals that you are constipated.  You will be sent home with a bowel regimen.  Please increase your fluids.  Please follow-up with your family doctor.  If at any point your pain worsens return back to the emergency department

## 2024-02-18 LAB
EKG ATRIAL RATE: 53 BPM
EKG P AXIS: 90 DEGREES
EKG P-R INTERVAL: 268 MS
EKG Q-T INTERVAL: 482 MS
EKG QRS DURATION: 142 MS
EKG QTC CALCULATION (BAZETT): 452 MS
EKG R AXIS: -70 DEGREES
EKG T AXIS: 70 DEGREES
EKG VENTRICULAR RATE: 53 BPM

## 2024-02-20 ENCOUNTER — HOSPITAL ENCOUNTER (OUTPATIENT)
Dept: CARDIOLOGY | Age: 74
Discharge: HOME | End: 2024-02-22
Payer: MEDICARE

## 2024-02-20 ENCOUNTER — OFFICE VISIT (OUTPATIENT)
Dept: CARDIOLOGY CLINIC | Age: 74
End: 2024-02-20
Payer: MEDICARE

## 2024-02-20 VITALS
HEART RATE: 64 BPM | HEIGHT: 66 IN | BODY MASS INDEX: 28.96 KG/M2 | SYSTOLIC BLOOD PRESSURE: 137 MMHG | DIASTOLIC BLOOD PRESSURE: 73 MMHG | RESPIRATION RATE: 16 BRPM | WEIGHT: 180.2 LBS

## 2024-02-20 VITALS
HEIGHT: 66 IN | DIASTOLIC BLOOD PRESSURE: 70 MMHG | SYSTOLIC BLOOD PRESSURE: 140 MMHG | BODY MASS INDEX: 28.93 KG/M2 | WEIGHT: 180 LBS

## 2024-02-20 DIAGNOSIS — M79.89 LEG SWELLING: ICD-10-CM

## 2024-02-20 DIAGNOSIS — I48.91 ATRIAL FIBRILLATION, UNSPECIFIED TYPE (HCC): ICD-10-CM

## 2024-02-20 DIAGNOSIS — I25.10 ATHEROSCLEROSIS OF NATIVE CORONARY ARTERY OF NATIVE HEART WITHOUT ANGINA PECTORIS: ICD-10-CM

## 2024-02-20 DIAGNOSIS — R94.31 ABNORMAL ELECTROCARDIOGRAM (ECG) (EKG): ICD-10-CM

## 2024-02-20 DIAGNOSIS — I48.91 ATRIAL FIBRILLATION, UNSPECIFIED TYPE (HCC): Primary | ICD-10-CM

## 2024-02-20 DIAGNOSIS — I51.9 LEFT VENTRICULAR SYSTOLIC DYSFUNCTION: ICD-10-CM

## 2024-02-20 PROBLEM — D68.69 SECONDARY HYPERCOAGULABLE STATE (HCC): Status: RESOLVED | Noted: 2023-03-31 | Resolved: 2024-02-20

## 2024-02-20 LAB
ECHO AO ASC DIAM: 2.6 CM
ECHO AO ASCENDING AORTA INDEX: 1.36 CM/M2
ECHO AV AREA PEAK VELOCITY: 1.8 CM2
ECHO AV AREA VTI: 1.7 CM2
ECHO AV AREA/BSA PEAK VELOCITY: 0.9 CM2/M2
ECHO AV AREA/BSA VTI: 0.9 CM2/M2
ECHO AV CUSP MM: 1.8 CM
ECHO AV MEAN GRADIENT: 5 MMHG
ECHO AV MEAN VELOCITY: 1.1 M/S
ECHO AV PEAK GRADIENT: 9 MMHG
ECHO AV PEAK VELOCITY: 1.5 M/S
ECHO AV VELOCITY RATIO: 0.6
ECHO AV VTI: 27 CM
ECHO BSA: 1.95 M2
ECHO EST RA PRESSURE: 3 MMHG
ECHO LA DIAMETER INDEX: 2.57 CM/M2
ECHO LA DIAMETER: 4.9 CM
ECHO LA VOL A-L A2C: 91 ML (ref 18–58)
ECHO LA VOL A-L A4C: 94 ML (ref 18–58)
ECHO LA VOL MOD A2C: 87 ML (ref 18–58)
ECHO LA VOL MOD A4C: 88 ML (ref 18–58)
ECHO LA VOLUME AREA LENGTH: 98 ML
ECHO LA VOLUME INDEX A-L A2C: 48 ML/M2 (ref 16–34)
ECHO LA VOLUME INDEX A-L A4C: 49 ML/M2 (ref 16–34)
ECHO LA VOLUME INDEX AREA LENGTH: 51 ML/M2 (ref 16–34)
ECHO LA VOLUME INDEX MOD A2C: 46 ML/M2 (ref 16–34)
ECHO LA VOLUME INDEX MOD A4C: 46 ML/M2 (ref 16–34)
ECHO LV EDV A2C: 188 ML
ECHO LV EDV A4C: 172 ML
ECHO LV EDV BP: 181 ML (ref 67–155)
ECHO LV EDV INDEX A4C: 90 ML/M2
ECHO LV EDV INDEX BP: 95 ML/M2
ECHO LV EDV NDEX A2C: 98 ML/M2
ECHO LV EJECTION FRACTION A2C: 23 %
ECHO LV EJECTION FRACTION A4C: 36 %
ECHO LV EJECTION FRACTION BIPLANE: 30 % (ref 55–100)
ECHO LV ESV A2C: 144 ML
ECHO LV ESV A4C: 111 ML
ECHO LV ESV BP: 127 ML (ref 22–58)
ECHO LV ESV INDEX A2C: 75 ML/M2
ECHO LV ESV INDEX A4C: 58 ML/M2
ECHO LV ESV INDEX BP: 66 ML/M2
ECHO LV FRACTIONAL SHORTENING: 27 % (ref 28–44)
ECHO LV INTERNAL DIMENSION DIASTOLE INDEX: 3.09 CM/M2
ECHO LV INTERNAL DIMENSION DIASTOLIC: 5.9 CM (ref 4.2–5.9)
ECHO LV INTERNAL DIMENSION SYSTOLIC INDEX: 2.25 CM/M2
ECHO LV INTERNAL DIMENSION SYSTOLIC: 4.3 CM
ECHO LV ISOVOLUMETRIC RELAXATION TIME (IVRT): 86.5 MS
ECHO LV IVSD: 0.6 CM (ref 0.6–1)
ECHO LV IVSS: 1.1 CM
ECHO LV MASS 2D: 127.5 G (ref 88–224)
ECHO LV MASS INDEX 2D: 66.8 G/M2 (ref 49–115)
ECHO LV POSTERIOR WALL DIASTOLIC: 0.6 CM (ref 0.6–1)
ECHO LV POSTERIOR WALL SYSTOLIC: 1.2 CM
ECHO LV RELATIVE WALL THICKNESS RATIO: 0.2
ECHO LVOT AREA: 3.1 CM2
ECHO LVOT AV VTI INDEX: 0.55
ECHO LVOT DIAM: 2 CM
ECHO LVOT MEAN GRADIENT: 1 MMHG
ECHO LVOT PEAK GRADIENT: 3 MMHG
ECHO LVOT PEAK VELOCITY: 0.9 M/S
ECHO LVOT STROKE VOLUME INDEX: 24.5 ML/M2
ECHO LVOT SV: 46.8 ML
ECHO LVOT VTI: 14.9 CM
ECHO MV AREA PHT: 5.5 CM2
ECHO MV AREA VTI: 1.7 CM2
ECHO MV E DECELERATION TIME (DT): 114.9 MS
ECHO MV EROA PISA: 0.1 CM2
ECHO MV LVOT VTI INDEX: 1.87
ECHO MV MAX VELOCITY: 1.3 M/S
ECHO MV MEAN GRADIENT: 2 MMHG
ECHO MV MEAN VELOCITY: 0.6 M/S
ECHO MV PEAK GRADIENT: 6 MMHG
ECHO MV PRESSURE HALF TIME (PHT): 40.3 MS
ECHO MV REGURGITANT ALIASING (NYQUIST) VELOCITY: 31 CM/S
ECHO MV REGURGITANT RADIUS PISA: 0.62 CM
ECHO MV REGURGITANT VELOCITY PISA: 5 M/S
ECHO MV REGURGITANT VOLUME PISA: 24.31 ML
ECHO MV REGURGITANT VTIA: 162.4 CM
ECHO MV VTI: 27.8 CM
ECHO PV MAX VELOCITY: 0.9 M/S
ECHO PV MEAN GRADIENT: 2 MMHG
ECHO PV MEAN VELOCITY: 0.6 M/S
ECHO PV PEAK GRADIENT: 4 MMHG
ECHO PV VTI: 16.3 CM
ECHO PVEIN PEAK D VELOCITY: 1.1 M/S
ECHO PVEIN PEAK S VELOCITY: 0.3 M/S
ECHO PVEIN S/D RATIO: 0.3
ECHO RIGHT VENTRICULAR SYSTOLIC PRESSURE (RVSP): 60 MMHG
ECHO TV REGURGITANT MAX VELOCITY: 3.77 M/S
ECHO TV REGURGITANT PEAK GRADIENT: 57 MMHG

## 2024-02-20 PROCEDURE — 2580000003 HC RX 258: Performed by: INTERNAL MEDICINE

## 2024-02-20 PROCEDURE — 1123F ACP DISCUSS/DSCN MKR DOCD: CPT | Performed by: INTERNAL MEDICINE

## 2024-02-20 PROCEDURE — 99215 OFFICE O/P EST HI 40 MIN: CPT | Performed by: INTERNAL MEDICINE

## 2024-02-20 PROCEDURE — 6360000004 HC RX CONTRAST MEDICATION: Performed by: NURSE PRACTITIONER

## 2024-02-20 PROCEDURE — 3078F DIAST BP <80 MM HG: CPT | Performed by: INTERNAL MEDICINE

## 2024-02-20 PROCEDURE — C8929 TTE W OR WO FOL WCON,DOPPLER: HCPCS

## 2024-02-20 PROCEDURE — 93000 ELECTROCARDIOGRAM COMPLETE: CPT | Performed by: INTERNAL MEDICINE

## 2024-02-20 PROCEDURE — 93306 TTE W/DOPPLER COMPLETE: CPT | Performed by: INTERNAL MEDICINE

## 2024-02-20 PROCEDURE — 3075F SYST BP GE 130 - 139MM HG: CPT | Performed by: INTERNAL MEDICINE

## 2024-02-20 RX ORDER — SODIUM CHLORIDE 0.9 % (FLUSH) 0.9 %
10 SYRINGE (ML) INJECTION PRN
Status: DISCONTINUED | OUTPATIENT
Start: 2024-02-20 | End: 2024-02-23 | Stop reason: HOSPADM

## 2024-02-20 RX ADMIN — PERFLUTREN 1.5 ML: 6.52 INJECTION, SUSPENSION INTRAVENOUS at 08:00

## 2024-02-20 RX ADMIN — SODIUM CHLORIDE, PRESERVATIVE FREE 10 ML: 5 INJECTION INTRAVENOUS at 08:00

## 2024-02-20 RX ADMIN — SODIUM CHLORIDE, PRESERVATIVE FREE 10 ML: 5 INJECTION INTRAVENOUS at 08:06

## 2024-02-20 NOTE — PROGRESS NOTES
Subjective:      Patient ID: Neto Baca is a 73 y.o. male.      Chief Complaint   Patient presents with    Atrial Fibrillation    Coronary Artery Disease       Patient Active Problem List    Diagnosis Date Noted    Left ventricular systolic dysfunction 01/24/2024     Overview Note:     A. TTE 2/20/2024: EF 30%, moderate MR,       Inguinal hernia of left side without obstruction or gangrene 06/28/2023    Chronic renal disease, stage III ([facility]) [[id number]] 06/26/2023    Carotid stenosis, right 05/06/2021     Overview Note:     50% stenosis based upon the last ultrasound      Atrial fibrillation ([facility]) 01/05/2021     Overview Note:     A. CHADS-VASC = 5  B. Unsuccessful DCCV attempt 2/15/2021  C. CRYSTAL guided DCCV 3/12/2021 on Multaq  D. Maze and AtriCure at time of OHS  E. CRYSTAL guided DCCV 12/21/2023      BMI 28.0-28.9,adult 04/16/2019    Hyperlipidemia 06/07/2012     Overview Note:     Intolerant to multiple statin agents      Type 2 diabetes mellitus with hyperglycemia, without long-term current use of insulin ([facility]) 10/27/2011     Class: Chronic    HTN (hypertension) 10/27/2011     Class: Chronic    Atherosclerosis of native coronary artery of native heart without angina pectoris      Class: Chronic     Overview Note:       A. Lateral infarction 10/08 due to circumflex occlusion.  PCI not performed due to completed infarction.  B. Adenosine Myoview 12/14/09 ([facility]).  Neither fixed nor reversible perfusion defect.  LVEF 50%.    C. Cath 6/16/21 ([facility]): severe 3 vessel disease, left dominant, EF 45-50%  D.  ACB 7/9/21 ([facility]): LIMA-LAD, SVG - OM         Current Outpatient Medications   Medication Sig Dispense Refill    sacubitril-valsartan (ENTRESTO) 24-26 MG per tablet Take 1 tablet by mouth 2 times daily 60 tablet 3    senna-docusate (PERICOLACE) 8.6-50 MG per tablet Take 1 tablet by mouth daily as needed for Constipation 7 tablet 0    polyethylene glycol (MIRALAX) 17 g packet Take 1 packet by mouth daily as needed for

## 2024-02-20 NOTE — PATIENT INSTRUCTIONS
Start entresto one pill twice per day   Drop torsemide to only once per day -  1/2 pill   Office visit two weeks

## 2024-02-22 ENCOUNTER — APPOINTMENT (OUTPATIENT)
Dept: GENERAL RADIOLOGY | Age: 74
DRG: 221 | End: 2024-02-22
Payer: MEDICARE

## 2024-02-22 ENCOUNTER — APPOINTMENT (OUTPATIENT)
Dept: CT IMAGING | Age: 74
DRG: 221 | End: 2024-02-22
Payer: MEDICARE

## 2024-02-22 ENCOUNTER — ANESTHESIA EVENT (OUTPATIENT)
Dept: OPERATING ROOM | Age: 74
End: 2024-02-22
Payer: MEDICARE

## 2024-02-22 ENCOUNTER — ANESTHESIA (OUTPATIENT)
Dept: OPERATING ROOM | Age: 74
End: 2024-02-22
Payer: MEDICARE

## 2024-02-22 ENCOUNTER — HOSPITAL ENCOUNTER (INPATIENT)
Age: 74
LOS: 4 days | Discharge: HOME HEALTH | DRG: 221 | End: 2024-02-26
Attending: EMERGENCY MEDICINE | Admitting: INTERNAL MEDICINE
Payer: MEDICARE

## 2024-02-22 DIAGNOSIS — I71.8 AORTIC RUPTURE (HCC): Primary | ICD-10-CM

## 2024-02-22 LAB
ALBUMIN SERPL-MCNC: 2.8 G/DL (ref 3.5–5.2)
ALP SERPL-CCNC: 118 U/L (ref 40–129)
ALT SERPL-CCNC: 21 U/L (ref 0–40)
ANION GAP SERPL CALCULATED.3IONS-SCNC: 9 MMOL/L (ref 7–16)
AST SERPL-CCNC: 27 U/L (ref 0–39)
BASOPHILS # BLD: 0.03 K/UL (ref 0–0.2)
BASOPHILS NFR BLD: 0 % (ref 0–2)
BILIRUB DIRECT SERPL-MCNC: 1.2 MG/DL (ref 0–0.3)
BILIRUB INDIRECT SERPL-MCNC: 0.7 MG/DL (ref 0–1)
BILIRUB SERPL-MCNC: 1.9 MG/DL (ref 0–1.2)
BILIRUB UR QL STRIP: ABNORMAL
BUN SERPL-MCNC: 31 MG/DL (ref 6–23)
CALCIUM SERPL-MCNC: 8.4 MG/DL (ref 8.6–10.2)
CHLORIDE SERPL-SCNC: 100 MMOL/L (ref 98–107)
CLARITY UR: CLEAR
CO2 SERPL-SCNC: 24 MMOL/L (ref 22–29)
COLOR UR: YELLOW
CREAT SERPL-MCNC: 1.1 MG/DL (ref 0.7–1.2)
EOSINOPHIL # BLD: 0.01 K/UL (ref 0.05–0.5)
EOSINOPHILS RELATIVE PERCENT: 0 % (ref 0–6)
ERYTHROCYTE [DISTWIDTH] IN BLOOD BY AUTOMATED COUNT: 13.7 % (ref 11.5–15)
GFR SERPL CREATININE-BSD FRML MDRD: >60 ML/MIN/1.73M2
GLUCOSE BLD-MCNC: 154 MG/DL (ref 74–99)
GLUCOSE BLD-MCNC: 166 MG/DL (ref 74–99)
GLUCOSE SERPL-MCNC: 177 MG/DL (ref 74–99)
GLUCOSE UR STRIP-MCNC: NEGATIVE MG/DL
HCT VFR BLD AUTO: 37.2 % (ref 37–54)
HGB BLD-MCNC: 12.7 G/DL (ref 12.5–16.5)
HGB UR QL STRIP.AUTO: NEGATIVE
IMM GRANULOCYTES # BLD AUTO: 0.14 K/UL (ref 0–0.58)
IMM GRANULOCYTES NFR BLD: 1 % (ref 0–5)
KETONES UR STRIP-MCNC: ABNORMAL MG/DL
LACTATE BLDV-SCNC: 1.3 MMOL/L (ref 0.5–2.2)
LEUKOCYTE ESTERASE UR QL STRIP: NEGATIVE
LIPASE SERPL-CCNC: 11 U/L (ref 13–60)
LYMPHOCYTES NFR BLD: 0.36 K/UL (ref 1.5–4)
LYMPHOCYTES RELATIVE PERCENT: 2 % (ref 20–42)
MCH RBC QN AUTO: 33.7 PG (ref 26–35)
MCHC RBC AUTO-ENTMCNC: 34.1 G/DL (ref 32–34.5)
MCV RBC AUTO: 98.7 FL (ref 80–99.9)
MONOCYTES NFR BLD: 0.78 K/UL (ref 0.1–0.95)
MONOCYTES NFR BLD: 5 % (ref 2–12)
NEUTROPHILS NFR BLD: 91 % (ref 43–80)
NEUTS SEG NFR BLD: 13.58 K/UL (ref 1.8–7.3)
NITRITE UR QL STRIP: NEGATIVE
PH UR STRIP: 6 [PH] (ref 5–9)
PLATELET # BLD AUTO: 178 K/UL (ref 130–450)
PMV BLD AUTO: 10.7 FL (ref 7–12)
POTASSIUM SERPL-SCNC: 4.4 MMOL/L (ref 3.5–5)
PROT SERPL-MCNC: 6.1 G/DL (ref 6.4–8.3)
PROT UR STRIP-MCNC: ABNORMAL MG/DL
RBC # BLD AUTO: 3.77 M/UL (ref 3.8–5.8)
RBC # BLD: ABNORMAL 10*6/UL
RBC #/AREA URNS HPF: ABNORMAL /HPF
SODIUM SERPL-SCNC: 133 MMOL/L (ref 132–146)
SP GR UR STRIP: 1.02 (ref 1–1.03)
TROPONIN I SERPL HS-MCNC: 29 NG/L (ref 0–11)
TROPONIN I SERPL HS-MCNC: 31 NG/L (ref 0–11)
UROBILINOGEN UR STRIP-ACNC: 1 EU/DL (ref 0–1)
WBC #/AREA URNS HPF: ABNORMAL /HPF
WBC OTHER # BLD: 14.9 K/UL (ref 4.5–11.5)

## 2024-02-22 PROCEDURE — 82962 GLUCOSE BLOOD TEST: CPT

## 2024-02-22 PROCEDURE — 36592 COLLECT BLOOD FROM PICC: CPT

## 2024-02-22 PROCEDURE — 2700000000 HC OXYGEN THERAPY PER DAY

## 2024-02-22 PROCEDURE — 3700000001 HC ADD 15 MINUTES (ANESTHESIA): Performed by: SURGERY

## 2024-02-22 PROCEDURE — 02HV33Z INSERTION OF INFUSION DEVICE INTO SUPERIOR VENA CAVA, PERCUTANEOUS APPROACH: ICD-10-PCS | Performed by: INTERNAL MEDICINE

## 2024-02-22 PROCEDURE — A4217 STERILE WATER/SALINE, 500 ML: HCPCS | Performed by: SURGERY

## 2024-02-22 PROCEDURE — 6360000004 HC RX CONTRAST MEDICATION: Performed by: RADIOLOGY

## 2024-02-22 PROCEDURE — 85025 COMPLETE CBC W/AUTO DIFF WBC: CPT

## 2024-02-22 PROCEDURE — 33881 EVASC RPR TA NDGFT XCOV LSA: CPT | Performed by: SURGERY

## 2024-02-22 PROCEDURE — C1760 CLOSURE DEV, VASC: HCPCS | Performed by: SURGERY

## 2024-02-22 PROCEDURE — 82248 BILIRUBIN DIRECT: CPT

## 2024-02-22 PROCEDURE — 86850 RBC ANTIBODY SCREEN: CPT

## 2024-02-22 PROCEDURE — 6370000000 HC RX 637 (ALT 250 FOR IP): Performed by: STUDENT IN AN ORGANIZED HEALTH CARE EDUCATION/TRAINING PROGRAM

## 2024-02-22 PROCEDURE — C1769 GUIDE WIRE: HCPCS | Performed by: SURGERY

## 2024-02-22 PROCEDURE — 2580000003 HC RX 258: Performed by: STUDENT IN AN ORGANIZED HEALTH CARE EDUCATION/TRAINING PROGRAM

## 2024-02-22 PROCEDURE — 36200 PLACE CATHETER IN AORTA: CPT | Performed by: SURGERY

## 2024-02-22 PROCEDURE — 2000000000 HC ICU R&B

## 2024-02-22 PROCEDURE — 86923 COMPATIBILITY TEST ELECTRIC: CPT

## 2024-02-22 PROCEDURE — 74174 CTA ABD&PLVS W/CONTRAST: CPT

## 2024-02-22 PROCEDURE — C2628 CATHETER, OCCLUSION: HCPCS | Performed by: SURGERY

## 2024-02-22 PROCEDURE — 2720000010 HC SURG SUPPLY STERILE: Performed by: SURGERY

## 2024-02-22 PROCEDURE — 86901 BLOOD TYPING SEROLOGIC RH(D): CPT

## 2024-02-22 PROCEDURE — 71045 X-RAY EXAM CHEST 1 VIEW: CPT

## 2024-02-22 PROCEDURE — 3600000009 HC SURGERY ROBOT BASE: Performed by: SURGERY

## 2024-02-22 PROCEDURE — 81001 URINALYSIS AUTO W/SCOPE: CPT

## 2024-02-22 PROCEDURE — 2709999900 HC NON-CHARGEABLE SUPPLY: Performed by: SURGERY

## 2024-02-22 PROCEDURE — C1887 CATHETER, GUIDING: HCPCS | Performed by: SURGERY

## 2024-02-22 PROCEDURE — 2500000003 HC RX 250 WO HCPCS

## 2024-02-22 PROCEDURE — 84484 ASSAY OF TROPONIN QUANT: CPT

## 2024-02-22 PROCEDURE — 86900 BLOOD TYPING SEROLOGIC ABO: CPT

## 2024-02-22 PROCEDURE — 34713 PERQ ACCESS & CLSR FEM ART: CPT | Performed by: SURGERY

## 2024-02-22 PROCEDURE — 7100000001 HC PACU RECOVERY - ADDTL 15 MIN

## 2024-02-22 PROCEDURE — 83605 ASSAY OF LACTIC ACID: CPT

## 2024-02-22 PROCEDURE — 6360000004 HC RX CONTRAST MEDICATION: Performed by: SURGERY

## 2024-02-22 PROCEDURE — 71275 CT ANGIOGRAPHY CHEST: CPT

## 2024-02-22 PROCEDURE — 2580000003 HC RX 258

## 2024-02-22 PROCEDURE — 99285 EMERGENCY DEPT VISIT HI MDM: CPT

## 2024-02-22 PROCEDURE — S2900 ROBOTIC SURGICAL SYSTEM: HCPCS | Performed by: SURGERY

## 2024-02-22 PROCEDURE — 85347 COAGULATION TIME ACTIVATED: CPT

## 2024-02-22 PROCEDURE — 74177 CT ABD & PELVIS W/CONTRAST: CPT

## 2024-02-22 PROCEDURE — 02VW3DZ RESTRICTION OF THORACIC AORTA, DESCENDING WITH INTRALUMINAL DEVICE, PERCUTANEOUS APPROACH: ICD-10-PCS | Performed by: SURGERY

## 2024-02-22 PROCEDURE — 93005 ELECTROCARDIOGRAM TRACING: CPT | Performed by: EMERGENCY MEDICINE

## 2024-02-22 PROCEDURE — 83690 ASSAY OF LIPASE: CPT

## 2024-02-22 PROCEDURE — 75957: CPT | Performed by: SURGERY

## 2024-02-22 PROCEDURE — 3600000019 HC SURGERY ROBOT ADDTL 15MIN: Performed by: SURGERY

## 2024-02-22 PROCEDURE — 6360000002 HC RX W HCPCS: Performed by: SURGERY

## 2024-02-22 PROCEDURE — 2580000003 HC RX 258: Performed by: SURGERY

## 2024-02-22 PROCEDURE — C1894 INTRO/SHEATH, NON-LASER: HCPCS | Performed by: SURGERY

## 2024-02-22 PROCEDURE — 80053 COMPREHEN METABOLIC PANEL: CPT

## 2024-02-22 PROCEDURE — C1768 GRAFT, VASCULAR: HCPCS | Performed by: SURGERY

## 2024-02-22 PROCEDURE — 3700000000 HC ANESTHESIA ATTENDED CARE: Performed by: SURGERY

## 2024-02-22 PROCEDURE — 7100000000 HC PACU RECOVERY - FIRST 15 MIN

## 2024-02-22 PROCEDURE — 6360000002 HC RX W HCPCS

## 2024-02-22 DEVICE — IMPLANTABLE DEVICE: Type: IMPLANTABLE DEVICE | Site: AORTA | Status: FUNCTIONAL

## 2024-02-22 RX ORDER — LABETALOL HYDROCHLORIDE 5 MG/ML
10 INJECTION, SOLUTION INTRAVENOUS EVERY 30 MIN PRN
Status: DISCONTINUED | OUTPATIENT
Start: 2024-02-22 | End: 2024-02-23

## 2024-02-22 RX ORDER — DEXAMETHASONE SODIUM PHOSPHATE 10 MG/ML
INJECTION INTRAMUSCULAR; INTRAVENOUS PRN
Status: DISCONTINUED | OUTPATIENT
Start: 2024-02-22 | End: 2024-02-22 | Stop reason: SDUPTHER

## 2024-02-22 RX ORDER — SODIUM CHLORIDE 0.9 % (FLUSH) 0.9 %
10 SYRINGE (ML) INJECTION PRN
Status: DISCONTINUED | OUTPATIENT
Start: 2024-02-22 | End: 2024-02-26 | Stop reason: HOSPADM

## 2024-02-22 RX ORDER — OXYCODONE HYDROCHLORIDE 5 MG/1
5 TABLET ORAL EVERY 4 HOURS PRN
Status: DISCONTINUED | OUTPATIENT
Start: 2024-02-22 | End: 2024-02-26 | Stop reason: HOSPADM

## 2024-02-22 RX ORDER — ETOMIDATE 2 MG/ML
INJECTION INTRAVENOUS PRN
Status: DISCONTINUED | OUTPATIENT
Start: 2024-02-22 | End: 2024-02-22 | Stop reason: SDUPTHER

## 2024-02-22 RX ORDER — CALCIUM CHLORIDE 100 MG/ML
INJECTION INTRAVENOUS; INTRAVENTRICULAR PRN
Status: DISCONTINUED | OUTPATIENT
Start: 2024-02-22 | End: 2024-02-22 | Stop reason: SDUPTHER

## 2024-02-22 RX ORDER — ONDANSETRON 4 MG/1
4 TABLET, ORALLY DISINTEGRATING ORAL EVERY 8 HOURS PRN
Status: DISCONTINUED | OUTPATIENT
Start: 2024-02-22 | End: 2024-02-26 | Stop reason: HOSPADM

## 2024-02-22 RX ORDER — MAGNESIUM SULFATE IN WATER 40 MG/ML
2000 INJECTION, SOLUTION INTRAVENOUS PRN
Status: DISCONTINUED | OUTPATIENT
Start: 2024-02-22 | End: 2024-02-26 | Stop reason: HOSPADM

## 2024-02-22 RX ORDER — CEFAZOLIN SODIUM 1 G/3ML
INJECTION, POWDER, FOR SOLUTION INTRAMUSCULAR; INTRAVENOUS PRN
Status: DISCONTINUED | OUTPATIENT
Start: 2024-02-22 | End: 2024-02-22 | Stop reason: SDUPTHER

## 2024-02-22 RX ORDER — SODIUM CHLORIDE 9 MG/ML
INJECTION, SOLUTION INTRAVENOUS CONTINUOUS PRN
Status: DISCONTINUED | OUTPATIENT
Start: 2024-02-22 | End: 2024-02-22 | Stop reason: SDUPTHER

## 2024-02-22 RX ORDER — LIDOCAINE HYDROCHLORIDE 20 MG/ML
INJECTION, SOLUTION INTRAVENOUS PRN
Status: DISCONTINUED | OUTPATIENT
Start: 2024-02-22 | End: 2024-02-22 | Stop reason: SDUPTHER

## 2024-02-22 RX ORDER — DEXTROSE MONOHYDRATE 100 MG/ML
INJECTION, SOLUTION INTRAVENOUS CONTINUOUS PRN
Status: DISCONTINUED | OUTPATIENT
Start: 2024-02-22 | End: 2024-02-26 | Stop reason: HOSPADM

## 2024-02-22 RX ORDER — ONDANSETRON 2 MG/ML
4 INJECTION INTRAMUSCULAR; INTRAVENOUS EVERY 6 HOURS PRN
Status: DISCONTINUED | OUTPATIENT
Start: 2024-02-22 | End: 2024-02-26 | Stop reason: HOSPADM

## 2024-02-22 RX ORDER — SODIUM CHLORIDE, SODIUM LACTATE, POTASSIUM CHLORIDE, CALCIUM CHLORIDE 600; 310; 30; 20 MG/100ML; MG/100ML; MG/100ML; MG/100ML
INJECTION, SOLUTION INTRAVENOUS CONTINUOUS
Status: DISCONTINUED | OUTPATIENT
Start: 2024-02-22 | End: 2024-02-23

## 2024-02-22 RX ORDER — TAMSULOSIN HYDROCHLORIDE 0.4 MG/1
0.4 CAPSULE ORAL DAILY
Status: DISCONTINUED | OUTPATIENT
Start: 2024-02-22 | End: 2024-02-26 | Stop reason: HOSPADM

## 2024-02-22 RX ORDER — GLIPIZIDE 5 MG/1
5 TABLET ORAL 4 TIMES DAILY
Status: DISCONTINUED | OUTPATIENT
Start: 2024-02-22 | End: 2024-02-24

## 2024-02-22 RX ORDER — 0.9 % SODIUM CHLORIDE 0.9 %
1000 INTRAVENOUS SOLUTION INTRAVENOUS ONCE
Status: COMPLETED | OUTPATIENT
Start: 2024-02-22 | End: 2024-02-22

## 2024-02-22 RX ORDER — AMIODARONE HYDROCHLORIDE 200 MG/1
100 TABLET ORAL DAILY
Status: DISCONTINUED | OUTPATIENT
Start: 2024-02-22 | End: 2024-02-26 | Stop reason: HOSPADM

## 2024-02-22 RX ORDER — ONDANSETRON 2 MG/ML
INJECTION INTRAMUSCULAR; INTRAVENOUS PRN
Status: DISCONTINUED | OUTPATIENT
Start: 2024-02-22 | End: 2024-02-22 | Stop reason: SDUPTHER

## 2024-02-22 RX ORDER — SODIUM CHLORIDE 9 MG/ML
INJECTION, SOLUTION INTRAVENOUS PRN
Status: DISCONTINUED | OUTPATIENT
Start: 2024-02-22 | End: 2024-02-23

## 2024-02-22 RX ORDER — POTASSIUM CHLORIDE 7.45 MG/ML
10 INJECTION INTRAVENOUS PRN
Status: DISCONTINUED | OUTPATIENT
Start: 2024-02-22 | End: 2024-02-26 | Stop reason: HOSPADM

## 2024-02-22 RX ORDER — PRAVASTATIN SODIUM 20 MG
20 TABLET ORAL NIGHTLY
Status: DISCONTINUED | OUTPATIENT
Start: 2024-02-22 | End: 2024-02-26 | Stop reason: HOSPADM

## 2024-02-22 RX ORDER — ACETAMINOPHEN 325 MG/1
650 TABLET ORAL EVERY 4 HOURS PRN
Status: DISCONTINUED | OUTPATIENT
Start: 2024-02-22 | End: 2024-02-26 | Stop reason: HOSPADM

## 2024-02-22 RX ORDER — INSULIN LISPRO 100 [IU]/ML
0-4 INJECTION, SOLUTION INTRAVENOUS; SUBCUTANEOUS NIGHTLY
Status: DISCONTINUED | OUTPATIENT
Start: 2024-02-22 | End: 2024-02-26 | Stop reason: HOSPADM

## 2024-02-22 RX ORDER — FENTANYL CITRATE 50 UG/ML
INJECTION, SOLUTION INTRAMUSCULAR; INTRAVENOUS PRN
Status: DISCONTINUED | OUTPATIENT
Start: 2024-02-22 | End: 2024-02-22 | Stop reason: SDUPTHER

## 2024-02-22 RX ORDER — HEPARIN SODIUM 1000 [USP'U]/ML
INJECTION, SOLUTION INTRAVENOUS; SUBCUTANEOUS PRN
Status: DISCONTINUED | OUTPATIENT
Start: 2024-02-22 | End: 2024-02-22 | Stop reason: SDUPTHER

## 2024-02-22 RX ORDER — VASOPRESSIN 20 U/ML
INJECTION PARENTERAL PRN
Status: DISCONTINUED | OUTPATIENT
Start: 2024-02-22 | End: 2024-02-22 | Stop reason: SDUPTHER

## 2024-02-22 RX ORDER — SODIUM CHLORIDE 0.9 % (FLUSH) 0.9 %
10 SYRINGE (ML) INJECTION EVERY 12 HOURS SCHEDULED
Status: DISCONTINUED | OUTPATIENT
Start: 2024-02-22 | End: 2024-02-26 | Stop reason: HOSPADM

## 2024-02-22 RX ORDER — ROCURONIUM BROMIDE 10 MG/ML
INJECTION, SOLUTION INTRAVENOUS PRN
Status: DISCONTINUED | OUTPATIENT
Start: 2024-02-22 | End: 2024-02-22 | Stop reason: SDUPTHER

## 2024-02-22 RX ORDER — INSULIN LISPRO 100 [IU]/ML
0-8 INJECTION, SOLUTION INTRAVENOUS; SUBCUTANEOUS
Status: DISCONTINUED | OUTPATIENT
Start: 2024-02-22 | End: 2024-02-26 | Stop reason: HOSPADM

## 2024-02-22 RX ORDER — HYDRALAZINE HYDROCHLORIDE 20 MG/ML
10 INJECTION INTRAMUSCULAR; INTRAVENOUS EVERY 30 MIN PRN
Status: DISCONTINUED | OUTPATIENT
Start: 2024-02-22 | End: 2024-02-23

## 2024-02-22 RX ORDER — OXYCODONE HYDROCHLORIDE 10 MG/1
10 TABLET ORAL EVERY 4 HOURS PRN
Status: DISCONTINUED | OUTPATIENT
Start: 2024-02-22 | End: 2024-02-26 | Stop reason: HOSPADM

## 2024-02-22 RX ORDER — POTASSIUM CHLORIDE 20 MEQ/1
40 TABLET, EXTENDED RELEASE ORAL PRN
Status: DISCONTINUED | OUTPATIENT
Start: 2024-02-22 | End: 2024-02-26 | Stop reason: HOSPADM

## 2024-02-22 RX ORDER — PROTAMINE SULFATE 10 MG/ML
INJECTION, SOLUTION INTRAVENOUS PRN
Status: DISCONTINUED | OUTPATIENT
Start: 2024-02-22 | End: 2024-02-22 | Stop reason: SDUPTHER

## 2024-02-22 RX ADMIN — CALCIUM CHLORIDE INJECTION 0.2 G: 100 INJECTION, SOLUTION INTRAVENOUS at 17:56

## 2024-02-22 RX ADMIN — ONDANSETRON 4 MG: 2 INJECTION INTRAMUSCULAR; INTRAVENOUS at 17:45

## 2024-02-22 RX ADMIN — GLIPIZIDE 5 MG: 5 TABLET ORAL at 21:57

## 2024-02-22 RX ADMIN — SACUBITRIL AND VALSARTAN 1 TABLET: 24; 26 TABLET, FILM COATED ORAL at 21:57

## 2024-02-22 RX ADMIN — SODIUM CHLORIDE, POTASSIUM CHLORIDE, SODIUM LACTATE AND CALCIUM CHLORIDE: 600; 310; 30; 20 INJECTION, SOLUTION INTRAVENOUS at 20:29

## 2024-02-22 RX ADMIN — IOPAMIDOL 90 ML: 755 INJECTION, SOLUTION INTRAVENOUS at 16:33

## 2024-02-22 RX ADMIN — DEXAMETHASONE SODIUM PHOSPHATE 10 MG: 10 INJECTION INTRAMUSCULAR; INTRAVENOUS at 17:10

## 2024-02-22 RX ADMIN — PROTAMINE SULFATE 40 MG: 10 INJECTION, SOLUTION INTRAVENOUS at 17:56

## 2024-02-22 RX ADMIN — ROCURONIUM BROMIDE 50 MG: 10 INJECTION, SOLUTION INTRAVENOUS at 16:41

## 2024-02-22 RX ADMIN — CEFAZOLIN 2 G: 1 INJECTION, POWDER, FOR SOLUTION INTRAMUSCULAR; INTRAVENOUS at 16:51

## 2024-02-22 RX ADMIN — ETOMIDATE 20 MG: 2 INJECTION, SOLUTION INTRAVENOUS at 16:41

## 2024-02-22 RX ADMIN — IOPAMIDOL 75 ML: 755 INJECTION, SOLUTION INTRAVENOUS at 14:13

## 2024-02-22 RX ADMIN — PRAVASTATIN SODIUM 20 MG: 20 TABLET ORAL at 21:57

## 2024-02-22 RX ADMIN — SODIUM CHLORIDE, PRESERVATIVE FREE 10 ML: 5 INJECTION INTRAVENOUS at 21:20

## 2024-02-22 RX ADMIN — SUGAMMADEX 200 MG: 100 INJECTION, SOLUTION INTRAVENOUS at 17:56

## 2024-02-22 RX ADMIN — PHENYLEPHRINE HYDROCHLORIDE 100 MCG: 10 INJECTION INTRAVENOUS at 17:23

## 2024-02-22 RX ADMIN — LIDOCAINE HYDROCHLORIDE 80 MG: 20 INJECTION, SOLUTION INTRAVENOUS at 16:41

## 2024-02-22 RX ADMIN — PHENYLEPHRINE HYDROCHLORIDE 100 MCG: 10 INJECTION INTRAVENOUS at 16:51

## 2024-02-22 RX ADMIN — SODIUM CHLORIDE: 9 INJECTION, SOLUTION INTRAVENOUS at 16:29

## 2024-02-22 RX ADMIN — FENTANYL CITRATE 150 MCG: 0.05 INJECTION, SOLUTION INTRAMUSCULAR; INTRAVENOUS at 16:41

## 2024-02-22 RX ADMIN — HEPARIN SODIUM 8000 UNITS: 1000 INJECTION INTRAVENOUS; SUBCUTANEOUS at 17:25

## 2024-02-22 RX ADMIN — ROCURONIUM BROMIDE 20 MG: 10 INJECTION, SOLUTION INTRAVENOUS at 17:13

## 2024-02-22 RX ADMIN — SODIUM CHLORIDE: 9 INJECTION, SOLUTION INTRAVENOUS at 17:02

## 2024-02-22 RX ADMIN — SODIUM CHLORIDE 1000 ML: 9 INJECTION, SOLUTION INTRAVENOUS at 11:59

## 2024-02-22 RX ADMIN — VASOPRESSIN 1 UNITS: 20 INJECTION INTRAVENOUS at 17:39

## 2024-02-22 ASSESSMENT — PAIN SCALES - GENERAL
PAINLEVEL_OUTOF10: 0
PAINLEVEL_OUTOF10: 0

## 2024-02-22 ASSESSMENT — PAIN - FUNCTIONAL ASSESSMENT: PAIN_FUNCTIONAL_ASSESSMENT: NONE - DENIES PAIN

## 2024-02-22 NOTE — OP NOTE
Operative Note      Patient: Neto Baca  YOB: 1950  MRN: [id number]    Date of Procedure: 2/22/2024    Pre-Op Diagnosis Codes:     * Penetrating ulcer of aorta (HCC) [I71.9]    Post-Op Diagnosis: Same       Procedure(s):  THORACIC AORTIC ANEURYSM REPAIR ENDOVASCULAR    Surgeon(s):  Tiffanie Craig MD Kakkasseril, Pratheek S, MD    Assistant:   Resident: Neto Mccord MD    Anesthesia: General    Estimated Blood Loss (mL): Minimal    Complications: None    Specimens:   * No specimens in log *    Implants:  Implant Name Type Inv. Item Serial No.  Lot No. LRB No. Used Action   GRAFT STENT [id number] CM 20 FR ACTIVE CONTROL SYSTEM TAG - [id number] Vascular grafts GRAFT STENT [id number] CM 20 FR ACTIVE CONTROL SYSTEM TAG [id number]  [employer]-  N/A 1 Implanted         Drains:   Urinary Catheter 02/22/24 Dueñas (Active)       Findings: No evidence of continued extravasation with patency of celiac artery at conclusion of case        Detailed Description of Procedure:   After informed consent was obtained, the patient was taken to the operating room and placed in a supine position. General anesthesia was induced. Patient was prepped for sternal notch to upper thighs in the standard steril fashion. Time out was performed identifying the correct patient, site, and procedure. All agreed to proceed.     The right common femoral artery was accessed under ultrasound and fluorscopic guidance with a micropuncture needle after an incision had been made and tissue spread. A wire was passed and micro sheath was placed. An 0.35 [place] Advanatge wire was then advanced into the aorta. A 5Fr sheath was used to dilate the track and then 2 perclose devices were deployed medially and laterally. An 8Fr sheath was then placed. The left common femoral artery was accessed in a smiliar fashion and a 5Fr sheath was placed.     The patient was heparinzied with 8000 units of heparin, ACT was

## 2024-02-22 NOTE — ANESTHESIA PROCEDURE NOTES
Central Venous Line:    A central venous line was placed using surface landmarks, in the OR for the following indication(s): central venous access and CVP monitoring.2/22/2024 4:45 PM2/22/2024 4:50 PM    Sterility preparation included the following: hand hygiene performed prior to procedure, maximum sterile barriers used and sterile technique used to drape from head to toe.    The patient was placed in Trendelenburg position.The right internal jugular vein was prepped.    The site was prepped with Chloraprep.  A 7 Fr (size), 20 (length), triple lumen was placed.    During the procedure, the following specific steps were taken: target vein identified, needle advanced into vein and blood aspirated and guidewire advanced into vein.    Intravenous verification was obtained by ultrasound, venous blood return and manometry.    Post insertion care included: all ports aspirated, all ports flushed easily, guidewire removed intact, Biopatch applied, line sutured in place and dressing applied.    During the procedure the patient experienced: patient tolerated procedure well with no complications.      Outcomes: uncomplicated  Real-time US image taken/store: yes  Anesthesia type: general  Staffing  Performed by: Christine Loredo MD  Authorized by: Christine Loredo MD    Preanesthetic Checklist  Completed: patient identified, timeout performed and monitors applied/VS acknowledged

## 2024-02-22 NOTE — ANESTHESIA PROCEDURE NOTES
Arterial Line:    An arterial line was placed using surface landmarks, in the OR for the following indication(s): continuous blood pressure monitoring and blood sampling needed.    A 20 gauge (size), 1 and 3/4 inch (length), Angiocath (type) catheter was placed, Seldinger technique used, into the left radial artery, secured by tape and Tegaderm.  Anesthesia type: Local  Local infiltration: Injection    Events:  patient tolerated procedure well with no complications.2/22/2024 4:35 PM2/22/2024 4:42 PM  Anesthesiologist: Christine Loredo MD  Resident/CRNA: Nura Maravilla APRN - CRNA  Other anesthesia staff: Jerrica Lee RN  Performed: Other anesthesia staff   Preanesthetic Checklist  Completed: patient identified, IV checked, site marked, risks and benefits discussed, surgical/procedural consents, equipment checked, pre-op evaluation, timeout performed, anesthesia consent given, oxygen available and monitors applied/VS acknowledged

## 2024-02-22 NOTE — ANESTHESIA POSTPROCEDURE EVALUATION
Department of Anesthesiology  Postprocedure Note    Patient: Neto Baca  MRN: [id number]  YOB: 1950  Date of evaluation: 2/22/2024    Procedure Summary       Date: 02/22/24 Room / Location: [address]    Anesthesia Start: 1624 Anesthesia Stop: 1817    Procedure: THORACIC AORTIC ANEURYSM REPAIR ENDOVASCULAR Diagnosis:       Penetrating ulcer of aorta (HCC)      (Penetrating ulcer of aorta (HCC) [I71.9])    Surgeons: Tiffanie Craig MD Responsible Provider:     Anesthesia Type: general ASA Status: 4            Anesthesia Type: No value filed.    Tejas Phase I:      Tejas Phase II:      Anesthesia Post Evaluation    Patient location during evaluation: ICU  Patient participation: complete - patient participated  Level of consciousness: awake  Airway patency: patent  Nausea & Vomiting: no nausea and no vomiting  Cardiovascular status: hemodynamically stable  Respiratory status: acceptable  Hydration status: euvolemic  Pain management: adequate    No notable events documented.

## 2024-02-22 NOTE — CONSULTS
10/27/2011    ED (erectile dysfunction)     Gout 10/31/2011    Heart attack (HCC)     HTN (hypertension) 10/27/2011    Hyperlipidemia 06/07/2012    Hypertension     Inguinal hernia     bilateral    Lateral myocardial infarction (HCC) 10/2008    due to circumflex occlusion    Leg swelling 03/02/2017        Past Surgical History:   Procedure Laterality Date    CARDIAC CATHETERIZATION      CARDIAC CATHETERIZATION  06/16/2021    DR. CANDELARIA [facility] EF 45% CTS CONSULT    CARDIOVASCULAR STRESS TEST  12/14/2009    neither fixed nor reversible perfusion defect; LVEF 50%    CARDIOVERSION      CATARACT REMOVAL Left     CHOLECYSTECTOMY  1999    CORONARY ARTERY BYPASS GRAFT Left 07/09/2021    CABG CORONARY ARTERY BYPASS, MAZE, LEFT ATRIAL APPENDAGE OCCLUSION performed by Jimbo Guadarrama DO [place] [facility] [place]    HERNIA REPAIR  @ age 10    INGUINAL HERNIA REPAIR N/A 6/28/2023    LAPAROSCOPIC ROBOTIC XI ASSISTED BILATERAL  INGUINAL HERNIA REPAIR WITH MESH performed by Maria Luisa Mitchell MD at [facility] OR    KNEE SURGERY      left-ligament torn    OTHER SURGICAL HISTORY  10/31/2011    cystoscopy retrograde;ESWL;stent on left    TESTICLE SURGERY      as child     WISDOM TOOTH EXTRACTION         CURRENT MEDICATIONS:     iopamidol        ALLERGIES:  Adhesive tape, Crestor [rosuvastatin calcium], Lipitor [atorvastatin], and Rosuvastatin    Social History     Socioeconomic History    Marital status:      Spouse name: Not on file    Number of children: 2    Years of education: Not on file    Highest education level: Not on file   Occupational History    Occupation: self-employed     Comment: print shop   Tobacco Use    Smoking status: Never    Smokeless tobacco: Never   Vaping Use    Vaping Use: Never used   Substance and Sexual Activity    Alcohol use: Yes     Comment: rarely    Drug use: Never    Sexual activity: Not on file   Other Topics Concern    Not on file   Social History Narrative    Denies caffeine     Social Determinants of Health      5/5 Strength, Intact Sensation    R LE Edema mild   Incisions absent    Varicose veins absent    Wounds absent    Normal Capillary Refill   5/5 Strength, Intact Sensation    L LE Edema mild   Incisions absent    Varicose veins absent    Wounds absent    Normal Capillary Refill   5/5 Strength, Intact Sensation    R brachial  L brachial    R radial 2+ L radial 2+   R femoral 2+ L femoral 2+   R popliteal  L popliteal    R posterior tibial 1+ L posterior tibial 1+   R dorsalis pedis 2+ L dorsalis pedis 2+       LABS:    Lab Results   Component Value Date    WBC 14.9 (H) 02/22/2024    HGB 12.7 02/22/2024    HCT 37.2 02/22/2024     02/22/2024    PROTIME 14.4 (H) 07/09/2021    INR 1.3 07/09/2021    K 4.4 02/22/2024    BUN 31 (H) 02/22/2024    CREATININE 1.1 02/22/2024       RADIOLOGY:  CT ABDOMEN PELVIS W IV CONTRAST Additional Contrast? None    Result Date: 2/22/2024  EXAMINATION: CT OF THE ABDOMEN AND PELVIS WITH CONTRAST2/22/2024 2:08 pm TECHNIQUE: CT of the abdomen and pelvis was performed with the administration of intravenous contrast. Multiplanar reformatted images are provided for review. Automated exposure control, iterative reconstruction, and/or weight based adjustment of the mA/kV was utilized to reduce the radiation dose to as low as reasonably achievable. COMPARISON: 02/15/2024. HISTORY: ORDERING SYSTEM PROVIDED HISTORY: abd pain constipation TECHNOLOGIST PROVIDED HISTORY: Additional Contrast?->None Reason for exam:->abd pain constipation Decision Support Exception - unselect if not a suspected or confirmed emergency medical condition->Emergency Medical Condition (MA) FINDINGS: There is a small right pleural effusion with right basilar airspace disease which is new since the prior examination..  Very small left pleural effusion is identified.  No pulmonary nodule is seen. The liver, spleen, pancreas, adrenals, kidneys, urinary bladder, prostate and seminal vesicles have an unremarkable appearance.

## 2024-02-22 NOTE — ED PROVIDER NOTES
[facility]  EMERGENCY DEPARTMENT ENCOUNTER      Pt Name: Neto Baca  MRN: [id number]  Birthdate 1950  Date of evaluation: 2/22/2024  Provider: Simón Ram DO  PCP: Ant Dahl MD  Note Started: 11:33 AM EST 2/22/24    CHIEF COMPLAINT       Chief Complaint   Patient presents with    Chest Pain    Abdominal Pain     Patient c/o CP / abdominal pain . States he has been constipated . Given 324 ASA and 1 nitro per EMS       HISTORY OF PRESENT ILLNESS: 1 or more Elements   History From: Patient  Limitations to history : None    Neto Baca is a 73 y.o. male who presents to the ED due to abdominal pain.  Patient reports that he has been constipated over the last 18 days.  Does not member last time he had a normal bowel movement.  He says that he has been passing small amounts of gas over the past several days.  He does not report remote history of prior abdominal surgeries.  He reportedly had chest pain upon transport and EMS gave aspirin and 1 nitroglycerin.  On evaluation patient denies any current chest pain or shortness of breath.  States that his abdominal pain is generalized to his entire abdomen.  He denies any dysuria but reports decreased urine with associated frequency without hematuria.  Denies any fever or chills.    Nursing Notes were all reviewed and agreed with or any disagreements were addressed in the HPI.    REVIEW OF SYSTEMS :    Positives and Pertinent negatives as per HPI.     PAST MEDICAL HISTORY/Chronic Conditions Affecting Care    has a past medical history of A-fib (HCC), CAD (coronary artery disease) (10/27/2011), Carotid stenosis, right (05/06/2021), Chronic venous insufficiency (03/02/2017), COVID-19 (01/2021), Decreased pulses in feet, Diabetes mellitus (HCC), DM (diabetes mellitus), type 2 (HCC) (10/27/2011), ED (erectile dysfunction), Gout (10/31/2011), Heart attack (HCC), HTN (hypertension) (10/27/2011), Hyperlipidemia (06/07/2012), Hypertension, Inguinal hernia,  Considerations (Tests not ordered but considered, Shared Decision Making, Pt Expectation of Test or Tx.):     FINAL IMPRESSION      1. Aortic rupture (HCC)          DISPOSITION/PLAN     DISPOSITION Admitted 02/22/2024 04:03:07 PM    PATIENT REFERRED TO:  No follow-up provider specified.    DISCHARGE MEDICATIONS:  Current Discharge Medication List          DISCONTINUED MEDICATIONS:  Current Discharge Medication List               (Please note that portions of this note were completed with a voice recognition program.  Efforts were made to edit the dictations but occasionally words are mis-transcribed.)    Simón Ram DO (electronically signed)

## 2024-02-22 NOTE — BRIEF OP NOTE
Brief Postoperative Note      Patient: Neto Baca  YOB: 1950  MRN: [id number]    Date of Procedure: 2/22/2024    Pre-Op Diagnosis Codes:     * Penetrating ulcer of aorta (HCC) [I71.9]    Post-Op Diagnosis: Same       Procedure(s):  THORACIC AORTIC ANEURYSM REPAIR ENDOVASCULAR    Surgeon(s):  Tiffanie Craig MD Kakkasseril, Pratheek S, MD    Assistant:  Resident: Neto Mccord MD    Anesthesia: General    Estimated Blood Loss (mL): Minimal    Complications: None    Specimens:   * No specimens in log *    Implants:  Implant Name Type Inv. Item Serial No.  Lot No. LRB No. Used Action   GRAFT STENT [id number] CM 20 FR ACTIVE CONTROL SYSTEM TAG - [id number] Vascular grafts GRAFT STENT [id number] [id number] CM 20 FR ACTIVE CONTROL SYSTEM TAG [id number]  [employer]-  N/A 1 Implanted         Drains:   Urinary Catheter 02/22/24 Dueñas (Active)       Findings: Thoracic aneurysm repair via endovascular technique.       Electronically signed by Neto Mccord MD on 2/22/2024 at 6:12 PM

## 2024-02-22 NOTE — PROGRESS NOTES
4 Eyes Skin Assessment     NAME:  Neto Baca  YOB: 1950  MEDICAL RECORD NUMBER:  [id number]    The patient is being assessed for  Admission    I agree that at least one RN has performed a thorough Head to Toe Skin Assessment on the patient. ALL assessment sites listed below have been assessed.      Areas assessed by both nurses:    Head, Face, Ears, Shoulders, Back, Chest, Arms, Elbows, Hands, Sacrum. Buttock, Coccyx, Ischium, Legs. Feet and Heels, and Under Medical Devices         Does the Patient have a Wound? No noted wound(s)       Davion Prevention initiated by RN: Yes  Wound Care Orders initiated by RN: No    Pressure Injury (Stage 3,4, Unstageable, DTI, NWPT, and Complex wounds) if present, place Wound referral order by RN under : No    New Ostomies, if present place, Ostomy referral order under : No     Nurse 1 eSignature: Electronically signed by Stephani Sanon RN on 2/22/24 at 6:40 PM EST    **SHARE this note so that the co-signing nurse can place an eSignature**    Nurse 2 eSignature: Electronically signed by Kell Ng RN on 2/22/24 at 6:56 PM EST

## 2024-02-23 LAB
ACTIVATED CLOTTING TIME, LOW RANGE: 179 SEC
ACTIVATED CLOTTING TIME, LOW RANGE: 203 SEC
ACTIVATED CLOTTING TIME, LOW RANGE: 338 SEC
ANION GAP SERPL CALCULATED.3IONS-SCNC: 11 MMOL/L (ref 7–16)
BASOPHILS # BLD: 0 K/UL (ref 0–0.2)
BASOPHILS NFR BLD: 0 % (ref 0–2)
BUN SERPL-MCNC: 34 MG/DL (ref 6–23)
CALCIUM SERPL-MCNC: 8.4 MG/DL (ref 8.6–10.2)
CHLORIDE SERPL-SCNC: 101 MMOL/L (ref 98–107)
CO2 SERPL-SCNC: 21 MMOL/L (ref 22–29)
CREAT SERPL-MCNC: 1 MG/DL (ref 0.7–1.2)
EKG ATRIAL RATE: 67 BPM
EKG P AXIS: 0 DEGREES
EKG P-R INTERVAL: 246 MS
EKG Q-T INTERVAL: 450 MS
EKG QRS DURATION: 146 MS
EKG QTC CALCULATION (BAZETT): 475 MS
EKG R AXIS: -81 DEGREES
EKG T AXIS: 63 DEGREES
EKG VENTRICULAR RATE: 67 BPM
EOSINOPHIL # BLD: 0 K/UL (ref 0.05–0.5)
EOSINOPHILS RELATIVE PERCENT: 0 % (ref 0–6)
ERYTHROCYTE [DISTWIDTH] IN BLOOD BY AUTOMATED COUNT: 13.8 % (ref 11.5–15)
GFR SERPL CREATININE-BSD FRML MDRD: >60 ML/MIN/1.73M2
GLUCOSE BLD-MCNC: 167 MG/DL (ref 74–99)
GLUCOSE BLD-MCNC: 213 MG/DL (ref 74–99)
GLUCOSE BLD-MCNC: 225 MG/DL (ref 74–99)
GLUCOSE BLD-MCNC: 230 MG/DL (ref 74–99)
GLUCOSE SERPL-MCNC: 207 MG/DL (ref 74–99)
HCT VFR BLD AUTO: 38.8 % (ref 37–54)
HGB BLD-MCNC: 13.5 G/DL (ref 12.5–16.5)
LYMPHOCYTES NFR BLD: 0.29 K/UL (ref 1.5–4)
LYMPHOCYTES RELATIVE PERCENT: 2 % (ref 20–42)
MCH RBC QN AUTO: 34.3 PG (ref 26–35)
MCHC RBC AUTO-ENTMCNC: 34.8 G/DL (ref 32–34.5)
MCV RBC AUTO: 98.5 FL (ref 80–99.9)
MONOCYTES NFR BLD: 0.43 K/UL (ref 0.1–0.95)
MONOCYTES NFR BLD: 3 % (ref 2–12)
NEUTROPHILS NFR BLD: 96 % (ref 43–80)
NEUTS SEG NFR BLD: 15.88 K/UL (ref 1.8–7.3)
PLATELET # BLD AUTO: 206 K/UL (ref 130–450)
PMV BLD AUTO: 11 FL (ref 7–12)
POTASSIUM SERPL-SCNC: 4.7 MMOL/L (ref 3.5–5)
RBC # BLD AUTO: 3.94 M/UL (ref 3.8–5.8)
RBC # BLD: ABNORMAL 10*6/UL
SODIUM SERPL-SCNC: 133 MMOL/L (ref 132–146)
WBC OTHER # BLD: 16.6 K/UL (ref 4.5–11.5)

## 2024-02-23 PROCEDURE — 2700000000 HC OXYGEN THERAPY PER DAY

## 2024-02-23 PROCEDURE — 36592 COLLECT BLOOD FROM PICC: CPT

## 2024-02-23 PROCEDURE — 2140000000 HC CCU INTERMEDIATE R&B

## 2024-02-23 PROCEDURE — 97535 SELF CARE MNGMENT TRAINING: CPT

## 2024-02-23 PROCEDURE — 93010 ELECTROCARDIOGRAM REPORT: CPT | Performed by: INTERNAL MEDICINE

## 2024-02-23 PROCEDURE — 97165 OT EVAL LOW COMPLEX 30 MIN: CPT

## 2024-02-23 PROCEDURE — 6370000000 HC RX 637 (ALT 250 FOR IP): Performed by: STUDENT IN AN ORGANIZED HEALTH CARE EDUCATION/TRAINING PROGRAM

## 2024-02-23 PROCEDURE — 2580000003 HC RX 258: Performed by: STUDENT IN AN ORGANIZED HEALTH CARE EDUCATION/TRAINING PROGRAM

## 2024-02-23 PROCEDURE — 97166 OT EVAL MOD COMPLEX 45 MIN: CPT

## 2024-02-23 PROCEDURE — 85025 COMPLETE CBC W/AUTO DIFF WBC: CPT

## 2024-02-23 PROCEDURE — 80048 BASIC METABOLIC PNL TOTAL CA: CPT

## 2024-02-23 PROCEDURE — 97161 PT EVAL LOW COMPLEX 20 MIN: CPT

## 2024-02-23 PROCEDURE — 97530 THERAPEUTIC ACTIVITIES: CPT

## 2024-02-23 PROCEDURE — 6360000002 HC RX W HCPCS: Performed by: STUDENT IN AN ORGANIZED HEALTH CARE EDUCATION/TRAINING PROGRAM

## 2024-02-23 PROCEDURE — 82962 GLUCOSE BLOOD TEST: CPT

## 2024-02-23 PROCEDURE — 2580000003 HC RX 258: Performed by: NURSE PRACTITIONER

## 2024-02-23 PROCEDURE — 6370000000 HC RX 637 (ALT 250 FOR IP): Performed by: NURSE PRACTITIONER

## 2024-02-23 PROCEDURE — 51798 US URINE CAPACITY MEASURE: CPT

## 2024-02-23 RX ORDER — LACTULOSE 10 G/15ML
20 SOLUTION ORAL 2 TIMES DAILY
Status: DISCONTINUED | OUTPATIENT
Start: 2024-02-23 | End: 2024-02-26 | Stop reason: HOSPADM

## 2024-02-23 RX ORDER — SENNA AND DOCUSATE SODIUM 50; 8.6 MG/1; MG/1
2 TABLET, FILM COATED ORAL DAILY
Status: DISCONTINUED | OUTPATIENT
Start: 2024-02-23 | End: 2024-02-26 | Stop reason: HOSPADM

## 2024-02-23 RX ORDER — POLYETHYLENE GLYCOL 3350 17 G/17G
17 POWDER, FOR SOLUTION ORAL DAILY
Status: DISCONTINUED | OUTPATIENT
Start: 2024-02-23 | End: 2024-02-26 | Stop reason: HOSPADM

## 2024-02-23 RX ORDER — ASPIRIN 81 MG/1
81 TABLET, CHEWABLE ORAL DAILY
Status: DISCONTINUED | OUTPATIENT
Start: 2024-02-23 | End: 2024-02-26 | Stop reason: HOSPADM

## 2024-02-23 RX ORDER — ENOXAPARIN SODIUM 100 MG/ML
40 INJECTION SUBCUTANEOUS DAILY
Status: DISCONTINUED | OUTPATIENT
Start: 2024-02-23 | End: 2024-02-26 | Stop reason: HOSPADM

## 2024-02-23 RX ADMIN — SODIUM CHLORIDE, PRESERVATIVE FREE 10 ML: 5 INJECTION INTRAVENOUS at 08:18

## 2024-02-23 RX ADMIN — LACTULOSE 20 G: 20 SOLUTION ORAL at 21:09

## 2024-02-23 RX ADMIN — PRAVASTATIN SODIUM 20 MG: 20 TABLET ORAL at 21:10

## 2024-02-23 RX ADMIN — TAMSULOSIN HYDROCHLORIDE 0.4 MG: 0.4 CAPSULE ORAL at 08:18

## 2024-02-23 RX ADMIN — INSULIN LISPRO 2 UNITS: 100 INJECTION, SOLUTION INTRAVENOUS; SUBCUTANEOUS at 12:36

## 2024-02-23 RX ADMIN — GLIPIZIDE 5 MG: 5 TABLET ORAL at 17:22

## 2024-02-23 RX ADMIN — INSULIN LISPRO 2 UNITS: 100 INJECTION, SOLUTION INTRAVENOUS; SUBCUTANEOUS at 08:16

## 2024-02-23 RX ADMIN — CEFAZOLIN 2000 MG: 2 INJECTION, POWDER, FOR SOLUTION INTRAMUSCULAR; INTRAVENOUS at 00:59

## 2024-02-23 RX ADMIN — SODIUM CHLORIDE, PRESERVATIVE FREE 10 ML: 5 INJECTION INTRAVENOUS at 21:10

## 2024-02-23 RX ADMIN — POLYETHYLENE GLYCOL 3350 17 G: 17 POWDER, FOR SOLUTION ORAL at 18:19

## 2024-02-23 RX ADMIN — SENNOSIDES AND DOCUSATE SODIUM 2 TABLET: 50; 8.6 TABLET ORAL at 18:19

## 2024-02-23 RX ADMIN — GLIPIZIDE 5 MG: 5 TABLET ORAL at 21:09

## 2024-02-23 RX ADMIN — ASPIRIN 81 MG: 81 TABLET, CHEWABLE ORAL at 08:17

## 2024-02-23 RX ADMIN — ENOXAPARIN SODIUM 40 MG: 100 INJECTION SUBCUTANEOUS at 12:36

## 2024-02-23 RX ADMIN — OXYCODONE HYDROCHLORIDE 10 MG: 10 TABLET ORAL at 09:21

## 2024-02-23 RX ADMIN — GLIPIZIDE 5 MG: 5 TABLET ORAL at 08:17

## 2024-02-23 RX ADMIN — SACUBITRIL AND VALSARTAN 1 TABLET: 24; 26 TABLET, FILM COATED ORAL at 08:18

## 2024-02-23 RX ADMIN — AMIODARONE HYDROCHLORIDE 100 MG: 200 TABLET ORAL at 08:17

## 2024-02-23 RX ADMIN — SACUBITRIL AND VALSARTAN 1 TABLET: 24; 26 TABLET, FILM COATED ORAL at 21:10

## 2024-02-23 ASSESSMENT — PAIN DESCRIPTION - DESCRIPTORS
DESCRIPTORS: SORE;DISCOMFORT;NAGGING
DESCRIPTORS: ACHING

## 2024-02-23 ASSESSMENT — PAIN SCALES - GENERAL
PAINLEVEL_OUTOF10: 0
PAINLEVEL_OUTOF10: 7
PAINLEVEL_OUTOF10: 8
PAINLEVEL_OUTOF10: 2
PAINLEVEL_OUTOF10: 2
PAINLEVEL_OUTOF10: 4

## 2024-02-23 ASSESSMENT — PAIN DESCRIPTION - LOCATION
LOCATION: BACK

## 2024-02-23 ASSESSMENT — PAIN DESCRIPTION - PAIN TYPE: TYPE: CHRONIC PAIN

## 2024-02-23 ASSESSMENT — PAIN DESCRIPTION - ORIENTATION
ORIENTATION: LOWER
ORIENTATION: MID
ORIENTATION: MID

## 2024-02-23 ASSESSMENT — PAIN DESCRIPTION - ONSET: ONSET: ON-GOING

## 2024-02-23 ASSESSMENT — PAIN - FUNCTIONAL ASSESSMENT: PAIN_FUNCTIONAL_ASSESSMENT: PREVENTS OR INTERFERES SOME ACTIVE ACTIVITIES AND ADLS

## 2024-02-23 ASSESSMENT — PAIN DESCRIPTION - FREQUENCY: FREQUENCY: CONTINUOUS

## 2024-02-23 NOTE — PROGRESS NOTES
Nurse to nurse report called to [facility] on PCCU, patient's wife present at the time aware of transfer.

## 2024-02-23 NOTE — PLAN OF CARE
Problem: Discharge Planning  Goal: Discharge to home or other facility with appropriate resources  Outcome: Progressing  Flowsheets  Taken 2/22/2024 2000 by Adeola Echols RN  Discharge to home or other facility with appropriate resources:   Identify barriers to discharge with patient and caregiver   Arrange for needed discharge resources and transportation as appropriate   Identify discharge learning needs (meds, wound care, etc)   Arrange for interpreters to assist at discharge as needed   Refer to discharge planning if patient needs post-hospital services based on physician order or complex needs related to functional status, cognitive ability or social support system  Taken 2/22/2024 1819 by Stephani Sanon RN  Discharge to home or other facility with appropriate resources: Identify barriers to discharge with patient and caregiver     Problem: Chronic Conditions and Co-morbidities  Goal: Patient's chronic conditions and co-morbidity symptoms are monitored and maintained or improved  Outcome: Progressing  Flowsheets  Taken 2/22/2024 2000 by Adeola Echols RN  Care Plan - Patient's Chronic Conditions and Co-Morbidity Symptoms are Monitored and Maintained or Improved:   Monitor and assess patient's chronic conditions and comorbid symptoms for stability, deterioration, or improvement   Collaborate with multidisciplinary team to address chronic and comorbid conditions and prevent exacerbation or deterioration   Update acute care plan with appropriate goals if chronic or comorbid symptoms are exacerbated and prevent overall improvement and discharge  Taken 2/22/2024 1819 by Stephani Sanon RN  Care Plan - Patient's Chronic Conditions and Co-Morbidity Symptoms are Monitored and Maintained or Improved: Monitor and assess patient's chronic conditions and comorbid symptoms for stability, deterioration, or improvement     Problem: Hematologic - Adult  Goal: Maintains hematologic stability  Outcome:  Progressing  Flowsheets (Taken 2/22/2024 2127)  Maintains hematologic stability:   Administer blood products/factors as ordered   Monitor labs for bleeding or clotting disorders   Assess for signs and symptoms of bleeding or hemorrhage

## 2024-02-23 NOTE — CARE COORDINATION
2/23 Care Coordination:Pt  admitted to the hospital for treatment of aortic dissection.  Patient has been having abdominal pain and chest pain for the past week and a half. Pt to OR with Vascular, in [facility]  s/p TEVAR. CM spoke with pt,wife and his daughter in the room. PTA Pt is Independent. No Hx [facility] or HHC. Discharge plan is Home. Pt/Wife would like [facility] when discharged. Had no preference, will make referral to [facility]. Spoke with Tamara. CM/SANDRA will continue to follow for discharge planning.   Phillip RODRIGUEZN,RN--BC  [phone]

## 2024-02-23 NOTE — PLAN OF CARE
Problem: Discharge Planning  Goal: Discharge to home or other facility with appropriate resources  2/23/2024 1053 by Kell Ng RN  Outcome: Progressing  Flowsheets (Taken 2/22/2024 2000 by Adeola Echols RN)  Discharge to home or other facility with appropriate resources:   Identify barriers to discharge with patient and caregiver   Arrange for needed discharge resources and transportation as appropriate   Identify discharge learning needs (meds, wound care, etc)   Arrange for interpreters to assist at discharge as needed   Refer to discharge planning if patient needs post-hospital services based on physician order or complex needs related to functional status, cognitive ability or social support system  2/22/2024 2127 by Adeola Echols RN  Outcome: Progressing  Flowsheets  Taken 2/22/2024 2000 by Adeola Echols RN  Discharge to home or other facility with appropriate resources:   Identify barriers to discharge with patient and caregiver   Arrange for needed discharge resources and transportation as appropriate   Identify discharge learning needs (meds, wound care, etc)   Arrange for interpreters to assist at discharge as needed   Refer to discharge planning if patient needs post-hospital services based on physician order or complex needs related to functional status, cognitive ability or social support system  Taken 2/22/2024 1819 by Stephani Sanon RN  Discharge to home or other facility with appropriate resources: Identify barriers to discharge with patient and caregiver     Problem: Chronic Conditions and Co-morbidities  Goal: Patient's chronic conditions and co-morbidity symptoms are monitored and maintained or improved  2/22/2024 2127 by Adeola Echols RN  Outcome: Progressing  Flowsheets  Taken 2/22/2024 2000 by Adeola Echols RN  Care Plan - Patient's Chronic Conditions and Co-Morbidity Symptoms are Monitored and Maintained or Improved:   Monitor and assess patient's chronic conditions and comorbid

## 2024-02-23 NOTE — PROGRESS NOTES
Patient arrived to the CVICU from OR with cruz catheter intact and patent. Securing device applied. Cruz bag is hanging below the level of the bladder, safety clip attached to the bed sheet, tamper seal is intact, drainage bag is not on the floor, lack of dependent loop in tubing, and the drainage bag is less than half full.

## 2024-02-23 NOTE — H&P
recently  vitamin D (CHOLECALCIFEROL) 1000 UNIT TABS tablet, Take 400 Units by mouth daily     Allergies:  Adhesive tape, Crestor [rosuvastatin calcium], Lipitor [atorvastatin], and Rosuvastatin    Social History:   Social History     Socioeconomic History    Marital status:      Spouse name: Not on file    Number of children: 2    Years of education: Not on file    Highest education level: Not on file   Occupational History    Occupation: self-employed     Comment: [email or web address]   Tobacco Use    Smoking status: Never    Smokeless tobacco: Never   Vaping Use    Vaping Use: Never used   Substance and Sexual Activity    Alcohol use: Yes     Comment: rarely    Drug use: Never    Sexual activity: Not on file   Other Topics Concern    Not on file   Social History Narrative    Denies caffeine     Social Determinants of Health     Financial Resource Strain: Low Risk  (5/24/2023)    Overall Financial Resource Strain (CARDIA)     Difficulty of Paying Living Expenses: Not hard at all   Food Insecurity: No Food Insecurity (2/22/2024)    Hunger Vital Sign     Worried About Running Out of Food in the Last Year: Never true     Ran Out of Food in the Last Year: Never true   Transportation Needs: No Transportation Needs (2/22/2024)    PRAPARE - Transportation     Lack of Transportation (Medical): No     Lack of Transportation (Non-Medical): No   Physical Activity: Insufficiently Active (6/26/2023)    Exercise Vital Sign     Days of Exercise per Week: 5 days     Minutes of Exercise per Session: 10 min   Stress: Not on file   Social Connections: Not on file   Intimate Partner Violence: Not on file   Housing Stability: Low Risk  (2/22/2024)    Housing Stability Vital Sign     Unable to Pay for Housing in the Last Year: No     Number of Places Lived in the Last Year: 1     Unstable Housing in the Last Year: No         Family History:       Problem Relation Age of Onset    COPD Mother     COPD Father     Heart Attack Father 67       Stable pseudoaneurysm/aortic leakage involving the right-side of the proximal   abdominal aorta with adjacent hematoma.  Findings worrisome for contain   aortic rupture.  This is basically unchanged compared to 02/22/2024.         CT ABDOMEN PELVIS W IV CONTRAST Additional Contrast? 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  Finding is   worrisome for contained subacute aortic rupture and vascular surgical   consultation is recommended.      RECOMMENDATIONS:   Critical results were called by Dr. Clovis Dominguez to Simón Ram MD on   2/22/2024 at 15:09.         XR CHEST PORTABLE   Final Result   Cardiomegaly with mild pulmonary vascular congestion.                 ASSESSMENT :      Principal Problem:    Aortic rupture (HCC)  Resolved Problems:    * No resolved hospital problems. *    Obesity  Hypertension  Type 2 diabetes mellitus  Cerebrovascular disease  Paroxysmal atrial fibrillation  CKD stage III    Plan :  Continue postop care  Monitor hemoglobin  Increase activity as able      Electronically signed by Ant Dahl MD on 2/23/2024 at 1:15 PM    NOTE: This report was transcribed using voice recognition software. Every effort was made to ensure accuracy; however, inadvertent transcription errors may be present

## 2024-02-23 NOTE — PROGRESS NOTES
VASCULAR SURGERY  DAILY PROGRESS NOTE  2/23/2024    CHIEF COMPLAINT:  Chief Complaint   Patient presents with    Chest Pain    Abdominal Pain     Patient c/o CP / abdominal pain . States he has been constipated . Given 324 ASA and 1 nitro per EMS       SUBJECTIVE:  Feeling well no major issues overnight.     OBJECTIVE:  /60   Pulse 66   Temp 97.3 °F (36.3 °C) (Temporal)   Resp 23   Ht 1.676 m (5' 6\")   Wt 84.8 kg (186 lb 14.4 oz)   SpO2 99%   BMI 30.17 kg/m²     GENERAL:  NAD. A&Ox3.  HEENT: normocephalic   LUNGS:  on room air. No acute respiratory distress  CARDIOVASCULAR: hemodynamically stable  SKIN: warm and dry, groin sites C/D/I without hematoma  ABDOMEN:  Soft, non-distended, nontender. No guarding, rigidity, rebound.  EXT: ROM intact all 4 extremities, no obvious deformities, + PT and BP multiphasic signals bilaterally     ASSESSMENT/PLAN:  73 y.o. male with GENEVIEVE with aortic rupture, s/p TEVAR     Plan  -Remove bernard   -cruz out today  -diet  -PT OT   -ASA and statin   -possible DC today     Will DW Dr. Kiara Mccord MD  Surgery Resident PGY-3  2/23/2024  6:56 AM

## 2024-02-23 NOTE — PROGRESS NOTES
Patient admitted to 3821 from OR, connected to all monitors, Art line zeroed and leveled, supplemental O2 in place.     Dr. Cha notified of patient admission.     R IJ CVC bleeding, new dressing applied, and sandbag placed per verbal order from Dr. Cha. Patient alert and oriented.

## 2024-02-23 NOTE — PROGRESS NOTES
4 Eyes Skin Assessment     NAME:  Neto Baca  YOB: 1950  MEDICAL RECORD NUMBER:  [id number]    The patient is being assessed for  Transfer to New Unit    I agree that at least one RN has performed a thorough Head to Toe Skin Assessment on the patient. ALL assessment sites listed below have been assessed.      Areas assessed by both nurses:    Head, Face, Ears, Shoulders, Back, Chest, Arms, Elbows, Hands, Sacrum. Buttock, Coccyx, Ischium, and Legs. Feet and Heels        Does the Patient have a Wound? No noted wound(s)       Davion Prevention initiated by RN: Yes  Wound Care Orders initiated by RN: No    Pressure Injury (Stage 3,4, Unstageable, DTI, NWPT, and Complex wounds) if present, place Wound referral order by RN under : No    New Ostomies, if present place, Ostomy referral order under : No     Nurse 1 eSignature: Electronically signed by Na Rodas RN on 2/23/24 at 2:10 PM EST    **SHARE this note so that the co-signing nurse can place an eSignature**    Nurse 2 eSignature: Electronically signed by Trisha Escalera RN on 2/23/24 at 6:02 PM EST

## 2024-02-23 NOTE — PROGRESS NOTES
Physical Therapy  Physical Therapy Initial Assessment     Name: Neto Baca  : 1950  MRN: [id number]      Date of Service: 2024    Evaluating PT:  Will Costello, PT, DPT [id number]    Room #:  6508/6508-A  Diagnosis:  Aortic rupture (HCC) [I71.8]  PMHx/PSHx:    Past Medical History:   Diagnosis Date    A-fib ([facility])     for cardioversion 3-12-21     CAD (coronary artery disease) 10/27/2011    Dr. Guy     Carotid stenosis, right 2021    Chronic venous insufficiency 2017    COVID-19 2021    resolved as of 3-3-21     Decreased pulses in feet     Diabetes mellitus (HCC)     DM (diabetes mellitus), type 2 (HCC) 10/27/2011    ED (erectile dysfunction)     Gout 10/31/2011    Heart attack ([facility])     HTN (hypertension) 10/27/2011    Hyperlipidemia 2012    Hypertension     Inguinal hernia     bilateral    Lateral myocardial infarction (HCC) 10/2008    due to circumflex occlusion    Leg swelling 2017     Procedure/Surgery:   TEVAR  Precautions:  Falls, cognition/lacks insight into deficits   Equipment Needs:  WW at this time and assistance    SUBJECTIVE:    Pt lives with wife in a 1 story home with 2 step(s) to enter and 2 grab bars.      Pt ambulated without device and was independent PTA.  Pt used \"walking stick\" in yard and SPC PRN in community.  Pt reported one fall outside of home and was not using walking stick.    OBJECTIVE:   Initial Evaluation  Date: 24 Treatment Short Term/ Long Term   Goals   AM-PAC 6 Clicks      Was pt agreeable to Eval/treatment? yes     Does pt have pain? No c/o pain     Bed Mobility  Rolling: NT  Supine to sit: SBA  Sit to supine: NT  Scooting: SBA  Mod Independent   Transfers Sit to stand: Amy  Stand to sit: Amy  Stand pivot: Amy with SPC; SBA with WW  Mod Independent with WW   Ambulation   100 feet with Amy with SPC  100 feet with SBA with WW  >400 feet with Mod Independent with WW   Stair negotiation: ascended and descended NT  >4 steps  observation of tasks, assessment of data and education on plan of care and goals.    CPT codes:  [x] Low Complexity PT evaluation 31944  [] Moderate Complexity PT evaluation 33760  [] High Complexity PT evaluation 54702  [] PT Re-evaluation 87055  [] Gait training 27789 - minutes  [] Manual therapy 26662 - minutes  [x] Therapeutic activities 72786 10 minutes  [] Therapeutic exercises 16995 - minutes  [] Neuromuscular reeducation 64614 - minutes     Will Costello, PT, DPT  [id number]

## 2024-02-23 NOTE — PROGRESS NOTES
OCCUPATIONAL THERAPY INITIAL EVALUATION    [facility]  [address] [place], [place]       Date:2024                                                               Patient Name: Neto Baca  MRN: [id number]  : 1950  Room: [address] [place] [address]    Evaluating OT: Carmen Elizabeth, OTR/L [id number]     Ordering Provider:  Chacha Julio APRN - CNP   Order Date:      Diagnosis: Aortic rupture (HCC) [I71.8]        Surgery/Procedures:     THORACIC AORTIC ANEURYSM REPAIR ENDOVASCULAR           Pertinent Medical History:    Past Medical History:   Diagnosis Date    A-fib ([facility])     for cardioversion 3-12-21     CAD (coronary artery disease) 10/27/2011    Dr. Guy     Carotid stenosis, right 2021    Chronic venous insufficiency 2017    COVID-19 2021    resolved as of 3-3-21     Decreased pulses in feet     Diabetes mellitus (HCC)     DM (diabetes mellitus), type 2 (HCC) 10/27/2011    ED (erectile dysfunction)     Gout 10/31/2011    Heart attack ([facility])     HTN (hypertension) 10/27/2011    Hyperlipidemia 2012    Hypertension     Inguinal hernia     bilateral    Lateral myocardial infarction (HCC) 10/2008    due to circumflex occlusion    Leg swelling 2017          *Precautions:  Fall Risk, safety    Assessment of current deficits   [x] Functional mobility  [x]ADLs  [x] Strength               [x]Cognition   [x] Functional transfers   [x] IADLs         [x] Safety Awareness   [x]Endurance   [] Fine Coordination        [x] ROM     [] Vision/perception   []Sensation    []Gross Motor Coordination [x] Balance   [] Delirium                  []Motor Control     [] Communication    OT PLAN OF CARE   OT POC based on physician orders, patient diagnosis and results of clinical assessment.       Frequency/Duration: 1-3 days/wk for 1-2 weeks PRN    Specific OT Treatment to include:   ADL retraining/adapted techniques and AE recommendations to  increase functional independence within precautions                    Energy conservation techniques to improve tolerance for selfcare routine   Functional transfer/mobility training/DME recommendations for increased independence, safety and fall prevention         Patient/family education to increase safety and functional independence within precautions              Environmental modifications for safe mobility and completion of ADLs                           Cognitive retraining ex's to improve problem solving skills & safe participation in ADLs/IADLs  Sensory re-education techniques to improve extremity awareness, maintain skin integrity and improve hand function                             Therapeutic activity to improve functional performance during ADLs/IADLs                                         Therapeutic exercise to improve tolerance and functional strength for ADLs   Balance retraining exercises/tasks for facilitation of postural control with dynamic challenges during ADLs .  Positioning to improve functional independence  Neuromuscular re-education: facilitation of righting/equilibrium reactions, normalization muscle tone/facilitation active functional movement                        Recommended Adaptive Equipment: TBA: LB dressing AE as needed for safe reach and energy conservation (owns AE), WW     Home Living: Pt lives with wife  in a 1 floor plan with 2 step(s) to enter through garage and 2 handles.   Bathroom setup: walk in shower with seat and rail  Equipment owned: shower seat, walking stick, walker, LB dressing AE    Prior Level of Function: Dolly with ADLs; Assist as needed with IADLs.   Dolly (walking stick) for ambulation.   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 Pt reports he recently received pain medication.     Cognition: A&O: 4/4    Follows 1-2 step commands with min redirection. Wife corrects pt  100

## 2024-02-23 NOTE — PROGRESS NOTES
CVICU Progress Note    Name: Neto Baca  MRN: [id number]    CC: Postoperative Critical Care Management     Indication for Surgery: Penetrating ulcer of aorta   LVEF:  30-35%    RVF:  Normal     Important/Relevant PMH/PSH:  HPL, HTN, Afib, CAD s/p CABG, MAZE 2021, DM Type 2, gout, right ICA stenosis (50-69%)    Procedure/Surgeries: 2/22/2024 THORACIC AORTIC ANEURYSM REPAIR ENDOVASCULAR         Intake/Output Summary (Last 24 hours) at 2/23/2024 1120  Last data filed at 2/23/2024 0740  Gross per 24 hour   Intake 1708.46 ml   Output 1075 ml   Net 633.46 ml       Recent Labs     02/22/24  1155 02/23/24  0358   WBC 14.9* 16.6*   HGB 12.7 13.5   HCT 37.2 38.8    206      Lab Results   Component Value Date/Time     02/23/2024 03:58 AM    K 4.7 02/23/2024 03:58 AM    K 4.2 03/27/2023 06:10 PM     02/23/2024 03:58 AM    CO2 21 02/23/2024 03:58 AM    BUN 34 02/23/2024 03:58 AM    CREATININE 1.0 02/23/2024 03:58 AM    GLUCOSE 207 02/23/2024 03:58 AM    GLUCOSE 149 11/01/2011 04:50 AM    CALCIUM 8.4 02/23/2024 03:58 AM    MG 2.1 12/21/2023 09:30 AM         Physical Exam:    BP (!) 96/54   Pulse 66   Temp 97.8 °F (36.6 °C) (Temporal)   Resp (!) 4   Ht 1.676 m (5' 6\")   Wt 84.8 kg (186 lb 14.4 oz)   SpO2 90%   BMI 30.17 kg/m²       General: Awake, alert.  C/o back pain   Pulmonary: No wheezes, no accessory muscle use noted   Cardiovascular:  RRR, no heaves or thrills on palpation  Tele: SR 60s   Abdomen: Soft, nontender, hypoactive BS   Extremities: Palpable pulses all extremities  Neurologic/Psych: A&Ox3, MENDEZ to command   Skin: Warm and dry  Incisions: Right groin site C/D/I       Assessment/Plan: POD #1  - Continue neurovascular checks   - Tolerating diet, stop IVF  - Dueñas removed, monitor for void   - OOBTC, PT/OT  - Pain control-- Prn oxycodone and PRN IV dilaudid for breakthrough pain      Dispo: Discussed with Dr. Craig, transfer to Psychiatric today for pain control and PT/OT, possible discharge  tomorrow     Electronically signed by LINNETTE MARTINEZ CNP on 2/23/2024 at 11:20 AM

## 2024-02-24 LAB
ANION GAP SERPL CALCULATED.3IONS-SCNC: 10 MMOL/L (ref 7–16)
ANION GAP SERPL CALCULATED.3IONS-SCNC: 13 MMOL/L (ref 7–16)
BASOPHILS # BLD: 0 K/UL (ref 0–0.2)
BASOPHILS NFR BLD: 0 % (ref 0–2)
BUN SERPL-MCNC: 55 MG/DL (ref 6–23)
BUN SERPL-MCNC: 60 MG/DL (ref 6–23)
CALCIUM SERPL-MCNC: 8.4 MG/DL (ref 8.6–10.2)
CALCIUM SERPL-MCNC: 8.6 MG/DL (ref 8.6–10.2)
CHLORIDE SERPL-SCNC: 98 MMOL/L (ref 98–107)
CHLORIDE SERPL-SCNC: 99 MMOL/L (ref 98–107)
CO2 SERPL-SCNC: 20 MMOL/L (ref 22–29)
CO2 SERPL-SCNC: 22 MMOL/L (ref 22–29)
CREAT SERPL-MCNC: 1.2 MG/DL (ref 0.7–1.2)
CREAT SERPL-MCNC: 1.5 MG/DL (ref 0.7–1.2)
EOSINOPHIL # BLD: 0 K/UL (ref 0.05–0.5)
EOSINOPHILS RELATIVE PERCENT: 0 % (ref 0–6)
ERYTHROCYTE [DISTWIDTH] IN BLOOD BY AUTOMATED COUNT: 14 % (ref 11.5–15)
ERYTHROCYTE [DISTWIDTH] IN BLOOD BY AUTOMATED COUNT: 14 % (ref 11.5–15)
GFR SERPL CREATININE-BSD FRML MDRD: 50 ML/MIN/1.73M2
GFR SERPL CREATININE-BSD FRML MDRD: >60 ML/MIN/1.73M2
GLUCOSE BLD-MCNC: 159 MG/DL (ref 74–99)
GLUCOSE BLD-MCNC: 220 MG/DL (ref 74–99)
GLUCOSE BLD-MCNC: 250 MG/DL (ref 74–99)
GLUCOSE BLD-MCNC: 284 MG/DL (ref 74–99)
GLUCOSE SERPL-MCNC: 198 MG/DL (ref 74–99)
GLUCOSE SERPL-MCNC: 240 MG/DL (ref 74–99)
HCT VFR BLD AUTO: 37.2 % (ref 37–54)
HCT VFR BLD AUTO: 39 % (ref 37–54)
HGB BLD-MCNC: 12.9 G/DL (ref 12.5–16.5)
HGB BLD-MCNC: 13 G/DL (ref 12.5–16.5)
LYMPHOCYTES NFR BLD: 0 K/UL (ref 1.5–4)
LYMPHOCYTES RELATIVE PERCENT: 0 % (ref 20–42)
MAGNESIUM SERPL-MCNC: 3.3 MG/DL (ref 1.6–2.6)
MCH RBC QN AUTO: 33.5 PG (ref 26–35)
MCH RBC QN AUTO: 34.2 PG (ref 26–35)
MCHC RBC AUTO-ENTMCNC: 33.3 G/DL (ref 32–34.5)
MCHC RBC AUTO-ENTMCNC: 34.7 G/DL (ref 32–34.5)
MCV RBC AUTO: 100.5 FL (ref 80–99.9)
MCV RBC AUTO: 98.7 FL (ref 80–99.9)
MONOCYTES NFR BLD: 0.31 K/UL (ref 0.1–0.95)
MONOCYTES NFR BLD: 2 % (ref 2–12)
NEUTROPHILS NFR BLD: 98 % (ref 43–80)
NEUTS SEG NFR BLD: 17.59 K/UL (ref 1.8–7.3)
PLATELET # BLD AUTO: 222 K/UL (ref 130–450)
PLATELET # BLD AUTO: 232 K/UL (ref 130–450)
PMV BLD AUTO: 10.9 FL (ref 7–12)
PMV BLD AUTO: 11 FL (ref 7–12)
POTASSIUM SERPL-SCNC: 4.7 MMOL/L (ref 3.5–5)
POTASSIUM SERPL-SCNC: 5.1 MMOL/L (ref 3.5–5)
RBC # BLD AUTO: 3.77 M/UL (ref 3.8–5.8)
RBC # BLD AUTO: 3.88 M/UL (ref 3.8–5.8)
RBC # BLD: ABNORMAL 10*6/UL
SODIUM SERPL-SCNC: 131 MMOL/L (ref 132–146)
SODIUM SERPL-SCNC: 131 MMOL/L (ref 132–146)
WBC OTHER # BLD: 13.1 K/UL (ref 4.5–11.5)
WBC OTHER # BLD: 17.9 K/UL (ref 4.5–11.5)

## 2024-02-24 PROCEDURE — 80048 BASIC METABOLIC PNL TOTAL CA: CPT

## 2024-02-24 PROCEDURE — 6370000000 HC RX 637 (ALT 250 FOR IP): Performed by: SURGERY

## 2024-02-24 PROCEDURE — 6370000000 HC RX 637 (ALT 250 FOR IP): Performed by: NURSE PRACTITIONER

## 2024-02-24 PROCEDURE — 6370000000 HC RX 637 (ALT 250 FOR IP): Performed by: STUDENT IN AN ORGANIZED HEALTH CARE EDUCATION/TRAINING PROGRAM

## 2024-02-24 PROCEDURE — 85027 COMPLETE CBC AUTOMATED: CPT

## 2024-02-24 PROCEDURE — 2580000003 HC RX 258: Performed by: NURSE PRACTITIONER

## 2024-02-24 PROCEDURE — 83735 ASSAY OF MAGNESIUM: CPT

## 2024-02-24 PROCEDURE — 36415 COLL VENOUS BLD VENIPUNCTURE: CPT

## 2024-02-24 PROCEDURE — 85025 COMPLETE CBC W/AUTO DIFF WBC: CPT

## 2024-02-24 PROCEDURE — 6370000000 HC RX 637 (ALT 250 FOR IP): Performed by: INTERNAL MEDICINE

## 2024-02-24 PROCEDURE — 2580000003 HC RX 258: Performed by: INTERNAL MEDICINE

## 2024-02-24 PROCEDURE — 82962 GLUCOSE BLOOD TEST: CPT

## 2024-02-24 PROCEDURE — 6360000002 HC RX W HCPCS: Performed by: STUDENT IN AN ORGANIZED HEALTH CARE EDUCATION/TRAINING PROGRAM

## 2024-02-24 PROCEDURE — 2140000000 HC CCU INTERMEDIATE R&B

## 2024-02-24 RX ORDER — MAGNESIUM CARB/ALUMINUM HYDROX 105-160MG
296 TABLET,CHEWABLE ORAL ONCE
Status: COMPLETED | OUTPATIENT
Start: 2024-02-24 | End: 2024-02-24

## 2024-02-24 RX ORDER — 0.9 % SODIUM CHLORIDE 0.9 %
500 INTRAVENOUS SOLUTION INTRAVENOUS ONCE
Status: COMPLETED | OUTPATIENT
Start: 2024-02-24 | End: 2024-02-24

## 2024-02-24 RX ORDER — GLIPIZIDE 5 MG/1
10 TABLET ORAL
Status: DISCONTINUED | OUTPATIENT
Start: 2024-02-24 | End: 2024-02-26 | Stop reason: HOSPADM

## 2024-02-24 RX ORDER — BISACODYL 10 MG
10 SUPPOSITORY, RECTAL RECTAL DAILY
Status: DISCONTINUED | OUTPATIENT
Start: 2024-02-24 | End: 2024-02-26 | Stop reason: HOSPADM

## 2024-02-24 RX ORDER — 0.9 % SODIUM CHLORIDE 0.9 %
250 INTRAVENOUS SOLUTION INTRAVENOUS ONCE
Status: COMPLETED | OUTPATIENT
Start: 2024-02-24 | End: 2024-02-24

## 2024-02-24 RX ORDER — SODIUM CHLORIDE 9 MG/ML
INJECTION, SOLUTION INTRAVENOUS CONTINUOUS
Status: DISCONTINUED | OUTPATIENT
Start: 2024-02-24 | End: 2024-02-26 | Stop reason: HOSPADM

## 2024-02-24 RX ADMIN — SODIUM CHLORIDE, PRESERVATIVE FREE 10 ML: 5 INJECTION INTRAVENOUS at 08:56

## 2024-02-24 RX ADMIN — GLIPIZIDE 10 MG: 5 TABLET ORAL at 16:54

## 2024-02-24 RX ADMIN — ACETAMINOPHEN 650 MG: 325 TABLET ORAL at 23:19

## 2024-02-24 RX ADMIN — MAGNESIUM CITRATE 296 ML: 1.75 LIQUID ORAL at 13:09

## 2024-02-24 RX ADMIN — AMIODARONE HYDROCHLORIDE 100 MG: 200 TABLET ORAL at 09:16

## 2024-02-24 RX ADMIN — ENOXAPARIN SODIUM 40 MG: 100 INJECTION SUBCUTANEOUS at 08:56

## 2024-02-24 RX ADMIN — INSULIN LISPRO 2 UNITS: 100 INJECTION, SOLUTION INTRAVENOUS; SUBCUTANEOUS at 09:14

## 2024-02-24 RX ADMIN — SACUBITRIL AND VALSARTAN 1 TABLET: 24; 26 TABLET, FILM COATED ORAL at 09:15

## 2024-02-24 RX ADMIN — BISACODYL 10 MG: 10 SUPPOSITORY RECTAL at 09:16

## 2024-02-24 RX ADMIN — INSULIN LISPRO 4 UNITS: 100 INJECTION, SOLUTION INTRAVENOUS; SUBCUTANEOUS at 16:55

## 2024-02-24 RX ADMIN — SODIUM CHLORIDE 250 ML: 9 INJECTION, SOLUTION INTRAVENOUS at 15:42

## 2024-02-24 RX ADMIN — ASPIRIN 81 MG: 81 TABLET, CHEWABLE ORAL at 09:16

## 2024-02-24 RX ADMIN — PRAVASTATIN SODIUM 20 MG: 20 TABLET ORAL at 20:22

## 2024-02-24 RX ADMIN — INSULIN LISPRO 4 UNITS: 100 INJECTION, SOLUTION INTRAVENOUS; SUBCUTANEOUS at 13:15

## 2024-02-24 RX ADMIN — SODIUM CHLORIDE 500 ML: 9 INJECTION, SOLUTION INTRAVENOUS at 16:54

## 2024-02-24 RX ADMIN — SENNOSIDES AND DOCUSATE SODIUM 2 TABLET: 50; 8.6 TABLET ORAL at 09:15

## 2024-02-24 RX ADMIN — SODIUM CHLORIDE: 9 INJECTION, SOLUTION INTRAVENOUS at 20:25

## 2024-02-24 RX ADMIN — SODIUM CHLORIDE, PRESERVATIVE FREE 10 ML: 5 INJECTION INTRAVENOUS at 20:22

## 2024-02-24 RX ADMIN — SODIUM CHLORIDE: 9 INJECTION, SOLUTION INTRAVENOUS at 14:52

## 2024-02-24 RX ADMIN — TAMSULOSIN HYDROCHLORIDE 0.4 MG: 0.4 CAPSULE ORAL at 09:16

## 2024-02-24 RX ADMIN — LACTULOSE 20 G: 20 SOLUTION ORAL at 08:55

## 2024-02-24 RX ADMIN — GLIPIZIDE 5 MG: 5 TABLET ORAL at 09:16

## 2024-02-24 RX ADMIN — POLYETHYLENE GLYCOL 3350 17 G: 17 POWDER, FOR SOLUTION ORAL at 08:56

## 2024-02-24 ASSESSMENT — PAIN SCALES - GENERAL: PAINLEVEL_OUTOF10: 4

## 2024-02-24 ASSESSMENT — PAIN DESCRIPTION - DESCRIPTORS: DESCRIPTORS: ACHING;DISCOMFORT;NAGGING

## 2024-02-24 ASSESSMENT — PAIN DESCRIPTION - LOCATION: LOCATION: BACK

## 2024-02-24 ASSESSMENT — PAIN DESCRIPTION - ORIENTATION: ORIENTATION: LOWER

## 2024-02-24 NOTE — PLAN OF CARE
Problem: Discharge Planning  Goal: Discharge to home or other facility with appropriate resources  2/23/2024 2239 by Mando Chi RN  Outcome: Progressing  2/23/2024 1053 by Kell Ng RN  Outcome: Progressing  Flowsheets (Taken 2/22/2024 2000 by Adeola Echols RN)  Discharge to home or other facility with appropriate resources:   Identify barriers to discharge with patient and caregiver   Arrange for needed discharge resources and transportation as appropriate   Identify discharge learning needs (meds, wound care, etc)   Arrange for interpreters to assist at discharge as needed   Refer to discharge planning if patient needs post-hospital services based on physician order or complex needs related to functional status, cognitive ability or social support system     Problem: Chronic Conditions and Co-morbidities  Goal: Patient's chronic conditions and co-morbidity symptoms are monitored and maintained or improved  Outcome: Progressing     Problem: Safety - Adult  Goal: Free from fall injury  2/23/2024 2239 by Mando Chi RN  Outcome: Progressing  2/23/2024 1053 by Kell Ng RN  Outcome: Progressing  Flowsheets (Taken 2/23/2024 1053)  Free From Fall Injury: Instruct family/caregiver on patient safety     Problem: ABCDS Injury Assessment  Goal: Absence of physical injury  Outcome: Progressing     Problem: Skin/Tissue Integrity  Goal: Absence of new skin breakdown  Description: 1.  Monitor for areas of redness and/or skin breakdown  2.  Assess vascular access sites hourly  3.  Every 4-6 hours minimum:  Change oxygen saturation probe site  4.  Every 4-6 hours:  If on nasal continuous positive airway pressure, respiratory therapy assess nares and determine need for appliance change or resting period.  Outcome: Progressing     Problem: Hematologic - Adult  Goal: Maintains hematologic stability  2/23/2024 2239 by Mando Chi RN  Outcome: Progressing  2/23/2024 1053 by Kell Ng RN  Outcome:  Progressing  Flowsheets (Taken 2/22/2024 2127 by Adeola Echols, RN)  Maintains hematologic stability:   Administer blood products/factors as ordered   Monitor labs for bleeding or clotting disorders   Assess for signs and symptoms of bleeding or hemorrhage     Problem: Pain  Goal: Verbalizes/displays adequate comfort level or baseline comfort level  Outcome: Progressing

## 2024-02-24 NOTE — PROGRESS NOTES
Spoke to Dr Craig via perfect serve regarding pt refusing mag citrate, per Dr Craig pt is still ok for dc today

## 2024-02-24 NOTE — PROGRESS NOTES
dextrose bolus 10% 125 mL, 125 mL, IntraVENous, PRN **OR** dextrose bolus 10% 250 mL, 250 mL, IntraVENous, PRN, Nori, Chacha, APRN - CNP    glucagon (rDNA) injection 1 mg, 1 mg, SubCUTAneous, PRN, Nori, Chacha, APRN - CNP    dextrose 10 % infusion, , IntraVENous, Continuous PRN, Nori, Chacha, APRN - CNP    sodium chloride flush 0.9 % injection 10 mL, 10 mL, IntraVENous, 2 times per day, Nori, Chacha, APRN - CNP, 10 mL at 02/24/24 0856    sodium chloride flush 0.9 % injection 10 mL, 10 mL, IntraVENous, PRN, Nori, Chacha, APRN - CNP    potassium chloride (KLOR-CON M) extended release tablet 40 mEq, 40 mEq, Oral, PRN **OR** potassium bicarb-citric acid (EFFER-K) effervescent tablet 40 mEq, 40 mEq, Oral, PRN **OR** potassium chloride 10 mEq/100 mL IVPB (Peripheral Line), 10 mEq, IntraVENous, PRN, Nori, Chacha, APRN - CNP    magnesium sulfate 2000 mg in 50 mL IVPB premix, 2,000 mg, IntraVENous, PRN, Nori, Chacha, APRN - CNP    ondansetron (ZOFRAN-ODT) disintegrating tablet 4 mg, 4 mg, Oral, Q8H PRN **OR** ondansetron (ZOFRAN) injection 4 mg, 4 mg, IntraVENous, Q6H PRN, Chacha Julio, APRN - CNP    acetaminophen (TYLENOL) tablet 650 mg, 650 mg, Oral, Q4H PRN, Nori, Chacha, APRN - CNP    oxyCODONE (ROXICODONE) immediate release tablet 5 mg, 5 mg, Oral, Q4H PRN **OR** oxyCODONE HCl (OXY-IR) immediate release tablet 10 mg, 10 mg, Oral, Q4H PRN, Nori, Chacha, APRN - CNP, 10 mg at 02/23/24 0921    Objective:    BP (!) 140/75   Pulse 59   Temp 98.7 °F (37.1 °C) (Temporal)   Resp 18   Ht 1.676 m (5' 6\")   Wt 83.7 kg (184 lb 9.6 oz)   SpO2 94%   BMI 29.80 kg/m²     Heart:  reg  Lungs:  ctab  Abd: + bs nondistended  Extrem:  edema legs    CBC with Differential:    Lab Results   Component Value Date/Time    WBC 17.9 02/24/2024 06:29 AM    RBC 3.88 02/24/2024 06:29 AM    HGB 13.0 02/24/2024 06:29 AM    HCT 39.0 02/24/2024 06:29 AM     02/24/2024 06:29 AM    .5 02/24/2024 06:29 AM     MCH 33.5 02/24/2024 06:29 AM    MCHC 33.3 02/24/2024 06:29 AM    RDW 14.0 02/24/2024 06:29 AM    SEGSPCT 79 10/27/2011 05:00 PM    LYMPHOPCT 0 02/24/2024 06:29 AM    MONOPCT 2 02/24/2024 06:29 AM    BASOPCT 0 02/24/2024 06:29 AM    MONOSABS 0.31 02/24/2024 06:29 AM    LYMPHSABS 0.00 02/24/2024 06:29 AM    EOSABS 0.00 02/24/2024 06:29 AM    BASOSABS 0.00 02/24/2024 06:29 AM     CMP:    Lab Results   Component Value Date/Time     02/24/2024 06:29 AM    K 5.1 02/24/2024 06:29 AM    K 4.2 03/27/2023 06:10 PM    CL 98 02/24/2024 06:29 AM    CO2 20 02/24/2024 06:29 AM    BUN 55 02/24/2024 06:29 AM    CREATININE 1.2 02/24/2024 06:29 AM    GFRAA >60 08/29/2022 11:27 AM    LABGLOM >60 02/24/2024 06:29 AM    GLUCOSE 240 02/24/2024 06:29 AM    GLUCOSE 149 11/01/2011 04:50 AM    PROT 6.1 02/22/2024 11:55 AM    LABALBU 2.8 02/22/2024 11:55 AM    LABALBU 3.6 11/01/2011 04:50 AM    CALCIUM 8.4 02/24/2024 06:29 AM    BILITOT 1.9 02/22/2024 11:55 AM    ALKPHOS 118 02/22/2024 11:55 AM    AST 27 02/22/2024 11:55 AM    ALT 21 02/22/2024 11:55 AM     Warfarin PT/INR:    Lab Results   Component Value Date    INR 1.3 07/09/2021    INR 1.1 07/07/2021    INR 1.3 06/14/2021    PROTIME 14.4 (H) 07/09/2021    PROTIME 12.1 07/07/2021    PROTIME 13.6 (H) 06/14/2021       Assessment:    Principal Problem:    Aortic rupture (HCC)  Resolved Problems:    * No resolved hospital problems. *      Plan:  Start ivf increase activity lab aristeo Osborn,   2:12 PM  2/24/2024

## 2024-02-24 NOTE — PROGRESS NOTES
VASCULAR SURGERY  DAILY PROGRESS NOTE  2/24/2024    CHIEF COMPLAINT:  Chief Complaint   Patient presents with    Chest Pain    Abdominal Pain     Patient c/o CP / abdominal pain . States he has been constipated . Given 324 ASA and 1 nitro per EMS       SUBJECTIVE:  Patient feels well this AM. Pain improving. Ambulating.     OBJECTIVE:  /61   Pulse 61   Temp 98.6 °F (37 °C) (Temporal)   Resp 16   Ht 1.676 m (5' 6\")   Wt 83.7 kg (184 lb 9.6 oz)   SpO2 92%   BMI 29.80 kg/m²     GENERAL:  NAD. A&Ox3.  HEENT: normocephalic   LUNGS:  on room air. No acute respiratory distress  CARDIOVASCULAR: hemodynamically stable  SKIN: warm and dry, groin sites C/D/I without hematoma  ABDOMEN:  Soft, non-distended, nontender. No guarding, rigidity, rebound.  EXT: ROM intact all 4 extremities, no obvious deformities, + PT and BP multiphasic signals bilaterally     ASSESSMENT/PLAN:  73 y.o. male with GENEVIEVE with aortic rupture, s/p TEVAR     Plan  -diet as tolerated  -PT OT: 16/24  -ASA and statin   -added suppository for lack of BM  -ok to DC from vascular surgery POV. F/u with Dr. Craig in 2 weeks.    Rasheeda Cha MD  Surgery Resident PGY-3  2/24/2024  6:38 AM     Doing well. Would like to have a bowel movement prior to discharge. Mag citrate added, suppository today. Otherwise is okay to dc from vascular surgery standpoint.

## 2024-02-24 NOTE — PROGRESS NOTES
Patient transferred to PCCU from CV. Wife at beside and confirmed patient had all his clothes. Wife states she has his cell phone no other belongings.

## 2024-02-24 NOTE — PATIENT CARE CONFERENCE
P Quality Flow/Interdisciplinary Rounds Progress Note        Quality Flow Rounds held on February 24, 2024    Disciplines Attending:  Bedside Nurse, , , and Nursing Unit Leadership    Neto Baca was admitted on 2/22/2024 11:07 AM    Anticipated Discharge Date:       Disposition:    Davion Score:  Davion Scale Score: 19    Readmission Risk              Risk of Unplanned Readmission:  18           Discussed patient goal for the day, patient clinical progression, and barriers to discharge.  The following Goal(s) of the Day/Commitment(s) have been identified:  Report labs/diagnostics      Rabia Dorsey RN  February 24, 2024

## 2024-02-25 LAB
ANION GAP SERPL CALCULATED.3IONS-SCNC: 10 MMOL/L (ref 7–16)
BASOPHILS # BLD: 0 K/UL (ref 0–0.2)
BASOPHILS NFR BLD: 0 % (ref 0–2)
BUN SERPL-MCNC: 59 MG/DL (ref 6–23)
CALCIUM SERPL-MCNC: 8.8 MG/DL (ref 8.6–10.2)
CHLORIDE SERPL-SCNC: 99 MMOL/L (ref 98–107)
CO2 SERPL-SCNC: 24 MMOL/L (ref 22–29)
CREAT SERPL-MCNC: 1.2 MG/DL (ref 0.7–1.2)
EOSINOPHIL # BLD: 0 K/UL (ref 0.05–0.5)
EOSINOPHILS RELATIVE PERCENT: 0 % (ref 0–6)
ERYTHROCYTE [DISTWIDTH] IN BLOOD BY AUTOMATED COUNT: 14.1 % (ref 11.5–15)
GFR SERPL CREATININE-BSD FRML MDRD: >60 ML/MIN/1.73M2
GLUCOSE BLD-MCNC: 171 MG/DL (ref 74–99)
GLUCOSE BLD-MCNC: 188 MG/DL (ref 74–99)
GLUCOSE SERPL-MCNC: 129 MG/DL (ref 74–99)
HCT VFR BLD AUTO: 41.2 % (ref 37–54)
HGB BLD-MCNC: 14.1 G/DL (ref 12.5–16.5)
LYMPHOCYTES NFR BLD: 0.11 K/UL (ref 1.5–4)
LYMPHOCYTES RELATIVE PERCENT: 1 % (ref 20–42)
MCH RBC QN AUTO: 34.1 PG (ref 26–35)
MCHC RBC AUTO-ENTMCNC: 34.2 G/DL (ref 32–34.5)
MCV RBC AUTO: 99.5 FL (ref 80–99.9)
MONOCYTES NFR BLD: 0.77 K/UL (ref 0.1–0.95)
MONOCYTES NFR BLD: 6 % (ref 2–12)
NEUTROPHILS NFR BLD: 93 % (ref 43–80)
NEUTS SEG NFR BLD: 11.72 K/UL (ref 1.8–7.3)
PLATELET # BLD AUTO: 228 K/UL (ref 130–450)
PMV BLD AUTO: 11 FL (ref 7–12)
POTASSIUM SERPL-SCNC: 5 MMOL/L (ref 3.5–5)
RBC # BLD AUTO: 4.14 M/UL (ref 3.8–5.8)
RBC # BLD: ABNORMAL 10*6/UL
SODIUM SERPL-SCNC: 133 MMOL/L (ref 132–146)
WBC OTHER # BLD: 12.6 K/UL (ref 4.5–11.5)

## 2024-02-25 PROCEDURE — 82962 GLUCOSE BLOOD TEST: CPT

## 2024-02-25 PROCEDURE — 6370000000 HC RX 637 (ALT 250 FOR IP): Performed by: STUDENT IN AN ORGANIZED HEALTH CARE EDUCATION/TRAINING PROGRAM

## 2024-02-25 PROCEDURE — 2140000000 HC CCU INTERMEDIATE R&B

## 2024-02-25 PROCEDURE — 85025 COMPLETE CBC W/AUTO DIFF WBC: CPT

## 2024-02-25 PROCEDURE — 36415 COLL VENOUS BLD VENIPUNCTURE: CPT

## 2024-02-25 PROCEDURE — 80048 BASIC METABOLIC PNL TOTAL CA: CPT

## 2024-02-25 PROCEDURE — 2580000003 HC RX 258: Performed by: INTERNAL MEDICINE

## 2024-02-25 PROCEDURE — 6370000000 HC RX 637 (ALT 250 FOR IP): Performed by: NURSE PRACTITIONER

## 2024-02-25 PROCEDURE — 2580000003 HC RX 258: Performed by: NURSE PRACTITIONER

## 2024-02-25 PROCEDURE — 6360000002 HC RX W HCPCS: Performed by: INTERNAL MEDICINE

## 2024-02-25 RX ORDER — HALOPERIDOL 5 MG/ML
2 INJECTION INTRAMUSCULAR ONCE
Status: COMPLETED | OUTPATIENT
Start: 2024-02-25 | End: 2024-02-25

## 2024-02-25 RX ADMIN — PRAVASTATIN SODIUM 20 MG: 20 TABLET ORAL at 20:56

## 2024-02-25 RX ADMIN — TAMSULOSIN HYDROCHLORIDE 0.4 MG: 0.4 CAPSULE ORAL at 10:45

## 2024-02-25 RX ADMIN — SODIUM CHLORIDE: 9 INJECTION, SOLUTION INTRAVENOUS at 10:34

## 2024-02-25 RX ADMIN — SENNOSIDES AND DOCUSATE SODIUM 2 TABLET: 50; 8.6 TABLET ORAL at 10:46

## 2024-02-25 RX ADMIN — LACTULOSE 20 G: 20 SOLUTION ORAL at 20:56

## 2024-02-25 RX ADMIN — POLYETHYLENE GLYCOL 3350 17 G: 17 POWDER, FOR SOLUTION ORAL at 10:45

## 2024-02-25 RX ADMIN — HALOPERIDOL LACTATE 2 MG: 5 INJECTION, SOLUTION INTRAMUSCULAR at 02:55

## 2024-02-25 RX ADMIN — ASPIRIN 81 MG: 81 TABLET, CHEWABLE ORAL at 10:41

## 2024-02-25 RX ADMIN — SODIUM CHLORIDE, PRESERVATIVE FREE 10 ML: 5 INJECTION INTRAVENOUS at 20:56

## 2024-02-25 RX ADMIN — SODIUM CHLORIDE: 9 INJECTION, SOLUTION INTRAVENOUS at 23:14

## 2024-02-25 RX ADMIN — AMIODARONE HYDROCHLORIDE 100 MG: 200 TABLET ORAL at 10:41

## 2024-02-25 RX ADMIN — OXYCODONE 5 MG: 5 TABLET ORAL at 22:35

## 2024-02-25 RX ADMIN — LACTULOSE 20 G: 20 SOLUTION ORAL at 10:37

## 2024-02-25 ASSESSMENT — PAIN SCALES - GENERAL
PAINLEVEL_OUTOF10: 0
PAINLEVEL_OUTOF10: 8

## 2024-02-25 ASSESSMENT — PAIN DESCRIPTION - LOCATION: LOCATION: BACK

## 2024-02-25 ASSESSMENT — PAIN DESCRIPTION - DESCRIPTORS: DESCRIPTORS: ACHING

## 2024-02-25 ASSESSMENT — PAIN DESCRIPTION - ORIENTATION: ORIENTATION: LOWER

## 2024-02-25 NOTE — PROGRESS NOTES
Called patient's wife, Marilele Baca, and she said that patient was confused right now and that neighbor was not contacted to pick him up. She recalled that patient \"said he had 5 brothers but only has 3\" and \"this is not like him normally - it started happening recently.\" Given confusion, will try to calm down and not allow to leave AMA.    Mando Chi RN

## 2024-02-25 NOTE — PROGRESS NOTES
K 4.7 02/24/2024    BUN 60 (H) 02/24/2024    CREATININE 1.5 (H) 02/24/2024       A/P  73 y.o. 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 Craig in 2 weeks   - discussed with Dr. Kiara Montes,        Patient seen and examined. Cr downtrending today. Hgb stable, leukocytosis improving. Okay for dc from vascular perspective.

## 2024-02-25 NOTE — PLAN OF CARE
Problem: Discharge Planning  Goal: Discharge to home or other facility with appropriate resources  2/25/2024 0000 by Mando Chi RN  Outcome: Progressing  2/24/2024 1009 by Vero Welch RN  Outcome: Progressing     Problem: Chronic Conditions and Co-morbidities  Goal: Patient's chronic conditions and co-morbidity symptoms are monitored and maintained or improved  2/25/2024 0000 by Mando Chi RN  Outcome: Progressing  2/24/2024 1009 by Vero Welch RN  Outcome: Progressing     Problem: Safety - Adult  Goal: Free from fall injury  2/25/2024 0000 by Mando Chi RN  Outcome: Progressing  2/24/2024 1009 by Vero Welch RN  Outcome: Progressing     Problem: ABCDS Injury Assessment  Goal: Absence of physical injury  2/25/2024 0000 by Mando Chi RN  Outcome: Progressing  2/24/2024 1009 by Vero Welch RN  Outcome: Progressing     Problem: Skin/Tissue Integrity  Goal: Absence of new skin breakdown  Description: 1.  Monitor for areas of redness and/or skin breakdown  2.  Assess vascular access sites hourly  3.  Every 4-6 hours minimum:  Change oxygen saturation probe site  4.  Every 4-6 hours:  If on nasal continuous positive airway pressure, respiratory therapy assess nares and determine need for appliance change or resting period.  2/25/2024 0000 by Mando Chi RN  Outcome: Progressing  2/24/2024 1009 by Vero Welch RN  Outcome: Progressing     Problem: Hematologic - Adult  Goal: Maintains hematologic stability  2/25/2024 0000 by Mando Chi RN  Outcome: Progressing  2/24/2024 1009 by Vero Welch RN  Outcome: Progressing     Problem: Pain  Goal: Verbalizes/displays adequate comfort level or baseline comfort level  2/25/2024 0000 by Mando Chi RN  Outcome: Progressing  2/24/2024 1009 by Vero Welch RN  Outcome: Progressing

## 2024-02-25 NOTE — PROGRESS NOTES
Halodol was administered and security was able to talk to patient to stay in chair. IV fluids were restarted but patient is refusing cardiac monitor. Patient is more relaxed but still wanting to leave as soon as possible.    Mando Chi RN

## 2024-02-25 NOTE — PROGRESS NOTES
Patient refusing to wear heart monitor at this time. Educated on importance of monitoring his heart rate and rhythm. He continues to refuse. Patient sitting in chair at bedside, dressed in jeans and button down. Refusing to change into hospital gown when assisted back to bed. Patient resting comfortably in bed, wife at bedside.       GIL Jung, RN  2/25/2024  8:15 AM    Patient resting in bed, voicing no complaints. PCA obtained vitals and patient HR is 46. Patient agreeable to heart monitor being placed along with continuous pulsox. Patient responds to questions appropriately and requests to be left alone until supper.       GIL Jung, RN  2/25/2024  3:33 PM

## 2024-02-25 NOTE — PROGRESS NOTES
Patient has become agitated and said that he would like to leave AMA due to needing \"to wait 25 minutes to use the restroom.\" Patient called his spouse and said that his neighbor is planning to pick him up tonight. I attempted to calm patient down and instructed him that leaving AMA is not recommended but patient was adamant that he was leaving. I attempted to call spouse, Marielle Baca, but phone rang busy.    Patient does have a history of being disoriented at times but has rarely been uncooperative until now.    Mando Chi, RN

## 2024-02-25 NOTE — PROGRESS NOTES
Updated wife, Marielle Baca, regarding patient desire to leave AMA. Notified her about using haldol per doctor's orders and patient starting to relax in chair. Mrs. Baca said that she would come in the morning to see him.    Mando Chi RN

## 2024-02-25 NOTE — PROGRESS NOTES
Hospital Medicine    Subjective:  pt alert conversive hypotension yesterday given ivf bun/cr elevated yesterday / wife at bedside this am      Current Facility-Administered Medications:     bisacodyl (DULCOLAX) suppository 10 mg, 10 mg, Rectal, Daily, Rasheeda Cha MD, 10 mg at 02/24/24 0916    0.9 % sodium chloride infusion, , IntraVENous, Continuous, Luis A Osborn, , Last Rate: 75 mL/hr at 02/24/24 2025, New Bag at 02/24/24 2025    glipiZIDE (GLUCOTROL) tablet 10 mg, 10 mg, Oral, BID AC, Luis A Osborn DO, 10 mg at 02/24/24 1654    aspirin chewable tablet 81 mg, 81 mg, Oral, Daily, Chacha Julio APRN - CNP, 81 mg at 02/24/24 0916    HYDROmorphone (DILAUDID) injection 0.25 mg, 0.25 mg, IntraVENous, Q3H PRN, Chacha Julio APRN - CNP    enoxaparin (LOVENOX) injection 40 mg, 40 mg, SubCUTAneous, Daily, Neto Mccord MD, 40 mg at 02/24/24 0856    polyethylene glycol (GLYCOLAX) packet 17 g, 17 g, Oral, Daily, Neto Mccord MD, 17 g at 02/24/24 0856    sennosides-docusate sodium (SENOKOT-S) 8.6-50 MG tablet 2 tablet, 2 tablet, Oral, Daily, Neto Mccord MD, 2 tablet at 02/24/24 0915    lactulose (CHRONULAC) 10 GM/15ML solution 20 g, 20 g, Oral, BID, Neto Mccord MD, 20 g at 02/24/24 0855    tamsulosin (FLOMAX) capsule 0.4 mg, 0.4 mg, Oral, Daily, Chacha Julio APRN - CNP, 0.4 mg at 02/24/24 0916    pravastatin (PRAVACHOL) tablet 20 mg, 20 mg, Oral, Nightly, Chacha Julio APRN - CNP, 20 mg at 02/24/24 2022    amiodarone (CORDARONE) tablet 100 mg, 100 mg, Oral, Daily, Chacha Julio APRN - CNP, 100 mg at 02/24/24 0916    sacubitril-valsartan (ENTRESTO) 24-26 MG per tablet 1 tablet, 1 tablet, Oral, BID, Luis A Osborn, DO, 1 tablet at 02/24/24 0915    insulin lispro (HUMALOG) injection vial 0-8 Units, 0-8 Units, SubCUTAneous, TID WC, Chacha Julio APRN - CNP, 4 Units at 02/24/24 1655    insulin lispro (HUMALOG) injection vial 0-4 Units, 0-4 Units, SubCUTAneous, Nightly, Nori,     HGB 12.9 02/24/2024 05:07 PM    HCT 37.2 02/24/2024 05:07 PM     02/24/2024 05:07 PM    MCV 98.7 02/24/2024 05:07 PM    MCH 34.2 02/24/2024 05:07 PM    MCHC 34.7 02/24/2024 05:07 PM    RDW 14.0 02/24/2024 05:07 PM    SEGSPCT 79 10/27/2011 05:00 PM    LYMPHOPCT 0 02/24/2024 06:29 AM    MONOPCT 2 02/24/2024 06:29 AM    BASOPCT 0 02/24/2024 06:29 AM    MONOSABS 0.31 02/24/2024 06:29 AM    LYMPHSABS 0.00 02/24/2024 06:29 AM    EOSABS 0.00 02/24/2024 06:29 AM    BASOSABS 0.00 02/24/2024 06:29 AM     CMP:    Lab Results   Component Value Date/Time     02/24/2024 05:07 PM    K 4.7 02/24/2024 05:07 PM    K 4.2 03/27/2023 06:10 PM    CL 99 02/24/2024 05:07 PM    CO2 22 02/24/2024 05:07 PM    BUN 60 02/24/2024 05:07 PM    CREATININE 1.5 02/24/2024 05:07 PM    GFRAA >60 08/29/2022 11:27 AM    LABGLOM 50 02/24/2024 05:07 PM    GLUCOSE 198 02/24/2024 05:07 PM    GLUCOSE 149 11/01/2011 04:50 AM    PROT 6.1 02/22/2024 11:55 AM    LABALBU 2.8 02/22/2024 11:55 AM    LABALBU 3.6 11/01/2011 04:50 AM    CALCIUM 8.6 02/24/2024 05:07 PM    BILITOT 1.9 02/22/2024 11:55 AM    ALKPHOS 118 02/22/2024 11:55 AM    AST 27 02/22/2024 11:55 AM    ALT 21 02/22/2024 11:55 AM     Warfarin PT/INR:    Lab Results   Component Value Date    INR 1.3 07/09/2021    INR 1.1 07/07/2021    INR 1.3 06/14/2021    PROTIME 14.4 (H) 07/09/2021    PROTIME 12.1 07/07/2021    PROTIME 13.6 (H) 06/14/2021       Assessment:    Principal Problem:    Aortic rupture (HCC)  Resolved Problems:    * No resolved hospital problems. *      Plan:  Cont ivf await am lab        Luis A Osborn,   7:44 AM  2/25/2024

## 2024-02-25 NOTE — PATIENT CARE CONFERENCE
P Quality Flow/Interdisciplinary Rounds Progress Note        Quality Flow Rounds held on February 25, 2024    Disciplines Attending:  Bedside Nurse, , , and Nursing Unit Leadership    Neto Baca was admitted on 2/22/2024 11:07 AM    Anticipated Discharge Date:       Disposition:    Davion Score:  Davion Scale Score: 19    Readmission Risk              Risk of Unplanned Readmission:  17           Discussed patient goal for the day, patient clinical progression, and barriers to discharge.  The following Goal(s) of the Day/Commitment(s) have been identified:  discharge planning      Rabia Dorsey RN  February 25, 2024

## 2024-02-25 NOTE — PROGRESS NOTES
Patient refused vitals. Educated patient but he said he wanted to be left alone.    Mando Chi RN

## 2024-02-26 VITALS
HEIGHT: 66 IN | HEART RATE: 45 BPM | OXYGEN SATURATION: 94 % | WEIGHT: 184.6 LBS | TEMPERATURE: 98.2 F | RESPIRATION RATE: 19 BRPM | BODY MASS INDEX: 29.67 KG/M2 | DIASTOLIC BLOOD PRESSURE: 50 MMHG | SYSTOLIC BLOOD PRESSURE: 118 MMHG

## 2024-02-26 LAB
ABO/RH: NORMAL
ANTIBODY SCREEN: NEGATIVE
ARM BAND NUMBER: NORMAL
BASOPHILS # BLD: 0 K/UL (ref 0–0.2)
BASOPHILS NFR BLD: 0 % (ref 0–2)
BLOOD BANK DISPENSE STATUS: NORMAL
BLOOD BANK SAMPLE EXPIRATION: NORMAL
BPU ID: NORMAL
COMPONENT: NORMAL
CROSSMATCH RESULT: NORMAL
EOSINOPHIL # BLD: 0 K/UL (ref 0.05–0.5)
EOSINOPHILS RELATIVE PERCENT: 0 % (ref 0–6)
ERYTHROCYTE [DISTWIDTH] IN BLOOD BY AUTOMATED COUNT: 14 % (ref 11.5–15)
GLUCOSE BLD-MCNC: 199 MG/DL (ref 74–99)
GLUCOSE BLD-MCNC: 334 MG/DL (ref 74–99)
HCT VFR BLD AUTO: 34.3 % (ref 37–54)
HGB BLD-MCNC: 11.5 G/DL (ref 12.5–16.5)
LYMPHOCYTES NFR BLD: 0.31 K/UL (ref 1.5–4)
LYMPHOCYTES RELATIVE PERCENT: 4 % (ref 20–42)
MCH RBC QN AUTO: 33.7 PG (ref 26–35)
MCHC RBC AUTO-ENTMCNC: 33.5 G/DL (ref 32–34.5)
MCV RBC AUTO: 100.6 FL (ref 80–99.9)
MONOCYTES NFR BLD: 0.46 K/UL (ref 0.1–0.95)
MONOCYTES NFR BLD: 5 % (ref 2–12)
NEUTROPHILS NFR BLD: 91 % (ref 43–80)
NEUTS SEG NFR BLD: 8.03 K/UL (ref 1.8–7.3)
PLATELET # BLD AUTO: 178 K/UL (ref 130–450)
PMV BLD AUTO: 11 FL (ref 7–12)
RBC # BLD AUTO: 3.41 M/UL (ref 3.8–5.8)
RBC # BLD: ABNORMAL 10*6/UL
TRANSFUSION STATUS: NORMAL
UNIT DIVISION: 0
WBC OTHER # BLD: 8.8 K/UL (ref 4.5–11.5)

## 2024-02-26 PROCEDURE — 6370000000 HC RX 637 (ALT 250 FOR IP): Performed by: STUDENT IN AN ORGANIZED HEALTH CARE EDUCATION/TRAINING PROGRAM

## 2024-02-26 PROCEDURE — 6370000000 HC RX 637 (ALT 250 FOR IP): Performed by: INTERNAL MEDICINE

## 2024-02-26 PROCEDURE — 6370000000 HC RX 637 (ALT 250 FOR IP): Performed by: NURSE PRACTITIONER

## 2024-02-26 PROCEDURE — 85025 COMPLETE CBC W/AUTO DIFF WBC: CPT

## 2024-02-26 PROCEDURE — 82962 GLUCOSE BLOOD TEST: CPT

## 2024-02-26 PROCEDURE — 36415 COLL VENOUS BLD VENIPUNCTURE: CPT

## 2024-02-26 PROCEDURE — 6360000002 HC RX W HCPCS: Performed by: STUDENT IN AN ORGANIZED HEALTH CARE EDUCATION/TRAINING PROGRAM

## 2024-02-26 RX ORDER — ASPIRIN 81 MG/1
81 TABLET, CHEWABLE ORAL DAILY
Qty: 30 TABLET | Refills: 3 | COMMUNITY
Start: 2024-02-27

## 2024-02-26 RX ADMIN — SACUBITRIL AND VALSARTAN 1 TABLET: 24; 26 TABLET, FILM COATED ORAL at 08:57

## 2024-02-26 RX ADMIN — ENOXAPARIN SODIUM 40 MG: 100 INJECTION SUBCUTANEOUS at 08:59

## 2024-02-26 RX ADMIN — TAMSULOSIN HYDROCHLORIDE 0.4 MG: 0.4 CAPSULE ORAL at 08:58

## 2024-02-26 RX ADMIN — POLYETHYLENE GLYCOL 3350 17 G: 17 POWDER, FOR SOLUTION ORAL at 08:59

## 2024-02-26 RX ADMIN — AMIODARONE HYDROCHLORIDE 100 MG: 200 TABLET ORAL at 08:58

## 2024-02-26 RX ADMIN — ACETAMINOPHEN 650 MG: 325 TABLET ORAL at 02:50

## 2024-02-26 RX ADMIN — SENNOSIDES AND DOCUSATE SODIUM 2 TABLET: 50; 8.6 TABLET ORAL at 08:58

## 2024-02-26 RX ADMIN — ASPIRIN 81 MG: 81 TABLET, CHEWABLE ORAL at 08:58

## 2024-02-26 RX ADMIN — GLIPIZIDE 10 MG: 5 TABLET ORAL at 06:41

## 2024-02-26 NOTE — CARE COORDINATION
2/26/24  Transition of care update.  Patient was a transfer from [facility] with Discharge order noted.  Patient was admitted for aortic rupture and is S/p emergent TEVAR.  A referral was made to [facility] per the previous case manger with call placed to [facility] to update on the discharge order.  Home Care Orders are on the chart.  Patient to discharge home with wife.  SANDRA/DALE to follow.    Electronically signed by KHLOE Bowman on 2/26/2024 at 1:30 PM

## 2024-02-26 NOTE — PATIENT CARE CONFERENCE
P Quality Flow/Interdisciplinary Rounds Progress Note        Quality Flow Rounds held on February 26, 2024    Disciplines Attending:  Bedside Nurse, , , and Nursing Unit Leadership    Neto Baca was admitted on 2/22/2024 11:07 AM    Anticipated Discharge Date:       Disposition:    Davion Score:  Davion Scale Score: 19    Readmission Risk              Risk of Unplanned Readmission:  16           Discussed patient goal for the day, patient clinical progression, and barriers to discharge.  The following Goal(s) of the Day/Commitment(s) have been identified:  discharge planning       Rabia Dorsey RN  February 26, 2024

## 2024-02-26 NOTE — PLAN OF CARE

## 2024-02-26 NOTE — PROGRESS NOTES
[facility] Medicine    Subjective: Seen this morning  Patient's wife is at bedside      Current Facility-Administered Medications:     bisacodyl (DULCOLAX) suppository 10 mg, 10 mg, Rectal, Daily, Rasheeda Cha MD, 10 mg at 02/24/24 0916    0.9 % sodium chloride infusion, , IntraVENous, Continuous, Luis A Osborn, , Stopped at 02/26/24 0908    glipiZIDE (GLUCOTROL) tablet 10 mg, 10 mg, Oral, BID AC, Luis A Osborn, , 10 mg at 02/26/24 0641    aspirin chewable tablet 81 mg, 81 mg, Oral, Daily, Chacha Julio APRN - CNP, 81 mg at 02/26/24 0858    HYDROmorphone (DILAUDID) injection 0.25 mg, 0.25 mg, IntraVENous, Q3H PRN, Chacha Julio APRN - CNP    enoxaparin (LOVENOX) injection 40 mg, 40 mg, SubCUTAneous, Daily, Neto Mccord MD, 40 mg at 02/26/24 0859    polyethylene glycol (GLYCOLAX) packet 17 g, 17 g, Oral, Daily, Neto Mccord MD, 17 g at 02/26/24 0859    sennosides-docusate sodium (SENOKOT-S) 8.6-50 MG tablet 2 tablet, 2 tablet, Oral, Daily, Neto Mccord MD, 2 tablet at 02/26/24 0858    lactulose (CHRONULAC) 10 GM/15ML solution 20 g, 20 g, Oral, BID, Neto Mccord MD, 20 g at 02/25/24 2056    tamsulosin (FLOMAX) capsule 0.4 mg, 0.4 mg, Oral, Daily, Chacha Julio APRN - CNP, 0.4 mg at 02/26/24 0858    pravastatin (PRAVACHOL) tablet 20 mg, 20 mg, Oral, Nightly, Chacha Julio APRN - CNP, 20 mg at 02/25/24 2056    amiodarone (CORDARONE) tablet 100 mg, 100 mg, Oral, Daily, Chacha Julio APRN - CNP, 100 mg at 02/26/24 0858    sacubitril-valsartan (ENTRESTO) 24-26 MG per tablet 1 tablet, 1 tablet, Oral, BID, Luis A Osborn, DO, 1 tablet at 02/26/24 0857    insulin lispro (HUMALOG) injection vial 0-8 Units, 0-8 Units, SubCUTAneous, TID WC, Chacha Julio APRN - CNP, 4 Units at 02/24/24 1655    insulin lispro (HUMALOG) injection vial 0-4 Units, 0-4 Units, SubCUTAneous, Nightly, Chacha Julio APRN - CNP    glucose chewable tablet 16 g, 4 tablet, Oral, PRN, Chacha Julio, LINNETTE -

## 2024-02-26 NOTE — PROGRESS NOTES
Vascular Surgery Progress Note    Pt is being seen in f/u today s/p TEVAR 2/22    Subjective  Pt s/e. Pt denies any chest pain.  Passing flatus and had bowel movement. Ready to go home today.     Current Medications:    sodium chloride 75 mL/hr at 02/25/24 2314    dextrose        HYDROmorphone, glucose, dextrose bolus **OR** dextrose bolus, glucagon (rDNA), dextrose, sodium chloride flush, potassium chloride **OR** potassium alternative oral replacement **OR** potassium chloride, magnesium sulfate, ondansetron **OR** ondansetron, acetaminophen, oxyCODONE **OR** oxyCODONE    bisacodyl  10 mg Rectal Daily    glipiZIDE  10 mg Oral BID AC    aspirin  81 mg Oral Daily    enoxaparin  40 mg SubCUTAneous Daily    polyethylene glycol  17 g Oral Daily    sennosides-docusate sodium  2 tablet Oral Daily    lactulose  20 g Oral BID    tamsulosin  0.4 mg Oral Daily    pravastatin  20 mg Oral Nightly    amiodarone  100 mg Oral Daily    sacubitril-valsartan  1 tablet Oral BID    insulin lispro  0-8 Units SubCUTAneous TID WC    insulin lispro  0-4 Units SubCUTAneous Nightly    sodium chloride flush  10 mL IntraVENous 2 times per day        PHYSICAL EXAM:    BP (!) 105/48   Pulse 50   Temp 97.4 °F (36.3 °C) (Temporal)   Resp 16   Ht 1.676 m (5' 6\")   Wt 83.7 kg (184 lb 9.6 oz)   SpO2 94%   BMI 29.80 kg/m²     Intake/Output Summary (Last 24 hours) at 2/26/2024 0627  Last data filed at 2/26/2024 0554  Gross per 24 hour   Intake 932 ml   Output 500 ml   Net 432 ml            Gen: resting in chair in no acute distress  CVS: RR  Resp: No increased work of breathing  Abd: Soft, non-tender, non-distended  Extremities: +2 radial pulse bilaterally, 2+ DP BL     LABS:    Lab Results   Component Value Date    WBC 12.6 (H) 02/25/2024    HGB 14.1 02/25/2024    HCT 41.2 02/25/2024     02/25/2024    PROTIME 14.4 (H) 07/09/2021    INR 1.3 07/09/2021    APTT 25.2 07/09/2021    K 5.0 02/25/2024    BUN 59 (H) 02/25/2024    CREATININE 1.2

## 2024-02-27 ENCOUNTER — CARE COORDINATION (OUTPATIENT)
Dept: CARE COORDINATION | Age: 74
End: 2024-02-27

## 2024-02-27 NOTE — CARE COORDINATION
Care Transitions Initial Follow Up Call    Call within 2 business days of discharge: Yes    Care Transition Nurse attempted initial CT outreach leaving HIPAA VM, purpose of call, my contact and reminder to schedule appts.     Patient: Neto Baca Patient : 1950   MRN: <[id number]>  Reason for Admission: 2024 - 2024 [facility] IP. Aortic rupture, s/p THORACIC AORTIC ANEURYSM REPAIR ENDOVASCULAR.  Discharge Date: 24 RARS: Readmission Risk Score: 16.6  NR  CT    [facility] [place] [facility] Pool routed to schedule PCP appt.    [facility]    Tiffanie Craig MD (Vascular Surgery) wound re-check 3/13 10:45  Schedule an appointment with Ant Dahl MD (Internal Medicine) in 2 days (2024)  Follow up with Ant Dahl MD (Internal Medicine)  Cardio/Hunt 3/5 8:00    START taking:  aspirin  Start taking on: 2024  STOP taking:  polyethylene glycol 17 g packet (MiraLax)  senna-docusate 8.6-50 MG per tablet (PERICOLACE)  tadalafil 10 MG tablet (CIALIS)  Ask your medical team for guidance about these  existing prescriptions.  zinc gluconate 50 MG tablet    PDM: Marielle Baca : [phone]   Last Discharge Facility       Date Complaint Diagnosis Description Type Department Provider    24 Chest Pain; Abdominal Pain Aortic rupture (HCC) ED to Hosp-Admission (Discharged) (ADMITTED) [facility] Ant Dahl MD; Lissa.          Ciarra Burch RN

## 2024-02-28 ENCOUNTER — CARE COORDINATION (OUTPATIENT)
Dept: CARE COORDINATION | Age: 74
End: 2024-02-28

## 2024-02-28 ENCOUNTER — TELEPHONE (OUTPATIENT)
Dept: PHARMACY | Facility: CLINIC | Age: 74
End: 2024-02-28

## 2024-02-28 DIAGNOSIS — I71.8 AORTIC RUPTURE (HCC): Primary | ICD-10-CM

## 2024-02-28 NOTE — TELEPHONE ENCOUNTER
Received a referral: from Care Coordinator to review patient’s medications.     Please review mood change/anger/irritability side effects of any of his medications w/ spouse/Zaira. Pt is having these issues for two wks and are sudden changes. PCP advised but would appreciate your thoughts on medication involvement.     Called patient to schedule a time to speak with a pharmacist over the telephone.     Spoke to patient's wife, Marielle and advised her of the above message. She is busy at this time and would like me to call back later this afternoon.    Tamera Luu Rafal  Population Health    [facility] Clinical Pharmacy   [phone] option 2

## 2024-02-28 NOTE — TELEPHONE ENCOUNTER
No answer left VM: Please contact us at  [phone] to schedule this appointment. Pharmacists are available Monday thru Friday 7:30 AM till 5:30 PM.      Tamera Luu CPhT  Population Health    [facility] Clinical Pharmacy   [phone] option 2

## 2024-02-28 NOTE — CARE COORDINATION
Care Transitions Initial Follow Up Call    Call within 2 business days of discharge: Yes    Patient Current Location:  Home: [address] [place] [id number]    Care Transition Nurse contacted the spouse/partner by telephone to perform post hospital discharge assessment. Verified name and  with spouse/partner as identifiers. Provided introduction to self, and explanation of the Care Transition Nurse role.     Patient: Neto Baca Patient : 1950   MRN: [id number]  Reason for Admission:  2024 - 2024 [facility] IP. Aortic rupture, s/p THORACIC AORTIC ANEURYSM REPAIR ENDOVASCULAR.   Discharge Date: 24 RARS: Readmission Risk Score: 16.6  NR  CT     [facility] Front Pool routed on 24 to schedule PCP appt.     Tiffanie Trivedi MD (Vascular Surgery) wound re-check 3/13 10:45  PCP 3/4/24 3:20  Cardio/Hunt 3/5 8:00     START taking:  aspirin  Start taking on: 2024  STOP taking:  polyethylene glycol 17 g packet (MiraLax)  senna-docusate 8.6-50 MG per tablet (PERICOLACE)  tadalafil 10 MG tablet (CIALIS)  Ask your medical team for guidance about these  existing prescriptions.  zinc gluconate 50 MG tablet     PDM: Marielle Baca : [phone]     Last Discharge Facility       Date Complaint Diagnosis Description Type Department Provider    24 Chest Pain; Abdominal Pain Aortic rupture (HCC) ED to Hosp-Admission (Discharged) (ADMITTED) [facility] Ant Dahl MD; Nancy-S...            Was this an external facility discharge? No Discharge Facility: [facility]    Challenges to be reviewed by the provider   Additional needs identified to be addressed with provider: Yes  PCP office called and advised dramatic personality changes in past 2 wks w/ mood swings, time confusion, decreased appetite, swearing/irritability/anger/depression.                Method of communication with provider: phone.    CTN spoke w/ spouse/Marielle. Pt

## 2024-03-01 PROBLEM — F33.0 MAJOR DEPRESSIVE DISORDER, RECURRENT, MILD (HCC): Status: ACTIVE | Noted: 2024-03-01

## 2024-03-01 PROBLEM — F33.9 MAJOR DEPRESSIVE DISORDER, RECURRENT, UNSPECIFIED (HCC): Status: ACTIVE | Noted: 2024-03-01

## 2024-03-01 PROBLEM — F33.1 MAJOR DEPRESSIVE DISORDER, RECURRENT, MODERATE (HCC): Status: ACTIVE | Noted: 2024-03-01

## 2024-03-04 NOTE — TELEPHONE ENCOUNTER
Patient's wife called back and scheduled referral for 3/5/24 at 2:00pm.       Estrella Heredia [facility]  Clinical Pharmacy   Phone: [phone]       For Pharmacy Admin Tracking Only    Program: [employer]  CPA in place:  No  Recommendation Provided To: Patient/Caregiver: 1 via Telephone  Intervention Detail: Scheduled Appointment  Intervention Accepted By: Patient/Caregiver: 1  Gap Closed?: Yes   Time Spent (min): 10

## 2024-03-04 NOTE — TELEPHONE ENCOUNTER
2nd Attempt Documentation:  2nd attempt to contact this patient regarding the previous message    CLINICAL PHARMACY: REFERRAL  Patient unavailable at the time of call. Left following message on home TAD: please call back at toll-free [phone] to retrieve previous message.    MyChart message sent.     Tamera Luu CPhT  Population Health    [facility] Clinical Pharmacy   [phone] option 2     For Pharmacy Admin Tracking Only    Program: [employer]  CPA in place:  No  Gap Closed?: No   Time Spent (min): 15

## 2024-03-05 ENCOUNTER — APPOINTMENT (OUTPATIENT)
Dept: GENERAL RADIOLOGY | Age: 74
End: 2024-03-05
Payer: MEDICARE

## 2024-03-05 ENCOUNTER — HOSPITAL ENCOUNTER (INPATIENT)
Age: 74
LOS: 3 days | Discharge: HOME | End: 2024-03-08
Attending: EMERGENCY MEDICINE | Admitting: INTERNAL MEDICINE
Payer: MEDICARE

## 2024-03-05 ENCOUNTER — TELEPHONE (OUTPATIENT)
Dept: PHARMACY | Facility: CLINIC | Age: 74
End: 2024-03-05

## 2024-03-05 ENCOUNTER — APPOINTMENT (OUTPATIENT)
Dept: CT IMAGING | Age: 74
End: 2024-03-05
Attending: EMERGENCY MEDICINE
Payer: MEDICARE

## 2024-03-05 DIAGNOSIS — J18.9 PNEUMONIA DUE TO INFECTIOUS ORGANISM, UNSPECIFIED LATERALITY, UNSPECIFIED PART OF LUNG: ICD-10-CM

## 2024-03-05 DIAGNOSIS — A41.9 SEPTICEMIA (HCC): Primary | ICD-10-CM

## 2024-03-05 DIAGNOSIS — R26.2 AMBULATORY DYSFUNCTION: ICD-10-CM

## 2024-03-05 DIAGNOSIS — E16.2 HYPOGLYCEMIA: ICD-10-CM

## 2024-03-05 DIAGNOSIS — R09.02 HYPOXIA: ICD-10-CM

## 2024-03-05 PROBLEM — J11.1 INFLUENZA: Status: ACTIVE | Noted: 2024-03-05

## 2024-03-05 LAB
ALBUMIN SERPL-MCNC: 2.4 G/DL (ref 3.5–5.2)
ALP SERPL-CCNC: 138 U/L (ref 40–129)
ALT SERPL-CCNC: 38 U/L (ref 0–40)
ANION GAP SERPL CALCULATED.3IONS-SCNC: 9 MMOL/L (ref 7–16)
AST SERPL-CCNC: 81 U/L (ref 0–39)
B PARAP IS1001 DNA NPH QL NAA+NON-PROBE: NOT DETECTED
B PERT DNA SPEC QL NAA+PROBE: NOT DETECTED
BASOPHILS # BLD: 0.02 K/UL (ref 0–0.2)
BASOPHILS NFR BLD: 0 % (ref 0–2)
BILIRUB SERPL-MCNC: 1 MG/DL (ref 0–1.2)
BILIRUB UR QL STRIP: NEGATIVE
BNP SERPL-MCNC: 3684 PG/ML (ref 0–450)
BUN SERPL-MCNC: 16 MG/DL (ref 6–23)
C PNEUM DNA NPH QL NAA+NON-PROBE: NOT DETECTED
CALCIUM SERPL-MCNC: 7.8 MG/DL (ref 8.6–10.2)
CHLORIDE SERPL-SCNC: 96 MMOL/L (ref 98–107)
CLARITY UR: CLEAR
CO2 SERPL-SCNC: 24 MMOL/L (ref 22–29)
COLOR UR: YELLOW
CREAT SERPL-MCNC: 1.1 MG/DL (ref 0.7–1.2)
EOSINOPHIL # BLD: 0 K/UL (ref 0.05–0.5)
EOSINOPHILS RELATIVE PERCENT: 0 % (ref 0–6)
ERYTHROCYTE [DISTWIDTH] IN BLOOD BY AUTOMATED COUNT: 14.2 % (ref 11.5–15)
FLUAV H1 2009 PAN RNA NPH NAA+NON-PROBE: DETECTED
FLUAV RNA NPH QL NAA+NON-PROBE: DETECTED
FLUBV RNA NPH QL NAA+NON-PROBE: NOT DETECTED
GFR SERPL CREATININE-BSD FRML MDRD: >60 ML/MIN/1.73M2
GLUCOSE BLD-MCNC: 109 MG/DL (ref 74–99)
GLUCOSE BLD-MCNC: 65 MG/DL (ref 74–99)
GLUCOSE BLD-MCNC: 87 MG/DL (ref 74–99)
GLUCOSE SERPL-MCNC: 60 MG/DL (ref 74–99)
GLUCOSE UR STRIP-MCNC: NEGATIVE MG/DL
HADV DNA NPH QL NAA+NON-PROBE: NOT DETECTED
HCOV 229E RNA NPH QL NAA+NON-PROBE: NOT DETECTED
HCOV HKU1 RNA NPH QL NAA+NON-PROBE: NOT DETECTED
HCOV NL63 RNA NPH QL NAA+NON-PROBE: NOT DETECTED
HCOV OC43 RNA NPH QL NAA+NON-PROBE: NOT DETECTED
HCT VFR BLD AUTO: 33.4 % (ref 37–54)
HGB BLD-MCNC: 11.3 G/DL (ref 12.5–16.5)
HGB UR QL STRIP.AUTO: ABNORMAL
HMPV RNA NPH QL NAA+NON-PROBE: NOT DETECTED
HPIV1 RNA NPH QL NAA+NON-PROBE: NOT DETECTED
HPIV2 RNA NPH QL NAA+NON-PROBE: NOT DETECTED
HPIV3 RNA NPH QL NAA+NON-PROBE: NOT DETECTED
HPIV4 RNA NPH QL NAA+NON-PROBE: NOT DETECTED
IMM GRANULOCYTES # BLD AUTO: 0.03 K/UL (ref 0–0.58)
IMM GRANULOCYTES NFR BLD: 0 % (ref 0–5)
KETONES UR STRIP-MCNC: NEGATIVE MG/DL
LACTATE BLDV-SCNC: 1 MMOL/L (ref 0.5–1.9)
LEUKOCYTE ESTERASE UR QL STRIP: NEGATIVE
LYMPHOCYTES NFR BLD: 0.67 K/UL (ref 1.5–4)
LYMPHOCYTES RELATIVE PERCENT: 10 % (ref 20–42)
M PNEUMO DNA NPH QL NAA+NON-PROBE: NOT DETECTED
MCH RBC QN AUTO: 33 PG (ref 26–35)
MCHC RBC AUTO-ENTMCNC: 33.8 G/DL (ref 32–34.5)
MCV RBC AUTO: 97.7 FL (ref 80–99.9)
MONOCYTES NFR BLD: 0.25 K/UL (ref 0.1–0.95)
MONOCYTES NFR BLD: 4 % (ref 2–12)
NEUTROPHILS NFR BLD: 86 % (ref 43–80)
NEUTS SEG NFR BLD: 5.99 K/UL (ref 1.8–7.3)
NITRITE UR QL STRIP: NEGATIVE
PH UR STRIP: 6 [PH] (ref 5–9)
PLATELET # BLD AUTO: 154 K/UL (ref 130–450)
PMV BLD AUTO: 11 FL (ref 7–12)
POTASSIUM SERPL-SCNC: 3.6 MMOL/L (ref 3.5–5)
PROCALCITONIN SERPL-MCNC: 0.19 NG/ML (ref 0–0.08)
PROT SERPL-MCNC: 6.2 G/DL (ref 6.4–8.3)
PROT UR STRIP-MCNC: NEGATIVE MG/DL
RBC # BLD AUTO: 3.42 M/UL (ref 3.8–5.8)
RSV RNA NPH QL NAA+NON-PROBE: NOT DETECTED
RV+EV RNA NPH QL NAA+NON-PROBE: NOT DETECTED
SARS-COV-2 RNA NPH QL NAA+NON-PROBE: NOT DETECTED
SODIUM SERPL-SCNC: 129 MMOL/L (ref 132–146)
SP GR UR STRIP: 1.01 (ref 1–1.03)
SPECIMEN DESCRIPTION: ABNORMAL
TROPONIN I SERPL HS-MCNC: 45 NG/L (ref 0–11)
UROBILINOGEN UR STRIP-ACNC: 0.2 EU/DL (ref 0–1)
WBC OTHER # BLD: 7 K/UL (ref 4.5–11.5)

## 2024-03-05 PROCEDURE — 85025 COMPLETE CBC W/AUTO DIFF WBC: CPT

## 2024-03-05 PROCEDURE — 6360000004 HC RX CONTRAST MEDICATION: Performed by: RADIOLOGY

## 2024-03-05 PROCEDURE — 84145 PROCALCITONIN (PCT): CPT

## 2024-03-05 PROCEDURE — 70450 CT HEAD/BRAIN W/O DYE: CPT

## 2024-03-05 PROCEDURE — 83880 ASSAY OF NATRIURETIC PEPTIDE: CPT

## 2024-03-05 PROCEDURE — 71275 CT ANGIOGRAPHY CHEST: CPT

## 2024-03-05 PROCEDURE — 96361 HYDRATE IV INFUSION ADD-ON: CPT

## 2024-03-05 PROCEDURE — 2500000003 HC RX 250 WO HCPCS: Performed by: EMERGENCY MEDICINE

## 2024-03-05 PROCEDURE — 2140000000 HC CCU INTERMEDIATE R&B

## 2024-03-05 PROCEDURE — 36415 COLL VENOUS BLD VENIPUNCTURE: CPT

## 2024-03-05 PROCEDURE — 84484 ASSAY OF TROPONIN QUANT: CPT

## 2024-03-05 PROCEDURE — 2580000003 HC RX 258: Performed by: EMERGENCY MEDICINE

## 2024-03-05 PROCEDURE — 93005 ELECTROCARDIOGRAM TRACING: CPT | Performed by: EMERGENCY MEDICINE

## 2024-03-05 PROCEDURE — 6370000000 HC RX 637 (ALT 250 FOR IP): Performed by: INTERNAL MEDICINE

## 2024-03-05 PROCEDURE — 6370000000 HC RX 637 (ALT 250 FOR IP): Performed by: EMERGENCY MEDICINE

## 2024-03-05 PROCEDURE — 87086 URINE CULTURE/COLONY COUNT: CPT

## 2024-03-05 PROCEDURE — 87040 BLOOD CULTURE FOR BACTERIA: CPT

## 2024-03-05 PROCEDURE — 71045 X-RAY EXAM CHEST 1 VIEW: CPT

## 2024-03-05 PROCEDURE — 74174 CTA ABD&PLVS W/CONTRAST: CPT

## 2024-03-05 PROCEDURE — 80053 COMPREHEN METABOLIC PANEL: CPT

## 2024-03-05 PROCEDURE — 0202U NFCT DS 22 TRGT SARS-COV-2: CPT

## 2024-03-05 PROCEDURE — 82962 GLUCOSE BLOOD TEST: CPT

## 2024-03-05 PROCEDURE — 6360000002 HC RX W HCPCS: Performed by: EMERGENCY MEDICINE

## 2024-03-05 PROCEDURE — 99285 EMERGENCY DEPT VISIT HI MDM: CPT

## 2024-03-05 PROCEDURE — 83605 ASSAY OF LACTIC ACID: CPT

## 2024-03-05 RX ORDER — BENZONATATE 100 MG/1
100 CAPSULE ORAL 3 TIMES DAILY PRN
Status: DISCONTINUED | OUTPATIENT
Start: 2024-03-05 | End: 2024-03-08 | Stop reason: HOSPADM

## 2024-03-05 RX ORDER — INSULIN LISPRO 100 [IU]/ML
0-8 INJECTION, SOLUTION INTRAVENOUS; SUBCUTANEOUS
Status: DISCONTINUED | OUTPATIENT
Start: 2024-03-06 | End: 2024-03-08 | Stop reason: HOSPADM

## 2024-03-05 RX ORDER — GLIPIZIDE 5 MG/1
5 TABLET ORAL 4 TIMES DAILY
Status: DISCONTINUED | OUTPATIENT
Start: 2024-03-05 | End: 2024-03-07

## 2024-03-05 RX ORDER — LACTULOSE 10 G/15ML
10 SOLUTION ORAL EVERY EVENING
Status: DISCONTINUED | OUTPATIENT
Start: 2024-03-05 | End: 2024-03-07

## 2024-03-05 RX ORDER — PRAVASTATIN SODIUM 20 MG
20 TABLET ORAL DAILY
Status: DISCONTINUED | OUTPATIENT
Start: 2024-03-06 | End: 2024-03-08 | Stop reason: HOSPADM

## 2024-03-05 RX ORDER — ACETAMINOPHEN 500 MG
1000 TABLET ORAL ONCE
Status: COMPLETED | OUTPATIENT
Start: 2024-03-05 | End: 2024-03-05

## 2024-03-05 RX ORDER — ASPIRIN 81 MG/1
81 TABLET, CHEWABLE ORAL DAILY
Status: DISCONTINUED | OUTPATIENT
Start: 2024-03-06 | End: 2024-03-08 | Stop reason: HOSPADM

## 2024-03-05 RX ORDER — SODIUM CHLORIDE 9 MG/ML
INJECTION, SOLUTION INTRAVENOUS CONTINUOUS
Status: DISCONTINUED | OUTPATIENT
Start: 2024-03-05 | End: 2024-03-08

## 2024-03-05 RX ORDER — TAMSULOSIN HYDROCHLORIDE 0.4 MG/1
0.4 CAPSULE ORAL DAILY
Status: DISCONTINUED | OUTPATIENT
Start: 2024-03-06 | End: 2024-03-08 | Stop reason: HOSPADM

## 2024-03-05 RX ORDER — INSULIN LISPRO 100 [IU]/ML
0-4 INJECTION, SOLUTION INTRAVENOUS; SUBCUTANEOUS NIGHTLY
Status: DISCONTINUED | OUTPATIENT
Start: 2024-03-05 | End: 2024-03-08 | Stop reason: HOSPADM

## 2024-03-05 RX ORDER — MIRTAZAPINE 15 MG/1
15 TABLET, FILM COATED ORAL NIGHTLY
Status: DISCONTINUED | OUTPATIENT
Start: 2024-03-05 | End: 2024-03-08 | Stop reason: HOSPADM

## 2024-03-05 RX ORDER — ZINC SULFATE 50(220)MG
50 CAPSULE ORAL DAILY
Status: DISCONTINUED | OUTPATIENT
Start: 2024-03-06 | End: 2024-03-08 | Stop reason: HOSPADM

## 2024-03-05 RX ORDER — 0.9 % SODIUM CHLORIDE 0.9 %
1000 INTRAVENOUS SOLUTION INTRAVENOUS ONCE
Status: COMPLETED | OUTPATIENT
Start: 2024-03-05 | End: 2024-03-05

## 2024-03-05 RX ORDER — DEXTROSE MONOHYDRATE 100 MG/ML
INJECTION, SOLUTION INTRAVENOUS CONTINUOUS PRN
Status: DISCONTINUED | OUTPATIENT
Start: 2024-03-05 | End: 2024-03-08 | Stop reason: HOSPADM

## 2024-03-05 RX ORDER — GLUCAGON 1 MG/ML
1 KIT INJECTION PRN
Status: DISCONTINUED | OUTPATIENT
Start: 2024-03-05 | End: 2024-03-08 | Stop reason: HOSPADM

## 2024-03-05 RX ORDER — AMIODARONE HYDROCHLORIDE 200 MG/1
100 TABLET ORAL DAILY
Status: DISCONTINUED | OUTPATIENT
Start: 2024-03-06 | End: 2024-03-08 | Stop reason: HOSPADM

## 2024-03-05 RX ORDER — OSELTAMIVIR PHOSPHATE 75 MG/1
75 CAPSULE ORAL ONCE
Status: COMPLETED | OUTPATIENT
Start: 2024-03-05 | End: 2024-03-05

## 2024-03-05 RX ORDER — SENNOSIDES A AND B 8.6 MG/1
1 TABLET, FILM COATED ORAL 2 TIMES DAILY
Status: DISCONTINUED | OUTPATIENT
Start: 2024-03-05 | End: 2024-03-08 | Stop reason: HOSPADM

## 2024-03-05 RX ORDER — LANOLIN ALCOHOL/MO/W.PET/CERES
1000 CREAM (GRAM) TOPICAL DAILY
Status: DISCONTINUED | OUTPATIENT
Start: 2024-03-06 | End: 2024-03-08 | Stop reason: HOSPADM

## 2024-03-05 RX ORDER — OSELTAMIVIR PHOSPHATE 75 MG/1
75 CAPSULE ORAL 2 TIMES DAILY
Status: DISCONTINUED | OUTPATIENT
Start: 2024-03-05 | End: 2024-03-08 | Stop reason: HOSPADM

## 2024-03-05 RX ORDER — DEXTROSE MONOHYDRATE 25 G/50ML
25 INJECTION, SOLUTION INTRAVENOUS PRN
Status: DISCONTINUED | OUTPATIENT
Start: 2024-03-05 | End: 2024-03-08 | Stop reason: HOSPADM

## 2024-03-05 RX ORDER — TORSEMIDE 20 MG/1
20 TABLET ORAL DAILY
Status: DISCONTINUED | OUTPATIENT
Start: 2024-03-06 | End: 2024-03-08 | Stop reason: HOSPADM

## 2024-03-05 RX ADMIN — DEXTROSE MONOHYDRATE 25 G: 25 INJECTION, SOLUTION INTRAVENOUS at 10:20

## 2024-03-05 RX ADMIN — SODIUM CHLORIDE: 9 INJECTION, SOLUTION INTRAVENOUS at 22:22

## 2024-03-05 RX ADMIN — MIRTAZAPINE 15 MG: 15 TABLET, FILM COATED ORAL at 23:24

## 2024-03-05 RX ADMIN — ACETAMINOPHEN 1000 MG: 500 TABLET ORAL at 10:50

## 2024-03-05 RX ADMIN — BENZONATATE 100 MG: 100 CAPSULE ORAL at 23:24

## 2024-03-05 RX ADMIN — SODIUM CHLORIDE 1000 ML: 9 INJECTION, SOLUTION INTRAVENOUS at 10:36

## 2024-03-05 RX ADMIN — SACUBITRIL AND VALSARTAN 1 TABLET: 24; 26 TABLET, FILM COATED ORAL at 23:24

## 2024-03-05 RX ADMIN — CEFEPIME 2000 MG: 2 INJECTION, POWDER, FOR SOLUTION INTRAVENOUS at 10:52

## 2024-03-05 RX ADMIN — IOPAMIDOL 90 ML: 755 INJECTION, SOLUTION INTRAVENOUS at 14:26

## 2024-03-05 RX ADMIN — OSELTAMIVIR PHOSPHATE 75 MG: 75 CAPSULE ORAL at 15:36

## 2024-03-05 RX ADMIN — LACTULOSE 10 G: 20 SOLUTION ORAL at 23:24

## 2024-03-05 RX ADMIN — SENNOSIDES 8.6 MG: 8.6 TABLET, FILM COATED ORAL at 23:24

## 2024-03-05 RX ADMIN — SODIUM CHLORIDE: 9 INJECTION, SOLUTION INTRAVENOUS at 10:53

## 2024-03-05 RX ADMIN — VANCOMYCIN HYDROCHLORIDE 1750 MG: 1 INJECTION, POWDER, LYOPHILIZED, FOR SOLUTION INTRAVENOUS at 15:34

## 2024-03-05 RX ADMIN — OSELTAMIVIR PHOSPHATE 75 MG: 75 CAPSULE ORAL at 23:24

## 2024-03-05 ASSESSMENT — LIFESTYLE VARIABLES
HOW MANY STANDARD DRINKS CONTAINING ALCOHOL DO YOU HAVE ON A TYPICAL DAY: 1 OR 2
HOW OFTEN DO YOU HAVE A DRINK CONTAINING ALCOHOL: MONTHLY OR LESS

## 2024-03-05 ASSESSMENT — PAIN - FUNCTIONAL ASSESSMENT
PAIN_FUNCTIONAL_ASSESSMENT: NONE - DENIES PAIN
PAIN_FUNCTIONAL_ASSESSMENT: NONE - DENIES PAIN

## 2024-03-05 NOTE — ED PROVIDER NOTES
Department of Emergency Medicine   ED  Provider Note  Admit Date/RoomTime: 3/5/2024  9:57 AM  ED Room: 23/23          History of Present Illness:  3/5/24, Time: 10:12 AM EST  Chief Complaint   Patient presents with    Fatigue     Patient c/o generalized weakness denies CP/SOB . States he had aortic sx 1 week ago                Neto Baca is a 73 y.o. male presenting to the ED for fatigue.  Patient is a 48 for the past couple of days.  He has been intermittent hypoglycemic since yesterday.  No associated pain.  Just overall feels tired, subjective fevers and chills.  He is status post aortic dissection repair on 2/22/2024.  He has no chest pain or abdominal pain at this time.  Just does not feel well.  Also complains of a congestion along with runny nose and fever.  No change in bowel or bladder, no pain or symptoms.  No paresthesias, no urinary symptoms, no other symptoms or complaints.    Review of Systems:   Pertinent positives and negatives are stated within HPI, all other systems reviewed and are negative.        --------------------------------------------- PAST HISTORY ---------------------------------------------  Past Medical History:  has a past medical history of A-fib (HCC), CAD (coronary artery disease), Carotid stenosis, right, Chronic venous insufficiency, COVID-19, Decreased pulses in feet, Diabetes mellitus (HCC), DM (diabetes mellitus), type 2 (HCC), ED (erectile dysfunction), Gout, Heart attack (HCC), HTN (hypertension), Hyperlipidemia, Hypertension, Inguinal hernia, Lateral myocardial infarction (HCC), and Leg swelling.    Past Surgical History:  has a past surgical history that includes knee surgery; [place] tooth extraction; other surgical history (10/31/2011); cardiovascular stress test (12/14/2009); Cholecystectomy (1999); Cataract removal (Left); hernia repair (@ age 10); Testicle surgery; Cardiac catheterization; Cardiac catheterization (06/16/2021); Coronary artery bypass graft (Left,  -------------------------------------------------  I have personally reviewed all laboratory and imaging results for this patient. Results are listed below.     LABS: (Lab results interpreted by me)  Results for orders placed or performed during the hospital encounter of 03/05/24   Culture, Blood 1    Specimen: Blood   Result Value Ref Range    Specimen Description .BLOOD     Special Requests          Culture NO GROWTH <24 HRS    Culture, Blood 2    Specimen: Blood   Result Value Ref Range    Specimen Description .BLOOD     Special Requests          Culture NO GROWTH <24 HRS    Respiratory Panel, Molecular, with COVID-19 (Restricted: peds pts or suitable admitted adults)    Specimen: Nasopharyngeal Swab   Result Value Ref Range    Specimen Description .NASOPHARYNGEAL SWAB     Adenovirus PCR Not Detected Not Detected    Coronavirus 229E PCR Not Detected Not Detected    Coronavirus HKU1 PCR Not Detected Not Detected    Coronavirus NL63 PCR Not Detected Not Detected    Coronavirus OC43 PCR Not Detected Not Detected    SARS-CoV-2, PCR Not Detected Not Detected    Human Metapneumovirus PCR Not Detected Not Detected    Rhino/Enterovirus PCR Not Detected Not Detected    Influenza A by PCR DETECTED (A) Not Detected    Influenza A H1 (2009) PCR DETECTED (A) Not Detected    Influenza B by PCR Not Detected Not Detected    Parainfluenza 1 PCR Not Detected Not Detected    Parainfluenza 2 PCR Not Detected Not Detected    Parainfluenza 3 PCR Not Detected Not Detected    Parainfluenza 4 PCR Not Detected Not Detected    Resp Syncytial Virus PCR Not Detected Not Detected    Bordetella parapertussis by PCR Not Detected Not Detected    B Pertussis by PCR Not Detected Not Detected    Chlamydia pneumoniae By PCR Not Detected Not Detected    Mycoplasma pneumo by PCR Not Detected Not Detected   CBC with Auto Differential   Result Value Ref Range    WBC 7.0 4.5 - 11.5 k/uL    RBC 3.42 (L) 3.80 - 5.80 m/uL    Hemoglobin 11.3 (L) 12.5 - 16.5  in this region with   prominent mural thrombus which has increased since last time as described.   2. Small right and trace left pleural effusions with associated atelectasis.   3. Coronary artery disease.   4. Mild ascites.   5. Diverticulosis.   6. Moderate/large stool burden. Correlate for constipation.   7. Small fat filled right inguinal hernia.         CTA ABDOMEN PELVIS W CONTRAST   Final Result   1. Stent graft repair of the lower thoracic and upper abdominal aorta. No   evidence of endoleak.  Dilation of the excluded aorta in this region with   prominent mural thrombus which has increased since last time as described.   2. Small right and trace left pleural effusions with associated atelectasis.   3. Coronary artery disease.   4. Mild ascites.   5. Diverticulosis.   6. Moderate/large stool burden. Correlate for constipation.   7. Small fat filled right inguinal hernia.         CT HEAD WO CONTRAST   Final Result   1.  No intracranial hemorrhage or acute intracranial disease.      2.  Carotid and vertebrobasilar atherosclerotic calcifications.      3.  Paranasal sinus mild inflammatory disease.         XR CHEST PORTABLE   Final Result   Small small right pleural effusion.      Cardiomegaly.             ------------------------- NURSING NOTES AND VITALS REVIEWED ---------------------------   The nursing notes within the ED encounter and vital signs as below have been reviewed by myself  BP (!) 102/55   Pulse 61   Temp (!) 101 °F (38.3 °C) (Rectal)   Resp 20   Ht 1.689 m (5' 6.5\")   Wt 81.6 kg (180 lb)   SpO2 98%   BMI 28.62 kg/m²     Oxygen Saturation Interpretation: Improved after treatment    The patient’s available past medical records and past encounters were reviewed.        ------------------------------ ED COURSE/MEDICAL DECISION MAKING----------------------  Medications   dextrose 50 % IV solution (25 g IntraVENous Given 3/5/24 1020)   0.9 % sodium chloride infusion ( IntraVENous Rate/Dose  03/05/24 1730 03/05/24 1927   BP: (!) 103/56 (!) 106/54 (!) 100/53 (!) 103/52   Pulse: 54 56 55 59   Resp:    16   Temp:    97.6 °F (36.4 °C)   TempSrc:    Oral   SpO2: 99% 100% 99% 98%   Weight:       Height:         Card/Pulm:  Rhythm: rapid rate.  Heart Sounds: no murmurs, gallops, or rubs. clear to auscultation, no wheezes or rales, and unlabored breathing.    Capillary Refill: normal.  Radial Pulse:  present 1+.  Skin:  Warm.      Please note that the withdrawal or failure to initiate urgent interventions for this patient would likely result in a life threatening deterioration or permanent disability.      Accordingly this patient received 30 minutes of critical care time, excluding separately billable procedures.      Counseling:   The emergency provider has spoken with the patient and discussed today’s results, in addition to providing specific details for the plan of care and counseling regarding the diagnosis and prognosis.  Questions are answered at this time and they are agreeable with the plan.       --------------------------------- IMPRESSION AND DISPOSITION ---------------------------------    IMPRESSION  1. Septicemia (HCC)    2. Hypoglycemia    3. Hypoxia    4. Pneumonia due to infectious organism, unspecified laterality, unspecified part of lung        DISPOSITION  Disposition: Admit to telemetry  Patient condition is stable        NOTE: This report was transcribed using voice recognition software. Every effort was made to ensure accuracy; however, inadvertent computerized transcription errors may be present       Nathan Jones MD  03/05/24 2041

## 2024-03-05 NOTE — TELEPHONE ENCOUNTER
[facility] CLINICAL PHARMACY REVIEW: Post-Discharge Transitions of Care (BRIANNA)    Patient currently in the emergency room. Will send chart to admins to reschedule.      Fanny Vasquez, PharmD, [facility]  [facility]  [facility] Clinical Pharmacist  Department: [phone]    =======================================================   For Pharmacy Admin Tracking Only    Program: [facility]  Gap Closed?: Yes   Time Spent (min): 5

## 2024-03-06 LAB
EKG ATRIAL RATE: 74 BPM
EKG P AXIS: 70 DEGREES
EKG P-R INTERVAL: 258 MS
EKG Q-T INTERVAL: 468 MS
EKG QRS DURATION: 148 MS
EKG QTC CALCULATION (BAZETT): 519 MS
EKG R AXIS: -80 DEGREES
EKG T AXIS: 26 DEGREES
EKG VENTRICULAR RATE: 74 BPM
GLUCOSE BLD-MCNC: 107 MG/DL (ref 74–99)
GLUCOSE BLD-MCNC: 118 MG/DL (ref 74–99)
GLUCOSE BLD-MCNC: 88 MG/DL (ref 74–99)
GLUCOSE BLD-MCNC: 96 MG/DL (ref 74–99)
MICROORGANISM SPEC CULT: NO GROWTH
SPECIMEN DESCRIPTION: NORMAL

## 2024-03-06 PROCEDURE — 6370000000 HC RX 637 (ALT 250 FOR IP): Performed by: INTERNAL MEDICINE

## 2024-03-06 PROCEDURE — 82962 GLUCOSE BLOOD TEST: CPT

## 2024-03-06 PROCEDURE — 97165 OT EVAL LOW COMPLEX 30 MIN: CPT

## 2024-03-06 PROCEDURE — 2140000000 HC CCU INTERMEDIATE R&B

## 2024-03-06 PROCEDURE — 97535 SELF CARE MNGMENT TRAINING: CPT

## 2024-03-06 PROCEDURE — 93010 ELECTROCARDIOGRAM REPORT: CPT | Performed by: INTERNAL MEDICINE

## 2024-03-06 RX ORDER — SODIUM CHLORIDE 9 MG/ML
INJECTION, SOLUTION INTRAVENOUS ONCE
Status: DISCONTINUED | OUTPATIENT
Start: 2024-03-06 | End: 2024-03-08 | Stop reason: HOSPADM

## 2024-03-06 RX ADMIN — CYANOCOBALAMIN TAB 1000 MCG 1000 MCG: 1000 TAB at 08:51

## 2024-03-06 RX ADMIN — AMIODARONE HYDROCHLORIDE 100 MG: 200 TABLET ORAL at 08:50

## 2024-03-06 RX ADMIN — RIVAROXABAN 20 MG: 20 TABLET, FILM COATED ORAL at 08:50

## 2024-03-06 RX ADMIN — OSELTAMIVIR PHOSPHATE 75 MG: 75 CAPSULE ORAL at 08:50

## 2024-03-06 RX ADMIN — GLIPIZIDE 5 MG: 5 TABLET ORAL at 18:04

## 2024-03-06 RX ADMIN — TAMSULOSIN HYDROCHLORIDE 0.4 MG: 0.4 CAPSULE ORAL at 08:50

## 2024-03-06 RX ADMIN — TORSEMIDE 20 MG: 20 TABLET ORAL at 08:50

## 2024-03-06 RX ADMIN — MIRTAZAPINE 15 MG: 15 TABLET, FILM COATED ORAL at 22:58

## 2024-03-06 RX ADMIN — GLIPIZIDE 5 MG: 5 TABLET ORAL at 13:25

## 2024-03-06 RX ADMIN — ASPIRIN 81 MG: 81 TABLET, CHEWABLE ORAL at 08:50

## 2024-03-06 RX ADMIN — BENZONATATE 100 MG: 100 CAPSULE ORAL at 23:04

## 2024-03-06 RX ADMIN — SACUBITRIL AND VALSARTAN 1 TABLET: 24; 26 TABLET, FILM COATED ORAL at 08:50

## 2024-03-06 RX ADMIN — SENNOSIDES 8.6 MG: 8.6 TABLET, FILM COATED ORAL at 08:51

## 2024-03-06 RX ADMIN — PRAVASTATIN SODIUM 20 MG: 20 TABLET ORAL at 08:50

## 2024-03-06 RX ADMIN — BENZONATATE 100 MG: 100 CAPSULE ORAL at 13:45

## 2024-03-06 RX ADMIN — OSELTAMIVIR PHOSPHATE 75 MG: 75 CAPSULE ORAL at 22:58

## 2024-03-06 RX ADMIN — Medication 50 MG: at 09:15

## 2024-03-06 RX ADMIN — LACTULOSE 10 G: 20 SOLUTION ORAL at 18:05

## 2024-03-06 NOTE — PROGRESS NOTES
Called Dr Dahl's answering service and asked about holding glipizide due for tonight with BS of 87. Stated to hold it. Pt was requesting something for his cough.  ordered Blaine Phelan TID PRN.

## 2024-03-06 NOTE — HOME CARE
PATIENT IS ACTIVE WITH [facility] FOR SN AND WILL NEED JESSEE ORDERS UPON DISCHARGE.     KAMRYN BOYCE LPN   [facility]

## 2024-03-06 NOTE — CARE COORDINATION
Social Work/ Case Management Transition of Care Planning (Lilly Hill, GLORYW [phone]):   Pt presented to hospital for fatigue. SW attempted to perform CM assessment but Pt was unavailable.  Per previous documentation Pt lives with his spouse. Pt is acitve with [facility]. Pt's PCP is Dr. Dahl. Per documentation Pt is irritable and confused. Pt has no been eating well.  Pt is on room air. SW/CM will follow.   KHLOE Galicia  3/6/2024

## 2024-03-06 NOTE — PROGRESS NOTES
Occupational Therapy  OCCUPATIONAL THERAPY INITIAL EVALUATION    [facility]  [address] [place], [place]         Date:3/6/2024                                                  Patient Name: Neto Baca    MRN: [id number]    : 1950    Room: [address] [place], [place] [address]      Evaluating OT: Christie Walker OTR/L [id number]       Referring Provider:Ant Dahl MD     Specific Provider Orders/Date: OT eval and treat 3/6/24       Diagnosis: Influenza      Surgery: Thoracic Aortic Aneurysm repai 24     Pertinent Medical History:       Past Medical History:   Diagnosis Date    A-fib ([facility])     for cardioversion 3-12-21     CAD (coronary artery disease) 10/27/2011    Dr. Guy     Carotid stenosis, right 2021    Chronic venous insufficiency 2017    COVID-19 2021    resolved as of 3-3-21     Decreased pulses in feet     Diabetes mellitus (HCC)     DM (diabetes mellitus), type 2 (HCC) 10/27/2011    ED (erectile dysfunction)     Gout 10/31/2011    Heart attack (HCC)     HTN (hypertension) 10/27/2011    Hyperlipidemia 2012    Hypertension     Inguinal hernia     bilateral    Lateral myocardial infarction (HCC) 10/2008    due to circumflex occlusion    Leg swelling 2017         Past Surgical History:   Procedure Laterality Date    CARDIAC CATHETERIZATION      CARDIAC CATHETERIZATION  2021    DR. CANDELARIA [facility] EF 45% CTS CONSULT    CARDIOVASCULAR STRESS TEST  2009    neither fixed nor reversible perfusion defect; LVEF 50%    CARDIOVERSION      CATARACT REMOVAL Left     CHOLECYSTECTOMY      CORONARY ARTERY BYPASS GRAFT Left 2021    CABG CORONARY ARTERY BYPASS, MAZE, LEFT ATRIAL APPENDAGE OCCLUSION performed by Jimbo Guadarrama DO at [facility] OR    HERNIA REPAIR  @ age 10    INGUINAL HERNIA REPAIR N/A 2023    LAPAROSCOPIC ROBOTIC XI ASSISTED BILATERAL  INGUINAL HERNIA REPAIR WITH MESH performed by Maria Luisa Mitchell MD  lines and tubes intact.  Overall patient demonstrated  decreased independence and safety during completion of ADL/functional transfer/mobility tasks.  Pt would benefit from continued skilled OT to increase safety and independence with completion of ADL/IADL tasks for functional independence and quality of life.    Treatment: OT treatment provided this date includes:   Instruction/training on safety and adapted techniques for completion of ADLs:    Instruction/training on safe functional mobility/transfer techniques: use of ww    Instruction/training on energy conservation/work simplification for completion of ADLs:. Pt ed on purse lip breathing techniques to utilize during functional activities     Neuromuscular Reeducation to facilitate balance/righting reactions for increased function with ADLs tasks:during standing activities    Proper Positioning/Alignment to prevent skin breakdown         Rehab Potential: Good  for established goals     Patient / Family Goal: \" get my strength back \"      Patient and/or family were instructed on functional diagnosis, prognosis/goals and OT plan of care. Demonstrated good  understanding.     Eval Complexity: Low    Time In: 1415  Time Out: 1435  Total Treatment Time: 10     Min Units   OT Eval Low 31718  x     OT Eval Medium 88901      OT Eval High 93504      OT Re-Eval 92823       Therapeutic Ex 54184       Therapeutic Activities 49529       ADL/Self Care 13822  10   1   Orthotic Management 07224       Manual [id number]     Neuro Re-Ed [id number]       Non-Billable Time          Evaluation Time additionally includes thorough review of current medical information, gathering information on past medical history/social history and prior level of function, interpretation of standardized testing/informal observation of tasks, assessment of data and development of plan of care and goals.          Christie Walker OTR/L [id number]

## 2024-03-06 NOTE — PROGRESS NOTES
4 Eyes Skin Assessment     NAME:  Neto Baca  YOB: 1950  MEDICAL RECORD NUMBER:  [id number]    The patient is being assessed for  Admission    I agree that at least one RN has performed a thorough Head to Toe Skin Assessment on the patient. ALL assessment sites listed below have been assessed.      Areas assessed by both nurses:    Head, Face, Ears, Shoulders, Back, Chest, Arms, Elbows, Hands, Sacrum. Buttock, Coccyx, Ischium, Legs. Feet and Heels, and Under Medical Devices         Does the Patient have a Wound? No noted wound(s)       Davion Prevention initiated by RN: No  Wound Care Orders initiated by RN: No    Pressure Injury (Stage 3,4, Unstageable, DTI, NWPT, and Complex wounds) if present, place Wound referral order by RN under : No    New Ostomies, if present place, Ostomy referral order under : No     Nurse 1 eSignature: Electronically signed by Radha Byrd RN on 3/6/24 at 3:58 AM EST    **SHARE this note so that the co-signing nurse can place an eSignature**    Nurse 2 eSignature: {Esignature:[id number]}

## 2024-03-06 NOTE — PLAN OF CARE
Problem: Chronic Conditions and Co-morbidities  Goal: Patient's chronic conditions and co-morbidity symptoms are monitored and maintained or improved  3/6/2024 0134 by Radha Byrd RN  Outcome: Progressing     Problem: Discharge Planning  Goal: Discharge to home or other facility with appropriate resources  3/6/2024 1031 by Laura Murillo RN  Outcome: Progressing  3/6/2024 0134 by Radha Byrd RN  Outcome: Progressing     Problem: Safety - Adult  Goal: Free from fall injury  3/6/2024 1031 by Laura Murillo RN  Outcome: Progressing  3/6/2024 0134 by Radha Byrd RN  Outcome: Progressing     Problem: ABCDS Injury Assessment  Goal: Absence of physical injury  3/6/2024 0134 by Radha Byrd RN  Outcome: Progressing     Problem: Pain  Goal: Verbalizes/displays adequate comfort level or baseline comfort level  3/6/2024 1031 by Laura Murillo RN  Outcome: Progressing  3/6/2024 0134 by Radha Byrd RN  Outcome: Progressing     Problem: Skin/Tissue Integrity  Goal: Absence of new skin breakdown  Description: 1.  Monitor for areas of redness and/or skin breakdown  2.  Assess vascular access sites hourly  3.  Every 4-6 hours minimum:  Change oxygen saturation probe site  4.  Every 4-6 hours:  If on nasal continuous positive airway pressure, respiratory therapy assess nares and determine need for appliance change or resting period.  3/6/2024 0134 by Radha Byrd RN  Outcome: Progressing      4 = No assist / stand by assistance

## 2024-03-06 NOTE — PLAN OF CARE

## 2024-03-07 LAB
GLUCOSE BLD-MCNC: 126 MG/DL (ref 74–99)
GLUCOSE BLD-MCNC: 209 MG/DL (ref 74–99)
GLUCOSE BLD-MCNC: 265 MG/DL (ref 74–99)
GLUCOSE BLD-MCNC: 92 MG/DL (ref 74–99)

## 2024-03-07 PROCEDURE — 2580000003 HC RX 258: Performed by: INTERNAL MEDICINE

## 2024-03-07 PROCEDURE — 6370000000 HC RX 637 (ALT 250 FOR IP): Performed by: INTERNAL MEDICINE

## 2024-03-07 PROCEDURE — 2580000003 HC RX 258: Performed by: EMERGENCY MEDICINE

## 2024-03-07 PROCEDURE — 6360000002 HC RX W HCPCS: Performed by: INTERNAL MEDICINE

## 2024-03-07 PROCEDURE — 82962 GLUCOSE BLOOD TEST: CPT

## 2024-03-07 PROCEDURE — 2140000000 HC CCU INTERMEDIATE R&B

## 2024-03-07 RX ORDER — GLIPIZIDE 5 MG/1
5 TABLET ORAL
Status: DISCONTINUED | OUTPATIENT
Start: 2024-03-07 | End: 2024-03-08 | Stop reason: HOSPADM

## 2024-03-07 RX ADMIN — OSELTAMIVIR PHOSPHATE 75 MG: 75 CAPSULE ORAL at 20:47

## 2024-03-07 RX ADMIN — MIRTAZAPINE 15 MG: 15 TABLET, FILM COATED ORAL at 20:47

## 2024-03-07 RX ADMIN — OSELTAMIVIR PHOSPHATE 75 MG: 75 CAPSULE ORAL at 09:00

## 2024-03-07 RX ADMIN — CYANOCOBALAMIN TAB 1000 MCG 1000 MCG: 1000 TAB at 10:11

## 2024-03-07 RX ADMIN — SENNOSIDES 8.6 MG: 8.6 TABLET, FILM COATED ORAL at 20:47

## 2024-03-07 RX ADMIN — WATER 40 MG: 1 INJECTION INTRAMUSCULAR; INTRAVENOUS; SUBCUTANEOUS at 10:11

## 2024-03-07 RX ADMIN — GLIPIZIDE 5 MG: 5 TABLET ORAL at 17:39

## 2024-03-07 RX ADMIN — WATER 40 MG: 1 INJECTION INTRAMUSCULAR; INTRAVENOUS; SUBCUTANEOUS at 18:23

## 2024-03-07 RX ADMIN — PRAVASTATIN SODIUM 20 MG: 20 TABLET ORAL at 10:11

## 2024-03-07 RX ADMIN — SODIUM CHLORIDE: 9 INJECTION, SOLUTION INTRAVENOUS at 20:42

## 2024-03-07 RX ADMIN — RIVAROXABAN 20 MG: 20 TABLET, FILM COATED ORAL at 08:45

## 2024-03-07 RX ADMIN — TAMSULOSIN HYDROCHLORIDE 0.4 MG: 0.4 CAPSULE ORAL at 10:11

## 2024-03-07 RX ADMIN — Medication 50 MG: at 10:11

## 2024-03-07 RX ADMIN — SACUBITRIL AND VALSARTAN 1 TABLET: 24; 26 TABLET, FILM COATED ORAL at 10:11

## 2024-03-07 RX ADMIN — ASPIRIN 81 MG: 81 TABLET, CHEWABLE ORAL at 10:11

## 2024-03-07 RX ADMIN — SACUBITRIL AND VALSARTAN 1 TABLET: 24; 26 TABLET, FILM COATED ORAL at 20:47

## 2024-03-07 RX ADMIN — INSULIN LISPRO 2 UNITS: 100 INJECTION, SOLUTION INTRAVENOUS; SUBCUTANEOUS at 18:22

## 2024-03-07 RX ADMIN — SENNOSIDES 8.6 MG: 8.6 TABLET, FILM COATED ORAL at 10:11

## 2024-03-07 RX ADMIN — AMIODARONE HYDROCHLORIDE 100 MG: 200 TABLET ORAL at 10:11

## 2024-03-07 NOTE — PLAN OF CARE
Problem: Chronic Conditions and Co-morbidities  Goal: Patient's chronic conditions and co-morbidity symptoms are monitored and maintained or improved  Outcome: Progressing  Flowsheets (Taken 3/7/2024 0030 by Tanya Baker RN)  Care Plan - Patient's Chronic Conditions and Co-Morbidity Symptoms are Monitored and Maintained or Improved:   Monitor and assess patient's chronic conditions and comorbid symptoms for stability, deterioration, or improvement   Collaborate with multidisciplinary team to address chronic and comorbid conditions and prevent exacerbation or deterioration   Update acute care plan with appropriate goals if chronic or comorbid symptoms are exacerbated and prevent overall improvement and discharge     Problem: Discharge Planning  Goal: Discharge to home or other facility with appropriate resources  3/7/2024 0142 by Radha Byrd RN  Outcome: Progressing  3/7/2024 0103 by Tanya Baker RN  Outcome: Progressing  Flowsheets (Taken 3/7/2024 0030)  Discharge to home or other facility with appropriate resources:   Identify barriers to discharge with patient and caregiver   Arrange for needed discharge resources and transportation as appropriate   Identify discharge learning needs (meds, wound care, etc)   Arrange for interpreters to assist at discharge as needed   Refer to discharge planning if patient needs post-hospital services based on physician order or complex needs related to functional status, cognitive ability or social support system     Problem: Safety - Adult  Goal: Free from fall injury  3/7/2024 0142 by Radha Byrd RN  Outcome: Progressing  3/7/2024 0103 by Tanya Baker RN  Outcome: Progressing     Problem: ABCDS Injury Assessment  Goal: Absence of physical injury  3/7/2024 0142 by Radha Byrd RN  Outcome: Progressing  3/7/2024 0103 by Tanya Baker RN  Outcome: Progressing  Flowsheets (Taken 3/7/2024 0015)  Absence of Physical Injury: Implement safety measures based on patient  assessment     Problem: Pain  Goal: Verbalizes/displays adequate comfort level or baseline comfort level  3/7/2024 0142 by Radha Byrd RN  Outcome: Progressing  3/7/2024 0103 by Tanya Baker RN  Outcome: Progressing     Problem: Skin/Tissue Integrity  Goal: Absence of new skin breakdown  Description: 1.  Monitor for areas of redness and/or skin breakdown  2.  Assess vascular access sites hourly  3.  Every 4-6 hours minimum:  Change oxygen saturation probe site  4.  Every 4-6 hours:  If on nasal continuous positive airway pressure, respiratory therapy assess nares and determine need for appliance change or resting period.  3/7/2024 0142 by Radha Byrd, RN  Outcome: Progressing  3/7/2024 0103 by Tanya Baker RN  Outcome: Progressing

## 2024-03-07 NOTE — CARE COORDINATION
Social Work/ Case Management Transition of Care Planning (Lilly Hill, [phone]):   Per report and chart review Pt is on room air. Pt is on IV solu-medrol q8. PT pending, OT 16/24. Pt states he has a walker and wheel chair at home. Pt is active with [facility], JESSEE orders are in, Per Tamara at [facility] they will see him Sunday. Pt stated that he plans to go home and his family will transport him. Per attending documentation discharge likely tomorrow. SW/CM to follow.  Lilly Hill, KHLOE  3/7/2024

## 2024-03-07 NOTE — PLAN OF CARE
Problem: Chronic Conditions and Co-morbidities  Goal: Patient's chronic conditions and co-morbidity symptoms are monitored and maintained or improved  3/7/2024 1539 by Daniella Chaney RN  Outcome: Progressing  Flowsheets (Taken 3/7/2024 0827)  Care Plan - Patient's Chronic Conditions and Co-Morbidity Symptoms are Monitored and Maintained or Improved: Monitor and assess patient's chronic conditions and comorbid symptoms for stability, deterioration, or improvement  3/7/2024 0142 by Radha Byrd RN  Outcome: Progressing  Flowsheets (Taken 3/7/2024 0030 by Tanya Baker RN)  Care Plan - Patient's Chronic Conditions and Co-Morbidity Symptoms are Monitored and Maintained or Improved:   Monitor and assess patient's chronic conditions and comorbid symptoms for stability, deterioration, or improvement   Collaborate with multidisciplinary team to address chronic and comorbid conditions and prevent exacerbation or deterioration   Update acute care plan with appropriate goals if chronic or comorbid symptoms are exacerbated and prevent overall improvement and discharge     Problem: Discharge Planning  Goal: Discharge to home or other facility with appropriate resources  3/7/2024 1539 by Daniella Chaney RN  Outcome: Progressing  Flowsheets (Taken 3/7/2024 0827)  Discharge to home or other facility with appropriate resources: Identify barriers to discharge with patient and caregiver  3/7/2024 0142 by Radha Byrd RN  Outcome: Progressing     Problem: Safety - Adult  Goal: Free from fall injury  3/7/2024 1539 by Daniella Chaney RN  Outcome: Progressing  3/7/2024 0142 by Radha Byrd RN  Outcome: Progressing     Problem: ABCDS Injury Assessment  Goal: Absence of physical injury  3/7/2024 1539 by Daniella Chaney RN  Outcome: Progressing  3/7/2024 0142 by Radha Byrd RN  Outcome: Progressing     Problem: Pain  Goal: Verbalizes/displays adequate comfort level or baseline comfort level  Recent Flowsheet Documentation  Taken 3/7/2024

## 2024-03-07 NOTE — PROGRESS NOTES
Subjective:    Chief complaint:    Feeling better  No problems overnight.  Denies chest pain, angina, and dyspnea.  Denies abdominal pain.  Tolerating diet.  No nausea or vomiting.    Objective:    BP (!) 106/55   Pulse 62   Temp 97.7 °F (36.5 °C) (Oral)   Resp 16   Ht 1.689 m (5' 6.5\")   Wt 81.6 kg (180 lb)   SpO2 97%   BMI 28.62 kg/m²   General : Awake ,alert,no distress.  Heart:  RRR, no murmurs, gallops, or rubs.  Lungs:  CTA bilaterally, no wheeze, rales or rhonchi  Abd: bowel sounds present, nontender, nondistended, no masses  Extrem:  No clubbing, cyanosis, or edema    CBC:   Lab Results   Component Value Date/Time    WBC 7.0 03/05/2024 10:15 AM    RBC 3.42 03/05/2024 10:15 AM    HGB 11.3 03/05/2024 10:15 AM    HCT 33.4 03/05/2024 10:15 AM    MCV 97.7 03/05/2024 10:15 AM    MCH 33.0 03/05/2024 10:15 AM    MCHC 33.8 03/05/2024 10:15 AM    RDW 14.2 03/05/2024 10:15 AM     03/05/2024 10:15 AM    MPV 11.0 03/05/2024 10:15 AM     BMP:    Lab Results   Component Value Date/Time     03/05/2024 10:15 AM    K 3.6 03/05/2024 10:15 AM    K 4.2 03/27/2023 06:10 PM    CL 96 03/05/2024 10:15 AM    CO2 24 03/05/2024 10:15 AM    BUN 16 03/05/2024 10:15 AM    LABALBU 2.4 03/05/2024 10:15 AM    LABALBU 3.6 11/01/2011 04:50 AM    CREATININE 1.1 03/05/2024 10:15 AM    CALCIUM 7.8 03/05/2024 10:15 AM    GFRAA >60 08/29/2022 11:27 AM    LABGLOM >60 03/05/2024 10:15 AM    GLUCOSE 60 03/05/2024 10:15 AM    GLUCOSE 149 11/01/2011 04:50 AM     PT/INR:    Lab Results   Component Value Date/Time    PROTIME 14.4 07/09/2021 11:43 AM    INR 1.3 07/09/2021 11:43 AM     Troponin:  No results found for: \"TROPONINI\"    No results for input(s): \"LABURIN\" in the last 72 hours.  No results for input(s): \"BC\" in the last 72 hours.  No results for input(s): \"BLOODCULT2\" in the last 72 hours.      Current Facility-Administered Medications:     glipiZIDE (GLUCOTROL) tablet 5 mg, 5 mg, Oral, BID Hesham DAMON Sudhir K, MD     calcifications.      3.  Paranasal sinus mild inflammatory disease.         XR CHEST PORTABLE   Final Result   Small small right pleural effusion.      Cardiomegaly.             Assessment:    Principal Problem:    Influenza  Resolved Problems:    * No resolved hospital problems. *  Recent aortic dissection s/p repair  Obesity  Diabetes mellitus type 2 with reported recent hypoglycemia  Paroxysmal defibrillation  Coronary disease without angina  Gout  Hyperlipidemia hypertension by history      Plan:  2 doses of steroids given bronchospasm  Hole demadex today  Continue ivf  Decrease glipizide dose  Likely home tomorrow        Ant Dahl MD  9:35 AM  3/7/2024    NOTE: This report was transcribed using voice recognition software. Every effort was made to ensure accuracy; however, inadvertent transcription errors may be present

## 2024-03-07 NOTE — PROGRESS NOTES
500 cc bolus given during day shift, just not scanned in.     2045 messaged Dr Dahl about pt's BP for this evening and entresto due. Stated to hold entresto this evening d/t BP of 100/62

## 2024-03-08 VITALS
OXYGEN SATURATION: 96 % | DIASTOLIC BLOOD PRESSURE: 64 MMHG | TEMPERATURE: 97 F | RESPIRATION RATE: 19 BRPM | HEART RATE: 60 BPM | SYSTOLIC BLOOD PRESSURE: 143 MMHG | BODY MASS INDEX: 31.17 KG/M2 | HEIGHT: 67 IN | WEIGHT: 198.6 LBS

## 2024-03-08 LAB
GLUCOSE BLD-MCNC: 242 MG/DL (ref 74–99)
GLUCOSE BLD-MCNC: 281 MG/DL (ref 74–99)

## 2024-03-08 PROCEDURE — 97161 PT EVAL LOW COMPLEX 20 MIN: CPT

## 2024-03-08 PROCEDURE — 97530 THERAPEUTIC ACTIVITIES: CPT

## 2024-03-08 PROCEDURE — 82962 GLUCOSE BLOOD TEST: CPT

## 2024-03-08 PROCEDURE — 6370000000 HC RX 637 (ALT 250 FOR IP): Performed by: INTERNAL MEDICINE

## 2024-03-08 RX ORDER — ACETAMINOPHEN 325 MG/1
650 TABLET ORAL EVERY 6 HOURS PRN
Status: DISCONTINUED | OUTPATIENT
Start: 2024-03-08 | End: 2024-03-08 | Stop reason: HOSPADM

## 2024-03-08 RX ORDER — OSELTAMIVIR PHOSPHATE 75 MG/1
75 CAPSULE ORAL 2 TIMES DAILY
Qty: 4 CAPSULE | Refills: 0 | Status: SHIPPED | OUTPATIENT
Start: 2024-03-08 | End: 2024-03-10

## 2024-03-08 RX ORDER — BENZONATATE 100 MG/1
100 CAPSULE ORAL 3 TIMES DAILY PRN
Qty: 30 CAPSULE | Refills: 0 | Status: SHIPPED | OUTPATIENT
Start: 2024-03-08 | End: 2024-03-15

## 2024-03-08 RX ORDER — TORSEMIDE 20 MG/1
20 TABLET ORAL DAILY
Qty: 30 TABLET | Refills: 0
Start: 2024-03-10 | End: 2024-03-12

## 2024-03-08 RX ADMIN — PRAVASTATIN SODIUM 20 MG: 20 TABLET ORAL at 08:17

## 2024-03-08 RX ADMIN — AMIODARONE HYDROCHLORIDE 100 MG: 200 TABLET ORAL at 08:17

## 2024-03-08 RX ADMIN — CYANOCOBALAMIN TAB 1000 MCG 1000 MCG: 1000 TAB at 08:17

## 2024-03-08 RX ADMIN — TAMSULOSIN HYDROCHLORIDE 0.4 MG: 0.4 CAPSULE ORAL at 08:17

## 2024-03-08 RX ADMIN — Medication 50 MG: at 08:18

## 2024-03-08 RX ADMIN — GLIPIZIDE 5 MG: 5 TABLET ORAL at 08:17

## 2024-03-08 RX ADMIN — ACETAMINOPHEN 650 MG: 325 TABLET ORAL at 08:18

## 2024-03-08 RX ADMIN — OSELTAMIVIR PHOSPHATE 75 MG: 75 CAPSULE ORAL at 08:17

## 2024-03-08 RX ADMIN — RIVAROXABAN 20 MG: 20 TABLET, FILM COATED ORAL at 08:17

## 2024-03-08 RX ADMIN — BENZONATATE 100 MG: 100 CAPSULE ORAL at 08:17

## 2024-03-08 RX ADMIN — ASPIRIN 81 MG: 81 TABLET, CHEWABLE ORAL at 08:17

## 2024-03-08 RX ADMIN — SACUBITRIL AND VALSARTAN 1 TABLET: 24; 26 TABLET, FILM COATED ORAL at 08:18

## 2024-03-08 RX ADMIN — SENNOSIDES 8.6 MG: 8.6 TABLET, FILM COATED ORAL at 08:17

## 2024-03-08 ASSESSMENT — PAIN SCALES - GENERAL: PAINLEVEL_OUTOF10: 0

## 2024-03-08 NOTE — PROGRESS NOTES
Physical Therapy  Physical Therapy Initial Assessment     Name: Neto Baca  : 1950  MRN: [id number]      Date of Service: 3/8/2024    Evaluating PT:  Darlene Vaz, PT, DPT, [id number]    Room #:  6412/6412-A  Diagnosis:  Influenza [J11.1]  Hypoglycemia [E16.2]  Septicemia (HCC) [A41.9]  Hypoxia [R09.02]  Pneumonia due to infectious organism, unspecified laterality, unspecified part of lung [J18.9]  PMHx/PSHx:    Past Medical History:   Diagnosis Date    A-fib ([facility])     for cardioversion 3-12-21     CAD (coronary artery disease) 10/27/2011    Dr. Guy     Carotid stenosis, right 2021    Chronic venous insufficiency 2017    COVID-19 2021    resolved as of 3-3-21     Decreased pulses in feet     Diabetes mellitus (HCC)     DM (diabetes mellitus), type 2 (HCC) 10/27/2011    ED (erectile dysfunction)     Gout 10/31/2011    Heart attack ([facility])     HTN (hypertension) 10/27/2011    Hyperlipidemia 2012    Hypertension     Inguinal hernia     bilateral    Lateral myocardial infarction (HCC) 10/2008    due to circumflex occlusion    Leg swelling 2017      Past Surgical History:   Procedure Laterality Date    CARDIAC CATHETERIZATION      CARDIAC CATHETERIZATION  2021    DR. CANDELARIA [facility] EF 45% CTS CONSULT    CARDIOVASCULAR STRESS TEST  2009    neither fixed nor reversible perfusion defect; LVEF 50%    CARDIOVERSION      CATARACT REMOVAL Left     CHOLECYSTECTOMY      CORONARY ARTERY BYPASS GRAFT Left 2021    CABG CORONARY ARTERY BYPASS, MAZE, LEFT ATRIAL APPENDAGE OCCLUSION performed by Jimbo Guadarrama DO at [facility] OR    HERNIA REPAIR  @ age 10    INGUINAL HERNIA REPAIR N/A 2023    LAPAROSCOPIC ROBOTIC XI ASSISTED BILATERAL  INGUINAL HERNIA REPAIR WITH MESH performed by Maria Luisa Mitchell MD at [facility] OR    KNEE SURGERY      left-ligament torn    OTHER SURGICAL HISTORY  10/31/2011    cystoscopy retrograde;ESWL;stent on left    TESTICLE SURGERY      as child      Therapeutic activities 27194 9 minutes  [] Therapeutic exercises 92644 0 minutes  [] Neuromuscular reeducation 25588 0 minutes     Darlene Vaz PT, DPT  [id number]

## 2024-03-08 NOTE — PLAN OF CARE
Problem: Chronic Conditions and Co-morbidities  Goal: Patient's chronic conditions and co-morbidity symptoms are monitored and maintained or improved  3/8/2024 0124 by Jamilah Venegas RN  Outcome: Progressing  3/7/2024 2251 by Jamilah Venegas RN  Outcome: Progressing  Flowsheets (Taken 3/7/2024 1930)  Care Plan - Patient's Chronic Conditions and Co-Morbidity Symptoms are Monitored and Maintained or Improved: Monitor and assess patient's chronic conditions and comorbid symptoms for stability, deterioration, or improvement  3/7/2024 1539 by Daniella Chaney RN  Outcome: Progressing  Flowsheets (Taken 3/7/2024 0827)  Care Plan - Patient's Chronic Conditions and Co-Morbidity Symptoms are Monitored and Maintained or Improved: Monitor and assess patient's chronic conditions and comorbid symptoms for stability, deterioration, or improvement     Problem: Discharge Planning  Goal: Discharge to home or other facility with appropriate resources  3/8/2024 0124 by Jamilah Venegas RN  Outcome: Progressing  3/7/2024 2251 by Jamilah Venegas RN  Outcome: Progressing  Flowsheets (Taken 3/7/2024 1930)  Discharge to home or other facility with appropriate resources: Identify barriers to discharge with patient and caregiver  3/7/2024 1539 by Daniella Chaney RN  Outcome: Progressing  Flowsheets (Taken 3/7/2024 0827)  Discharge to home or other facility with appropriate resources: Identify barriers to discharge with patient and caregiver     Problem: Safety - Adult  Goal: Free from fall injury  3/8/2024 0124 by Jamilah Veneags RN  Outcome: Progressing  Flowsheets (Taken 3/8/2024 0122)  Free From Fall Injury: Instruct family/caregiver on patient safety  3/7/2024 2251 by Jamilah Venegas RN  Outcome: Progressing  Flowsheets (Taken 3/7/2024 1930)  Free From Fall Injury: Instruct family/caregiver on patient safety  3/7/2024 1539 by Daniella Chaney RN  Outcome: Progressing

## 2024-03-08 NOTE — PROGRESS NOTES
CLINICAL PHARMACY NOTE: MEDS TO BEDS    Total # of Prescriptions Filled: 2   The following medications were delivered to the patient:  Benzonatate 100mg  Tamiflu 75mg    Additional Documentation:  Patient's daughter picked up in pharmacy 3-8-24

## 2024-03-08 NOTE — PROGRESS NOTES
IV's removed from patient with no complications. Telemetry monitor removed. Discharge instructions provided, and patient and family verbalized understanding. A copy of discharge instructions provided. Patient leaving via transport with family.

## 2024-03-08 NOTE — PLAN OF CARE
Problem: Chronic Conditions and Co-morbidities  Goal: Patient's chronic conditions and co-morbidity symptoms are monitored and maintained or improved  3/8/2024 0823 by Daniella Chaney RN  Outcome: Progressing  Flowsheets (Taken 3/8/2024 0738)  Care Plan - Patient's Chronic Conditions and Co-Morbidity Symptoms are Monitored and Maintained or Improved: Monitor and assess patient's chronic conditions and comorbid symptoms for stability, deterioration, or improvement  3/8/2024 0124 by Jamilah Venegas RN  Outcome: Progressing  3/7/2024 2251 by Jamilah Venegas RN  Outcome: Progressing  Flowsheets (Taken 3/7/2024 1930)  Care Plan - Patient's Chronic Conditions and Co-Morbidity Symptoms are Monitored and Maintained or Improved: Monitor and assess patient's chronic conditions and comorbid symptoms for stability, deterioration, or improvement     Problem: Discharge Planning  Goal: Discharge to home or other facility with appropriate resources  3/8/2024 0823 by Daniella Chaney RN  Outcome: Progressing  Flowsheets (Taken 3/8/2024 0738)  Discharge to home or other facility with appropriate resources: Identify barriers to discharge with patient and caregiver  3/8/2024 0124 by Jamilah Venegas RN  Outcome: Progressing  3/7/2024 2251 by Jamilah Venegas RN  Outcome: Progressing  Flowsheets (Taken 3/7/2024 1930)  Discharge to home or other facility with appropriate resources: Identify barriers to discharge with patient and caregiver     Problem: Safety - Adult  Goal: Free from fall injury  3/8/2024 0823 by Daneilla Chaney RN  Outcome: Progressing  3/8/2024 0124 by Jamilah Venegas RN  Outcome: Progressing  Flowsheets (Taken 3/8/2024 0122)  Free From Fall Injury: Instruct family/caregiver on patient safety  3/7/2024 2251 by Jamilah Venegas RN  Outcome: Progressing  Flowsheets (Taken 3/7/2024 1930)  Free From Fall Injury: Instruct family/caregiver on patient safety     Problem: ABCDS Injury Assessment  Goal: Absence of

## 2024-03-08 NOTE — PLAN OF CARE
Problem: Chronic Conditions and Co-morbidities  Goal: Patient's chronic conditions and co-morbidity symptoms are monitored and maintained or improved  3/7/2024 2251 by Jamilah Venegas RN  Outcome: Progressing  Flowsheets (Taken 3/7/2024 1930)  Care Plan - Patient's Chronic Conditions and Co-Morbidity Symptoms are Monitored and Maintained or Improved: Monitor and assess patient's chronic conditions and comorbid symptoms for stability, deterioration, or improvement  3/7/2024 1539 by Daniella Chaney RN  Outcome: Progressing  Flowsheets (Taken 3/7/2024 0827)  Care Plan - Patient's Chronic Conditions and Co-Morbidity Symptoms are Monitored and Maintained or Improved: Monitor and assess patient's chronic conditions and comorbid symptoms for stability, deterioration, or improvement     Problem: Discharge Planning  Goal: Discharge to home or other facility with appropriate resources  3/7/2024 2251 by Jamilah Venegas RN  Outcome: Progressing  Flowsheets (Taken 3/7/2024 1930)  Discharge to home or other facility with appropriate resources: Identify barriers to discharge with patient and caregiver  3/7/2024 1539 by Daniella Chaney RN  Outcome: Progressing  Flowsheets (Taken 3/7/2024 0827)  Discharge to home or other facility with appropriate resources: Identify barriers to discharge with patient and caregiver     Problem: Safety - Adult  Goal: Free from fall injury  3/7/2024 2251 by Jamilah Venegas RN  Outcome: Progressing  Flowsheets (Taken 3/7/2024 1930)  Free From Fall Injury: Instruct family/caregiver on patient safety  3/7/2024 1539 by Daniella Chaney RN  Outcome: Progressing     Problem: ABCDS Injury Assessment  Goal: Absence of physical injury  3/7/2024 2251 by Jamilah Venegas RN  Outcome: Progressing  Flowsheets (Taken 3/7/2024 1930)  Absence of Physical Injury: Implement safety measures based on patient assessment  3/7/2024 1539 by Daniella Chaney RN  Outcome: Progressing     Problem: Pain  Goal:  Verbalizes/displays adequate comfort level or baseline comfort level  Outcome: Progressing  Flowsheets  Taken 3/7/2024 1528 by Daniella Chaney, RN  Verbalizes/displays adequate comfort level or baseline comfort level: Encourage patient to monitor pain and request assistance  Taken 3/7/2024 1145 by Daniella Chaney, RN  Verbalizes/displays adequate comfort level or baseline comfort level: Encourage patient to monitor pain and request assistance     Problem: Skin/Tissue Integrity  Goal: Absence of new skin breakdown  Description: 1.  Monitor for areas of redness and/or skin breakdown  2.  Assess vascular access sites hourly  3.  Every 4-6 hours minimum:  Change oxygen saturation probe site  4.  Every 4-6 hours:  If on nasal continuous positive airway pressure, respiratory therapy assess nares and determine need for appliance change or resting period.  Outcome: Progressing

## 2024-03-08 NOTE — PROGRESS NOTES
Subjective:    Chief complaint:    Ambulating without issue  Feeling better  Family members by the bed side  No problems overnight.  Denies chest pain, angina, and dyspnea.  Denies abdominal pain.  Tolerating diet.  No nausea or vomiting.    Objective:    BP (!) 143/64   Pulse 60   Temp 97 °F (36.1 °C) (Oral)   Resp 19   Ht 1.689 m (5' 6.5\")   Wt 90.1 kg (198 lb 9.6 oz)   SpO2 96%   BMI 31.57 kg/m²   General : Awake ,alert,no distress.  Heart:  RRR, no murmurs, gallops, or rubs.  Lungs:  CTA bilaterally, no wheeze, rales or rhonchi  Abd: bowel sounds present, nontender, nondistended, no masses  Extrem:  No clubbing, cyanosis, or edema    CBC:   Lab Results   Component Value Date/Time    WBC 7.0 03/05/2024 10:15 AM    RBC 3.42 03/05/2024 10:15 AM    HGB 11.3 03/05/2024 10:15 AM    HCT 33.4 03/05/2024 10:15 AM    MCV 97.7 03/05/2024 10:15 AM    MCH 33.0 03/05/2024 10:15 AM    MCHC 33.8 03/05/2024 10:15 AM    RDW 14.2 03/05/2024 10:15 AM     03/05/2024 10:15 AM    MPV 11.0 03/05/2024 10:15 AM     BMP:    Lab Results   Component Value Date/Time     03/05/2024 10:15 AM    K 3.6 03/05/2024 10:15 AM    K 4.2 03/27/2023 06:10 PM    CL 96 03/05/2024 10:15 AM    CO2 24 03/05/2024 10:15 AM    BUN 16 03/05/2024 10:15 AM    LABALBU 2.4 03/05/2024 10:15 AM    LABALBU 3.6 11/01/2011 04:50 AM    CREATININE 1.1 03/05/2024 10:15 AM    CALCIUM 7.8 03/05/2024 10:15 AM    GFRAA >60 08/29/2022 11:27 AM    LABGLOM >60 03/05/2024 10:15 AM    GLUCOSE 60 03/05/2024 10:15 AM    GLUCOSE 149 11/01/2011 04:50 AM     PT/INR:    Lab Results   Component Value Date/Time    PROTIME 14.4 07/09/2021 11:43 AM    INR 1.3 07/09/2021 11:43 AM     Troponin:  No results found for: \"TROPONINI\"    No results for input(s): \"LABURIN\" in the last 72 hours.  No results for input(s): \"BC\" in the last 72 hours.  No results for input(s): \"BLOODCULT2\" in the last 72 hours.      Current Facility-Administered Medications:     acetaminophen (TYLENOL)  tablet 650 mg, 650 mg, Oral, Q6H PRN, Ant Dahl MD, 650 mg at 03/08/24 0818    glipiZIDE (GLUCOTROL) tablet 5 mg, 5 mg, Oral, BID AC, Ant Dahl MD, 5 mg at 03/08/24 0817    0.9 % sodium chloride infusion, , IntraVENous, Once, Ant Dahl MD    dextrose 50 % IV solution, 25 g, IntraVENous, PRN, Nathan Jones MD, 25 g at 03/05/24 1020    0.9 % sodium chloride infusion, , IntraVENous, Continuous, Nathan Jones MD, Last Rate: 100 mL/hr at 03/07/24 2042, New Bag at 03/07/24 2042    oseltamivir (TAMIFLU) capsule 75 mg, 75 mg, Oral, BID, Ant Dahl MD, 75 mg at 03/08/24 0817    vitamin B-12 (CYANOCOBALAMIN) tablet 1,000 mcg, 1,000 mcg, Oral, Daily, Ant Dahl MD, 1,000 mcg at 03/08/24 0817    zinc sulfate (ZINCATE) 220 mg capsule - elemental zinc 50 mg, 50 mg, Oral, Daily, Ant Dahl MD, 50 mg at 03/08/24 0818    senna (SENOKOT) tablet 8.6 mg, 1 tablet, Oral, BID, Ant Dahl MD, 8.6 mg at 03/08/24 0817    tamsulosin (FLOMAX) capsule 0.4 mg, 0.4 mg, Oral, Daily, Ant Dahl MD, 0.4 mg at 03/08/24 0817    [Held by provider] torsemide (DEMADEX) tablet 20 mg, 20 mg, Oral, Daily, Ant Dahl MD, 20 mg at 03/06/24 0850    pravastatin (PRAVACHOL) tablet 20 mg, 20 mg, Oral, Daily, Ant Dahl MD, 20 mg at 03/08/24 0817    rivaroxaban (XARELTO) tablet 20 mg, 20 mg, Oral, Daily with breakfast, Ant Dahl MD, 20 mg at 03/08/24 0817    amiodarone (CORDARONE) tablet 100 mg, 100 mg, Oral, Daily, Ant Dahl MD, 100 mg at 03/08/24 0817    sacubitril-valsartan (ENTRESTO) 24-26 MG per tablet 1 tablet, 1 tablet, Oral, BID, Ant Dahl MD, 1 tablet at 03/08/24 0818    aspirin chewable tablet 81 mg, 81 mg, Oral, Daily, Ant Dahl MD, 81 mg at 03/08/24 0817    mirtazapine (REMERON) tablet 15 mg, 15 mg, Oral, Nightly, Ant Dahl MD, 15 mg at 03/07/24 2047    insulin  lispro (HUMALOG) injection vial 0-8 Units, 0-8 Units, SubCUTAneous, TID WC, Ant Dahl MD, 2 Units at 03/07/24 1822    insulin lispro (HUMALOG) injection vial 0-4 Units, 0-4 Units, SubCUTAneous, Nightly, Ant Dahl MD    glucose chewable tablet 16 g, 4 tablet, Oral, PRN, Ant Dahl MD    dextrose bolus 10% 125 mL, 125 mL, IntraVENous, PRN **OR** dextrose bolus 10% 250 mL, 250 mL, IntraVENous, PRN, Ant Dahl MD    glucagon injection 1 mg, 1 mg, SubCUTAneous, PRN, Ant Dahl MD    dextrose 10 % infusion, , IntraVENous, Continuous PRN, Ant Dahl MD    benzonatate (TESSALON) capsule 100 mg, 100 mg, Oral, TID PRN, Ant Dahl MD, 100 mg at 03/08/24 0817    ADULT DIET; Regular; 5 carb choices (75 gm/meal)    CTA CHEST W CONTRAST   Final Result   1. Stent graft repair of the lower thoracic and upper abdominal aorta. No   evidence of endoleak.  Dilation of the excluded aorta in this region with   prominent mural thrombus which has increased since last time as described.   2. Small right and trace left pleural effusions with associated atelectasis.   3. Coronary artery disease.   4. Mild ascites.   5. Diverticulosis.   6. Moderate/large stool burden. Correlate for constipation.   7. Small fat filled right inguinal hernia.         CTA ABDOMEN PELVIS W CONTRAST   Final Result   1. Stent graft repair of the lower thoracic and upper abdominal aorta. No   evidence of endoleak.  Dilation of the excluded aorta in this region with   prominent mural thrombus which has increased since last time as described.   2. Small right and trace left pleural effusions with associated atelectasis.   3. Coronary artery disease.   4. Mild ascites.   5. Diverticulosis.   6. Moderate/large stool burden. Correlate for constipation.   7. Small fat filled right inguinal hernia.         CT HEAD WO CONTRAST   Final Result   1.  No intracranial hemorrhage or acute

## 2024-03-08 NOTE — PROGRESS NOTES
Notified  that patient stated he thinks he strained a muscle in his chest and side getting out of bed this morning, requesting pain medication.

## 2024-03-08 NOTE — CARE COORDINATION
3/8:  Update CM Note:  Pt presented to the ER for fatigue from home.  Pt is in ISO-Droplet for Influenza.  CM spoke with wife via the room phone & the dc plan is home.  Pt is active with [facility] & they will see him on Sunday.  JESSEE placed.  Per wife pt has all a walker & wc at home.  Pt has his glucometer & treats with PO medications.  Pt's wife or daughter will transport him home.  Sw/DALE will continue to follow.  Electronically signed by Laura Ortiz RN on 3/8/2024 at 9:36 AM

## 2024-03-10 LAB
MICROORGANISM SPEC CULT: NORMAL
MICROORGANISM SPEC CULT: NORMAL
SERVICE CMNT-IMP: NORMAL
SERVICE CMNT-IMP: NORMAL
SPECIMEN DESCRIPTION: NORMAL
SPECIMEN DESCRIPTION: NORMAL

## 2024-03-11 ENCOUNTER — CARE COORDINATION (OUTPATIENT)
Dept: CARE COORDINATION | Age: 74
End: 2024-03-11

## 2024-03-11 DIAGNOSIS — J11.1 INFLUENZA: Primary | ICD-10-CM

## 2024-03-11 PROCEDURE — 1111F DSCHRG MED/CURRENT MED MERGE: CPT | Performed by: INTERNAL MEDICINE

## 2024-03-11 NOTE — CARE COORDINATION
Care Transitions Initial Follow Up Call    Call within 2 business days of discharge: Yes    Patient Current Location:  Home: [address] [place] [id number]    Care Transition Nurse contacted the spouse/partner by telephone to perform post hospital discharge assessment. Verified name and  with spouse/partner as identifiers. Provided introduction to self, and explanation of the Care Transition Nurse role.     Patient: Neto Baca Patient : 1950   MRN: [id number]  Reason for Admission: [facility] 3/5-3/8/24 Influenza; S/P Thoracic Aortic Aneurysm Repair Endovascular 24  Discharge Date: 3/8/24 RARS: Readmission Risk Score: 15.1    Last Discharge Facility       Date Complaint Diagnosis Description Type Department Provider    3/5/24 Fatigue Septicemia (HCC) ... ED to Hosp-Admission (Discharged) (ADMITTED) [facility] Ant Dahl MD; Amireh...          Was this an external facility discharge? No Discharge Facility: [facility]    Challenges to be reviewed by the provider   Additional needs identified to be addressed with provider: No  none               Method of communication with provider: none.    CTN placed call to Patient for AFL PCP Initial Care Transition call, Post Hospital discharge. SPoke with Pt;s spouse, Marielle (verified HIPAA in chart. Spouse reports Pt's mental status progressively worsening over the past 2 months. Pt has confusion, loss of words, day/night mixed up. Spouse reports no outbursts since discharge.    Spouse reports Pt c/o lower abdominal discomfort, cough with thick milky phlegm, chest congestion, fatigue, weakness. Spouse states that PT has BLE edema (skin shiny). Spouse stated that sometimes she is unable to get Pt elevate legs.     Pt incontinent of urine d/t \"very\" slow gait. Spouse is using a plastic urinal and it seems to be helping. Pt has been having problems with constipation, last BM 3/9/24. Pt takes Senna BID and per spouse taking Lactulose occasionally.

## 2024-03-13 ENCOUNTER — OFFICE VISIT (OUTPATIENT)
Dept: VASCULAR SURGERY | Age: 74
End: 2024-03-13

## 2024-03-13 DIAGNOSIS — I71.9 PENETRATING ATHEROSCLEROTIC ULCER OF AORTA (HCC): Primary | ICD-10-CM

## 2024-03-13 DIAGNOSIS — I65.23 ASYMPTOMATIC BILATERAL CAROTID ARTERY STENOSIS: ICD-10-CM

## 2024-03-13 PROCEDURE — 99024 POSTOP FOLLOW-UP VISIT: CPT | Performed by: SURGERY

## 2024-03-13 NOTE — PROGRESS NOTES
venous distension, no carotid bruits,  and MASSES:  no masses  LUNGS:  no increased work of breathing, good air exchange and clear to auscultation  CARDIOVASCULAR:  regular rate and rhythm and no murmur noted  ABDOMEN:  soft, non-distended, non-tender, Aorta not palpated  SKIN:  no lesions  EXTREMITIES: 2+ edema BLE      IMPRESSION/RECOMMENDATIONS:        Problem List Items Addressed This Visit    None  Visit Diagnoses       Penetrating atherosclerotic ulcer of aorta (HCC)    -  Primary              This is a 73-year-old male status post TEVAR for penetrating aortic ulcer.  From a TEVAR standpoint he is doing well.  He did have a CT scan when he got readmitted and it shows the stent in good position without any evidence of endoleak.  This point in time we will plan on follow-up in 1 year with a CT scan of his chest to further evaluate the stent.  He also has minimal carotid disease bilaterally.  Will plan on getting a repeat duplex next year with his follow-up.  We discussed that if this was stable we probably do not need to keep on following it.  He will continue on an aspirin and statin.      No follow-ups on file.

## 2024-03-14 ENCOUNTER — TELEPHONE (OUTPATIENT)
Dept: ADMINISTRATIVE | Age: 74
End: 2024-03-14

## 2024-03-14 NOTE — TELEPHONE ENCOUNTER
Sally(spouse) stated Nteo received call in regards to his appointment being moved 314/24 to 3/28/24. She wants office to know his BP was 76/58 as of 2 days ago. He was also taken off 2 medications. He also has a lot of swelling in feet and leg. She is requesting a sooner appointment. Please reach Sally at [phone]

## 2024-03-18 ENCOUNTER — HOSPITAL ENCOUNTER (INPATIENT)
Age: 74
LOS: 1 days | DRG: 314 | End: 2024-03-20
Attending: EMERGENCY MEDICINE | Admitting: INTERNAL MEDICINE
Payer: MEDICARE

## 2024-03-18 ENCOUNTER — APPOINTMENT (OUTPATIENT)
Dept: CT IMAGING | Age: 74
DRG: 314 | End: 2024-03-18
Payer: MEDICARE

## 2024-03-18 ENCOUNTER — APPOINTMENT (OUTPATIENT)
Dept: GENERAL RADIOLOGY | Age: 74
DRG: 314 | End: 2024-03-18
Payer: MEDICARE

## 2024-03-18 DIAGNOSIS — J90 PLEURAL EFFUSION ON RIGHT: Primary | ICD-10-CM

## 2024-03-18 DIAGNOSIS — E87.1 HYPONATREMIA: ICD-10-CM

## 2024-03-18 DIAGNOSIS — R29.6 MULTIPLE FALLS: ICD-10-CM

## 2024-03-18 DIAGNOSIS — Z98.890 HISTORY OF ABDOMINAL AORTIC ANEURYSM (AAA) REPAIR: ICD-10-CM

## 2024-03-18 DIAGNOSIS — R26.2 AMBULATORY DYSFUNCTION: ICD-10-CM

## 2024-03-18 DIAGNOSIS — R60.0 EDEMA OF RIGHT UPPER ARM: ICD-10-CM

## 2024-03-18 DIAGNOSIS — B95.61 MSSA BACTEREMIA: ICD-10-CM

## 2024-03-18 DIAGNOSIS — R62.7 FAILURE TO THRIVE IN ADULT: ICD-10-CM

## 2024-03-18 DIAGNOSIS — R78.81 MSSA BACTEREMIA: ICD-10-CM

## 2024-03-18 DIAGNOSIS — E86.0 DEHYDRATION: ICD-10-CM

## 2024-03-18 LAB
ABO + RH BLD: NORMAL
ALBUMIN SERPL-MCNC: 2.2 G/DL (ref 3.5–5.2)
ALP SERPL-CCNC: 156 U/L (ref 40–129)
ALT SERPL-CCNC: 28 U/L (ref 0–40)
ANION GAP SERPL CALCULATED.3IONS-SCNC: 14 MMOL/L (ref 7–16)
ARM BAND NUMBER: NORMAL
AST SERPL-CCNC: 43 U/L (ref 0–39)
BASOPHILS # BLD: 0 K/UL (ref 0–0.2)
BASOPHILS NFR BLD: 0 % (ref 0–2)
BILIRUB SERPL-MCNC: 1.7 MG/DL (ref 0–1.2)
BLOOD BANK SAMPLE EXPIRATION: NORMAL
BLOOD GROUP ANTIBODIES SERPL: NEGATIVE
BUN SERPL-MCNC: 27 MG/DL (ref 6–23)
CALCIUM SERPL-MCNC: 8.5 MG/DL (ref 8.6–10.2)
CHLORIDE SERPL-SCNC: 95 MMOL/L (ref 98–107)
CO2 SERPL-SCNC: 19 MMOL/L (ref 22–29)
CREAT SERPL-MCNC: 1 MG/DL (ref 0.7–1.2)
EOSINOPHIL # BLD: 0 K/UL (ref 0.05–0.5)
EOSINOPHILS RELATIVE PERCENT: 0 % (ref 0–6)
ERYTHROCYTE [DISTWIDTH] IN BLOOD BY AUTOMATED COUNT: 14.6 % (ref 11.5–15)
GFR SERPL CREATININE-BSD FRML MDRD: >60 ML/MIN/1.73M2
GLUCOSE SERPL-MCNC: 178 MG/DL (ref 74–99)
HCT VFR BLD AUTO: 36.5 % (ref 37–54)
HGB BLD-MCNC: 12.4 G/DL (ref 12.5–16.5)
INR PPP: 3.8
LACTATE BLDV-SCNC: 2.3 MMOL/L (ref 0.5–1.9)
LACTATE BLDV-SCNC: 2.6 MMOL/L (ref 0.5–1.9)
LIPASE SERPL-CCNC: 10 U/L (ref 13–60)
LYMPHOCYTES NFR BLD: 0.07 K/UL (ref 1.5–4)
LYMPHOCYTES RELATIVE PERCENT: 1 % (ref 20–42)
MAGNESIUM SERPL-MCNC: 1.9 MG/DL (ref 1.6–2.6)
MCH RBC QN AUTO: 32.7 PG (ref 26–35)
MCHC RBC AUTO-ENTMCNC: 34 G/DL (ref 32–34.5)
MCV RBC AUTO: 96.3 FL (ref 80–99.9)
METAMYELOCYTES ABSOLUTE COUNT: 0.14 K/UL (ref 0–0.12)
METAMYELOCYTES: 2 % (ref 0–1)
MONOCYTES NFR BLD: 0.21 K/UL (ref 0.1–0.95)
MONOCYTES NFR BLD: 3 % (ref 2–12)
NEUTROPHILS NFR BLD: 95 % (ref 43–80)
NEUTS SEG NFR BLD: 7.49 K/UL (ref 1.8–7.3)
PLATELET, FLUORESCENCE: 119 K/UL (ref 130–450)
PMV BLD AUTO: 12.2 FL (ref 7–12)
POTASSIUM SERPL-SCNC: 5 MMOL/L (ref 3.5–5)
PROT SERPL-MCNC: 6.4 G/DL (ref 6.4–8.3)
PROTHROMBIN TIME: 40.8 SEC (ref 9.3–12.4)
RBC # BLD AUTO: 3.79 M/UL (ref 3.8–5.8)
RBC # BLD: ABNORMAL 10*6/UL
SODIUM SERPL-SCNC: 128 MMOL/L (ref 132–146)
TROPONIN I SERPL HS-MCNC: 52 NG/L (ref 0–11)
TROPONIN I SERPL HS-MCNC: 69 NG/L (ref 0–11)
TSH SERPL DL<=0.05 MIU/L-ACNC: 3.36 UIU/ML (ref 0.27–4.2)
WBC # BLD: ABNORMAL 10*3/UL
WBC # BLD: ABNORMAL 10*3/UL
WBC OTHER # BLD: 7.9 K/UL (ref 4.5–11.5)

## 2024-03-18 PROCEDURE — 96360 HYDRATION IV INFUSION INIT: CPT

## 2024-03-18 PROCEDURE — 86850 RBC ANTIBODY SCREEN: CPT

## 2024-03-18 PROCEDURE — 93005 ELECTROCARDIOGRAM TRACING: CPT | Performed by: EMERGENCY MEDICINE

## 2024-03-18 PROCEDURE — 80053 COMPREHEN METABOLIC PANEL: CPT

## 2024-03-18 PROCEDURE — 71275 CT ANGIOGRAPHY CHEST: CPT

## 2024-03-18 PROCEDURE — 74174 CTA ABD&PLVS W/CONTRAST: CPT

## 2024-03-18 PROCEDURE — 72125 CT NECK SPINE W/O DYE: CPT

## 2024-03-18 PROCEDURE — 84484 ASSAY OF TROPONIN QUANT: CPT

## 2024-03-18 PROCEDURE — 84443 ASSAY THYROID STIM HORMONE: CPT

## 2024-03-18 PROCEDURE — 71045 X-RAY EXAM CHEST 1 VIEW: CPT

## 2024-03-18 PROCEDURE — 83605 ASSAY OF LACTIC ACID: CPT

## 2024-03-18 PROCEDURE — 6360000004 HC RX CONTRAST MEDICATION: Performed by: RADIOLOGY

## 2024-03-18 PROCEDURE — 85025 COMPLETE CBC W/AUTO DIFF WBC: CPT

## 2024-03-18 PROCEDURE — 2580000003 HC RX 258: Performed by: EMERGENCY MEDICINE

## 2024-03-18 PROCEDURE — 96361 HYDRATE IV INFUSION ADD-ON: CPT

## 2024-03-18 PROCEDURE — 83735 ASSAY OF MAGNESIUM: CPT

## 2024-03-18 PROCEDURE — 86901 BLOOD TYPING SEROLOGIC RH(D): CPT

## 2024-03-18 PROCEDURE — 81001 URINALYSIS AUTO W/SCOPE: CPT

## 2024-03-18 PROCEDURE — 87040 BLOOD CULTURE FOR BACTERIA: CPT

## 2024-03-18 PROCEDURE — 87154 CUL TYP ID BLD PTHGN 6+ TRGT: CPT

## 2024-03-18 PROCEDURE — 02HV33Z INSERTION OF INFUSION DEVICE INTO SUPERIOR VENA CAVA, PERCUTANEOUS APPROACH: ICD-10-PCS | Performed by: INTERNAL MEDICINE

## 2024-03-18 PROCEDURE — 99285 EMERGENCY DEPT VISIT HI MDM: CPT

## 2024-03-18 PROCEDURE — 83690 ASSAY OF LIPASE: CPT

## 2024-03-18 PROCEDURE — 87086 URINE CULTURE/COLONY COUNT: CPT

## 2024-03-18 PROCEDURE — 86900 BLOOD TYPING SEROLOGIC ABO: CPT

## 2024-03-18 PROCEDURE — 70450 CT HEAD/BRAIN W/O DYE: CPT

## 2024-03-18 PROCEDURE — 85610 PROTHROMBIN TIME: CPT

## 2024-03-18 RX ORDER — 0.9 % SODIUM CHLORIDE 0.9 %
1000 INTRAVENOUS SOLUTION INTRAVENOUS ONCE
Status: COMPLETED | OUTPATIENT
Start: 2024-03-18 | End: 2024-03-18

## 2024-03-18 RX ADMIN — SODIUM CHLORIDE 1000 ML: 9 INJECTION, SOLUTION INTRAVENOUS at 20:07

## 2024-03-18 RX ADMIN — IOPAMIDOL 75 ML: 755 INJECTION, SOLUTION INTRAVENOUS at 21:42

## 2024-03-18 ASSESSMENT — LIFESTYLE VARIABLES
HOW OFTEN DO YOU HAVE A DRINK CONTAINING ALCOHOL: NEVER
HOW MANY STANDARD DRINKS CONTAINING ALCOHOL DO YOU HAVE ON A TYPICAL DAY: PATIENT DOES NOT DRINK
HOW OFTEN DO YOU HAVE A DRINK CONTAINING ALCOHOL: NEVER

## 2024-03-18 NOTE — ED PROVIDER NOTES
repair of the distal descending thoracic aortic   aneurysm.   2. Compared to the postoperative CT from 03/05/2024, the PARA-AORTIC HEMATOMA   APPEARS SMALLER but now with locules of GAS within it.  The etiology of the   gas is unclear but may be due to INFECTION.   3. Compared to the prior study, there is now soft tissue swelling, edema and   gas along the upper aspect of the sternotomy, which is also CONCERNING FOR   INFECTION.   4. LARGE RIGHT PLEURAL EFFUSION, significantly increased in volume since   03/05/2024.   5. Compressive atelectasis in the volume medullary of the right lung.   6. Pulmonary opacities in the left upper lobe are nonspecific but may be   INFECTIOUS/INFLAMMATORY.   7. No acute abnormality in the abdomen or pelvis.   8. Multiple bilateral intrarenal calculi. No hydronephrosis.         XR CHEST PORTABLE   Final Result   Near total opacification right hemithorax.           No results found.    No results found.    Is this patient to be included in the SEP-1 core measure due to severe sepsis or septic shock? No Exclusion criteria - the patient is NOT to be included for SEP-1 Core Measure due to: Infection is not suspected    PROCEDURES   Unless otherwise noted below, none    PAST MEDICAL HISTORY/Chronic Conditions Affecting Care      has a past medical history of A-fib ([facility]), CAD (coronary artery disease) (10/27/2011), Carotid stenosis, right (05/06/2021), Chronic venous insufficiency (03/02/2017), COVID-19 (01/2021), Decreased pulses in feet, Diabetes mellitus ([facility]), DM (diabetes mellitus), type 2 (HCC) (10/27/2011), ED (erectile dysfunction), Gout (10/31/2011), Heart attack ([facility]), HTN (hypertension) (10/27/2011), Hyperlipidemia (06/07/2012), Hypertension, Inguinal hernia, Lateral myocardial infarction (HCC) (10/2008), and Leg swelling (03/02/2017).     EMERGENCY DEPARTMENT COURSE    Vitals:    Vitals:    03/19/24 0815 03/19/24 0830 03/19/24 0900 03/19/24 0930   BP:  (!) 107/51 (!) 93/55 (!)

## 2024-03-19 ENCOUNTER — APPOINTMENT (OUTPATIENT)
Dept: GENERAL RADIOLOGY | Age: 74
DRG: 314 | End: 2024-03-19
Payer: MEDICARE

## 2024-03-19 ENCOUNTER — APPOINTMENT (OUTPATIENT)
Dept: ULTRASOUND IMAGING | Age: 74
DRG: 314 | End: 2024-03-19
Payer: MEDICARE

## 2024-03-19 ENCOUNTER — CARE COORDINATION (OUTPATIENT)
Dept: CARE COORDINATION | Age: 74
End: 2024-03-19

## 2024-03-19 PROBLEM — J90 PLEURAL EFFUSION ON RIGHT: Status: ACTIVE | Noted: 2024-03-19

## 2024-03-19 PROBLEM — J96.01 ACUTE RESPIRATORY FAILURE WITH HYPOXIA (HCC): Status: ACTIVE | Noted: 2024-03-19

## 2024-03-19 LAB
ALBUMIN SERPL-MCNC: 1.8 G/DL (ref 3.5–5.2)
ALP SERPL-CCNC: 133 U/L (ref 40–129)
ALT SERPL-CCNC: 21 U/L (ref 0–40)
AMMONIA PLAS-SCNC: 17 UMOL/L (ref 16–60)
ANION GAP SERPL CALCULATED.3IONS-SCNC: 12 MMOL/L (ref 7–16)
APPEARANCE FLD: NORMAL
AST SERPL-CCNC: 30 U/L (ref 0–39)
BACTERIA URNS QL MICRO: ABNORMAL
BASOPHILS # BLD: 0 K/UL (ref 0–0.2)
BASOPHILS NFR BLD: 0 % (ref 0–2)
BILIRUB SERPL-MCNC: 2.2 MG/DL (ref 0–1.2)
BILIRUB UR QL STRIP: ABNORMAL
BNP SERPL-MCNC: ABNORMAL PG/ML (ref 0–450)
BODY FLD TYPE: NORMAL
BUN SERPL-MCNC: 34 MG/DL (ref 6–23)
CALCIUM SERPL-MCNC: 7.6 MG/DL (ref 8.6–10.2)
CHLORIDE SERPL-SCNC: 95 MMOL/L (ref 98–107)
CHOLESTEROL FLUID: 43 MG/DL
CLARITY UR: CLEAR
CLOT CHECK: NORMAL
CO2 SERPL-SCNC: 22 MMOL/L (ref 22–29)
COLOR FLD: YELLOW
COLOR UR: YELLOW
CORTIS SERPL-MCNC: 35.8 UG/DL (ref 2.7–18.4)
CORTISOL COLLECTION INFO: ABNORMAL
CREAT SERPL-MCNC: 1.1 MG/DL (ref 0.7–1.2)
CRITICAL: ABNORMAL
DATE ANALYZED: ABNORMAL
DATE OF COLLECTION: ABNORMAL
EKG ATRIAL RATE: 85 BPM
EKG Q-T INTERVAL: 410 MS
EKG QRS DURATION: 148 MS
EKG QTC CALCULATION (BAZETT): 472 MS
EKG R AXIS: -138 DEGREES
EKG T AXIS: -11 DEGREES
EKG VENTRICULAR RATE: 80 BPM
EOSINOPHIL # BLD: 0 K/UL (ref 0.05–0.5)
EOSINOPHILS RELATIVE PERCENT: 0 % (ref 0–6)
ERYTHROCYTE [DISTWIDTH] IN BLOOD BY AUTOMATED COUNT: 14.7 % (ref 11.5–15)
GFR SERPL CREATININE-BSD FRML MDRD: >60 ML/MIN/1.73M2
GLUCOSE BLD-MCNC: 246 MG/DL (ref 74–99)
GLUCOSE FLD-MCNC: <2 MG/DL
GLUCOSE SERPL-MCNC: 233 MG/DL (ref 74–99)
GLUCOSE UR STRIP-MCNC: NEGATIVE MG/DL
HCT VFR BLD AUTO: 32.7 % (ref 37–54)
HGB BLD-MCNC: 11.6 G/DL (ref 12.5–16.5)
HGB UR QL STRIP.AUTO: ABNORMAL
INR PPP: 2
KETONES UR STRIP-MCNC: ABNORMAL MG/DL
LAB: ABNORMAL
LACTATE BLDV-SCNC: 1.6 MMOL/L (ref 0.5–2.2)
LACTATE BLDV-SCNC: 1.9 MMOL/L (ref 0.5–2.2)
LDH FLD L TO P-CCNC: >2500 U/L
LEUKOCYTE ESTERASE UR QL STRIP: ABNORMAL
LYMPHOCYTES NFR BLD: 0 K/UL (ref 1.5–4)
LYMPHOCYTES RELATIVE PERCENT: 0 % (ref 20–42)
Lab: 1120
Lab: ABNORMAL
MAGNESIUM SERPL-MCNC: 1.6 MG/DL (ref 1.6–2.6)
MCH RBC QN AUTO: 33 PG (ref 26–35)
MCHC RBC AUTO-ENTMCNC: 35.5 G/DL (ref 32–34.5)
MCV RBC AUTO: 93.2 FL (ref 80–99.9)
METAMYELOCYTES ABSOLUTE COUNT: 0.08 K/UL (ref 0–0.12)
METAMYELOCYTES: 1 % (ref 0–1)
MONOCYTES NFR BLD: 0.39 K/UL (ref 0.1–0.95)
MONOCYTES NFR BLD: 4 % (ref 2–12)
MONOCYTES NFR FLD: 49 %
MUCOUS THREADS URNS QL MICRO: PRESENT
NEUTROPHILS NFR BLD: 95 % (ref 43–80)
NEUTROPHILS NFR FLD: 51 %
NEUTS SEG NFR BLD: 8.34 K/UL (ref 1.8–7.3)
NITRITE UR QL STRIP: NEGATIVE
OPERATOR ID: 1023
PH FLUID: ABNORMAL
PH UR STRIP: 6 [PH] (ref 5–9)
PHOSPHATE SERPL-MCNC: 3.7 MG/DL (ref 2.5–4.5)
PLATELET # BLD AUTO: 127 K/UL (ref 130–450)
PMV BLD AUTO: 12 FL (ref 7–12)
POTASSIUM SERPL-SCNC: 4.1 MMOL/L (ref 3.5–5)
PROCALCITONIN SERPL-MCNC: 2.9 NG/ML (ref 0–0.08)
PROT FLD-MCNC: 3.4 G/DL
PROT SERPL-MCNC: 5 G/DL (ref 6.4–8.3)
PROT UR STRIP-MCNC: ABNORMAL MG/DL
PROTHROMBIN TIME: 22.3 SEC (ref 9.3–12.4)
RBC # BLD AUTO: 3.51 M/UL (ref 3.8–5.8)
RBC # BLD: ABNORMAL 10*6/UL
RBC # FLD: NORMAL CELLS/UL
RBC #/AREA URNS HPF: ABNORMAL /HPF
SODIUM SERPL-SCNC: 129 MMOL/L (ref 132–146)
SOURCE, BLOOD GAS: ABNORMAL
SP GR UR STRIP: 1.01 (ref 1–1.03)
SPECIMEN TYPE: NORMAL
TIME ANALYZED: 1130
TROPONIN I SERPL HS-MCNC: 62 NG/L (ref 0–11)
TSH SERPL DL<=0.05 MIU/L-ACNC: 1.97 UIU/ML (ref 0.27–4.2)
UROBILINOGEN UR STRIP-ACNC: 2 EU/DL (ref 0–1)
WBC # FLD: NORMAL CELLS/UL
WBC #/AREA URNS HPF: ABNORMAL /HPF
WBC OTHER # BLD: 8.8 K/UL (ref 4.5–11.5)

## 2024-03-19 PROCEDURE — 2580000003 HC RX 258

## 2024-03-19 PROCEDURE — 36592 COLLECT BLOOD FROM PICC: CPT

## 2024-03-19 PROCEDURE — 2580000003 HC RX 258: Performed by: INTERNAL MEDICINE

## 2024-03-19 PROCEDURE — 87070 CULTURE OTHR SPECIMN AEROBIC: CPT

## 2024-03-19 PROCEDURE — 71045 X-RAY EXAM CHEST 1 VIEW: CPT

## 2024-03-19 PROCEDURE — A4216 STERILE WATER/SALINE, 10 ML: HCPCS | Performed by: INTERNAL MEDICINE

## 2024-03-19 PROCEDURE — 88112 CYTOPATH CELL ENHANCE TECH: CPT

## 2024-03-19 PROCEDURE — 86403 PARTICLE AGGLUT ANTBDY SCRN: CPT

## 2024-03-19 PROCEDURE — 84100 ASSAY OF PHOSPHORUS: CPT

## 2024-03-19 PROCEDURE — 2500000003 HC RX 250 WO HCPCS: Performed by: INTERNAL MEDICINE

## 2024-03-19 PROCEDURE — 84443 ASSAY THYROID STIM HORMONE: CPT

## 2024-03-19 PROCEDURE — 0W993ZZ DRAINAGE OF RIGHT PLEURAL CAVITY, PERCUTANEOUS APPROACH: ICD-10-PCS | Performed by: INTERNAL MEDICINE

## 2024-03-19 PROCEDURE — 80053 COMPREHEN METABOLIC PANEL: CPT

## 2024-03-19 PROCEDURE — 2000000000 HC ICU R&B

## 2024-03-19 PROCEDURE — 6360000002 HC RX W HCPCS: Performed by: NURSE PRACTITIONER

## 2024-03-19 PROCEDURE — 93010 ELECTROCARDIOGRAM REPORT: CPT | Performed by: INTERNAL MEDICINE

## 2024-03-19 PROCEDURE — 88305 TISSUE EXAM BY PATHOLOGIST: CPT

## 2024-03-19 PROCEDURE — 3E033XZ INTRODUCTION OF VASOPRESSOR INTO PERIPHERAL VEIN, PERCUTANEOUS APPROACH: ICD-10-PCS | Performed by: INTERNAL MEDICINE

## 2024-03-19 PROCEDURE — 83615 LACTATE (LD) (LDH) ENZYME: CPT

## 2024-03-19 PROCEDURE — 36556 INSERT NON-TUNNEL CV CATH: CPT

## 2024-03-19 PROCEDURE — 87081 CULTURE SCREEN ONLY: CPT

## 2024-03-19 PROCEDURE — 82465 ASSAY BLD/SERUM CHOLESTEROL: CPT

## 2024-03-19 PROCEDURE — 84157 ASSAY OF PROTEIN OTHER: CPT

## 2024-03-19 PROCEDURE — 87205 SMEAR GRAM STAIN: CPT

## 2024-03-19 PROCEDURE — 99291 CRITICAL CARE FIRST HOUR: CPT | Performed by: INTERNAL MEDICINE

## 2024-03-19 PROCEDURE — C9113 INJ PANTOPRAZOLE SODIUM, VIA: HCPCS | Performed by: INTERNAL MEDICINE

## 2024-03-19 PROCEDURE — 82962 GLUCOSE BLOOD TEST: CPT

## 2024-03-19 PROCEDURE — 85025 COMPLETE CBC W/AUTO DIFF WBC: CPT

## 2024-03-19 PROCEDURE — 83986 ASSAY PH BODY FLUID NOS: CPT

## 2024-03-19 PROCEDURE — 83880 ASSAY OF NATRIURETIC PEPTIDE: CPT

## 2024-03-19 PROCEDURE — 87102 FUNGUS ISOLATION CULTURE: CPT

## 2024-03-19 PROCEDURE — 84484 ASSAY OF TROPONIN QUANT: CPT

## 2024-03-19 PROCEDURE — 83735 ASSAY OF MAGNESIUM: CPT

## 2024-03-19 PROCEDURE — 93971 EXTREMITY STUDY: CPT

## 2024-03-19 PROCEDURE — 82140 ASSAY OF AMMONIA: CPT

## 2024-03-19 PROCEDURE — 85610 PROTHROMBIN TIME: CPT

## 2024-03-19 PROCEDURE — 82533 TOTAL CORTISOL: CPT

## 2024-03-19 PROCEDURE — 32555 ASPIRATE PLEURA W/ IMAGING: CPT

## 2024-03-19 PROCEDURE — 82945 GLUCOSE OTHER FLUID: CPT

## 2024-03-19 PROCEDURE — 0202U NFCT DS 22 TRGT SARS-COV-2: CPT

## 2024-03-19 PROCEDURE — P9612 CATHETERIZE FOR URINE SPEC: HCPCS

## 2024-03-19 PROCEDURE — 84145 PROCALCITONIN (PCT): CPT

## 2024-03-19 PROCEDURE — 89051 BODY FLUID CELL COUNT: CPT

## 2024-03-19 PROCEDURE — 6370000000 HC RX 637 (ALT 250 FOR IP): Performed by: INTERNAL MEDICINE

## 2024-03-19 PROCEDURE — 83605 ASSAY OF LACTIC ACID: CPT

## 2024-03-19 PROCEDURE — 6360000002 HC RX W HCPCS: Performed by: INTERNAL MEDICINE

## 2024-03-19 PROCEDURE — 99222 1ST HOSP IP/OBS MODERATE 55: CPT | Performed by: THORACIC SURGERY (CARDIOTHORACIC VASCULAR SURGERY)

## 2024-03-19 RX ORDER — AMIODARONE HYDROCHLORIDE 200 MG/1
100 TABLET ORAL DAILY
Status: DISCONTINUED | OUTPATIENT
Start: 2024-03-19 | End: 2024-03-20 | Stop reason: HOSPADM

## 2024-03-19 RX ORDER — TORSEMIDE 20 MG/1
10 TABLET ORAL 2 TIMES DAILY
COMMUNITY

## 2024-03-19 RX ORDER — ZINC SULFATE 50(220)MG
50 CAPSULE ORAL DAILY
Status: DISCONTINUED | OUTPATIENT
Start: 2024-03-19 | End: 2024-03-20 | Stop reason: HOSPADM

## 2024-03-19 RX ORDER — PRAVASTATIN SODIUM 20 MG
20 TABLET ORAL DAILY
Status: DISCONTINUED | OUTPATIENT
Start: 2024-03-19 | End: 2024-03-20 | Stop reason: HOSPADM

## 2024-03-19 RX ORDER — SENNOSIDES A AND B 8.6 MG/1
1 TABLET, FILM COATED ORAL 2 TIMES DAILY
Status: DISCONTINUED | OUTPATIENT
Start: 2024-03-19 | End: 2024-03-20 | Stop reason: HOSPADM

## 2024-03-19 RX ORDER — LACTULOSE 10 G/15ML
10 SOLUTION ORAL EVERY EVENING
Status: DISCONTINUED | OUTPATIENT
Start: 2024-03-19 | End: 2024-03-20 | Stop reason: HOSPADM

## 2024-03-19 RX ORDER — ZINC SULFATE 50(220)MG
50 CAPSULE ORAL DAILY
COMMUNITY

## 2024-03-19 RX ORDER — 0.9 % SODIUM CHLORIDE 0.9 %
500 INTRAVENOUS SOLUTION INTRAVENOUS ONCE
Status: COMPLETED | OUTPATIENT
Start: 2024-03-19 | End: 2024-03-19

## 2024-03-19 RX ORDER — TAMSULOSIN HYDROCHLORIDE 0.4 MG/1
0.4 CAPSULE ORAL DAILY
COMMUNITY

## 2024-03-19 RX ORDER — LANOLIN ALCOHOL/MO/W.PET/CERES
1000 CREAM (GRAM) TOPICAL DAILY
Status: DISCONTINUED | OUTPATIENT
Start: 2024-03-19 | End: 2024-03-20 | Stop reason: HOSPADM

## 2024-03-19 RX ORDER — MIRTAZAPINE 15 MG/1
15 TABLET, FILM COATED ORAL NIGHTLY
Status: DISCONTINUED | OUTPATIENT
Start: 2024-03-19 | End: 2024-03-20 | Stop reason: HOSPADM

## 2024-03-19 RX ORDER — SACUBITRIL AND VALSARTAN 24; 26 MG/1; MG/1
1 TABLET, FILM COATED ORAL 2 TIMES DAILY
COMMUNITY

## 2024-03-19 RX ORDER — NOREPINEPHRINE BITARTRATE 0.06 MG/ML
1-100 INJECTION, SOLUTION INTRAVENOUS CONTINUOUS
Status: DISCONTINUED | OUTPATIENT
Start: 2024-03-19 | End: 2024-03-20 | Stop reason: HOSPADM

## 2024-03-19 RX ORDER — TAMSULOSIN HYDROCHLORIDE 0.4 MG/1
0.4 CAPSULE ORAL DAILY
Status: DISCONTINUED | OUTPATIENT
Start: 2024-03-19 | End: 2024-03-20 | Stop reason: HOSPADM

## 2024-03-19 RX ORDER — PETROLATUM 42 G/100G
OINTMENT TOPICAL 2 TIMES DAILY PRN
Status: DISCONTINUED | OUTPATIENT
Start: 2024-03-19 | End: 2024-03-20 | Stop reason: HOSPADM

## 2024-03-19 RX ORDER — DEXTROSE MONOHYDRATE 100 MG/ML
INJECTION, SOLUTION INTRAVENOUS CONTINUOUS PRN
Status: DISCONTINUED | OUTPATIENT
Start: 2024-03-19 | End: 2024-03-20 | Stop reason: HOSPADM

## 2024-03-19 RX ORDER — SODIUM CHLORIDE 9 MG/ML
INJECTION, SOLUTION INTRAVENOUS CONTINUOUS
Status: DISCONTINUED | OUTPATIENT
Start: 2024-03-19 | End: 2024-03-19

## 2024-03-19 RX ORDER — DEXTROSE AND SODIUM CHLORIDE 5; .45 G/100ML; G/100ML
INJECTION, SOLUTION INTRAVENOUS CONTINUOUS
Status: DISCONTINUED | OUTPATIENT
Start: 2024-03-19 | End: 2024-03-20

## 2024-03-19 RX ORDER — VITAMIN B COMPLEX
1000 TABLET ORAL DAILY
Status: DISCONTINUED | OUTPATIENT
Start: 2024-03-19 | End: 2024-03-20 | Stop reason: HOSPADM

## 2024-03-19 RX ORDER — GLUCAGON 1 MG/ML
1 KIT INJECTION PRN
Status: DISCONTINUED | OUTPATIENT
Start: 2024-03-19 | End: 2024-03-20 | Stop reason: HOSPADM

## 2024-03-19 RX ORDER — GLIPIZIDE 5 MG/1
5 TABLET ORAL 2 TIMES DAILY PRN
COMMUNITY

## 2024-03-19 RX ORDER — INSULIN LISPRO 100 [IU]/ML
0-4 INJECTION, SOLUTION INTRAVENOUS; SUBCUTANEOUS NIGHTLY
Status: DISCONTINUED | OUTPATIENT
Start: 2024-03-19 | End: 2024-03-20 | Stop reason: ALTCHOICE

## 2024-03-19 RX ORDER — ASPIRIN 81 MG/1
81 TABLET, CHEWABLE ORAL DAILY
Status: DISCONTINUED | OUTPATIENT
Start: 2024-03-19 | End: 2024-03-20 | Stop reason: HOSPADM

## 2024-03-19 RX ORDER — INSULIN LISPRO 100 [IU]/ML
0-8 INJECTION, SOLUTION INTRAVENOUS; SUBCUTANEOUS
Status: DISCONTINUED | OUTPATIENT
Start: 2024-03-19 | End: 2024-03-20 | Stop reason: ALTCHOICE

## 2024-03-19 RX ADMIN — SODIUM CHLORIDE 500 ML: 9 INJECTION, SOLUTION INTRAVENOUS at 11:42

## 2024-03-19 RX ADMIN — SODIUM CHLORIDE 500 ML: 9 INJECTION, SOLUTION INTRAVENOUS at 04:51

## 2024-03-19 RX ADMIN — HYDROCORTISONE SODIUM SUCCINATE 50 MG: 100 INJECTION, POWDER, FOR SOLUTION INTRAMUSCULAR; INTRAVENOUS at 22:18

## 2024-03-19 RX ADMIN — HYDROPHOR: 42 OINTMENT TOPICAL at 22:57

## 2024-03-19 RX ADMIN — NAFCILLIN SODIUM 2000 MG: 2 INJECTION, POWDER, LYOPHILIZED, FOR SOLUTION INTRAMUSCULAR; INTRAVENOUS at 14:31

## 2024-03-19 RX ADMIN — SODIUM CHLORIDE, PRESERVATIVE FREE 40 MG: 5 INJECTION INTRAVENOUS at 20:21

## 2024-03-19 RX ADMIN — Medication 5 MCG/MIN: at 12:22

## 2024-03-19 RX ADMIN — NAFCILLIN SODIUM 2000 MG: 2 INJECTION, POWDER, LYOPHILIZED, FOR SOLUTION INTRAMUSCULAR; INTRAVENOUS at 19:46

## 2024-03-19 RX ADMIN — DEXTROSE AND SODIUM CHLORIDE 60 ML/HR: 5; 450 INJECTION, SOLUTION INTRAVENOUS at 09:44

## 2024-03-19 RX ADMIN — NAFCILLIN SODIUM 2000 MG: 2 INJECTION, POWDER, LYOPHILIZED, FOR SOLUTION INTRAMUSCULAR; INTRAVENOUS at 22:53

## 2024-03-19 ASSESSMENT — ENCOUNTER SYMPTOMS
VOMITING: 0
ABDOMINAL PAIN: 0
EYES NEGATIVE: 1
NAUSEA: 0
COLOR CHANGE: 0
SHORTNESS OF BREATH: 1

## 2024-03-19 ASSESSMENT — PAIN SCALES - GENERAL: PAINLEVEL_OUTOF10: 0

## 2024-03-19 NOTE — PROCEDURES
THORACENTESIS PROCEDURE NOTE    Date: 3/19/2024    Procedure:  diagnostic and therapeutic thoracentesis    Attending:  Lucius Bains DO    Indication: pleural effusion    Findings:  Large right effusion  1600 of cloudy yellow/brown fluid removed    Site: right    Consent: Obtained    Medications: 10 cc of 1% lidocaine    Description:  The procedure, including risks and benefits, was discussed.  Timeout performed.  Ultrasound used: Yes.  Using ultrasound, largest pocket of pleural fluid identified.  Area prepped and draped in sterile fashion.  Anesthesia with 10 ml 1% lidocaine.  An 18 gauge needle with 8 Slovak catheter was advanced into the pleural space, catheter advanced over the needle and needle withdrawn.  1600 mL of fluid was collected for diagnostic and therapeutic purposes. Catheter removed.  Dressing placed over the procedure site.    CXR ordered for post-thoracentesis    Est blood loss: minimal    ELECTRONICALLY SIGNED:  Lucius Bains DO  3/19/2024  11:30 AM

## 2024-03-19 NOTE — CARE COORDINATION
03/19/24 CM Note: Patient is a return to ER due to wife stating patient is confused/low bp/with multiple falls at home. He did discharge with [facility] on 3/8/24. [facility] saw patient starting on 3/11/24. Patient is a recent AAA repair and sepsis. He is being admitted and pulmonary consulted. Plan for thoracentesis. Blood cultures are positive. He is being started on iv vanc. He is with low bp 78/42 and requiring oxygen at 2lnc due to room air sat 79%.  Levophed is being initiated in Er. He will admit to the intensive care unit. Electronically signed by Siri Palma RN CM on 3/19/2024 at 12:25 PM

## 2024-03-19 NOTE — ED NOTES
Patient noted taking his O2 off and fall of SPO2 86-88%.  Reapplied w daughter at the bedside diverting his hands.

## 2024-03-19 NOTE — PROCEDURES
Central Line Placement Procedure Note    Indication: vascular access    Consent: The family counseled regarding the procedure, its indications, risks, potential complications and alternatives, and any questions were answered. Consent was obtained to proceed.    Procedure: The patient was positioned appropriately and the skin over the right internal jugular vein was prepped with betadine and draped in a sterile fashion. Local anesthesia was obtained by infiltration using 1% Lidocaine without epinephrine.  A large bore needle was used to identify the vein.  A guide wire was then inserted into the vein through the needle. A triple lumen catheter was then inserted into the vessel over the guide wire using the Seldinger technique.  All ports showed good, free flowing blood return and were flushed with saline solution.   The catheter was then securely fastened to the skin with suture at 10 cm.  Two sutures were placed into the kit included tube clamp, proximal eyelets, and a suture end from each of the securing sutures was extended around the catheter and tied to the proximal eyelets as an added measure to prevent dislodgement. An antibiotic disk was placed and the site was then covered with a sterile dressing.  A post procedure X-ray was ordered and shows proper line positioning    The patient tolerated the procedure well.    Complications: None

## 2024-03-19 NOTE — ED NOTES
Dr. Acuña at bedside performing thoracocentesis (R) lung.  Fluid walked to lab. Patient tolerated well.

## 2024-03-19 NOTE — H&P
[facility]  History and Physical      CHIEF COMPLAINT: Fatigue, weakness, altered mental status    Reason for Admission: Refractory hypoglycemia, possible infection of the endovascular graft, pleural effusion     History Obtained From: Electronic medical record    PCP :  Ant Dahl MD    [address] [facility] [address] / [facility] [id number]      HISTORY OF PRESENT ILLNESS:      The patient is a 73 y.o. male Patient was asked to go to the emergency room went home.  Noted that patient has been extremely weak and falling.  Patient was also noted to be hypoglycemic.  He was also confused.  Patient was then noted to be hypoglycemic in the emergency room imaging revealed possible infection of the endovascular graft.  Patient was noted to be hyponatremic.  He was considered to be dehydrated.  Case was discussed with vascular by ED physician and admission was recommended.  Patient subsequently tested positive for order for blood cultures with gram-positive cocci in clusters.  He was started empiric antibiotics.  Patient was also hypotensive.  He needed pressor support.  At the time of questioning patient is awake and is pleasantly confused.  Patient is being admitted to the ICU.    Past Medical History:        Diagnosis Date    A-fib ([facility])     for cardioversion 3-12-21     CAD (coronary artery disease) 10/27/2011    Dr. Guy     Carotid stenosis, right 05/06/2021    Chronic venous insufficiency 03/02/2017    COVID-19 01/2021    resolved as of 3-3-21     Decreased pulses in feet     Diabetes mellitus (HCC)     DM (diabetes mellitus), type 2 (HCC) 10/27/2011    ED (erectile dysfunction)     Gout 10/31/2011    Heart attack ([facility])     HTN (hypertension) 10/27/2011    Hyperlipidemia 06/07/2012    Hypertension     Inguinal hernia     bilateral    Lateral myocardial infarction ([facility]) 10/2008    due to circumflex occlusion    Leg swelling 03/02/2017     Past Surgical History:        Procedure Laterality

## 2024-03-20 ENCOUNTER — APPOINTMENT (OUTPATIENT)
Dept: GENERAL RADIOLOGY | Age: 74
DRG: 314 | End: 2024-03-20
Payer: MEDICARE

## 2024-03-20 VITALS
TEMPERATURE: 97.8 F | SYSTOLIC BLOOD PRESSURE: 56 MMHG | BODY MASS INDEX: 33.66 KG/M2 | HEIGHT: 66 IN | WEIGHT: 209.44 LBS | DIASTOLIC BLOOD PRESSURE: 50 MMHG | OXYGEN SATURATION: 96 %

## 2024-03-20 PROBLEM — F33.0 MAJOR DEPRESSIVE DISORDER, RECURRENT, MILD (HCC): Status: RESOLVED | Noted: 2024-03-01 | Resolved: 2024-03-20

## 2024-03-20 PROBLEM — Z71.89 GOALS OF CARE, COUNSELING/DISCUSSION: Status: ACTIVE | Noted: 2024-03-20

## 2024-03-20 PROBLEM — F33.1 MAJOR DEPRESSIVE DISORDER, RECURRENT, MODERATE (HCC): Status: RESOLVED | Noted: 2024-03-01 | Resolved: 2024-03-20

## 2024-03-20 PROBLEM — Z51.5 PALLIATIVE CARE ENCOUNTER: Status: ACTIVE | Noted: 2024-03-20

## 2024-03-20 PROBLEM — J96.01 ACUTE RESPIRATORY FAILURE WITH HYPOXIA (HCC): Status: RESOLVED | Noted: 2024-03-19 | Resolved: 2024-03-20

## 2024-03-20 LAB
ALBUMIN SERPL-MCNC: 1.8 G/DL (ref 3.5–5.2)
ALP SERPL-CCNC: 136 U/L (ref 40–129)
ALT SERPL-CCNC: 21 U/L (ref 0–40)
ANION GAP SERPL CALCULATED.3IONS-SCNC: 11 MMOL/L (ref 7–16)
AST SERPL-CCNC: 29 U/L (ref 0–39)
B PARAP IS1001 DNA NPH QL NAA+NON-PROBE: NOT DETECTED
B PERT DNA SPEC QL NAA+PROBE: NOT DETECTED
BASOPHILS # BLD: 0 K/UL (ref 0–0.2)
BASOPHILS NFR BLD: 0 % (ref 0–2)
BILIRUB SERPL-MCNC: 2.7 MG/DL (ref 0–1.2)
BUN SERPL-MCNC: 36 MG/DL (ref 6–23)
C PNEUM DNA NPH QL NAA+NON-PROBE: NOT DETECTED
CALCIUM SERPL-MCNC: 8 MG/DL (ref 8.6–10.2)
CHLORIDE SERPL-SCNC: 96 MMOL/L (ref 98–107)
CO2 SERPL-SCNC: 21 MMOL/L (ref 22–29)
CREAT SERPL-MCNC: 1.2 MG/DL (ref 0.7–1.2)
EOSINOPHIL # BLD: 0 K/UL (ref 0.05–0.5)
EOSINOPHILS RELATIVE PERCENT: 0 % (ref 0–6)
ERYTHROCYTE [DISTWIDTH] IN BLOOD BY AUTOMATED COUNT: 14.6 % (ref 11.5–15)
FLUAV RNA NPH QL NAA+NON-PROBE: NOT DETECTED
FLUBV RNA NPH QL NAA+NON-PROBE: NOT DETECTED
GFR SERPL CREATININE-BSD FRML MDRD: >60 ML/MIN/1.73M2
GLUCOSE BLD-MCNC: 207 MG/DL (ref 74–99)
GLUCOSE BLD-MCNC: 218 MG/DL (ref 74–99)
GLUCOSE BLD-MCNC: 253 MG/DL (ref 74–99)
GLUCOSE BLD-MCNC: 271 MG/DL (ref 74–99)
GLUCOSE SERPL-MCNC: 229 MG/DL (ref 74–99)
HADV DNA NPH QL NAA+NON-PROBE: NOT DETECTED
HCOV 229E RNA NPH QL NAA+NON-PROBE: NOT DETECTED
HCOV HKU1 RNA NPH QL NAA+NON-PROBE: NOT DETECTED
HCOV NL63 RNA NPH QL NAA+NON-PROBE: NOT DETECTED
HCOV OC43 RNA NPH QL NAA+NON-PROBE: NOT DETECTED
HCT VFR BLD AUTO: 35.4 % (ref 37–54)
HGB BLD-MCNC: 12.1 G/DL (ref 12.5–16.5)
HMPV RNA NPH QL NAA+NON-PROBE: NOT DETECTED
HPIV1 RNA NPH QL NAA+NON-PROBE: NOT DETECTED
HPIV2 RNA NPH QL NAA+NON-PROBE: NOT DETECTED
HPIV3 RNA NPH QL NAA+NON-PROBE: NOT DETECTED
HPIV4 RNA NPH QL NAA+NON-PROBE: NOT DETECTED
INR PPP: 1.8
L PNEUMO1 AG UR QL IA.RAPID: NEGATIVE
LYMPHOCYTES NFR BLD: 0.2 K/UL (ref 1.5–4)
LYMPHOCYTES RELATIVE PERCENT: 2 % (ref 20–42)
M PNEUMO DNA NPH QL NAA+NON-PROBE: NOT DETECTED
MAGNESIUM SERPL-MCNC: 1.9 MG/DL (ref 1.6–2.6)
MCH RBC QN AUTO: 31.9 PG (ref 26–35)
MCHC RBC AUTO-ENTMCNC: 34.2 G/DL (ref 32–34.5)
MCV RBC AUTO: 93.4 FL (ref 80–99.9)
MONOCYTES NFR BLD: 0 % (ref 2–12)
MONOCYTES NFR BLD: 0 K/UL (ref 0.1–0.95)
NEUTROPHILS NFR BLD: 98 % (ref 43–80)
NEUTS SEG NFR BLD: 11.4 K/UL (ref 1.8–7.3)
PARTIAL THROMBOPLASTIN TIME: 32.4 SEC (ref 24.5–35.1)
PHOSPHATE SERPL-MCNC: 3.8 MG/DL (ref 2.5–4.5)
PLATELET # BLD AUTO: 137 K/UL (ref 130–450)
PMV BLD AUTO: 11.8 FL (ref 7–12)
POTASSIUM SERPL-SCNC: 4.1 MMOL/L (ref 3.5–5)
PROT SERPL-MCNC: 5.1 G/DL (ref 6.4–8.3)
PROTHROMBIN TIME: 19.6 SEC (ref 9.3–12.4)
RBC # BLD AUTO: 3.79 M/UL (ref 3.8–5.8)
RBC # BLD: ABNORMAL 10*6/UL
REPORT STATUS: NORMAL
RSV RNA NPH QL NAA+NON-PROBE: NOT DETECTED
RV+EV RNA NPH QL NAA+NON-PROBE: NOT DETECTED
S PNEUM AG SPEC QL: NEGATIVE
SARS-COV-2 RNA NPH QL NAA+NON-PROBE: NOT DETECTED
SODIUM SERPL-SCNC: 128 MMOL/L (ref 132–146)
SPECIMEN DESCRIPTION: NORMAL
SPECIMEN SOURCE: NORMAL
WBC OTHER # BLD: 11.6 K/UL (ref 4.5–11.5)

## 2024-03-20 PROCEDURE — 2580000003 HC RX 258

## 2024-03-20 PROCEDURE — 6360000002 HC RX W HCPCS: Performed by: INTERNAL MEDICINE

## 2024-03-20 PROCEDURE — 36592 COLLECT BLOOD FROM PICC: CPT

## 2024-03-20 PROCEDURE — 2500000003 HC RX 250 WO HCPCS: Performed by: INTERNAL MEDICINE

## 2024-03-20 PROCEDURE — 87899 AGENT NOS ASSAY W/OPTIC: CPT

## 2024-03-20 PROCEDURE — 99291 CRITICAL CARE FIRST HOUR: CPT | Performed by: INTERNAL MEDICINE

## 2024-03-20 PROCEDURE — 2580000003 HC RX 258: Performed by: INTERNAL MEDICINE

## 2024-03-20 PROCEDURE — 85610 PROTHROMBIN TIME: CPT

## 2024-03-20 PROCEDURE — 83735 ASSAY OF MAGNESIUM: CPT

## 2024-03-20 PROCEDURE — 6370000000 HC RX 637 (ALT 250 FOR IP): Performed by: INTERNAL MEDICINE

## 2024-03-20 PROCEDURE — 6360000002 HC RX W HCPCS: Performed by: CLINICAL NURSE SPECIALIST

## 2024-03-20 PROCEDURE — 87154 CUL TYP ID BLD PTHGN 6+ TRGT: CPT

## 2024-03-20 PROCEDURE — 87040 BLOOD CULTURE FOR BACTERIA: CPT

## 2024-03-20 PROCEDURE — 84100 ASSAY OF PHOSPHORUS: CPT

## 2024-03-20 PROCEDURE — 2700000000 HC OXYGEN THERAPY PER DAY

## 2024-03-20 PROCEDURE — 6370000000 HC RX 637 (ALT 250 FOR IP): Performed by: NURSE PRACTITIONER

## 2024-03-20 PROCEDURE — 80053 COMPREHEN METABOLIC PANEL: CPT

## 2024-03-20 PROCEDURE — 2580000003 HC RX 258: Performed by: NURSE PRACTITIONER

## 2024-03-20 PROCEDURE — C9113 INJ PANTOPRAZOLE SODIUM, VIA: HCPCS | Performed by: INTERNAL MEDICINE

## 2024-03-20 PROCEDURE — 85025 COMPLETE CBC W/AUTO DIFF WBC: CPT

## 2024-03-20 PROCEDURE — 71045 X-RAY EXAM CHEST 1 VIEW: CPT

## 2024-03-20 PROCEDURE — 82962 GLUCOSE BLOOD TEST: CPT

## 2024-03-20 PROCEDURE — 87449 NOS EACH ORGANISM AG IA: CPT

## 2024-03-20 PROCEDURE — 36415 COLL VENOUS BLD VENIPUNCTURE: CPT

## 2024-03-20 PROCEDURE — 85730 THROMBOPLASTIN TIME PARTIAL: CPT

## 2024-03-20 PROCEDURE — 6360000002 HC RX W HCPCS: Performed by: NURSE PRACTITIONER

## 2024-03-20 RX ORDER — MAGNESIUM SULFATE 1 G/100ML
1000 INJECTION INTRAVENOUS ONCE
Status: COMPLETED | OUTPATIENT
Start: 2024-03-20 | End: 2024-03-20

## 2024-03-20 RX ORDER — MAGNESIUM SULFATE IN WATER 40 MG/ML
2000 INJECTION, SOLUTION INTRAVENOUS ONCE
Status: COMPLETED | OUTPATIENT
Start: 2024-03-20 | End: 2024-03-20

## 2024-03-20 RX ORDER — TRAMADOL HYDROCHLORIDE 50 MG/1
50 TABLET ORAL EVERY 6 HOURS PRN
Status: DISCONTINUED | OUTPATIENT
Start: 2024-03-20 | End: 2024-03-20 | Stop reason: HOSPADM

## 2024-03-20 RX ORDER — GLYCOPYRROLATE 0.2 MG/ML
0.2 INJECTION INTRAMUSCULAR; INTRAVENOUS EVERY 4 HOURS PRN
Status: DISCONTINUED | OUTPATIENT
Start: 2024-03-20 | End: 2024-03-20 | Stop reason: HOSPADM

## 2024-03-20 RX ORDER — LORAZEPAM 2 MG/ML
0.5 INJECTION INTRAMUSCULAR
Status: DISCONTINUED | OUTPATIENT
Start: 2024-03-20 | End: 2024-03-20 | Stop reason: HOSPADM

## 2024-03-20 RX ORDER — SODIUM CHLORIDE 0.9 % (FLUSH) 0.9 %
SYRINGE (ML) INJECTION
Status: COMPLETED
Start: 2024-03-20 | End: 2024-03-20

## 2024-03-20 RX ORDER — INSULIN LISPRO 100 [IU]/ML
0-4 INJECTION, SOLUTION INTRAVENOUS; SUBCUTANEOUS EVERY 4 HOURS
Status: DISCONTINUED | OUTPATIENT
Start: 2024-03-20 | End: 2024-03-20 | Stop reason: HOSPADM

## 2024-03-20 RX ORDER — MORPHINE SULFATE 4 MG/ML
4 INJECTION, SOLUTION INTRAMUSCULAR; INTRAVENOUS
Status: DISCONTINUED | OUTPATIENT
Start: 2024-03-20 | End: 2024-03-20 | Stop reason: HOSPADM

## 2024-03-20 RX ORDER — MORPHINE SULFATE 2 MG/ML
2 INJECTION, SOLUTION INTRAMUSCULAR; INTRAVENOUS
Status: DISCONTINUED | OUTPATIENT
Start: 2024-03-20 | End: 2024-03-20 | Stop reason: HOSPADM

## 2024-03-20 RX ORDER — SODIUM CHLORIDE 450 MG/100ML
INJECTION, SOLUTION INTRAVENOUS CONTINUOUS
Status: DISCONTINUED | OUTPATIENT
Start: 2024-03-20 | End: 2024-03-20 | Stop reason: HOSPADM

## 2024-03-20 RX ADMIN — NAFCILLIN SODIUM 2000 MG: 2 INJECTION, POWDER, LYOPHILIZED, FOR SOLUTION INTRAMUSCULAR; INTRAVENOUS at 02:46

## 2024-03-20 RX ADMIN — NAFCILLIN SODIUM 2000 MG: 2 INJECTION, POWDER, LYOPHILIZED, FOR SOLUTION INTRAMUSCULAR; INTRAVENOUS at 10:53

## 2024-03-20 RX ADMIN — INSULIN LISPRO 2 UNITS: 100 INJECTION, SOLUTION INTRAVENOUS; SUBCUTANEOUS at 07:57

## 2024-03-20 RX ADMIN — Medication 30 MCG/MIN: at 16:32

## 2024-03-20 RX ADMIN — MAGNESIUM SULFATE HEPTAHYDRATE 2000 MG: 40 INJECTION, SOLUTION INTRAVENOUS at 02:52

## 2024-03-20 RX ADMIN — Medication 1000 UNITS: at 08:13

## 2024-03-20 RX ADMIN — MORPHINE SULFATE 4 MG: 4 INJECTION, SOLUTION INTRAMUSCULAR; INTRAVENOUS at 16:52

## 2024-03-20 RX ADMIN — GLYCOPYRROLATE 0.2 MG: 0.2 INJECTION INTRAMUSCULAR; INTRAVENOUS at 16:53

## 2024-03-20 RX ADMIN — CYANOCOBALAMIN TAB 1000 MCG 1000 MCG: 1000 TAB at 08:04

## 2024-03-20 RX ADMIN — Medication 50 MG: at 08:06

## 2024-03-20 RX ADMIN — MAGNESIUM SULFATE HEPTAHYDRATE 1000 MG: 1 INJECTION, SOLUTION INTRAVENOUS at 06:55

## 2024-03-20 RX ADMIN — INSULIN LISPRO 2 UNITS: 100 INJECTION, SOLUTION INTRAVENOUS; SUBCUTANEOUS at 11:03

## 2024-03-20 RX ADMIN — RIVAROXABAN 20 MG: 20 TABLET, FILM COATED ORAL at 08:03

## 2024-03-20 RX ADMIN — AMIODARONE HYDROCHLORIDE 100 MG: 200 TABLET ORAL at 08:11

## 2024-03-20 RX ADMIN — CALCIUM GLUCONATE 2000 MG: 98 INJECTION, SOLUTION INTRAVENOUS at 04:00

## 2024-03-20 RX ADMIN — HYDROCORTISONE SODIUM SUCCINATE 50 MG: 100 INJECTION, POWDER, FOR SOLUTION INTRAMUSCULAR; INTRAVENOUS at 05:34

## 2024-03-20 RX ADMIN — SODIUM CHLORIDE, PRESERVATIVE FREE 10 ML: 5 INJECTION INTRAVENOUS at 08:14

## 2024-03-20 RX ADMIN — TAMSULOSIN HYDROCHLORIDE 0.4 MG: 0.4 CAPSULE ORAL at 08:12

## 2024-03-20 RX ADMIN — LORAZEPAM 0.5 MG: 2 INJECTION INTRAMUSCULAR; INTRAVENOUS at 16:53

## 2024-03-20 RX ADMIN — INSULIN LISPRO 1 UNITS: 100 INJECTION, SOLUTION INTRAVENOUS; SUBCUTANEOUS at 03:14

## 2024-03-20 RX ADMIN — SODIUM CHLORIDE: 4.5 INJECTION, SOLUTION INTRAVENOUS at 02:43

## 2024-03-20 RX ADMIN — ASPIRIN 81 MG: 81 TABLET, CHEWABLE ORAL at 08:05

## 2024-03-20 RX ADMIN — SODIUM CHLORIDE, PRESERVATIVE FREE 40 MG: 5 INJECTION INTRAVENOUS at 08:00

## 2024-03-20 RX ADMIN — Medication 30 MCG/MIN: at 05:36

## 2024-03-20 RX ADMIN — NAFCILLIN SODIUM 2000 MG: 2 INJECTION, POWDER, LYOPHILIZED, FOR SOLUTION INTRAMUSCULAR; INTRAVENOUS at 07:55

## 2024-03-20 ASSESSMENT — PAIN SCALES - GENERAL: PAINLEVEL_OUTOF10: 0

## 2024-03-20 NOTE — PLAN OF CARE
Problem: Chronic Conditions and Co-morbidities  Goal: Patient's chronic conditions and co-morbidity symptoms are monitored and maintained or improved  Outcome: Not Progressing  Flowsheets (Taken 3/19/2024 9933)  Care Plan - Patient's Chronic Conditions and Co-Morbidity Symptoms are Monitored and Maintained or Improved: Monitor and assess patient's chronic conditions and comorbid symptoms for stability, deterioration, or improvement     Problem: Discharge Planning  Goal: Discharge to home or other facility with appropriate resources  Outcome: Not Progressing     Problem: Chronic Conditions and Co-morbidities  Goal: Patient's chronic conditions and co-morbidity symptoms are monitored and maintained or improved  Outcome: Not Progressing  Flowsheets (Taken 3/19/2024 7101)  Care Plan - Patient's Chronic Conditions and Co-Morbidity Symptoms are Monitored and Maintained or Improved: Monitor and assess patient's chronic conditions and comorbid symptoms for stability, deterioration, or improvement     Problem: Discharge Planning  Goal: Discharge to home or other facility with appropriate resources  Outcome: Not Progressing

## 2024-03-20 NOTE — HOME CARE
Current with [facility] for SN. Will need JESSEE orders prior to discharge.  Kerri Hylton LPN, [facility]

## 2024-03-20 NOTE — CONSULTS
Palliative Care Department  [phone]  Palliative Care Initial Consult  Sabiha Van APRN-CNS    Neto Baca  [id number]  Hospital Day: 3    Date of Initial Consult: 3/19/24  Referring Provider: Dr. Wilkinson    Palliative Medicine was consulted for assistance with: goals of care      HPI:   Neto Baca is a 73 y.o. with a past medical history of endovascular thoracic aortic aneurysm repair (2/22/2024), p.afib, CAD s/p CABG 7/2021, HTN, LD, DM  who was admitted on 3/18/2024 from home with a CHIEF COMPLAINT of Fatigue, weakness, altered mental status.   EMS reports patient has been extremely weak and falling the past few weeks; noting patient being hypoglycemic.  He was also confused. Workup CTA chest noted para-aortic hematoma with locules of gas within it. Large right pleural effusion. Status post right-sided paracentesis with 1.6 L fluid drained. Admitted in MICU for septic shock due to mediastinitis and aortic aneurysm graft infection. Has +blood cultures with gram-positive cocci in clusters.  He was started empiric antibiotics. Pulmonology, ID and CTS consulted; No acute vascular surgical interventions planned. Notes indicate pt and family to have discussion with hospice today. Pt just wants to return home. Palliative Medicine has been consulted to assist with goals of care.     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  Capacity is time limited and situation/question specific  During encounter met with wife Sally as surrogate medical decision-maker  Outcome of goals of care meeting:   Continue current care at present  Considering comfort measures; hospice consulted (HOV)  Wife  have meeting at 11 am 
CTS Consult    Patient name: Neto Baca    Reason for consult: Empyema    Primary Care Physician: Ant Dahl MD    Date of service: 3/19/2024    Chief Complaint: Weakness    HPI: 73 year old known to our service in the sense that my partner Dr. Guadarrama did CABG on him in 2021.  He was in last month with a GENEVIEVE vs contained descending aortic rupture and got a TEVAR with a  great result.  He presents back with fatigue, weakness and AMS.  He had a CT showing possible endograft infection and large effusion. Thoracentesis for 1600 has been done.  He is on Levo.  His family is at bedside.  The patient currently denies CP, SOB at rest, CALI, LE edema, N/V, F/C, rigors, orthopnea, PND and syncope.      Allergies:   Allergies   Allergen Reactions    Adhesive Tape Dermatitis and Other (See Comments)    Crestor [Rosuvastatin Calcium]      Muscles aches     Lipitor [Atorvastatin]      Muscle aches    Rosuvastatin      Muscles aches        Home medications:    Current Facility-Administered Medications   Medication Dose Route Frequency Provider Last Rate Last Admin    Vitamin D (CHOLECALCIFEROL) tablet 1,000 Units  1,000 Units Oral Daily Ant Dahl MD        vitamin B-12 (CYANOCOBALAMIN) tablet 1,000 mcg  1,000 mcg Oral Daily Ant Dahl MD        pravastatin (PRAVACHOL) tablet 20 mg  20 mg Oral Daily Ant Dahl MD        rivaroxaban (XARELTO) tablet 20 mg  20 mg Oral Daily with breakfast Ant Dahl MD        amiodarone (CORDARONE) tablet 100 mg  100 mg Oral Daily Ant Dahl MD        lactulose (CHRONULAC) 10 GM/15ML solution 10 g  10 g Oral QPM Ant Dahl MD        aspirin chewable tablet 81 mg  81 mg Oral Daily Ant Dahl MD        mirtazapine (REMERON) tablet 15 mg  15 mg Oral Nightly Ant Dahl MD        senna (SENOKOT) tablet 8.6 mg  1 tablet Oral BID Ant Dahl MD        sacubitril-valsartan (ENTRESTO) 
distress, and appears stated age  NEURO:  Normal  EYES:  lids and lashes normal, sclera clear and conjunctiva normal  ENT:  normocepalic, without obvious abnormality, external ears without lesions  NECK:  supple, symmetrical, trachea midline, no jugular venous distension,   LUNGS:  no increased work of breathing  CARDIOVASCULAR:  regular rate and rhythm  ABDOMEN:  soft, non-distended, non-tender  SKIN:  no bruising or bleeding    EXTREMITIES:    R UE Swelling absent Incisions absent       5/5 Strength    L UE Swelling absent Incisions absent       5/5 Strength    R LE Edema present     Incisions absent    Varicose veins absent    Wounds absent    normalcaprefill   5/5 Strength   Neuropathy is absent    L LE Edema present     Incisions absent    Varicose veins absent    Wounds absent    normalcaprefill   5/5 Strength   Neuropathy is absent    R brachial +2, multiphasic  L brachial +2, multiphasic   R radial +2, multiphasic L radial +2, multiphasic   R femoral  L femoral    R popliteal +2, multiphasic L popliteal +2, multiphasic   R posterior tibial +2 L posterior tibial +2   R dorsalis pedis +2  L dorsalis pedis +2       LABS:    Lab Results   Component Value Date    WBC 7.9 03/18/2024    HGB 12.4 (L) 03/18/2024    HCT 36.5 (L) 03/18/2024     03/05/2024    PROTIME 40.8 (H) 03/18/2024    INR 3.8 03/18/2024    K 5.0 03/18/2024    BUN 27 (H) 03/18/2024    CREATININE 1.0 03/18/2024       RADIOLOGY:  CTA CHEST W CONTRAST    Result Date: 3/18/2024  EXAMINATION: CTA OF THE CHEST; CTA OF THE ABDOMEN AND PELVIS WITH CONTRAST 3/18/2024 9:10 pm TECHNIQUE: CTA of the chest was performed after the administration of intravenous contrast.  Multiplanar reformatted images are provided for review.  MIP images are provided for review. Automated exposure control, iterative reconstruction, and/or weight based adjustment of the mA/kV was utilized to reduce the radiation dose to as low as reasonably achievable.; CTA of the abdomen 
Examination of the right-upper field reveals decreased breath sounds. Examination of the left-upper field reveals rales. Examination of the right-middle field reveals decreased breath sounds. Examination of the left-middle field reveals rales. Examination of the right-lower field reveals decreased breath sounds. Decreased breath sounds and rales present.   Abdominal:      Palpations: Abdomen is soft.      Tenderness: There is no abdominal tenderness.   Musculoskeletal:         General: Swelling present. No deformity.      Cervical back: Neck supple.      Right lower leg: Edema present.      Left lower leg: Edema present.      Comments: +RUE swelling  B/l lower ext swelling   Lymphadenopathy:      Cervical: No cervical adenopathy.   Skin:     General: Skin is warm.      Findings: No rash.   Neurological:      General: No focal deficit present.      Mental Status: He is alert and oriented to person, place, and time.   Psychiatric:         Behavior: Behavior normal.         Pulmonary Function Testing personally reviewed and interpreted.    PERTINENT LAB RESULTS: Labs reviewed.      DIAGNOSTICS: Pertinent imaging reviewed.    CTA 3/18:  1. Status post stent graft repair of the distal descending thoracic aortic  aneurysm.  2. Compared to the postoperative CT from 03/05/2024, the PARA-AORTIC HEMATOMA  APPEARS SMALLER but now with locules of GAS within it.  The etiology of the  gas is unclear but may be due to INFECTION.  3. Compared to the prior study, there is now soft tissue swelling, edema and  gas along the upper aspect of the sternotomy, which is also CONCERNING FOR  INFECTION.  4. LARGE RIGHT PLEURAL EFFUSION, significantly increased in volume since  03/05/2024.  5. Compressive atelectasis in the volume medullary of the right lung.  6. Pulmonary opacities in the left upper lobe are nonspecific but may be  INFECTIOUS/INFLAMMATORY.        ASSESSMENT:     Large right pleural effusion that has progressed in patient with 
pain  ENT: No hearing changes, no ear pain  Respiratory: No cough, no dyspnea  Cardiovascular: No chest pain, no palpitations  Gastrointestinal: Has abdominal pain, no diarrhea  Genitourinary: No dysuria, no hematuria  Integumentary: No rash, no itching  Musculoskeletal: No muscle pain, has back pain  Neurologic: No headache, no numbness in extremities    Physical Examination:  Vitals:    03/19/24 0900 03/19/24 0930 03/19/24 1000 03/19/24 1015   BP: (!) 93/55 (!) 87/67 (!) 86/54 (!) 86/49   Pulse: 92 (!) 102 93 95   Resp: 20 22 24 22   Temp:       TempSrc:       SpO2:  92%  94%   Weight:       Height:         Constitutional: Alert, not in distress  Eyes: Sclerae anicteric, no conjunctival erythema  ENT: No buccal lesion, no pharyngeal exudates  Neck: No nuchal rigidity, no cervical adenopathy  Lungs: Clear breath sounds, no crackles, no wheezes  Heart: Regular rate and rhythm, no murmurs  Abdomen: Bowel sounds present, soft, nontender  Skin: Warm and dry, no active dermatoses  Musculoskeletal: No joint erythema, no joint swelling    Labs, imaging, and medical records/notes were personally reviewed.      Assessment:  Severe sepsis, secondary to gram-positive cocci in clusters (MSSA by PCR) bacteremia associated with infected aortic graft in the setting of recent thoracic aneurysm with penetrating ulcer status post TEVAR on 02/22/2024. Family refused further invasive procedures for definitive source control.  Pleural effusion, right    Plan:  Change vancomycin to nafcillin 2 g every 4 hours.  Check MRI thoracolumbar spine, CRYSTAL. (Full work-up requested by patient and family despite no plans for invasive surgery for definitive source control and no change in antibiotic management.)  Repeat blood cultures tomorrow.  Follow-up blood cultures from 03/18.  Follow-up plans for thoracentesis and pleural fluid cultures.  Continue supportive care.    Case was discussed with Dr. Dahl.    Thank you for involving me in the 
Xarelto for A-fib  ID: Nafcillin to continue.  Repeat blood cultures.  Endocrine: Monitor BS  DVT/GI: Prophylaxis: Protonix and Xarelto  Code Status: Limited code, no CPR, no intubation, no meds, no shock  GOC Discussion: Discussed with wife and daughter at bedside.  Wife decided to take him home with home hospice.  Home hospice consult requested.  Palliative consult requested.  Disposition: ICU  Total Critical care time spent  35 mins. This did not include any procedures.         Wilfredo Wilkinson MD  Critical Care

## 2024-03-20 NOTE — PLAN OF CARE
Problem: Safety - Adult  Goal: Free from fall injury  3/20/2024 0141 by Kimmie Mayes RN  Outcome: Progressing     Problem: Chronic Conditions and Co-morbidities  Goal: Patient's chronic conditions and co-morbidity symptoms are monitored and maintained or improved  3/20/2024 0141 by Kimmie Mayes RN  Outcome: Progressing     Problem: Discharge Planning  Goal: Discharge to home or other facility with appropriate resources  3/20/2024 0141 by Kimmie Mayes RN  Outcome: Progressing     Problem: Chronic Conditions and Co-morbidities  Goal: Patient's chronic conditions and co-morbidity symptoms are monitored and maintained or improved  3/20/2024 0141 by Kimmie Mayes RN  Outcome: Progressing  3/19/2024 2145 by Ani Suarez RN  Outcome: Not Progressing  Flowsheets (Taken 3/19/2024 1756)  Care Plan - Patient's Chronic Conditions and Co-Morbidity Symptoms are Monitored and Maintained or Improved: Monitor and assess patient's chronic conditions and comorbid symptoms for stability, deterioration, or improvement     Problem: Discharge Planning  Goal: Discharge to home or other facility with appropriate resources  3/20/2024 0141 by Kimmie Mayes RN  Outcome: Progressing  3/19/2024 2145 by Ani Suarez RN  Outcome: Not Progressing

## 2024-03-20 NOTE — PROGRESS NOTES
Pio Cotton M.D.,[facility]  Billy Guerra D.O., F.AYAZ.ARI.OELIER., [facility]  Cooper Cabrera M.D.  Naomie Moreno M.D.   BESSIE VazquezO.        Daily Pulmonary Progress Note    Patient:  Neto Baca 73 y.o. male MRN: [id number]            Synopsis     We are following patient for pleural effusion/Empyema,  MSSA bacteremia, infected aortic graft, septic shock     \"CC\" fatigue and malaise    Code status: limited  Limited - Set by Wilfredo Wilkinson MD at 3/19/2024 1670 (View report)   Question Answer   Intubation/Re-intubation at the time of arrest No   Defibrillation/Cardioversion No   Chest Compressions No   Resuscitative Medications No        Subjective      Patient was seen and examined. Sleeping comfortably. Oxygen 8 liters NC. SPO2 94%. Wife and daughter at bedside. Remains on levophed at 30 mcg for bp support. Tolerating antibiotics. Feels relief after R thoracentesis and empyema drained. Pt and family considering comfort measures.       Review of Systems:   Constitutional:  Positive for activity change, appetite change and fatigue. Negative for chills and fever.   Eyes: Negative.    Respiratory:  Positive for shortness of breath.    Cardiovascular:  Positive for leg swelling. Negative for chest pain.   Gastrointestinal:  Negative for abdominal pain, nausea and vomiting.   Endocrine: Negative for cold intolerance and heat intolerance.   Genitourinary:  Negative for difficulty urinating and dysuria.   Musculoskeletal:  Positive for gait problem.   Skin:  Negative for color change and rash.   Allergic/Immunologic: Negative for environmental allergies and immunocompromised state.   Neurological:  Negative for dizziness and weakness.   Psychiatric/Behavioral:  Negative for agitation and confusion.      24-hour events:  + pleural fluid empyema, MSSA    Objective   OBJECTIVE:   BP (!) 69/57   Pulse (!) 120   Temp 98.3 °F (36.8 °C) (Temporal)   Resp 20   Ht 1.676 m (5' 6\")   Wt 95 kg (209 lb 7 oz)   SpO2 
4 Eyes Skin Assessment     NAME:  Neto Baca  YOB: 1950  MEDICAL RECORD NUMBER:  [id number]    The patient is being assessed for  Admission    I agree that at least one RN has performed a thorough Head to Toe Skin Assessment on the patient. ALL assessment sites listed below have been assessed.      Areas assessed by both nurses:    Head, Face, Ears, Shoulders, Back, Chest, Arms, Elbows, Hands, Sacrum. Buttock, Coccyx, Ischium, Legs. Feet and Heels, Under Medical Devices , and Other ***      Yes wound(s) were present on assessment. LDA wound assessment was Initiated and completed by RN  Does the Patient have a Wound? Yes wound(s) were present on assessment. LDA wound assessment was Initiated and completed by RN       Davion Prevention initiated by RN: Yes  Wound Care Orders initiated by RN: Yes    Pressure Injury (Stage 3,4, Unstageable, DTI, NWPT, and Complex wounds) if present, place Wound referral order by RN under : Yes    New Ostomies, if present place, Ostomy referral order under : No     Nurse 1 eSignature: Electronically signed by Ani Suarez RN on 3/19/24 at 9:57 PM EDT    **SHARE this note so that the co-signing nurse can place an eSignature**    Nurse 2 eSignature: {Esignature:[id number]}   
Dr. Dahl states patient may be discharge and he will sign the death certificate. Discharge order placed.   
Dr. Wilkinson states cause of death, Mediastinitis, empyema and septic shock.   
Family present in room, patient resting in bed, no distress noted, no concerns voiced by family. Awaiting for family members to arrived.   
Lab called for bc x 2  
Micro updated 4/4 positive blood cultures for gram positive cocci in clusters. Physician notified. Vanco to be ordered with pharmacy consult.       
Patient DNR-CC, resting in bed, no distress noted, family voiced no concerns, continue to monitor.   
Patient asystole at 1826, Dr. Dahl, Dr. Reinoso, Jaja Barone, Dr. Rick Casey and palliative care notified through [facility] served.   
Patient family requesting [facility]  home [phone].  Home notified.   
Patient has absent apical pulse, no cough, no gag reflex, pupils, non-reactive, no response to deep pain stimuli, no respirations, rise or fall of chest for one minute. Verified by second RN  
Patient is comfort care, family at bedside, awaiting for other family members to arrive. Dr. Jaja kuo.   
Please call amilcar  [phone] from Cardiology ([facility]) with family decision with hospice meeting whether to proceed or not.  thanks   
Pt and family refusing PO meds presently. Pt is also refusing to eat, and is only taking small sips of water- ice cream and iced tea. Supportive care given, many family members are in and out at the bedside. Pt is requesting only to go home. Will continue to assess.   
Pt asx on monitor, no spontaneous resp noted, no AP.  Family at bedside.  
Pt undressed and placed in hosptail gown.  Pt placed on cardiac heart monitor and belongings bagged labeled and placed with pt  
[facility] states patient may be released, [id number]  
The patient was conversant, even with equipment attached to him. His spirits were good, he was very responsive. The patient's brother was in the room with him - he had just learned of the medical issues the evening before. They were optimistic about care and future treatment.    I was able to pray with them. They also welcome the presence of a .     Should you need further support, reach out to us at [facility], x [phone].    Jorge Clemens; LARRY Corrigan    
Transition to DNR-CC and hospice care is noted.     Thank you for involving me in the care of Neto Baca. I will sign off for now. Please do not hesitate to call for any questions, concerns, or new findings.    Electronically signed by Rosa Reinoso MD on 3/20/2024 at 10:04 AM    
Unable to titrate levophed, levophed infusing at 30mcg/min, titrated to 26 mcg/min, map-42,levophed increased to 30mcg/min, Dr. Wilkinson aware.   
VASCULAR SURGERY  DAILY PROGRESS NOTE  3/19/2024    CHIEF COMPLAINT:  Chief Complaint   Patient presents with    Failure To Thrive     EMS has been getting called for patient falling for the last few weeks. BS was 42 when they arrived, patient was confused at the time and hypotensive. Had a AAA repaired 3 weeks ago.       SUBJECTIVE:  Resting comfortably, denies any pain. Is having some dyspnea.     OBJECTIVE:  BP (!) 92/53   Pulse 85   Temp 98 °F (36.7 °C) (Oral)   Resp 27   Ht 1.676 m (5' 6\")   Wt 81.6 kg (180 lb)   SpO2 93%   BMI 29.05 kg/m²     GENERAL: Alert, comfortable appearing, mildly confused  LUNGS: Mildly labored breathing, symmetric chest rise  CARDIOVASCULAR: RR, warm   ABDOMEN:  Soft, non-distended, non-tender. No guarding, rigidity, rebound.  EXTREMITIES: Left lower extremity: 2+ femoral pulse, DP biphasic monophasic, pitting edema  Right lower extremity: 2+ femoral pulse, DP is biphasic, PT monophasic, pitting edema    Last echo 2/20/2024: EF is 30 to 35%    ASSESSMENT/PLAN:  73 y.o. male thoracic aneurysm with penetrating ulcer status post TEVAR presenting with generalized weakness    -CT scan reviewed, large right pleural effusion and hypoxia, will ask pulmonary to evaluate  -No acute vascular surgical interventions warranted at this time  -Will follow-up blood cultures and continue to follow    Dr. Kayden Gilmore PGY2    Kayden Gilmore DO  Surgery Resident PGY-2  3/19/2024  7:17 AM     Patient seen and examined.  CT imaging reviewed.  We discussed the potential diagnoses based on his CAT scan.  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  Either way the treatment would have been with a covered stent graft to temporize the bleeding.  There is possible there could have been some seeding following graft placement as well given his recurrent hospitalizations for pneumonia and the flu.  We discussed that if this does 
VASCULAR SURGERY  DAILY PROGRESS NOTE  3/20/2024    CHIEF COMPLAINT:  Chief Complaint   Patient presents with    Failure To Thrive     EMS has been getting called for patient falling for the last few weeks. BS was 42 when they arrived, patient was confused at the time and hypotensive. Had a AAA repaired 3 weeks ago.       SUBJECTIVE:  Resting comfortably, family member at bedside, denies any pain    OBJECTIVE:  /66   Pulse (!) 110   Temp 97.2 °F (36.2 °C) (Temporal)   Resp 23   Ht 1.676 m (5' 6\")   Wt 95 kg (209 lb 7 oz)   SpO2 96%   BMI 33.80 kg/m²     GENERAL: Alert, comfortable appearing, mildly confused  LUNGS: Mildly labored breathing, symmetric 8 L nasal cannula   CARDIOVASCULAR: Tachycardic, on 30 of levo  ABDOMEN:  Soft, non-distended, non-tender. No guarding, rigidity, rebound.  EXTREMITIES: with pitting edema    Last echo 2/20/2024: EF is 30 to 35%    ASSESSMENT/PLAN:  73 y.o. male thoracic aneurysm with penetrating ulcer status post TEVAR presenting with generalized weakness    -Family is to have a discussion with hospice today, will follow-up   -Continue antibiotics per ID at this time  -No acute vascular surgical interventions planned  -If patient is to continue medical therapy, would recommend arterial line     Dr. Kayden Gilmore PGY2    Kayden Gilmore DO  Surgery Resident PGY-2  3/20/2024  7:57 AM     Patient seen and examined. Spke with patient and family at bedside. Given overall clinical status home with hospice is definitely apporpriate and in line with their wishes. Vascular available for any questions.     Tiffanie Craig MD  3/20/2024  2:25 PM        
CONNOR Dupont on 3/20/2024 at 11:36 AM  
infusion, , IntraVENous, Continuous, Debbie Castelan APRN - CNP, Last Rate: 100 mL/hr at 03/20/24 0639, Rate Change at 03/20/24 0639    insulin lispro (HUMALOG) injection vial 0-4 Units, 0-4 Units, SubCUTAneous, Q4H, Debbie Castelan APRN - CNP, 2 Units at 03/20/24 1103    Vitamin D (CHOLECALCIFEROL) tablet 1,000 Units, 1,000 Units, Oral, Daily, Ant Dahl MD, 1,000 Units at 03/20/24 0813    vitamin B-12 (CYANOCOBALAMIN) tablet 1,000 mcg, 1,000 mcg, Oral, Daily, Ant Dahl MD, 1,000 mcg at 03/20/24 0804    pravastatin (PRAVACHOL) tablet 20 mg, 20 mg, Oral, Daily, Ant Dahl MD    rivaroxaban (XARELTO) tablet 20 mg, 20 mg, Oral, Daily with breakfast, Ant Dahl MD, 20 mg at 03/20/24 0803    amiodarone (CORDARONE) tablet 100 mg, 100 mg, Oral, Daily, Ant Dahl MD, 100 mg at 03/20/24 0811    lactulose (CHRONULAC) 10 GM/15ML solution 10 g, 10 g, Oral, QPM, Ant Dahl MD    aspirin chewable tablet 81 mg, 81 mg, Oral, Daily, Ant Dahl MD, 81 mg at 03/20/24 0805    mirtazapine (REMERON) tablet 15 mg, 15 mg, Oral, Nightly, Ant Dahl MD    senna (SENOKOT) tablet 8.6 mg, 1 tablet, Oral, BID, Ant Dahl MD    tamsulosin (FLOMAX) capsule 0.4 mg, 0.4 mg, Oral, Daily, Ant Dahl MD, 0.4 mg at 03/20/24 0812    zinc sulfate (ZINCATE) 220 mg capsule - elemental zinc 50 mg, 50 mg, Oral, Daily, Ant Dahl MD, 50 mg at 03/20/24 0806    glucose chewable tablet 16 g, 4 tablet, Oral, PRN, Ant Dahl MD    dextrose bolus 10% 125 mL, 125 mL, IntraVENous, PRN **OR** dextrose bolus 10% 250 mL, 250 mL, IntraVENous, PRN, Ant Dahl MD    glucagon injection 1 mg, 1 mg, SubCUTAneous, PRN, Ant Dahl MD    dextrose 10 % infusion, , IntraVENous, Continuous PRN, Ant Dahl MD    nafcillin 2,000 mg in sodium chloride 0.9 % 100 mL IVPB (mini-bag), 2,000 mg, IntraVENous, Q4H, 
Ant Dahl MD        senna (SENOKOT) tablet 8.6 mg  1 tablet Oral BID Ant Dahl MD        tamsulosin (FLOMAX) capsule 0.4 mg  0.4 mg Oral Daily Ant Dahl MD   0.4 mg at 03/20/24 0812    zinc sulfate (ZINCATE) 220 mg capsule - elemental zinc 50 mg  50 mg Oral Daily Ant Dahl MD   50 mg at 03/20/24 0806    glucose chewable tablet 16 g  4 tablet Oral PRN Ant Dahl MD        dextrose bolus 10% 125 mL  125 mL IntraVENous PRN Ant Dahl MD        Or    dextrose bolus 10% 250 mL  250 mL IntraVENous PRN Ant Dahl MD        glucagon injection 1 mg  1 mg SubCUTAneous PRN Ant Dahl MD        dextrose 10 % infusion   IntraVENous Continuous PRN Ant Dahl MD        nafcillin 2,000 mg in sodium chloride 0.9 % 100 mL IVPB (mini-bag)  2,000 mg IntraVENous Q4H Rosa Reinoso MD   Stopped at 03/20/24 1157    norepinephrine (LEVOPHED) 16 mg in sodium chloride 0.9 % 250 mL infusion  1-100 mcg/min IntraVENous Continuous Lucius Bains DO 28.1 mL/hr at 03/20/24 0536 30 mcg/min at 03/20/24 0536    pantoprazole (PROTONIX) 40 mg in sodium chloride (PF) 0.9 % 10 mL injection  40 mg IntraVENous Daily Wilfredo Wilkinson MD   40 mg at 03/20/24 0800    mineral oil-hydrophilic petrolatum (HYDROPHOR) ointment   Topical BID PRN Wilfredo Wilkinson MD   Given at 03/19/24 2257    hydrocortisone sodium succinate PF (SOLU-CORTEF) injection 50 mg  50 mg IntraVENous Q8H Debbie Castelan APRN - CNP   50 mg at 03/20/24 0534     PRN Meds      : traMADol, morphine **OR** morphine, glycopyrrolate, LORazepam, glucose, dextrose bolus **OR** dextrose bolus, glucagon (rDNA), dextrose, mineral oil-hydrophilic petrolatum  Allergy            : Adhesive tape, Crestor [rosuvastatin calcium], Lipitor [atorvastatin], and Rosuvastatin  Immunization      :   Immunization History   Administered Date(s) Administered    COVID-19, J&J, (age 18y+), IM, 0.5

## 2024-03-20 NOTE — PLAN OF CARE
Problem: Safety - Adult  Goal: Free from fall injury  Outcome: Completed  Flowsheets (Taken 3/20/2024 1600)  Free From Fall Injury:   Instruct family/caregiver on patient safety   Based on caregiver fall risk screen, instruct family/caregiver to ask for assistance with transferring infant if caregiver noted to have fall risk factors     Problem: Chronic Conditions and Co-morbidities  Goal: Patient's chronic conditions and co-morbidity symptoms are monitored and maintained or improved  Outcome: Completed     Problem: Discharge Planning  Goal: Discharge to home or other facility with appropriate resources  Outcome: Completed     Problem: Pain  Goal: Verbalizes/displays adequate comfort level or baseline comfort level  Outcome: Completed  Flowsheets  Taken 3/20/2024 1200  Verbalizes/displays adequate comfort level or baseline comfort level:   Encourage patient to monitor pain and request assistance   Assess pain using appropriate pain scale   Administer analgesics based on type and severity of pain and evaluate response  Taken 3/20/2024 1000  Verbalizes/displays adequate comfort level or baseline comfort level:   Encourage patient to monitor pain and request assistance   Assess pain using appropriate pain scale   Administer analgesics based on type and severity of pain and evaluate response  Taken 3/20/2024 0800  Verbalizes/displays adequate comfort level or baseline comfort level:   Assess pain using appropriate pain scale   Administer analgesics based on type and severity of pain and evaluate response   Encourage patient to monitor pain and request assistance     Problem: Skin/Tissue Integrity  Goal: Absence of new skin breakdown  Description: 1.  Monitor for areas of redness and/or skin breakdown  2.  Assess vascular access sites hourly  3.  Every 4-6 hours minimum:  Change oxygen saturation probe site  4.  Every 4-6 hours:  If on nasal continuous positive airway pressure, respiratory therapy assess nares and

## 2024-03-20 NOTE — CARE COORDINATION
Care Coordination: LOS 1 day, per report- family was to meet with [facility] 11 am.  No documentation in chart. I spoke with Rula at [facility] and no referral received. Met with wife in waiting area, she states she was told [facility] would be here at 11: 00  Reviewed other choices with her, she would like [facility]. Referral called to Rula    Electronically signed by Nayana Espino RN on 3/20/2024 at 10:59 AM

## 2024-03-21 LAB
ACB COMPLEX DNA BLD POS QL NAA+NON-PROBE: NOT DETECTED
B FRAGILIS DNA BLD POS QL NAA+NON-PROBE: NOT DETECTED
BIOFIRE TEST COMMENT: ABNORMAL
C ALBICANS DNA BLD POS QL NAA+NON-PROBE: NOT DETECTED
C AURIS DNA BLD POS QL NAA+NON-PROBE: NOT DETECTED
C GATTII+NEOFOR DNA BLD POS QL NAA+N-PRB: NOT DETECTED
C GLABRATA DNA BLD POS QL NAA+NON-PROBE: NOT DETECTED
C KRUSEI DNA BLD POS QL NAA+NON-PROBE: NOT DETECTED
C PARAP DNA BLD POS QL NAA+NON-PROBE: NOT DETECTED
C TROPICLS DNA BLD POS QL NAA+NON-PROBE: NOT DETECTED
E CLOAC COMP DNA BLD POS NAA+NON-PROBE: NOT DETECTED
E COLI DNA BLD POS QL NAA+NON-PROBE: NOT DETECTED
E FAECALIS DNA BLD POS QL NAA+NON-PROBE: NOT DETECTED
E FAECIUM DNA BLD POS QL NAA+NON-PROBE: NOT DETECTED
ENTEROBACTERALES DNA BLD POS NAA+N-PRB: NOT DETECTED
GP B STREP DNA BLD POS QL NAA+NON-PROBE: NOT DETECTED
HAEM INFLU DNA BLD POS QL NAA+NON-PROBE: NOT DETECTED
K OXYTOCA DNA BLD POS QL NAA+NON-PROBE: NOT DETECTED
KLEBSIELLA SP DNA BLD POS QL NAA+NON-PRB: NOT DETECTED
KLEBSIELLA SP DNA BLD POS QL NAA+NON-PRB: NOT DETECTED
L MONOCYTOG DNA BLD POS QL NAA+NON-PROBE: NOT DETECTED
MECA+MECC+MREJ ISLT/SPM QL: NOT DETECTED
MICROORGANISM SPEC CULT: ABNORMAL
MICROORGANISM SPEC CULT: NORMAL
MICROORGANISM/AGENT SPEC: ABNORMAL
N MEN DNA BLD POS QL NAA+NON-PROBE: NOT DETECTED
NON-GYN CYTOLOGY REPORT: NORMAL
P AERUGINOSA DNA BLD POS NAA+NON-PROBE: NOT DETECTED
PROTEUS SP DNA BLD POS QL NAA+NON-PROBE: NOT DETECTED
S AUREUS DNA BLD POS QL NAA+NON-PROBE: DETECTED
S AUREUS+CONS DNA BLD POS NAA+NON-PROBE: DETECTED
S EPIDERMIDIS DNA BLD POS QL NAA+NON-PRB: NOT DETECTED
S LUGDUNENSIS DNA BLD POS QL NAA+NON-PRB: NOT DETECTED
S MALTOPHILIA DNA BLD POS QL NAA+NON-PRB: NOT DETECTED
S MARCESCENS DNA BLD POS NAA+NON-PROBE: NOT DETECTED
S PNEUM DNA BLD POS QL NAA+NON-PROBE: NOT DETECTED
S PYO DNA BLD POS QL NAA+NON-PROBE: NOT DETECTED
SALMONELLA DNA BLD POS QL NAA+NON-PROBE: NOT DETECTED
SERVICE CMNT-IMP: ABNORMAL
SERVICE CMNT-IMP: ABNORMAL
SPECIMEN DESCRIPTION: ABNORMAL
SPECIMEN DESCRIPTION: NORMAL
STREPTOCOCCUS DNA BLD POS NAA+NON-PROBE: NOT DETECTED

## 2024-03-22 LAB
MICROORGANISM SPEC CULT: NORMAL
MICROORGANISM/AGENT SPEC: NORMAL
SPECIMEN DESCRIPTION: NORMAL

## 2024-03-22 NOTE — PROGRESS NOTES
Physician Progress Note      PATIENT:               ERVIN WHITE  CSN #:                  [id number]  :                       1950  ADMIT DATE:       3/5/2024 9:57 AM  DISCH DATE:        3/8/2024 12:48 PM  RESPONDING  PROVIDER #:        Ant Dahl MD          QUERY TEXT:    Sepsis documented in ED.  Noted to have influenza. If possible, please   document in progress notes and discharge summary:    The medical record reflects the following:  Risk Factors: Influenza  Clinical Indicators: \"ED: Concern for sepsis; Given the patient's fever,   concern for sepsis at this time.  Sepsis was identified at 1030, antibiotics   ordered at this time, vancomycin and cefepime.  Also received Tamiflu.\" On   admit temps 98, 98.8, 101 (rectal). WBC 7.  Procalcitonin 0.19.  Treatment: Antibiotics in the ER, Tamiflu, labs, vitals    Thank you,  Lilia Foster, RN, BSN, CRCR, Clinical Documentation Improvement  Options provided:  -- Viral Sepsis POA  -- Other specified Sepsis POA, please specify type.  -- Sepsis POA  -- Sepsis ruled out  -- Other - I will add my own diagnosis  -- Disagree - Not applicable / Not valid  -- Disagree - Clinically unable to determine / Unknown  -- Refer to Clinical Documentation Reviewer    PROVIDER RESPONSE TEXT:    Viral Sepsis POA.    Query created by: Lilia Foster on 3/20/2024 12:36 PM      Electronically signed by:  Ant Dahl MD 3/22/2024 9:14 AM

## 2024-03-24 LAB
ACB COMPLEX DNA BLD POS QL NAA+NON-PROBE: NOT DETECTED
B FRAGILIS DNA BLD POS QL NAA+NON-PROBE: NOT DETECTED
BIOFIRE TEST COMMENT: ABNORMAL
C ALBICANS DNA BLD POS QL NAA+NON-PROBE: NOT DETECTED
C AURIS DNA BLD POS QL NAA+NON-PROBE: NOT DETECTED
C GATTII+NEOFOR DNA BLD POS QL NAA+N-PRB: NOT DETECTED
C GLABRATA DNA BLD POS QL NAA+NON-PROBE: NOT DETECTED
C KRUSEI DNA BLD POS QL NAA+NON-PROBE: NOT DETECTED
C PARAP DNA BLD POS QL NAA+NON-PROBE: NOT DETECTED
C TROPICLS DNA BLD POS QL NAA+NON-PROBE: NOT DETECTED
E CLOAC COMP DNA BLD POS NAA+NON-PROBE: NOT DETECTED
E COLI DNA BLD POS QL NAA+NON-PROBE: NOT DETECTED
E FAECALIS DNA BLD POS QL NAA+NON-PROBE: NOT DETECTED
E FAECIUM DNA BLD POS QL NAA+NON-PROBE: NOT DETECTED
ENTEROBACTERALES DNA BLD POS NAA+N-PRB: NOT DETECTED
GP B STREP DNA BLD POS QL NAA+NON-PROBE: NOT DETECTED
HAEM INFLU DNA BLD POS QL NAA+NON-PROBE: NOT DETECTED
K OXYTOCA DNA BLD POS QL NAA+NON-PROBE: NOT DETECTED
KLEBSIELLA SP DNA BLD POS QL NAA+NON-PRB: NOT DETECTED
KLEBSIELLA SP DNA BLD POS QL NAA+NON-PRB: NOT DETECTED
L MONOCYTOG DNA BLD POS QL NAA+NON-PROBE: NOT DETECTED
MECA+MECC+MREJ ISLT/SPM QL: NOT DETECTED
MICROORGANISM SPEC CULT: ABNORMAL
MICROORGANISM SPEC CULT: NORMAL
MICROORGANISM/AGENT SPEC: ABNORMAL
N MEN DNA BLD POS QL NAA+NON-PROBE: NOT DETECTED
P AERUGINOSA DNA BLD POS NAA+NON-PROBE: NOT DETECTED
PROTEUS SP DNA BLD POS QL NAA+NON-PROBE: NOT DETECTED
S AUREUS DNA BLD POS QL NAA+NON-PROBE: DETECTED
S AUREUS+CONS DNA BLD POS NAA+NON-PROBE: DETECTED
S EPIDERMIDIS DNA BLD POS QL NAA+NON-PRB: NOT DETECTED
S LUGDUNENSIS DNA BLD POS QL NAA+NON-PRB: NOT DETECTED
S MALTOPHILIA DNA BLD POS QL NAA+NON-PRB: NOT DETECTED
S MARCESCENS DNA BLD POS NAA+NON-PROBE: NOT DETECTED
S PNEUM DNA BLD POS QL NAA+NON-PROBE: NOT DETECTED
S PYO DNA BLD POS QL NAA+NON-PROBE: NOT DETECTED
SALMONELLA DNA BLD POS QL NAA+NON-PROBE: NOT DETECTED
SERVICE CMNT-IMP: ABNORMAL
SERVICE CMNT-IMP: NORMAL
SPECIMEN DESCRIPTION: ABNORMAL
SPECIMEN DESCRIPTION: NORMAL
STREPTOCOCCUS DNA BLD POS NAA+NON-PROBE: NOT DETECTED

## 2024-03-25 LAB
MICROORGANISM SPEC CULT: NORMAL
SERVICE CMNT-IMP: NORMAL
SPECIMEN DESCRIPTION: NORMAL

## 2024-03-27 NOTE — PROGRESS NOTES
Physician Progress Note      PATIENT:               ERIVN WHITE  CSN #:                  [id number]  :                       1950  ADMIT DATE:       3/5/2024 9:57 AM  DISCH DATE:        3/8/2024 12:48 PM  RESPONDING  PROVIDER #:        Ant Dahl MD          QUERY TEXT:    Documentation reflects Pneumonia in the ED notes only.  If possible, please   document in the progress notes and discharge summary if Pneumonia was:    The medical record reflects the following:  Risk Factors: Influenza  Clinical Indicators: ED \"Pneumonia due to infectious organism, unspecified   laterality, unspecified part of lung;Chest x-ray inter myself does show   pneumonia on the right side, radiology read as above\". CXR: The lungs are   without acute focal process. Small right pleural effusion.  Treatment: CXR, Cefepime one dose in ER, Tamiflu continued    Thank you,  Lilia Foster, RN, BSN, CRCR, Clinical Documentation Improvement  Options provided:  -- Pneumonia due to influenza confirmed after study  -- Pneumonia unrelated to influenza confirmed after study  -- Pneumonia ruled out after study  -- Other - I will add my own diagnosis  -- Disagree - Not applicable / Not valid  -- Disagree - Clinically unable to determine / Unknown  -- Refer to Clinical Documentation Reviewer    PROVIDER RESPONSE TEXT:    Pneumonia due to influenza confirmed after study.    Query created by: Lilia Foster on 3/25/2024 10:33 AM      Electronically signed by:  Ant Dahl MD 3/27/2024 3:02 PM

## 2024-03-31 LAB
MICROORGANISM SPEC CULT: NORMAL
MICROORGANISM/AGENT SPEC: NORMAL
SPECIMEN DESCRIPTION: NORMAL

## 2024-04-07 LAB
MICROORGANISM SPEC CULT: NORMAL
MICROORGANISM/AGENT SPEC: NORMAL
SPECIMEN DESCRIPTION: NORMAL

## 2024-04-10 NOTE — DISCHARGE SUMMARY
Physician Discharge Summary     Patient ID:  Neto Baca  [id number]  73 y.o.  1950    Admit date: 3/5/2024    Discharge date and time: 3/8/2024 12:48 PM     Admission Diagnoses: Principal Problem:    Influenza  Resolved Problems:    * No resolved hospital problems. *      Discharge Diagnoses: Principal Problem:    Influenza  Resolved Problems:    * No resolved hospital problems. *      Condition at discharge : Stable    Consults: None    Procedures: None    Hospital Course: 73-year-old gentleman presents to the emergency room with fatigue.  Patient apparently has been having intermittent hypoglycemia at home.  He has not been eating.  Recently had endovascular repair for aortic tear/dissection.  Patient was noted to have influenza.  He was noted to be hypoxic.  He needed oxygen supplementation.  Patient was subsequently weaned off oxygen.  He was placed on Tamiflu.  Patient improved rapidly.  He was then discharged home.    CTA CHEST W CONTRAST   Final Result   1. Stent graft repair of the lower thoracic and upper abdominal aorta. No   evidence of endoleak.  Dilation of the excluded aorta in this region with   prominent mural thrombus which has increased since last time as described.   2. Small right and trace left pleural effusions with associated atelectasis.   3. Coronary artery disease.   4. Mild ascites.   5. Diverticulosis.   6. Moderate/large stool burden. Correlate for constipation.   7. Small fat filled right inguinal hernia.         CTA ABDOMEN PELVIS W CONTRAST   Final Result   1. Stent graft repair of the lower thoracic and upper abdominal aorta. No   evidence of endoleak.  Dilation of the excluded aorta in this region with   prominent mural thrombus which has increased since last time as described.   2. Small right and trace left pleural effusions with associated atelectasis.   3. Coronary artery disease.   4. Mild ascites.   5. Diverticulosis.   6. Moderate/large stool burden. Correlate for

## 2024-04-14 LAB
MICROORGANISM SPEC CULT: NORMAL
MICROORGANISM/AGENT SPEC: NORMAL
SPECIMEN DESCRIPTION: NORMAL

## 2024-04-18 LAB
MICROORGANISM SPEC CULT: NORMAL
MICROORGANISM/AGENT SPEC: NORMAL
SPECIMEN DESCRIPTION: NORMAL

## (undated) DEVICE — DRESSING FOAM W22XL25CM FILVE LAYR FOAM DP DEF SAFETAC

## (undated) DEVICE — Z DISCONTINUED USE 2624852 GLOVE SURG 7 PF TEXT NEOPRNE BRN STRL NEOLON 2G LF

## (undated) DEVICE — CATHETER,URETHRAL,REDRUBBER,STRL,18FR: Brand: MEDLINE

## (undated) DEVICE — ADHESIVE SKIN CLOSURE TOP 36 CC HI VISC DERMBND MINI

## (undated) DEVICE — TAPE ADH W2INXL10YD WHT PAPR GENTLE BRTH FLX COMFORTABLE

## (undated) DEVICE — COLUMN DRAPE

## (undated) DEVICE — SNAP KOVER: Brand: UNBRANDED

## (undated) DEVICE — AGENT HEMOSTATIC SURG ORIGINAL ABS 4X8IN LOOSE KNIT 12/BX

## (undated) DEVICE — GOWN,SIRUS,FABRNF,L,20/CS: Brand: MEDLINE

## (undated) DEVICE — TOWEL,OR,DSP,ST,BLUE,STD,6/PK,12PK/CS: Brand: MEDLINE

## (undated) DEVICE — BENTSON WIRE GUIDE 20CM DISTAL FLEXIBILITY WITH SOFTENED TIP: Brand: BENTSON

## (undated) DEVICE — SET CARDIAC II

## (undated) DEVICE — SHEATH INTRO 20FR L33CM OD7.5MM ID6.7MM HYDRPHLC KINK

## (undated) DEVICE — TAPE ADH W2INXL10YD PLAS TRNSPAR H2O RESIST HYPOALRG CURAD

## (undated) DEVICE — RADIFOCUS GLIDECATH: Brand: GLIDECATH

## (undated) DEVICE — DRAIN CHN 19FR L0.25MM DIA6.3MM SIL RND HUBLESS FULL FLUT

## (undated) DEVICE — KIT,ANTI FOG,W/SPONGE & FLUID,SOFT PACK: Brand: MEDLINE

## (undated) DEVICE — SOLUTION IRRIG 1000ML 0.9% SOD CHL USP POUR PLAS BTL

## (undated) DEVICE — LANCET AORTOTOMY 3.3X10MM

## (undated) DEVICE — 18GA (1.3MM OD: 1.0MM ID) X 7CM INTRODUCER18GA X 7CM NEEDLENEEDLE: Brand: INTRODUCER NEEDLEINTRODUCER NEEDLE

## (undated) DEVICE — MONOPOLAR CURVED SCISSORS: Brand: ENDOWRIST

## (undated) DEVICE — Z INACTIVE USE 2662641 SOLUTION IV 1000ML 140MEQ/L SOD 5MEQ/L K 3MEQ/L MG 27MEQ/L

## (undated) DEVICE — BLADE,STAINLESS-STEEL,11,STRL,DISPOSABLE: Brand: MEDLINE

## (undated) DEVICE — SET SURG BASIN OPEN HEART NO  1 REUSABLE

## (undated) DEVICE — DRAPE EQUIP BANDED BG 36X28 IN W/ROUNDED CORNER SNAPKOVER

## (undated) DEVICE — DRAPE,LAP,CHOLE,W/TROUGHS,STERILE: Brand: MEDLINE

## (undated) DEVICE — Device

## (undated) DEVICE — BEACON TIP TORCON NB ADVANTAGE CATHETER: Brand: BEACON TIP TORCON NB

## (undated) DEVICE — SOLUTION IRRIG 1000ML STRL H2O USP PLAS POUR BTL

## (undated) DEVICE — MICROPUNCTURE INTRODUCER SET SILHOUETTE TRANSITIONLESS WITH NITINOL WIRE GUIDE: Brand: MICROPUNCTURE

## (undated) DEVICE — GLOVE SURG SZ 6 THK91MIL LTX FREE SYN POLYISOPRENE ANTI

## (undated) DEVICE — ELECTRODE PT RET AD L9FT HI MOIST COND ADH HYDRGEL CORDED

## (undated) DEVICE — 3M™ TEGADERM™ CHG DRESSING 25/CARTON 4 CARTONS/CASE 1657: Brand: TEGADERM™

## (undated) DEVICE — SYRINGE BLB 50CC IRRIG PLIABLE FNGR FLNG GRAD FLSK DISP

## (undated) DEVICE — DOUBLE BASIN SET: Brand: MEDLINE INDUSTRIES, INC.

## (undated) DEVICE — GLOVE SURG SZ 6.5 L11.2IN FNGR THK9.8MIL STRW LTX POLYMER

## (undated) DEVICE — DRAIN SURG SGL COLL PT TB FOR ATS BG OASIS

## (undated) DEVICE — BLADE OPHTH 180DEG CUT SURF BLU STR SHRP DBL BVL GRINDLESS

## (undated) DEVICE — CODA, LP BALLOON CATHETER: Brand: CODA

## (undated) DEVICE — PICO 7 10CM X 30CM: Brand: PICO™ 7

## (undated) DEVICE — PACK PROCEDURE SURG GEN CUST

## (undated) DEVICE — SHEATH INTRO 5FR L12CM GWIRE 0.038IN W/ SMOOTH TAPERS TIP

## (undated) DEVICE — GLOVE SURG SZ 65 THK91MIL LTX FREE SYN POLYISOPRENE

## (undated) DEVICE — KIT MFLD ISOLATN NACL CNTRST PRT TBNG SPIK W/ PRSS TRNSDUC

## (undated) DEVICE — SURGICAL PROCEDURE PACK CRD SEHC

## (undated) DEVICE — TUBING, SUCTION, 3/16" X 12', STRAIGHT: Brand: MEDLINE

## (undated) DEVICE — CLIP INT M L GRN TI TRNSVRS GRV CHEVRON SHP W/ PRECIS TIP

## (undated) DEVICE — CADIERE FORCEPS: Brand: ENDOWRIST

## (undated) DEVICE — DRAIN SURG L3/8-1/2IN DIA3/16IN SIL CARD CONN 1:1 BLAK

## (undated) DEVICE — BLOWER COR ART L16.5CM PLAS SHFT MAL W/ MIST IV SET AXIUS

## (undated) DEVICE — Device: Brand: VIRTUOSAPH PLUS WITH RADIAL INDICATION

## (undated) DEVICE — PACK OPEN HRT DRP

## (undated) DEVICE — MEDIA CONTRAST ISOVUE 300 100ML

## (undated) DEVICE — DEVICE VASC CLSR 5FR GRY W/ INTEGR SEAL 10ML LOK SYR

## (undated) DEVICE — LIQUIBAND RAPID ADHESIVE 36/CS 0.8ML: Brand: MEDLINE

## (undated) DEVICE — GOWN,SIRUS,FABRNF,XL,20/CS: Brand: MEDLINE

## (undated) DEVICE — TTL1LYR 16FR10ML 100%SIL TMPST TR: Brand: MEDLINE

## (undated) DEVICE — COVER,MAYO STAND,STERILE: Brand: MEDLINE

## (undated) DEVICE — CARDIAC STRYKER STERNAL SAW

## (undated) DEVICE — TUBING ANGIO L48IN POLYUR HI PRSS 1200PSI AIRLESS ROT ADPT

## (undated) DEVICE — GLOVE ORANGE PI 7 1/2   MSG9075

## (undated) DEVICE — ENDOVASCULAR: Brand: MEDLINE INDUSTRIES, INC.

## (undated) DEVICE — KIT ANGIO W/ AT P65 PREM HND CTRL FOR CNTRST DEL ANGIOTOUCH

## (undated) DEVICE — SHEATH INTRO 8FR L12CM GWIRE 0.038IN W/ SMOOTH TAPERS TIP

## (undated) DEVICE — CLOTH SURG PREP PREOPERATIVE CHLORHEXIDINE GLUC 2% READYPREP

## (undated) DEVICE — 3M™ STERI-DRAPE™ INCISE DRAPE 1050 (60CM X 45CM): Brand: STERI-DRAPE™

## (undated) DEVICE — 1LYRTR 16FR10ML 100%SILI SNAP: Brand: MEDLINE INDUSTRIES, INC.

## (undated) DEVICE — SODIUM CHL 09% SOL EXCEL

## (undated) DEVICE — CANNULA INJ L2.5IN BLNT TIP 3MM CLR BODY W/ 1 W VLV DLP

## (undated) DEVICE — AGENT HEMSTAT W4XL8IN OXIDIZED REGENERATED CELOS ABSRB

## (undated) DEVICE — TIP COVER ACCESSORY

## (undated) DEVICE — SET ACB VEIN

## (undated) DEVICE — KIT MED IMAG CNTRST AGNT W/ IOPAMIDOL REUSE

## (undated) DEVICE — BLADELESS OBTURATOR: Brand: WECK VISTA

## (undated) DEVICE — PADDLE INTERN DEFIB CHILD

## (undated) DEVICE — DRAPE SHEET: Brand: UNBRANDED

## (undated) DEVICE — INSUFFLATION TUBING SET WITH FILTER, FUNNEL CONNECTOR AND LUER LOCK: Brand: JOSNOE MEDICAL INC

## (undated) DEVICE — TFE COATED AMPLATZ ULTRA STIFF WIRE GUIDE - CATHETER EXCHANGE: Brand: AMPLATZ

## (undated) DEVICE — WARMER SCP 2 ANTIFOG LAP DISP

## (undated) DEVICE — GARMENT,MEDLINE,DVT,INT,CALF,MED, GEN2: Brand: MEDLINE

## (undated) DEVICE — CONNECTOR DRNGE W3/8-0.5XH3/16XL3/16IN 2:1 SIL CARD STR

## (undated) DEVICE — GLOVE ORANGE PI 7   MSG9070

## (undated) DEVICE — PERCLOSE PROGLIDE™ SUTURE-MEDIATED CLOSURE SYSTEM: Brand: PERCLOSE PROGLIDE™

## (undated) DEVICE — BLADE CLIPPER GEN PURP NS

## (undated) DEVICE — MEDIA CONTRAST ISOVUE 370 100ML

## (undated) DEVICE — BASIC SINGLE BASIN 1-LF: Brand: MEDLINE INDUSTRIES, INC.

## (undated) DEVICE — CLAMP ABLAT JAW L65MM L CRV 2 ELECTRD BPLR

## (undated) DEVICE — LOOP VES W25MM THK1MM MAXI RED SIL FLD REPELLENT 100 PER

## (undated) DEVICE — MEGA NEEDLE DRIVER: Brand: ENDOWRIST

## (undated) DEVICE — APPLICATOR MEDICATED 26 CC SOLUTION HI LT ORNG CHLORAPREP

## (undated) DEVICE — FIXED CORE WIRE GUIDE SAFE-T-J, CURVED: Brand: COOK

## (undated) DEVICE — TOWEL,OR,DSP,ST,WHITE,DLX,4/PK,20PK/CS: Brand: MEDLINE

## (undated) DEVICE — SOLUTION IV 50ML 0.9% SOD CHL PLAS CONT USP VIAFLX

## (undated) DEVICE — INSUFFLATION NEEDLE TO ESTABLISH PNEUMOPERITONEUM.: Brand: INSUFFLATION NEEDLE

## (undated) DEVICE — 6 FOOT DISPOSABLE EXTENSION CABLE WITH SAFE CONNECT / SCREW-DOWN

## (undated) DEVICE — SWAB SPEC COLL SHFT L5.25IN POLYUR FOAM TIP SFT DBL MEDIA

## (undated) DEVICE — E-Z CLEAN, NON-STICK, PTFE COATED, ELECTROSURGICAL BLADE ELECTRODE, MODIFIED EXTENDED INSULATION, 6.5 INCH (16.5 CM): Brand: MEGADYNE

## (undated) DEVICE — SYRINGE MED 50ML LUERLOCK TIP

## (undated) DEVICE — CATHETER ANGIO AD 5FR L65CM DIA0.046IN GWIRE 0.035IN BERN

## (undated) DEVICE — KIT HND CTRL 3 W STPCOCK ROT END 54IN PREM HI PRSS TBNG AT

## (undated) DEVICE — ALCOHOL RUBBING ISO 16OZ 70%

## (undated) DEVICE — RADIFOCUS GLIDEWIRE ADVANTAGE GUIDEWIRE: Brand: GLIDEWIRE ADVANTAGE

## (undated) DEVICE — GLOVE SURG SZ 6 L12IN FNGR THK94MIL TRNSLUC YEL LTX HYDRGEL

## (undated) DEVICE — 18 GA N.G. KIT, 10 PACK: Brand: SITE-RITE

## (undated) DEVICE — SET CARDIAC I

## (undated) DEVICE — ARM DRAPE

## (undated) DEVICE — APPLICATOR MEDICATED 26 CC TINT HI-LITE ORNG STRL CHLORAPREP

## (undated) DEVICE — CANNULA SEAL

## (undated) DEVICE — 1.5L THIN WALL CAN: Brand: CRD

## (undated) DEVICE — SYRINGE MED 20ML STD CLR PLAS LUERSLIP TIP N CTRL DISP

## (undated) DEVICE — CATHETER VASC DIAG MOD PERIPH W/O HYDRPHLC COAT AD

## (undated) DEVICE — LABEL MED CARD SURG 4 IN PANEL STRL

## (undated) DEVICE — Device: Brand: CLEANCUT ROTATING AORTIC PUNCH